# Patient Record
Sex: MALE | Race: WHITE
[De-identification: names, ages, dates, MRNs, and addresses within clinical notes are randomized per-mention and may not be internally consistent; named-entity substitution may affect disease eponyms.]

---

## 2019-03-27 ENCOUNTER — TRANSCRIPTION ENCOUNTER (OUTPATIENT)
Age: 59
End: 2019-03-27

## 2022-01-19 ENCOUNTER — APPOINTMENT (OUTPATIENT)
Dept: GASTROENTEROLOGY | Facility: CLINIC | Age: 62
End: 2022-01-19
Payer: COMMERCIAL

## 2022-01-19 VITALS
TEMPERATURE: 97 F | SYSTOLIC BLOOD PRESSURE: 170 MMHG | BODY MASS INDEX: 42.44 KG/M2 | HEART RATE: 67 BPM | HEIGHT: 68 IN | OXYGEN SATURATION: 95 % | WEIGHT: 280 LBS | DIASTOLIC BLOOD PRESSURE: 90 MMHG

## 2022-01-19 DIAGNOSIS — E03.9 HYPOTHYROIDISM, UNSPECIFIED: ICD-10-CM

## 2022-01-19 DIAGNOSIS — Z86.010 PERSONAL HISTORY OF COLONIC POLYPS: ICD-10-CM

## 2022-01-19 DIAGNOSIS — Z87.440 PERSONAL HISTORY OF URINARY (TRACT) INFECTIONS: ICD-10-CM

## 2022-01-19 DIAGNOSIS — K80.20 CALCULUS OF GALLBLADDER W/OUT CHOLECYSTITIS W/OUT OBSTRUCTION: ICD-10-CM

## 2022-01-19 DIAGNOSIS — Z87.19 PERSONAL HISTORY OF OTHER DISEASES OF THE DIGESTIVE SYSTEM: ICD-10-CM

## 2022-01-19 DIAGNOSIS — I10 ESSENTIAL (PRIMARY) HYPERTENSION: ICD-10-CM

## 2022-01-19 DIAGNOSIS — G47.33 OBSTRUCTIVE SLEEP APNEA (ADULT) (PEDIATRIC): ICD-10-CM

## 2022-01-19 DIAGNOSIS — K58.2 MIXED IRRITABLE BOWEL SYNDROME: ICD-10-CM

## 2022-01-19 DIAGNOSIS — Z78.9 OTHER SPECIFIED HEALTH STATUS: ICD-10-CM

## 2022-01-19 DIAGNOSIS — K76.0 FATTY (CHANGE OF) LIVER, NOT ELSEWHERE CLASSIFIED: ICD-10-CM

## 2022-01-19 DIAGNOSIS — M51.26 OTHER INTERVERTEBRAL DISC DISPLACEMENT, LUMBAR REGION: ICD-10-CM

## 2022-01-19 DIAGNOSIS — K35.32 ACUTE APPENDICITIS W/ PERFORATION AND LOCALIZED PERITONITIS, W/O ABSCESS: ICD-10-CM

## 2022-01-19 DIAGNOSIS — Z87.01 PERSONAL HISTORY OF PNEUMONIA (RECURRENT): ICD-10-CM

## 2022-01-19 PROCEDURE — 99205 OFFICE O/P NEW HI 60 MIN: CPT

## 2022-01-19 RX ORDER — DULOXETINE HYDROCHLORIDE 60 MG/1
60 CAPSULE, DELAYED RELEASE ORAL
Refills: 0 | Status: ACTIVE | COMMUNITY

## 2022-01-19 RX ORDER — PANTOPRAZOLE SODIUM 40 MG/1
40 TABLET, DELAYED RELEASE ORAL
Refills: 0 | Status: ACTIVE | COMMUNITY

## 2022-01-19 RX ORDER — ATENOLOL 100 MG/1
100 TABLET ORAL
Refills: 0 | Status: ACTIVE | COMMUNITY

## 2022-01-19 RX ORDER — AMLODIPINE BESYLATE 5 MG/1
5 TABLET ORAL
Refills: 0 | Status: ACTIVE | COMMUNITY

## 2022-01-19 RX ORDER — HYOSCYAMINE SULFATE 0.375 MG
0.38 CAPSULE, EXTENDED RELEASE 12 HR ORAL
Refills: 0 | Status: ACTIVE | COMMUNITY

## 2022-01-19 RX ORDER — GABAPENTIN 300 MG/1
300 CAPSULE ORAL
Refills: 0 | Status: ACTIVE | COMMUNITY

## 2022-01-19 RX ORDER — LORATADINE 10 MG
TABLET,DISINTEGRATING ORAL
Refills: 0 | Status: ACTIVE | COMMUNITY

## 2022-01-19 RX ORDER — LEVOTHYROXINE SODIUM 75 UG/1
75 TABLET ORAL
Refills: 0 | Status: ACTIVE | COMMUNITY

## 2022-01-19 NOTE — CONSULT LETTER
[Dear  ___] : Dear  [unfilled], [Consult Letter:] : I had the pleasure of evaluating your patient, [unfilled]. [( Thank you for referring [unfilled] for consultation for _____ )] : Thank you for referring [unfilled] for consultation for [unfilled] [FreeTextEntry2] : Dr. Shannan Victoria

## 2022-01-19 NOTE — ASSESSMENT
[FreeTextEntry1] : Impression:\par \par #1 GERD–decade long history of symptoms described as typical heartburn described as retrosternal burning in association with regurgitation and epigastric burning and discomfort. Symptoms almost exclusively nocturnal when recumbent. Per patient–esophageal physiology testing noted for hypotensive LES (await reports for review and confirmation).\par \par #2 dysphagia–describes solid food dysphagia of 10 years duration. Evaluate for stricture. May be esophageal dysmotility related to ineffective motility from chronic GERD.\par \par #3 IBS–typical symptoms of intermittent lower abdominal crampy discomfort in association with regular bowel movements (mostly constipation improved on MiraLAX although has occasional diarrhea).\par \par #4 nonalcoholic fatty liver disease–associated with obesity.\par \par #5 history of colonic polyps–per patient (if had adenoma or sessile serrated polyp would pose increased risk of metachronous lesions).\par \par #6 perforated appendicitis 1971–history of peritonitis. Status post appendectomy.\par \par #7 status post laparoscopic cholecystectomy 1997.\par \par #8 history recurrent small bowel obstruction–attributed to adhesions. Status post laparotomy for SBO 1995.\par \par #9 abdominal wall hernias–hernia repair with mesh 1996 and ventral hernia repair 1998.\par \par #10 obesity–BMI 42.57.\par \par #11 history duodenal ulcer–per patient. Reports testing negative for Helicobacter pylori.\par \par General medical problems:\par \par -Hypertension.\par \par -Hypothyroid.\par \par -History pneumonia.\par \par -Obstructive sleep apnea (on CPAP).\par \par -History lumbar discogenic disease status post lumbar laminectomy 2007. Apparently had complications requiring reoperation. Has chronic low back pain and impaired ambulation.

## 2022-01-19 NOTE — REVIEW OF SYSTEMS
[As Noted in HPI] : as noted in HPI [Limb Pain] : limb pain [Dizziness] : dizziness [Difficulty Walking] : difficulty walking [Anxiety] : anxiety [Depression] : depression [Negative] : Heme/Lymph [FreeTextEntry9] : Chronic back pain. Difficulty ambulating. Joint pain, neck pain, back pain. [de-identified] : Lower extremity numbness.

## 2022-01-19 NOTE — PHYSICAL EXAM
[General Appearance - Alert] : alert [General Appearance - In No Acute Distress] : in no acute distress [General Appearance - Well Developed] : well developed [General Appearance - Well-Appearing] : healthy appearing [Sclera] : the sclera and conjunctiva were normal [Neck Appearance] : the appearance of the neck was normal [Neck Cervical Mass (___cm)] : no neck mass was observed [Respiration, Rhythm And Depth] : normal respiratory rhythm and effort [Exaggerated Use Of Accessory Muscles For Inspiration] : no accessory muscle use [Auscultation Breath Sounds / Voice Sounds] : lungs were clear to auscultation bilaterally [Heart Rate And Rhythm] : heart rate was normal and rhythm regular [Heart Sounds] : normal S1 and S2 [Heart Sounds Gallop] : no gallops [Murmurs] : no murmurs [Heart Sounds Pericardial Friction Rub] : no pericardial rub [Bowel Sounds] : normal bowel sounds [Abdomen Soft] : soft [Abdomen Tenderness] : non-tender [] : no hepato-splenomegaly [Abdomen Mass (___ Cm)] : no abdominal mass palpated [Abdomen Hernia] : no hernia was discovered [Cervical Lymph Nodes Enlarged Posterior Bilaterally] : posterior cervical [Cervical Lymph Nodes Enlarged Anterior Bilaterally] : anterior cervical [Supraclavicular Lymph Nodes Enlarged Bilaterally] : supraclavicular [No CVA Tenderness] : no ~M costovertebral angle tenderness [Nail Clubbing] : no clubbing  or cyanosis of the fingernails [Skin Color & Pigmentation] : normal skin color and pigmentation [Oriented To Time, Place, And Person] : oriented to person, place, and time [Impaired Insight] : insight and judgment were intact [Affect] : the affect was normal [Mood] : the mood was normal [FreeTextEntry1] : Ambulates with a cane.

## 2022-01-19 NOTE — HISTORY OF PRESENT ILLNESS
[FreeTextEntry1] : Office consultation on 1/19/2022.\par \par The patient is a 61-year-old man evaluated for consultation regarding severe GERD symptoms. PMD: Dr. Cass Victoria.\par \par The patient seeks consultation regarding severe persistent GERD symptoms. In addition, he has a history of IBS, nonalcoholic fatty liver disease, obesity and warrants screening colonoscopy.\par \par Patient indicates issues of heartburn and GERD since teenage years. Longstanding heartburn described as typical retrosternal burning. In his 20s he was in the [employer] for 6 years and was diagnosed with GERD and at varying times treated with dietary modification, Zantac or Tagamet.\par \par The patient underwent lumbar laminectomy in 2007 and indicates having a poor outcome. Increased back pain associated with difficulty ambulating. Has gained significant weight. GERD symptoms have increased in severity since that time.\par \par Patient indicates last colonoscopy was in 2013 and last upper endoscopy in 2016. Patient believes colon polyps were removed at prior colonoscopy examinations.\par \par Indicates being evaluated by GI at [place] and underwent's esophageal function testing describing weak LES. Indicates was recommended to have Stretta procedure but insurance would not cover. Never evaluated by thoracic surgery for antireflux surgery. Patient unsure as to the type of diagnostic evaluation but may have had esophageal HRM and possibly ambulatory pH monitoring.\par \par Patient indicates daily complaints of heartburn and regurgitation. However, symptoms almost exclusively when recumbent. Does not indicate significant daytime symptoms when upright. When recumbent describes sensation of feeling regurgitation ("something coming up") in association with heartburn described as retrosternal burning. Experiences associated epigastric burning and discomfort in conjunction with heartburn symptoms.\par \par Currently on a regimen of pantoprazole 40 mg twice daily administered AC. Indicates he has tried other PPI therapies but finds pantoprazole works the best. As noted, despite this regimen he indicates breakthrough heartburn almost on a daily basis at night when recumbent. In the past has utilized H2 blockers but not recently. Tends to sleep with head of bed elevated.\par \par Appetite stable. Since back surgery has gained considerable weight. However, over the past 6 months he has lost 25 pounds. Swallows liquids without any difficulty and reports no choking, coughing or nasal regurgitation. For the past 10 years he has had intermittent episodes of solid food dysphagia described as transient sense of bolus hang up of solid food located at the sternal notch. Culprit foods might include bread. Episodes occur approximately 3 times per week. Typically drinks water to push bolus down. Does not describe having to retch bolus. Never had an episode of esophageal food bolus impaction.\par \par As noted, experiencing daily episodes of heartburn feelings of regurgitation but almost exclusively when recumbent. Has occasional nausea but no vomiting. Past 2 to 3 years describes feeling of early satiety. As noted, in association with GERD symptoms describes sensation of epigastric burning and feeling of tenderness at epigastrium.\par \par Patient also reports a history of IBS. Indicates experiencing intermittent lower abdominal cramps in association with irregular bowel movements. Mostly on the constipated side and on a regimen of MiraLAX he typically has Wright 5 or Wright 6 soft bowel movements daily to every other day. Mostly constipated but has occasional diarrhea. Denies any rectal bleeding. Stress exacerbates IBS symptoms.\par \par In addition to history of GERD patient reports prior diagnosis of duodenal ulcer but indicates he was Helicobacter pylori negative. Has history of gallstones and underwent laparoscopic cholecystectomy in 1997.\par \par Experienced perforated ruptured appendicitis complicated by peritonitis in 1971. Describes prolonged hospitalization of 1 month at that time. Experience pneumonia.\par \par Operated on for small bowel obstruction attributed to adhesions in 1995. Does not believe he had bowel resection.\par \par Underwent "acute ventral hernia repair" in 1998 and also describes abdominal hernia repair with mesh insertion in 1996.\par \par Has a history of obesity and elevated liver enzymes attributed to nonalcoholic fatty liver disease.\par \par Patient reports no other history of digestive illness except as noted. Family history of colon cancer is not reported.

## 2022-01-24 ENCOUNTER — NON-APPOINTMENT (OUTPATIENT)
Age: 62
End: 2022-01-24

## 2022-01-31 NOTE — ASU PATIENT PROFILE, ADULT - FALL HARM RISK - UNIVERSAL INTERVENTIONS
Bed in lowest position, wheels locked, appropriate side rails in place/Call bell, personal items and telephone in reach/Instruct patient to call for assistance before getting out of bed or chair/Non-slip footwear when patient is out of bed/[place] to call system/Physically safe environment - no spills, clutter or unnecessary equipment/Purposeful Proactive Rounding/Room/bathroom lighting operational, light cord in reach

## 2022-01-31 NOTE — ASU PATIENT PROFILE, ADULT - PAIN LOCATION
patient lives with chronic Pain. patient takes morphine at home for Patient had an acceptable level of pain while in Endoscopy. and repositions.

## 2022-01-31 NOTE — ASU PATIENT PROFILE, ADULT - NSICDXPASTMEDICALHX_GEN_ALL_CORE_FT
PAST MEDICAL HISTORY:  GERD (gastroesophageal reflux disease)     HTN (hypertension)     IBS (irritable bowel syndrome)     SBO (small bowel obstruction)

## 2022-01-31 NOTE — ASU PATIENT PROFILE, ADULT - NSICDXPASTSURGICALHX_GEN_ALL_CORE_FT
PAST SURGICAL HISTORY:  H/O exploratory laparotomy     H/O hernia repair     History of appendectomy     History of lumbar fusion

## 2022-02-01 ENCOUNTER — NON-APPOINTMENT (OUTPATIENT)
Age: 62
End: 2022-02-01

## 2022-02-01 ENCOUNTER — OUTPATIENT (OUTPATIENT)
Dept: OUTPATIENT SERVICES | Facility: HOSPITAL | Age: 62
LOS: 1 days | Discharge: HOME | End: 2022-02-01
Payer: MEDICARE

## 2022-02-01 ENCOUNTER — RESULT REVIEW (OUTPATIENT)
Age: 62
End: 2022-02-01

## 2022-02-01 ENCOUNTER — APPOINTMENT (OUTPATIENT)
Dept: GASTROENTEROLOGY | Facility: HOSPITAL | Age: 62
End: 2022-02-01

## 2022-02-01 VITALS
HEIGHT: 69 IN | RESPIRATION RATE: 17 BRPM | DIASTOLIC BLOOD PRESSURE: 82 MMHG | WEIGHT: 274.92 LBS | HEART RATE: 66 BPM | TEMPERATURE: 99 F | OXYGEN SATURATION: 98 % | SYSTOLIC BLOOD PRESSURE: 161 MMHG

## 2022-02-01 VITALS
OXYGEN SATURATION: 98 % | RESPIRATION RATE: 20 BRPM | SYSTOLIC BLOOD PRESSURE: 141 MMHG | DIASTOLIC BLOOD PRESSURE: 80 MMHG

## 2022-02-01 DIAGNOSIS — Z98.890 OTHER SPECIFIED POSTPROCEDURAL STATES: Chronic | ICD-10-CM

## 2022-02-01 DIAGNOSIS — Z98.1 ARTHRODESIS STATUS: Chronic | ICD-10-CM

## 2022-02-01 DIAGNOSIS — Z12.11 ENCOUNTER FOR SCREENING FOR MALIGNANT NEOPLASM OF COLON: ICD-10-CM

## 2022-02-01 PROCEDURE — 45385 COLONOSCOPY W/LESION REMOVAL: CPT

## 2022-02-01 PROCEDURE — 43239 EGD BIOPSY SINGLE/MULTIPLE: CPT

## 2022-02-01 PROCEDURE — 88305 TISSUE EXAM BY PATHOLOGIST: CPT | Mod: 26

## 2022-02-01 DEVICE — CLIP RESOLUTION 360 235CM: Type: IMPLANTABLE DEVICE | Status: FUNCTIONAL

## 2022-02-01 DEVICE — CATH ENDOFLIP MEASURE 16MM: Type: IMPLANTABLE DEVICE | Status: FUNCTIONAL

## 2022-02-04 LAB — SURGICAL PATHOLOGY STUDY: SIGNIFICANT CHANGE UP

## 2022-02-06 ENCOUNTER — NON-APPOINTMENT (OUTPATIENT)
Age: 62
End: 2022-02-06

## 2022-02-06 DIAGNOSIS — Z12.11 ENCOUNTER FOR SCREENING FOR MALIGNANT NEOPLASM OF COLON: ICD-10-CM

## 2022-02-09 PROBLEM — K58.9 IRRITABLE BOWEL SYNDROME WITHOUT DIARRHEA: Chronic | Status: ACTIVE | Noted: 2022-02-01

## 2022-02-09 PROBLEM — K58.9 IRRITABLE BOWEL SYNDROME, UNSPECIFIED: Chronic | Status: ACTIVE | Noted: 2022-02-01

## 2022-02-09 PROBLEM — K56.609 UNSPECIFIED INTESTINAL OBSTRUCTION, UNSPECIFIED AS TO PARTIAL VERSUS COMPLETE OBSTRUCTION: Chronic | Status: ACTIVE | Noted: 2022-02-01

## 2022-02-09 PROBLEM — I10 ESSENTIAL (PRIMARY) HYPERTENSION: Chronic | Status: ACTIVE | Noted: 2022-02-01

## 2022-02-14 ENCOUNTER — APPOINTMENT (OUTPATIENT)
Dept: GASTROENTEROLOGY | Facility: CLINIC | Age: 62
End: 2022-02-14
Payer: MEDICARE

## 2022-02-14 PROCEDURE — 99442: CPT

## 2022-02-14 RX ORDER — SODIUM SULFATE, POTASSIUM SULFATE, MAGNESIUM SULFATE 17.5; 3.13; 1.6 G/ML; G/ML; G/ML
17.5-3.13-1.6 SOLUTION, CONCENTRATE ORAL
Qty: 1 | Refills: 0 | Status: COMPLETED | COMMUNITY
Start: 2022-01-19 | End: 2022-02-14

## 2022-02-15 ENCOUNTER — NON-APPOINTMENT (OUTPATIENT)
Age: 62
End: 2022-02-15

## 2022-02-16 ENCOUNTER — NON-APPOINTMENT (OUTPATIENT)
Age: 62
End: 2022-02-16

## 2022-02-21 ENCOUNTER — TRANSCRIPTION ENCOUNTER (OUTPATIENT)
Age: 62
End: 2022-02-21

## 2022-02-28 ENCOUNTER — NON-APPOINTMENT (OUTPATIENT)
Age: 62
End: 2022-02-28

## 2022-03-11 ENCOUNTER — APPOINTMENT (OUTPATIENT)
Dept: GASTROENTEROLOGY | Facility: HOSPITAL | Age: 62
End: 2022-03-11

## 2022-04-06 ENCOUNTER — NON-APPOINTMENT (OUTPATIENT)
Age: 62
End: 2022-04-06

## 2022-04-11 NOTE — ASU PATIENT PROFILE, ADULT - FALL HARM RISK - UNIVERSAL INTERVENTIONS
Bed in lowest position, wheels locked, appropriate side rails in place/Call bell, personal items and telephone in reach/Instruct patient to call for assistance before getting out of bed or chair/Non-slip footwear when patient is out of bed/[place] to call system/Physically safe environment - no spills, clutter or unnecessary equipment/Purposeful Proactive Rounding/Room/bathroom lighting operational, light cord in reach

## 2022-04-12 ENCOUNTER — OUTPATIENT (OUTPATIENT)
Dept: OUTPATIENT SERVICES | Facility: HOSPITAL | Age: 62
LOS: 1 days | Discharge: HOME | End: 2022-04-12
Payer: MEDICARE

## 2022-04-12 ENCOUNTER — APPOINTMENT (OUTPATIENT)
Dept: GASTROENTEROLOGY | Facility: HOSPITAL | Age: 62
End: 2022-04-12
Payer: MEDICARE

## 2022-04-12 VITALS
HEIGHT: 68 IN | TEMPERATURE: 97 F | SYSTOLIC BLOOD PRESSURE: 147 MMHG | RESPIRATION RATE: 16 BRPM | OXYGEN SATURATION: 100 % | HEART RATE: 88 BPM | WEIGHT: 265 LBS | DIASTOLIC BLOOD PRESSURE: 88 MMHG

## 2022-04-12 DIAGNOSIS — K21.9 GASTRO-ESOPHAGEAL REFLUX DISEASE WITHOUT ESOPHAGITIS: ICD-10-CM

## 2022-04-12 DIAGNOSIS — R13.19 OTHER DYSPHAGIA: ICD-10-CM

## 2022-04-12 DIAGNOSIS — Z98.1 ARTHRODESIS STATUS: Chronic | ICD-10-CM

## 2022-04-12 DIAGNOSIS — Z98.890 OTHER SPECIFIED POSTPROCEDURAL STATES: Chronic | ICD-10-CM

## 2022-04-12 PROCEDURE — 91037 ESOPH IMPED FUNCTION TEST: CPT | Mod: 26

## 2022-04-12 PROCEDURE — 91010 ESOPHAGUS MOTILITY STUDY: CPT | Mod: 26

## 2022-04-12 DEVICE — CATH VERSAFLEX Z PH: Type: IMPLANTABLE DEVICE | Status: FUNCTIONAL

## 2022-04-13 PROCEDURE — 91038 ESOPH IMPED FUNCT TEST > 1HR: CPT | Mod: 26

## 2022-05-10 ENCOUNTER — RX RENEWAL (OUTPATIENT)
Age: 62
End: 2022-05-10

## 2022-05-10 ENCOUNTER — NON-APPOINTMENT (OUTPATIENT)
Age: 62
End: 2022-05-10

## 2022-05-19 ENCOUNTER — APPOINTMENT (OUTPATIENT)
Dept: GASTROENTEROLOGY | Facility: CLINIC | Age: 62
End: 2022-05-19

## 2022-05-25 PROBLEM — R13.19 ESOPHAGEAL DYSPHAGIA: Status: ACTIVE | Noted: 2022-01-19

## 2022-05-26 ENCOUNTER — NON-APPOINTMENT (OUTPATIENT)
Age: 62
End: 2022-05-26

## 2022-05-26 ENCOUNTER — APPOINTMENT (OUTPATIENT)
Dept: THORACIC SURGERY | Facility: CLINIC | Age: 62
End: 2022-05-26
Payer: MEDICARE

## 2022-05-26 VITALS
DIASTOLIC BLOOD PRESSURE: 84 MMHG | BODY MASS INDEX: 42.44 KG/M2 | HEIGHT: 68 IN | OXYGEN SATURATION: 96 % | WEIGHT: 280 LBS | HEART RATE: 73 BPM | RESPIRATION RATE: 17 BRPM | SYSTOLIC BLOOD PRESSURE: 142 MMHG

## 2022-05-26 DIAGNOSIS — Z82.49 FAMILY HISTORY OF ISCHEMIC HEART DISEASE AND OTHER DISEASES OF THE CIRCULATORY SYSTEM: ICD-10-CM

## 2022-05-26 DIAGNOSIS — Z82.3 FAMILY HISTORY OF STROKE: ICD-10-CM

## 2022-05-26 DIAGNOSIS — R13.19 OTHER DYSPHAGIA: ICD-10-CM

## 2022-05-26 DIAGNOSIS — Z83.3 FAMILY HISTORY OF DIABETES MELLITUS: ICD-10-CM

## 2022-05-26 DIAGNOSIS — Z86.59 PERSONAL HISTORY OF OTHER MENTAL AND BEHAVIORAL DISORDERS: ICD-10-CM

## 2022-05-26 DIAGNOSIS — Z80.0 FAMILY HISTORY OF MALIGNANT NEOPLASM OF DIGESTIVE ORGANS: ICD-10-CM

## 2022-05-26 PROCEDURE — 99205 OFFICE O/P NEW HI 60 MIN: CPT

## 2022-05-26 RX ORDER — GUAIFENESIN 1200 MG/1
500 TABLET, EXTENDED RELEASE ORAL
Refills: 0 | Status: ACTIVE | COMMUNITY

## 2022-05-26 RX ORDER — METFORMIN HYDROCHLORIDE 1000 MG/1
1000 TABLET, COATED ORAL
Refills: 0 | Status: ACTIVE | COMMUNITY

## 2022-05-26 RX ORDER — ATORVASTATIN CALCIUM 10 MG/1
10 TABLET, FILM COATED ORAL
Refills: 0 | Status: ACTIVE | COMMUNITY

## 2022-05-26 RX ORDER — LORATADINE 10 MG
17 TABLET,DISINTEGRATING ORAL
Refills: 0 | Status: ACTIVE | COMMUNITY

## 2022-05-26 RX ORDER — DOCUSATE SODIUM 100 MG/1
100 CAPSULE ORAL
Refills: 0 | Status: ACTIVE | COMMUNITY

## 2022-05-26 RX ORDER — TIZANIDINE 4 MG/1
4 TABLET ORAL
Refills: 0 | Status: ACTIVE | COMMUNITY

## 2022-05-26 RX ORDER — ZOLPIDEM TARTRATE 5 MG/1
5 TABLET, FILM COATED ORAL
Refills: 0 | Status: ACTIVE | COMMUNITY

## 2022-05-26 RX ORDER — LOSARTAN POTASSIUM 25 MG/1
25 TABLET, FILM COATED ORAL
Refills: 0 | Status: ACTIVE | COMMUNITY

## 2022-05-26 RX ORDER — MOMETASONE FUROATE 1 MG/G
0.1 CREAM TOPICAL
Refills: 0 | Status: ACTIVE | COMMUNITY

## 2022-05-26 NOTE — ASSESSMENT
[FreeTextEntry1] : Mr. STEPHANIE LAWTON, 62 year old male, never smoker, w/ hx of HTN, Depression/Anxiety, Sleep Apnea on CPAP, chronic GERD since age 17, been on PPIs, currently on Protonix 40mg BID and Pepcid QHS.\par \par EGD on 2/1/22 by Dr. Patel: GE Flap valve classified as Hill Grade I, multiple gastric body and fundus polyps.\par \par Patient also had 24-hour pH P. while on Dexilant 60 mg twice a day/Zantac 300 each bedtime\par Protocol was 6 channel impedance catheter with 2 Versed flex pH sensors -10 cm in the distal esophagus and 5 cm above in the proximal esophagus. The total number of acid events was 74, with a AET of 3.6, upright AET of 6.6%, supine AET of 0.3%, Gastric acid AET was one hour and one minute in 24-hour period\par \par BRAVO pH Study on 4/12/22: DeMeester = 78.3\par Manometry on 4/12/22 by Dr. Eden Matthews. LES = 34.2 (13-43), UES = 34.6 (). Normal LES pressures w/ complete deglutitive relaxation. On supine, 100% swallows w/ normal amplitude peristalsis. On supine, 100% swallows w/ incomplete bolus clearance by impedence analysis w/ fluid escape at the level of the upper esophagus (where skeletal and smooth muscle meet). 1.5cm hiatal hernia. Normal motility study.\par \par I have reviewed the patient's medical records and diagnostic images at time of this office consultation and have made the following recommendation:\par 1. GI studies reviewed, d/w Dr. Eden Matthews, I recommended an EGD, Lap, Robotic-assisted, repair of hiatal hernia, fundoplication on 6/28/22. Risks and benefits and alternatives explained to patient, all questions answered, patient agreed to proceed with surgery.\par 2. CT CHest w/o contrast \par 3. Barium Esophagram\par 4. Medical clearance, PST, COVID-testing \par \par \par I personally performed the services described in the documentation, reviewed the documentation recorded by the scribe in my presence and it accurately and completely records my words and actions.\par \par Sammie NEW NP, am scribing for and the presence of CARLOS Kaufman, the following sections HISTORY OF PRESENT ILLNESS, PAST MEDICAL/FAMILY/SOCIAL HISTORY; REVIEW OF SYSTEMS; VITAL SIGNS; PHYSICAL EXAM; DISPOSITION.\par \par

## 2022-05-26 NOTE — PHYSICAL EXAM
[Fully active, able to carry on all pre-disease performance without restriction] : Status 0 - Fully active, able to carry on all pre-disease performance without restriction [General Appearance - Alert] : alert [General Appearance - In No Acute Distress] : in no acute distress [Sclera] : the sclera and conjunctiva were normal [PERRL With Normal Accommodation] : pupils were equal in size, round, and reactive to light [Extraocular Movements] : extraocular movements were intact [Outer Ear] : the ears and nose were normal in appearance [Oropharynx] : the oropharynx was normal [Neck Appearance] : the appearance of the neck was normal [Neck Cervical Mass (___cm)] : no neck mass was observed [Jugular Venous Distention Increased] : there was no jugular-venous distention [Thyroid Diffuse Enlargement] : the thyroid was not enlarged [Thyroid Nodule] : there were no palpable thyroid nodules [Auscultation Breath Sounds / Voice Sounds] : lungs were clear to auscultation bilaterally [Heart Rate And Rhythm] : heart rate was normal and rhythm regular [Heart Sounds] : normal S1 and S2 [Heart Sounds Gallop] : no gallops [Murmurs] : no murmurs [Heart Sounds Pericardial Friction Rub] : no pericardial rub [Examination Of The Chest] : the chest was normal in appearance [Chest Visual Inspection Thoracic Asymmetry] : no chest asymmetry [Diminished Respiratory Excursion] : normal chest expansion [2+] : left 2+ [Breast Appearance] : normal in appearance [Breast Palpation Mass] : no palpable masses [Bowel Sounds] : normal bowel sounds [Abdomen Soft] : soft [Abdomen Tenderness] : non-tender [Abdomen Mass (___ Cm)] : no abdominal mass palpated [Cervical Lymph Nodes Enlarged Posterior Bilaterally] : posterior cervical [Cervical Lymph Nodes Enlarged Anterior Bilaterally] : anterior cervical [Supraclavicular Lymph Nodes Enlarged Bilaterally] : supraclavicular [No CVA Tenderness] : no ~M costovertebral angle tenderness [No Spinal Tenderness] : no spinal tenderness [Abnormal Walk] : normal gait [Nail Clubbing] : no clubbing  or cyanosis of the fingernails [Musculoskeletal - Swelling] : no joint swelling seen [Motor Tone] : muscle strength and tone were normal [Skin Color & Pigmentation] : normal skin color and pigmentation [Skin Turgor] : normal skin turgor [] : no rash [Deep Tendon Reflexes (DTR)] : deep tendon reflexes were 2+ and symmetric [Sensation] : the sensory exam was normal to light touch and pinprick [No Focal Deficits] : no focal deficits [Oriented To Time, Place, And Person] : oriented to person, place, and time [Impaired Insight] : insight and judgment were intact [Affect] : the affect was normal [FreeTextEntry1] : +cane

## 2022-05-26 NOTE — CONSULT LETTER
[Consult Letter:] : I had the pleasure of evaluating your patient, [unfilled]. [( Thank you for referring [unfilled] for consultation for _____ )] : Thank you for referring [unfilled] for consultation for [unfilled] [Please see my note below.] : Please see my note below. [Consult Closing:] : Thank you very much for allowing me to participate in the care of this patient.  If you have any questions, please do not hesitate to contact me. [Sincerely,] : Sincerely, [FreeTextEntry2] : Dr. Eden Matthews (GI Motility/Referring) [FreeTextEntry3] : Rex Dent MD, MPH \par System Director of Thoracic Surgery \par Director of Comprehensive Lung [facility] [place] \par Professor Cardiovascular & Thoracic Surgery  \par [facility]\par \par [facility]\par [address]\par Oncology [address]\[place] [address]\par Tel: [phone]\par Fax: [phone]\par

## 2022-05-26 NOTE — HISTORY OF PRESENT ILLNESS
[FreeTextEntry1] : Mr. STEPHANIE LAWTON, 62 year old male, never smoker, w/ hx of HTN, Depression/Anxiety, Sleep Apnea on CPAP, chronic GERD since age 17, been on PPIs, currently on Protonix 40mg BID and Pepcid QHS.\par \par EGD on 2/1/22 by Dr. Patel: GE Flap valve classified as Hill Grade I, multiple gastric body and fundus polyps.\par \par Patient also had 24-hour pH P. while on Dexilant 60 mg twice a day/Zantac 300 each bedtime\par Protocol was 6 channel impedance catheter with 2 Versed flex pH sensors -10 cm in the distal esophagus and 5 cm above in the proximal esophagus. The total number of acid events was 74, with a AET of 3.6, upright AET of 6.6%, supine AET of 0.3%, Gastric acid AET was one hour and one minute in 24-hour period\par \par BRAVO pH Study on 4/12/22: DeMeester = 78.3\par Manometry on 4/12/22 by Dr. Eden Matthews. LES = 34.2 (13-43), UES = 34.6 (). Normal LES pressures w/ complete deglutitive relaxation. On supine, 100% swallows w/ normal amplitude peristalsis. On supine, 100% swallows w/ incomplete bolus clearance by impedence analysis w/ fluid escape at the level of the upper esophagus (where skeletal and smooth muscle meet). 1.5cm hiatal hernia. Normal motility study.\par \par Patient is here today for CT Sx consultation, referred by Dr. Eden Matthews.\par

## 2022-06-09 ENCOUNTER — APPOINTMENT (OUTPATIENT)
Dept: CT IMAGING | Facility: CLINIC | Age: 62
End: 2022-06-09
Payer: MEDICARE

## 2022-06-09 ENCOUNTER — OUTPATIENT (OUTPATIENT)
Dept: OUTPATIENT SERVICES | Facility: HOSPITAL | Age: 62
LOS: 1 days | End: 2022-06-09
Payer: MEDICARE

## 2022-06-09 DIAGNOSIS — K44.9 DIAPHRAGMATIC HERNIA WITHOUT OBSTRUCTION OR GANGRENE: ICD-10-CM

## 2022-06-09 DIAGNOSIS — Z98.890 OTHER SPECIFIED POSTPROCEDURAL STATES: Chronic | ICD-10-CM

## 2022-06-09 DIAGNOSIS — Z98.1 ARTHRODESIS STATUS: Chronic | ICD-10-CM

## 2022-06-09 PROCEDURE — 71250 CT THORAX DX C-: CPT | Mod: 26

## 2022-06-09 PROCEDURE — 71250 CT THORAX DX C-: CPT

## 2022-06-13 ENCOUNTER — OUTPATIENT (OUTPATIENT)
Dept: OUTPATIENT SERVICES | Facility: HOSPITAL | Age: 62
LOS: 1 days | End: 2022-06-13
Payer: MEDICARE

## 2022-06-13 VITALS
WEIGHT: 285.06 LBS | RESPIRATION RATE: 14 BRPM | OXYGEN SATURATION: 99 % | HEART RATE: 60 BPM | DIASTOLIC BLOOD PRESSURE: 87 MMHG | TEMPERATURE: 98 F | SYSTOLIC BLOOD PRESSURE: 148 MMHG | HEIGHT: 67 IN

## 2022-06-13 DIAGNOSIS — Z98.1 ARTHRODESIS STATUS: Chronic | ICD-10-CM

## 2022-06-13 DIAGNOSIS — K21.00 GASTRO-ESOPHAGEAL REFLUX DISEASE WITH ESOPHAGITIS, WITHOUT BLEEDING: ICD-10-CM

## 2022-06-13 DIAGNOSIS — E11.9 TYPE 2 DIABETES MELLITUS WITHOUT COMPLICATIONS: ICD-10-CM

## 2022-06-13 DIAGNOSIS — Z98.890 OTHER SPECIFIED POSTPROCEDURAL STATES: Chronic | ICD-10-CM

## 2022-06-13 DIAGNOSIS — K21.9 GASTRO-ESOPHAGEAL REFLUX DISEASE WITHOUT ESOPHAGITIS: ICD-10-CM

## 2022-06-13 DIAGNOSIS — G47.33 OBSTRUCTIVE SLEEP APNEA (ADULT) (PEDIATRIC): ICD-10-CM

## 2022-06-13 DIAGNOSIS — I10 ESSENTIAL (PRIMARY) HYPERTENSION: ICD-10-CM

## 2022-06-13 DIAGNOSIS — E03.9 HYPOTHYROIDISM, UNSPECIFIED: ICD-10-CM

## 2022-06-13 DIAGNOSIS — Z90.49 ACQUIRED ABSENCE OF OTHER SPECIFIED PARTS OF DIGESTIVE TRACT: Chronic | ICD-10-CM

## 2022-06-13 DIAGNOSIS — Z87.898 PERSONAL HISTORY OF OTHER SPECIFIED CONDITIONS: ICD-10-CM

## 2022-06-13 LAB
A1C WITH ESTIMATED AVERAGE GLUCOSE RESULT: 5.9 % — HIGH (ref 4–5.6)
ALBUMIN SERPL ELPH-MCNC: 4.2 G/DL — SIGNIFICANT CHANGE UP (ref 3.3–5)
ALP SERPL-CCNC: 90 U/L — SIGNIFICANT CHANGE UP (ref 40–120)
ALT FLD-CCNC: 24 U/L — SIGNIFICANT CHANGE UP (ref 4–41)
ANION GAP SERPL CALC-SCNC: 14 MMOL/L — SIGNIFICANT CHANGE UP (ref 7–14)
AST SERPL-CCNC: 29 U/L — SIGNIFICANT CHANGE UP (ref 4–40)
BILIRUB SERPL-MCNC: 0.6 MG/DL — SIGNIFICANT CHANGE UP (ref 0.2–1.2)
BLD GP AB SCN SERPL QL: NEGATIVE — SIGNIFICANT CHANGE UP
BUN SERPL-MCNC: 13 MG/DL — SIGNIFICANT CHANGE UP (ref 7–23)
CALCIUM SERPL-MCNC: 9.8 MG/DL — SIGNIFICANT CHANGE UP (ref 8.4–10.5)
CHLORIDE SERPL-SCNC: 99 MMOL/L — SIGNIFICANT CHANGE UP (ref 98–107)
CO2 SERPL-SCNC: 22 MMOL/L — SIGNIFICANT CHANGE UP (ref 22–31)
CREAT SERPL-MCNC: 0.75 MG/DL — SIGNIFICANT CHANGE UP (ref 0.5–1.3)
EGFR: 103 ML/MIN/1.73M2 — SIGNIFICANT CHANGE UP
ESTIMATED AVERAGE GLUCOSE: 123 — SIGNIFICANT CHANGE UP
GLUCOSE SERPL-MCNC: 112 MG/DL — HIGH (ref 70–99)
HCT VFR BLD CALC: 41.5 % — SIGNIFICANT CHANGE UP (ref 39–50)
HGB BLD-MCNC: 14.4 G/DL — SIGNIFICANT CHANGE UP (ref 13–17)
MCHC RBC-ENTMCNC: 30.8 PG — SIGNIFICANT CHANGE UP (ref 27–34)
MCHC RBC-ENTMCNC: 34.7 GM/DL — SIGNIFICANT CHANGE UP (ref 32–36)
MCV RBC AUTO: 88.7 FL — SIGNIFICANT CHANGE UP (ref 80–100)
NRBC # BLD: 0 /100 WBCS — SIGNIFICANT CHANGE UP
NRBC # FLD: 0 K/UL — SIGNIFICANT CHANGE UP
PLATELET # BLD AUTO: 177 K/UL — SIGNIFICANT CHANGE UP (ref 150–400)
POTASSIUM SERPL-MCNC: 4 MMOL/L — SIGNIFICANT CHANGE UP (ref 3.5–5.3)
POTASSIUM SERPL-SCNC: 4 MMOL/L — SIGNIFICANT CHANGE UP (ref 3.5–5.3)
PROT SERPL-MCNC: 7.9 G/DL — SIGNIFICANT CHANGE UP (ref 6–8.3)
RBC # BLD: 4.68 M/UL — SIGNIFICANT CHANGE UP (ref 4.2–5.8)
RBC # FLD: 12.9 % — SIGNIFICANT CHANGE UP (ref 10.3–14.5)
RH IG SCN BLD-IMP: POSITIVE — SIGNIFICANT CHANGE UP
SODIUM SERPL-SCNC: 135 MMOL/L — SIGNIFICANT CHANGE UP (ref 135–145)
WBC # BLD: 8.12 K/UL — SIGNIFICANT CHANGE UP (ref 3.8–10.5)
WBC # FLD AUTO: 8.12 K/UL — SIGNIFICANT CHANGE UP (ref 3.8–10.5)

## 2022-06-13 PROCEDURE — 93010 ELECTROCARDIOGRAM REPORT: CPT

## 2022-06-13 NOTE — H&P PST ADULT - PROBLEM SELECTOR PLAN 5
Assessment and Plan: Patient instructed to take atenolol, norvasc, losartan on day of procedure, verbalized understanding.

## 2022-06-13 NOTE — H&P PST ADULT - TEMPERATURE IN FAHRENHEIT (DEGREES F)
Request for medical records. Received request for medical records from [address] for continuation of care. Last office note from Dr. Nam Woo, Chest X-ray from 4/28/22, CCT from 5/9/22 and PET scan from 5/19/22 was faxed to [phone] for continuation of care. 97.9

## 2022-06-13 NOTE — H&P PST ADULT - PROBLEM SELECTOR PLAN 3
Problem: Diabetes  Assessment and Plan: Patient instructed on medications adjustments: hold metformin on DOS

## 2022-06-13 NOTE — H&P PST ADULT - NSICDXPASTMEDICALHX_GEN_ALL_CORE_FT
PAST MEDICAL HISTORY:  Anxiety and depression     Diabetes     Gastro-esophageal reflux disease with esophagitis     H/O hiatal hernia     H/O neuropathy     H/O small bowel obstruction     H/O spinal stenosis     History of chronic pain     History of herniated intervertebral disc     HTN (hypertension)     Hypothyroidism     IBS (irritable bowel syndrome)     Obese     SBO (small bowel obstruction)     Unsteady gait

## 2022-06-13 NOTE — H&P PST ADULT - HISTORY OF PRESENT ILLNESS
62 yr old male presents with preop dx gastro-esophageal reflux diseases scheduled for robotic esophagogastroduodenoscopy, laparoscopic hiatal hernia repair, fundoplication, patient reports 60 lbs weight loss from hiatal hernia, chronic acid reflux, 'constant irritation", occasional nausea, denies vomiting, decreased appetite

## 2022-06-13 NOTE — H&P PST ADULT - NSICDXPASTSURGICALHX_GEN_ALL_CORE_FT
PAST SURGICAL HISTORY:  H/O exploratory laparotomy     H/O hernia repair abdominal    History of appendectomy     History of lumbar fusion     S/P cholecystectomy     S/P small bowel resection

## 2022-06-13 NOTE — H&P PST ADULT - NSICDXFAMILYHX_GEN_ALL_CORE_FT
FAMILY HISTORY:  Father  Still living? No  FH: heart disease, Age at diagnosis: Age Unknown    Mother  Still living? No  Family history of TIAs, Age at diagnosis: Age Unknown  FH: stomach cancer, Age at diagnosis: Age Unknown  FH: type 2 diabetes, Age at diagnosis: Age Unknown

## 2022-06-13 NOTE — H&P PST ADULT - PROBLEM SELECTOR PLAN 1
Assessment and Plan: Patient scheduled for surgery on 6/28/22  Patient provided with verbal and written presurgical instructions; verbalized understanding  with teach back.    Patient provided with famotidine for GI prophylaxis; verbalized understanding.    Patient provided with Chlorhexidine wash, verbal and written instructions reviewed. Patient demonstrated understanding with teach back.   Patient instructed to obtain preop covid pcr, lab directory provided

## 2022-06-13 NOTE — H&P PST ADULT - GASTROINTESTINAL COMMENTS
preop dx gastro-esophageal reflux diseases - hiatal hernia- adhesions IBS, preop dx gastro-esophageal reflux diseases - hiatal hernia

## 2022-06-17 PROBLEM — Z87.39 PERSONAL HISTORY OF OTHER DISEASES OF THE MUSCULOSKELETAL SYSTEM AND CONNECTIVE TISSUE: Chronic | Status: ACTIVE | Noted: 2022-06-13

## 2022-06-17 PROBLEM — Z87.19 PERSONAL HISTORY OF OTHER DISEASES OF THE DIGESTIVE SYSTEM: Chronic | Status: ACTIVE | Noted: 2022-06-13

## 2022-06-17 PROBLEM — F41.9 ANXIETY DISORDER, UNSPECIFIED: Chronic | Status: ACTIVE | Noted: 2022-06-13

## 2022-06-17 PROBLEM — G47.33 OBSTRUCTIVE SLEEP APNEA (ADULT) (PEDIATRIC): Chronic | Status: ACTIVE | Noted: 2022-06-13

## 2022-06-17 PROBLEM — R26.81 UNSTEADINESS ON FEET: Chronic | Status: ACTIVE | Noted: 2022-06-13

## 2022-06-17 PROBLEM — E66.9 OBESITY, UNSPECIFIED: Chronic | Status: ACTIVE | Noted: 2022-06-13

## 2022-06-17 PROBLEM — E03.9 HYPOTHYROIDISM, UNSPECIFIED: Chronic | Status: ACTIVE | Noted: 2022-06-13

## 2022-06-17 PROBLEM — Z87.898 PERSONAL HISTORY OF OTHER SPECIFIED CONDITIONS: Chronic | Status: ACTIVE | Noted: 2022-06-13

## 2022-06-17 PROBLEM — Z86.69 PERSONAL HISTORY OF OTHER DISEASES OF THE NERVOUS SYSTEM AND SENSE ORGANS: Chronic | Status: ACTIVE | Noted: 2022-06-13

## 2022-06-17 PROBLEM — E11.9 TYPE 2 DIABETES MELLITUS WITHOUT COMPLICATIONS: Chronic | Status: ACTIVE | Noted: 2022-06-13

## 2022-06-17 PROBLEM — K21.00 GASTRO-ESOPHAGEAL REFLUX DISEASE WITH ESOPHAGITIS, WITHOUT BLEEDING: Chronic | Status: ACTIVE | Noted: 2022-06-13

## 2022-06-28 ENCOUNTER — APPOINTMENT (OUTPATIENT)
Dept: THORACIC SURGERY | Facility: HOSPITAL | Age: 62
End: 2022-06-28

## 2022-08-02 ENCOUNTER — OUTPATIENT (OUTPATIENT)
Dept: OUTPATIENT SERVICES | Facility: HOSPITAL | Age: 62
LOS: 1 days | End: 2022-08-02

## 2022-08-02 VITALS
HEART RATE: 68 BPM | RESPIRATION RATE: 16 BRPM | WEIGHT: 177.91 LBS | OXYGEN SATURATION: 98 % | DIASTOLIC BLOOD PRESSURE: 86 MMHG | TEMPERATURE: 98 F | SYSTOLIC BLOOD PRESSURE: 148 MMHG | HEIGHT: 68 IN

## 2022-08-02 DIAGNOSIS — I10 ESSENTIAL (PRIMARY) HYPERTENSION: ICD-10-CM

## 2022-08-02 DIAGNOSIS — E11.9 TYPE 2 DIABETES MELLITUS WITHOUT COMPLICATIONS: ICD-10-CM

## 2022-08-02 DIAGNOSIS — E03.9 HYPOTHYROIDISM, UNSPECIFIED: ICD-10-CM

## 2022-08-02 DIAGNOSIS — K44.9 DIAPHRAGMATIC HERNIA WITHOUT OBSTRUCTION OR GANGRENE: ICD-10-CM

## 2022-08-02 DIAGNOSIS — Z98.1 ARTHRODESIS STATUS: Chronic | ICD-10-CM

## 2022-08-02 DIAGNOSIS — Z98.890 OTHER SPECIFIED POSTPROCEDURAL STATES: Chronic | ICD-10-CM

## 2022-08-02 DIAGNOSIS — Z90.49 ACQUIRED ABSENCE OF OTHER SPECIFIED PARTS OF DIGESTIVE TRACT: Chronic | ICD-10-CM

## 2022-08-02 DIAGNOSIS — Z87.19 PERSONAL HISTORY OF OTHER DISEASES OF THE DIGESTIVE SYSTEM: ICD-10-CM

## 2022-08-02 LAB
BLD GP AB SCN SERPL QL: NEGATIVE — SIGNIFICANT CHANGE UP
HCT VFR BLD CALC: 42.9 % — SIGNIFICANT CHANGE UP (ref 39–50)
HGB BLD-MCNC: 14.3 G/DL — SIGNIFICANT CHANGE UP (ref 13–17)
MCHC RBC-ENTMCNC: 31 PG — SIGNIFICANT CHANGE UP (ref 27–34)
MCHC RBC-ENTMCNC: 33.3 GM/DL — SIGNIFICANT CHANGE UP (ref 32–36)
MCV RBC AUTO: 93.1 FL — SIGNIFICANT CHANGE UP (ref 80–100)
NRBC # BLD: 0 /100 WBCS — SIGNIFICANT CHANGE UP
NRBC # FLD: 0 K/UL — SIGNIFICANT CHANGE UP
PLATELET # BLD AUTO: 171 K/UL — SIGNIFICANT CHANGE UP (ref 150–400)
RBC # BLD: 4.61 M/UL — SIGNIFICANT CHANGE UP (ref 4.2–5.8)
RBC # FLD: 12.7 % — SIGNIFICANT CHANGE UP (ref 10.3–14.5)
RH IG SCN BLD-IMP: POSITIVE — SIGNIFICANT CHANGE UP
WBC # BLD: 6.66 K/UL — SIGNIFICANT CHANGE UP (ref 3.8–10.5)
WBC # FLD AUTO: 6.66 K/UL — SIGNIFICANT CHANGE UP (ref 3.8–10.5)

## 2022-08-02 RX ORDER — ACETAMINOPHEN 500 MG
1 TABLET ORAL
Qty: 0 | Refills: 0 | DISCHARGE

## 2022-08-02 RX ORDER — METFORMIN HYDROCHLORIDE 850 MG/1
1 TABLET ORAL
Qty: 0 | Refills: 0 | DISCHARGE

## 2022-08-02 RX ORDER — HYOSCYAMINE SULFATE 0.13 MG
1 TABLET ORAL
Qty: 0 | Refills: 0 | DISCHARGE

## 2022-08-02 RX ORDER — SODIUM CHLORIDE 9 MG/ML
1000 INJECTION, SOLUTION INTRAVENOUS
Refills: 0 | Status: DISCONTINUED | OUTPATIENT
Start: 2022-08-10 | End: 2022-08-12

## 2022-08-02 RX ORDER — TIZANIDINE 4 MG/1
1 TABLET ORAL
Qty: 0 | Refills: 0 | DISCHARGE

## 2022-08-02 RX ORDER — MORPHINE SULFATE 50 MG/1
1 CAPSULE, EXTENDED RELEASE ORAL
Qty: 0 | Refills: 0 | DISCHARGE

## 2022-08-02 RX ORDER — ZOLPIDEM TARTRATE 10 MG/1
1 TABLET ORAL
Qty: 0 | Refills: 0 | DISCHARGE

## 2022-08-02 RX ORDER — AMLODIPINE BESYLATE 2.5 MG/1
1 TABLET ORAL
Qty: 0 | Refills: 0 | DISCHARGE

## 2022-08-02 RX ORDER — PANTOPRAZOLE SODIUM 20 MG/1
1 TABLET, DELAYED RELEASE ORAL
Qty: 0 | Refills: 0 | DISCHARGE

## 2022-08-02 RX ORDER — LOSARTAN POTASSIUM 100 MG/1
1 TABLET, FILM COATED ORAL
Qty: 0 | Refills: 0 | DISCHARGE

## 2022-08-02 RX ORDER — DULOXETINE HYDROCHLORIDE 30 MG/1
1 CAPSULE, DELAYED RELEASE ORAL
Qty: 0 | Refills: 0 | DISCHARGE

## 2022-08-02 RX ORDER — ATORVASTATIN CALCIUM 80 MG/1
1 TABLET, FILM COATED ORAL
Qty: 0 | Refills: 0 | DISCHARGE

## 2022-08-02 RX ORDER — ATENOLOL 25 MG/1
1 TABLET ORAL
Qty: 0 | Refills: 0 | DISCHARGE

## 2022-08-02 RX ORDER — GABAPENTIN 400 MG/1
1 CAPSULE ORAL
Qty: 0 | Refills: 0 | DISCHARGE

## 2022-08-02 RX ORDER — LEVOTHYROXINE SODIUM 125 MCG
1 TABLET ORAL
Qty: 0 | Refills: 0 | DISCHARGE

## 2022-08-02 RX ORDER — FAMOTIDINE 10 MG/ML
1 INJECTION INTRAVENOUS
Qty: 0 | Refills: 0 | DISCHARGE

## 2022-08-02 RX ORDER — MOMETASONE FUROATE 1 MG/G
1 CREAM TOPICAL
Qty: 0 | Refills: 0 | DISCHARGE

## 2022-08-02 NOTE — H&P PST ADULT - PROBLEM SELECTOR PLAN 1
Pt scheduled for surgery and preop instructions including instructions for taking Famotidine and for Chlorhexidine use in showering on the day of surgery, given verbally and with use of  written materials, and patient confirming understanding of such instructions using  teach back method.  Pt given information for COVID PCR testing sites and told to make appointment 3-5 days preop   MC requested for hx of sinus infection Pt scheduled for surgery and preop instructions including instructions for taking Famotidine and for Chlorhexidine use in showering on the day of surgery, given verbally and with use of  written materials, and patient confirming understanding of such instructions using  teach back method.  CBC repeated in PST with T&S   Email to [facility] Rubi - Pain Management - to inform of Morphine use prn   Pt given information for COVID PCR testing sites and told to make appointment 3-5 days preop   MC requested for hx of sinus infection

## 2022-08-02 NOTE — H&P PST ADULT - SUBSTANCE USE COMMENT, PROFILE
uses morphine 30 IR mg BID PRN uses morphine 30 IR mg BID PRN - last used 8/1/22 pm for sleep uses morphine 30 IR mg po PRN HS - last used 8/1/22 pm for sleep

## 2022-08-02 NOTE — H&P PST ADULT - NEGATIVE CARDIOVASCULAR SYMPTOMS
no chest pain/no palpitations/no dyspnea on exertion/no claudication no chest pain/no palpitations/no dyspnea on exertion

## 2022-08-02 NOTE — H&P PST ADULT - LYMPHATICS COMMENTS
Medical Necessity Clause: This procedure was medically necessary because the lesions that were treated were: itchy and irritated Add 52 Modifier (Optional): no Number Of Freeze-Thaw Cycles: 1 freeze-thaw cycle Medical Necessity Information: It is in your best interest to select a reason for this procedure from the list below. All of these items fulfill various CMS LCD requirements except the new and changing color options. Detail Level: Simple Post-Care Instructions: I reviewed with the patient in detail post-care instructions. Patient is to wear sunprotection, and avoid picking at any of the treated lesions. Pt may apply Vaseline to crusted or scabbing areas. Consent: The patient's verbal consent was obtained including but not limited to risks of crusting, scabbing, blistering, scarring, darker or lighter pigmentary change, recurrence, incomplete removal and infection. No supraclavicular or clavicular adenopathy noted on assessment

## 2022-08-02 NOTE — H&P PST ADULT - HISTORY OF PRESENT ILLNESS
62 yr old male presents with preop dx gastro-esophageal reflux diseases scheduled for robotic esophagogastroduodenoscopy, laparoscopic hiatal hernia repair, fundoplication, patient reports 60 lbs weight loss from hiatal hernia, chronic acid reflux, 'constant irritation", occasional nausea, denies vomiting, decreased appetite   Pt 62 yr old male presents with preop dx gastro-esophageal reflux diseases scheduled for robotic esophagogastroduodenoscopy, laparoscopic hiatal hernia repair, fundoplication, patient reports 60 lbs weight loss from hiatal hernia, chronic acid reflux, 'constant irritation", occasional nausea, denies vomiting, decreased appetite   Pt 62 yr old male scheduled for Esophagogastroduodenoscopy Robotic Assisted Laparoscopic Repair of Hiatal hernia Fundoplication with Dr Dent tentatively 8/10/22 - ( pt cancelled from surgery 6/10/22 due to sinus infection )  presents with preop dx gastro-esophageal reflux diseases -  patient reports 60 lbs weight loss from hiatal hernia, chronic acid reflux, 'constant irritation", occasional nausea, - -hx obesity, lumbar disc disorder, sleep apnea , preDM, HTN , HLD  Pt given information for COVID PCR testing sites and told to make appointment 3-5 days preop

## 2022-08-02 NOTE — H&P PST ADULT - ENMT COMMENTS
preop dx gastro-esophageal reflux diseases - hiatal hernia Pt cancelled from surgery 6/10/22 for sinus infection which was treated with antibiotics - pt now denies symptoms

## 2022-08-02 NOTE — H&P PST ADULT - NS MD HP INPLANTS MED DEV
Brain/Spinal implant mesh , spinal cage/Brain/Spinal implant mesh , lumbar spinal cage/Brain/Spinal implant

## 2022-08-04 ENCOUNTER — OUTPATIENT (OUTPATIENT)
Dept: OUTPATIENT SERVICES | Facility: HOSPITAL | Age: 62
LOS: 1 days | End: 2022-08-04

## 2022-08-04 ENCOUNTER — APPOINTMENT (OUTPATIENT)
Dept: RADIOLOGY | Facility: HOSPITAL | Age: 62
End: 2022-08-04

## 2022-08-04 DIAGNOSIS — Z98.1 ARTHRODESIS STATUS: Chronic | ICD-10-CM

## 2022-08-04 DIAGNOSIS — K21.9 GASTRO-ESOPHAGEAL REFLUX DISEASE WITHOUT ESOPHAGITIS: ICD-10-CM

## 2022-08-04 DIAGNOSIS — Z90.49 ACQUIRED ABSENCE OF OTHER SPECIFIED PARTS OF DIGESTIVE TRACT: Chronic | ICD-10-CM

## 2022-08-04 DIAGNOSIS — R13.19 OTHER DYSPHAGIA: ICD-10-CM

## 2022-08-04 DIAGNOSIS — Z98.890 OTHER SPECIFIED POSTPROCEDURAL STATES: Chronic | ICD-10-CM

## 2022-08-04 PROCEDURE — 74220 X-RAY XM ESOPHAGUS 1CNTRST: CPT | Mod: 26

## 2022-08-06 ENCOUNTER — LABORATORY RESULT (OUTPATIENT)
Age: 62
End: 2022-08-06

## 2022-08-09 ENCOUNTER — TRANSCRIPTION ENCOUNTER (OUTPATIENT)
Age: 62
End: 2022-08-09

## 2022-08-10 ENCOUNTER — APPOINTMENT (OUTPATIENT)
Dept: THORACIC SURGERY | Facility: HOSPITAL | Age: 62
End: 2022-08-10

## 2022-08-10 ENCOUNTER — TRANSCRIPTION ENCOUNTER (OUTPATIENT)
Age: 62
End: 2022-08-10

## 2022-08-10 ENCOUNTER — RESULT REVIEW (OUTPATIENT)
Age: 62
End: 2022-08-10

## 2022-08-10 ENCOUNTER — INPATIENT (INPATIENT)
Facility: HOSPITAL | Age: 62
LOS: 1 days | Discharge: HOME | End: 2022-08-12
Attending: THORACIC SURGERY (CARDIOTHORACIC VASCULAR SURGERY) | Admitting: THORACIC SURGERY (CARDIOTHORACIC VASCULAR SURGERY)

## 2022-08-10 VITALS
HEIGHT: 68 IN | HEART RATE: 61 BPM | TEMPERATURE: 98 F | SYSTOLIC BLOOD PRESSURE: 122 MMHG | DIASTOLIC BLOOD PRESSURE: 74 MMHG | RESPIRATION RATE: 18 BRPM | WEIGHT: 278 LBS | OXYGEN SATURATION: 99 %

## 2022-08-10 DIAGNOSIS — Z90.49 ACQUIRED ABSENCE OF OTHER SPECIFIED PARTS OF DIGESTIVE TRACT: Chronic | ICD-10-CM

## 2022-08-10 DIAGNOSIS — Z98.1 ARTHRODESIS STATUS: Chronic | ICD-10-CM

## 2022-08-10 DIAGNOSIS — Z98.890 OTHER SPECIFIED POSTPROCEDURAL STATES: Chronic | ICD-10-CM

## 2022-08-10 DIAGNOSIS — K44.9 DIAPHRAGMATIC HERNIA WITHOUT OBSTRUCTION OR GANGRENE: ICD-10-CM

## 2022-08-10 LAB — GLUCOSE BLDC GLUCOMTR-MCNC: 148 MG/DL — HIGH (ref 70–99)

## 2022-08-10 PROCEDURE — 43281 LAP PARAESOPHAG HERN REPAIR: CPT | Mod: 22

## 2022-08-10 PROCEDURE — 88304 TISSUE EXAM BY PATHOLOGIST: CPT | Mod: 26

## 2022-08-10 PROCEDURE — 43281 LAP PARAESOPHAG HERN REPAIR: CPT | Mod: 80,22

## 2022-08-10 PROCEDURE — S2900 ROBOTIC SURGICAL SYSTEM: CPT | Mod: NC

## 2022-08-10 PROCEDURE — 99233 SBSQ HOSP IP/OBS HIGH 50: CPT | Mod: 57

## 2022-08-10 PROCEDURE — 43235 EGD DIAGNOSTIC BRUSH WASH: CPT

## 2022-08-10 PROCEDURE — 71045 X-RAY EXAM CHEST 1 VIEW: CPT | Mod: 26

## 2022-08-10 PROCEDURE — 88302 TISSUE EXAM BY PATHOLOGIST: CPT | Mod: 26

## 2022-08-10 DEVICE — SURGICEL 2 X 14": Type: IMPLANTABLE DEVICE | Status: FUNCTIONAL

## 2022-08-10 DEVICE — LIGATING CLIPS WECK HEMOLOK POLYMER LARGE (PURPLE) 6: Type: IMPLANTABLE DEVICE | Status: FUNCTIONAL

## 2022-08-10 RX ORDER — HEPARIN SODIUM 5000 [USP'U]/ML
7500 INJECTION INTRAVENOUS; SUBCUTANEOUS EVERY 8 HOURS
Refills: 0 | Status: DISCONTINUED | OUTPATIENT
Start: 2022-08-10 | End: 2022-08-12

## 2022-08-10 RX ORDER — LEVOTHYROXINE SODIUM 125 MCG
45 TABLET ORAL AT BEDTIME
Refills: 0 | Status: DISCONTINUED | OUTPATIENT
Start: 2022-08-10 | End: 2022-08-10

## 2022-08-10 RX ORDER — NALOXONE HYDROCHLORIDE 4 MG/.1ML
0.1 SPRAY NASAL
Refills: 0 | Status: DISCONTINUED | OUTPATIENT
Start: 2022-08-10 | End: 2022-08-12

## 2022-08-10 RX ORDER — ONDANSETRON 8 MG/1
4 TABLET, FILM COATED ORAL EVERY 6 HOURS
Refills: 0 | Status: DISCONTINUED | OUTPATIENT
Start: 2022-08-10 | End: 2022-08-12

## 2022-08-10 RX ORDER — DEXTROSE 50 % IN WATER 50 %
25 SYRINGE (ML) INTRAVENOUS ONCE
Refills: 0 | Status: DISCONTINUED | OUTPATIENT
Start: 2022-08-10 | End: 2022-08-12

## 2022-08-10 RX ORDER — SODIUM CHLORIDE 9 MG/ML
1000 INJECTION, SOLUTION INTRAVENOUS
Refills: 0 | Status: DISCONTINUED | OUTPATIENT
Start: 2022-08-10 | End: 2022-08-12

## 2022-08-10 RX ORDER — LEVOTHYROXINE SODIUM 125 MCG
56 TABLET ORAL AT BEDTIME
Refills: 0 | Status: DISCONTINUED | OUTPATIENT
Start: 2022-08-10 | End: 2022-08-12

## 2022-08-10 RX ORDER — HYDROMORPHONE HYDROCHLORIDE 2 MG/ML
30 INJECTION INTRAMUSCULAR; INTRAVENOUS; SUBCUTANEOUS
Refills: 0 | Status: DISCONTINUED | OUTPATIENT
Start: 2022-08-10 | End: 2022-08-11

## 2022-08-10 RX ORDER — HYDROMORPHONE HYDROCHLORIDE 2 MG/ML
0.5 INJECTION INTRAMUSCULAR; INTRAVENOUS; SUBCUTANEOUS
Refills: 0 | Status: DISCONTINUED | OUTPATIENT
Start: 2022-08-10 | End: 2022-08-11

## 2022-08-10 RX ORDER — GLUCAGON INJECTION, SOLUTION 0.5 MG/.1ML
1 INJECTION, SOLUTION SUBCUTANEOUS ONCE
Refills: 0 | Status: DISCONTINUED | OUTPATIENT
Start: 2022-08-10 | End: 2022-08-12

## 2022-08-10 RX ORDER — DEXTROSE 50 % IN WATER 50 %
12.5 SYRINGE (ML) INTRAVENOUS ONCE
Refills: 0 | Status: DISCONTINUED | OUTPATIENT
Start: 2022-08-10 | End: 2022-08-12

## 2022-08-10 RX ORDER — DEXTROSE 50 % IN WATER 50 %
15 SYRINGE (ML) INTRAVENOUS ONCE
Refills: 0 | Status: DISCONTINUED | OUTPATIENT
Start: 2022-08-10 | End: 2022-08-12

## 2022-08-10 RX ORDER — METOPROLOL TARTRATE 50 MG
5 TABLET ORAL EVERY 6 HOURS
Refills: 0 | Status: DISCONTINUED | OUTPATIENT
Start: 2022-08-10 | End: 2022-08-11

## 2022-08-10 RX ORDER — INSULIN LISPRO 100/ML
VIAL (ML) SUBCUTANEOUS EVERY 6 HOURS
Refills: 0 | Status: DISCONTINUED | OUTPATIENT
Start: 2022-08-10 | End: 2022-08-12

## 2022-08-10 RX ADMIN — Medication 5 MILLIGRAM(S): at 19:23

## 2022-08-10 RX ADMIN — HYDROMORPHONE HYDROCHLORIDE 30 MILLILITER(S): 2 INJECTION INTRAMUSCULAR; INTRAVENOUS; SUBCUTANEOUS at 19:23

## 2022-08-10 RX ADMIN — SODIUM CHLORIDE 100 MILLILITER(S): 9 INJECTION, SOLUTION INTRAVENOUS at 22:08

## 2022-08-10 RX ADMIN — Medication 56 MICROGRAM(S): at 22:07

## 2022-08-10 RX ADMIN — HEPARIN SODIUM 7500 UNIT(S): 5000 INJECTION INTRAVENOUS; SUBCUTANEOUS at 22:07

## 2022-08-10 NOTE — BRIEF OPERATIVE NOTE - OPERATION/FINDINGS
Extensive intraabdominal adhesions, lysis of adhesions, reduction of hiatal hernia, removal of esophageal lipoma. Toupet fundoplication. Preop Z-line 41 cm, post op 45 cm.

## 2022-08-10 NOTE — BRIEF OPERATIVE NOTE - NSICDXBRIEFPROCEDURE_GEN_ALL_CORE_FT
PROCEDURES:  Robot-assisted laparoscopic lysis of adhesions of abdomen using da [place] Xi 10-Aug-2022 16:39:08  John CARCAMO  Robot-assisted repair of sliding hiatal hernia 10-Aug-2022 16:39:26  John CARCAMO

## 2022-08-10 NOTE — ASU PREOP CHECKLIST - COMMENTS
took amlodipine atenolol protonix losartin this am with sip of water took amlodipine atenolol protonix Losartan this am with sip of water took amlodipine atenolol Protonix Losartan this am with sip of water

## 2022-08-10 NOTE — PROGRESS NOTE ADULT - SUBJECTIVE AND OBJECTIVE BOX
STEPHANIE LAWTON            [id number]     Male    IV Contrast (Nausea)  No Known Allergies               HPI: Pt 62 yr old male scheduled for Esophagogastroduodenoscopy Robotic Assisted Laparoscopic Repair of Hiatal hernia Fundoplication with Dr Dent tentatively 8/10/22 - ( pt cancelled from surgery 6/10/22 due to sinus infection )  presents with preop dx gastro-esophageal reflux diseases -  patient reports 60 lbs weight loss from hiatal hernia, chronic acid reflux, 'constant irritation", occasional nausea, - -hx obesity, lumbar disc disorder, sleep apnea , preDM, HTN , HLD      Procedure: Extensive intraabdominal adhesions, lysis of adhesions, reduction of hiatal hernia, removal of esophageal lipoma. Toupet fundoplication. Preop Z-line 41 cm, post op 45 cm.  8/10/2022    Issues:               Hiatal Hernia               Postop pain   HTN (hypertension)  IBS (irritable bowel syndrome)  Hx of SBO (small bowel obstruction) s/p SBR  Gastro-esophageal reflux disease with esophagitis  Obesity (BMI 42.3)  Diabetes  H/O neuropathy  Hypothyroidism  History of chronic pain  Unsteady gait  History of herniated intervertebral disc  H/O spinal stenosis  Anxiety and depression  NANCY (obstructive sleep apnea)  History of lumbar fusion                   Postop course: Patient reports moderate pain at chest wall incision sites which is worse with coughing and deep breathing without associated fever or dyspnea. Pain is improved with PCA / IV meds    PAST MEDICAL & SURGICAL HISTORY:  HTN (hypertension)    IBS (irritable bowel syndrome)    SBO (small bowel obstruction)    Gastro-esophageal reflux disease with esophagitis    Obese    Diabetes    H/O neuropathy    Hypothyroidism    History of chronic pain    Unsteady gait    H/O hiatal hernia    H/O small bowel obstruction    History of herniated intervertebral disc    H/O spinal stenosis    Anxiety and depression    NANCY (obstructive sleep apnea)    History of lumbar fusion    H/O exploratory laparotomy    H/O hernia repair  abdominal    History of appendectomy    S/P cholecystectomy    S/P small bowel resection      ICU Vital Signs Last 24 Hrs  T(C): 36.8 (10 Aug 2022 17:25), Max: 36.9 (10 Aug 2022 08:23)  T(F): 98.2 (10 Aug 2022 17:25), Max: 98.4 (10 Aug 2022 08:23)  HR: 80 (10 Aug 2022 17:55) (61 - 80)  BP: 114/79 (10 Aug 2022 17:55) (114/79 - 138/86)  BP(mean): 91 (10 Aug 2022 17:55) (78 - 101)  ABP: --  ABP(mean): --  RR: 16 (10 Aug 2022 17:55) (13 - 20)  SpO2: 98% (10 Aug 2022 17:55) (97% - 99%)    O2 Parameters below as of 10 Aug 2022 17:55  Patient On (Oxygen Delivery Method): nasal cannula w/ humidification  O2 Flow (L/min): 4        I&O's Detail    10 Aug 2022 07:01  -  10 Aug 2022 18:22  --------------------------------------------------------  IN:    Lactated Ringers: 200 mL  Total IN: 200 mL    OUT:  Total OUT: 0 mL    Total NET: 200 mL        CAPILLARY BLOOD GLUCOSE        Home Medications:  acetaminophen 500 mg oral capsule: 2 tab(s) orally 3 times a day, As Needed (10 Aug 2022 08:40)  Ambien 5 mg oral tablet: 1 tab(s) orally once a day (at bedtime), As Needed (10 Aug 2022 08:40)  amLODIPine 10 mg oral tablet: 1 tab(s) orally once a day, am (10 Aug 2022 08:40)  atenolol 100 mg oral tablet: 1 tab(s) orally once a day, am (10 Aug 2022 08:40)  atorvastatin 10 mg oral tablet: 1 tab(s) orally once a day, pm (10 Aug 2022 08:40)  DULoxetine 60 mg oral delayed release capsule: 1 cap(s) orally 2 times a day (10 Aug 2022 08:40)  gabapentin 300 mg oral tablet: 1 cap(s) orally 4 times a day (10 Aug 2022 08:40)  hyoscyamine 0.375 mg oral tablet, extended release: 1 tab(s) orally 3 times a day, As Needed (10 Aug 2022 08:40)  levothyroxine 75 mcg (0.075 mg) oral tablet: 1 tab(s) orally once a day, am (10 Aug 2022 08:40)  losartan 25 mg oral tablet: 1 tab(s) orally once a day, am (10 Aug 2022 08:40)  metFORMIN 500 mg oral tablet: 2 tab(s) orally 2 times a day (10 Aug 2022 08:40)  morphine 30 mg oral tablet: 1 tab(s) orally prn HS for sleep  (10 Aug 2022 08:40)  pantoprazole 40 mg oral delayed release tablet: 1 tab(s) orally 2 times a day (10 Aug 2022 08:40)  Pepcid 20 mg oral tablet: 1 tab(s) orally once a day, am (10 Aug 2022 08:40)  tiZANidine 4 mg oral capsule: 1 cap(s) orally 2 times a day, As Needed (10 Aug 2022 08:40)    MEDICATIONS  (STANDING):  dextrose 5%. 1000 milliLiter(s) (100 mL/Hr) IV Continuous <Continuous>  dextrose 5%. 1000 milliLiter(s) (50 mL/Hr) IV Continuous <Continuous>  dextrose 50% Injectable 25 Gram(s) IV Push once  dextrose 50% Injectable 12.5 Gram(s) IV Push once  dextrose 50% Injectable 25 Gram(s) IV Push once  glucagon  Injectable 1 milliGRAM(s) IntraMuscular once  heparin   Injectable 7500 Unit(s) SubCutaneous every 8 hours  HYDROmorphone PCA (1 mG/mL) 30 milliLiter(s) PCA Continuous PCA Continuous  insulin lispro (ADMELOG) corrective regimen sliding scale   SubCutaneous every 6 hours  lactated ringers. 1000 milliLiter(s) (100 mL/Hr) IV Continuous <Continuous>  levothyroxine Injectable 56 MICROGram(s) IV Push at bedtime  metoprolol tartrate Injectable 5 milliGRAM(s) IV Push every 6 hours    MEDICATIONS  (PRN):  dextrose Oral Gel 15 Gram(s) Oral once PRN Blood Glucose LESS THAN 70 milliGRAM(s)/deciliter  HYDROmorphone PCA (1 mG/mL) Rescue Clinician Bolus 0.5 milliGRAM(s) IV Push every 15 minutes PRN for Pain Scale GREATER THAN 6  naloxone Injectable 0.1 milliGRAM(s) IV Push every 3 minutes PRN For ANY of the following changes in patient status:  A. RR LESS THAN 10 breaths per minute, B. Oxygen saturation LESS THAN 90%, C. Sedation score of 6  ondansetron Injectable 4 milliGRAM(s) IV Push every 6 hours PRN Nausea        Physical exam:                              General:               Pt is awake, alert, not in any distress                                 Psych:                   A & O x 3                                                                Neuro:                  Nonfocal                              Cardiovascular:   S1 & S2, regular                            Respiratory:         Air entry is fair and equal on both sides, has bilateral conducted sounds                            GI:                          Soft, nondistended and nontender, Bowel sounds hypoactive                             Ext:                        No cyanosis or edema     Labs:                                                                   CXR:      Plan:    General: 62yMale s/p Extensive intraabdominal adhesions, lysis of adhesions, reduction of hiatal hernia, removal of esophageal lipoma. Toupet fundoplication. Preop Z-line 41 cm, post op 45 cm.  8/10/2022 ,   progressing well, experiencing  pain with deep breathing.                             Neuro:                                          Pain control with PCA / IV Tylenol     Chronic back pain - May restart Tizanidine, Gabapentin and Morphine PO after swallow study in AM                            Cardiovascular:                                           Telemetry (medical test) - Reviewed by me today independently. Patient is in normal sinus rhythm.                                           Continue hemodynamic monitoring.                                         Continue IVF LR 100cc/hr     HTN: Continue Lopressor 5mg IVP Q6HRS                             Respiratory:                                          Postop hypoxemia requiring O2 via nasal cannula probably due to postop pain - Wean nasal cannula for goal O2sat above 92%.                                                        CXR showed subcutaneous emphysema. Encourage incentive spirometry.                                                                     Chest PT and frequent suctioning. Continue bronchodilators.                                                                                 OOB to chair & ambulate w/ assistance.                                                                                              Continuous pulse oximetry for support & to prevent decompensation.                                          Subcutaneous emphysema without any clinical significance. Monitor respiratory status closely.                            GI                                          NPO                                          Hx of GERD: Hold  Protonix for now                                          Continue Zofran / Reglan for nausea - PRN                                          Barium swallow in AM 	                                                                 Renal:                                          Continue LR 100cc/hr                                          Monitor I/Os and electrolytes, maintain K>4.0 and mG >2.0                                                                                         Hem/ Onc:                                                                                   Monitor for signs of bleeding.                                           Follow CBC in AM                           Infectious disease:                                             No signs of infection. Monitor for fever / leukocytosis.                                           All surgical incision / chest tube  sites look clean.                            Endocrine                                             DM-2: Continue Accu-Checks with coverage.                                            Hypothyroidism: On Synthroid 56mcg IVP QHS    Pt is on SQ Heparin and Venodyne boots for DVT prophylaxis.     Pertinent clinical, laboratory, radiographic, hemodynamic, echocardiographic, respiratory data, microbiologic data and chart were reviewed and analyzed frequently throughout the course of the day and night.   Patient seen, examined and plan discussed with CT Surgeon Dr. Dent / CTICU team during rounds.    Status discussed with patient at bedside, updated plan of care.    I have spent 40 minutes with this patient including 20 minutes of time coordinating care in the ICU and  updating patient's family.            Leo Herrmann MD                                                                     STEPHANIE LAWTON            MRN-[id number]     Male    IV Contrast (Nausea)  No Known Allergies               HPI: Pt 62 yr old male scheduled for Esophagogastroduodenoscopy Robotic Assisted Laparoscopic Repair of Hiatal hernia Fundoplication with Dr Dent tentatively 8/10/22 - ( pt cancelled from surgery 6/10/22 due to sinus infection )  presents with preop dx gastro-esophageal reflux diseases -  patient reports 60 lbs weight loss from hiatal hernia, chronic acid reflux, 'constant irritation", occasional nausea, - -hx obesity, lumbar disc disorder, sleep apnea , preDM, HTN , HLD      Procedure: Extensive intraabdominal adhesions, lysis of adhesions, reduction of hiatal hernia, removal of esophageal lipoma. Toupet fundoplication. Preop Z-line 41 cm, post op 45 cm.  8/10/2022    Issues:               Hiatal Hernia               Postop pain   HTN (hypertension)  IBS (irritable bowel syndrome)  Hx of SBO (small bowel obstruction) s/p SBR  Gastro-esophageal reflux disease with esophagitis  Obesity (BMI 42.3)  Diabetes  H/O neuropathy  Hypothyroidism  History of chronic pain  Unsteady gait  History of herniated intervertebral disc  H/O spinal stenosis  Anxiety and depression  NANCY (obstructive sleep apnea)  History of lumbar fusion                   Postop course: Patient reports moderate pain at chest wall incision sites which is worse with coughing and deep breathing without associated fever or dyspnea. Pain is improved with PCA / IV meds    PAST MEDICAL & SURGICAL HISTORY:  HTN (hypertension)    IBS (irritable bowel syndrome)    SBO (small bowel obstruction)    Gastro-esophageal reflux disease with esophagitis    Obese    Diabetes    H/O neuropathy    Hypothyroidism    History of chronic pain    Unsteady gait    H/O hiatal hernia    H/O small bowel obstruction    History of herniated intervertebral disc    H/O spinal stenosis    Anxiety and depression    NANCY (obstructive sleep apnea)    History of lumbar fusion    H/O exploratory laparotomy    H/O hernia repair  abdominal    History of appendectomy    S/P cholecystectomy    S/P small bowel resection      ICU Vital Signs Last 24 Hrs  T(C): 36.8 (10 Aug 2022 17:25), Max: 36.9 (10 Aug 2022 08:23)  T(F): 98.2 (10 Aug 2022 17:25), Max: 98.4 (10 Aug 2022 08:23)  HR: 80 (10 Aug 2022 17:55) (61 - 80)  BP: 114/79 (10 Aug 2022 17:55) (114/79 - 138/86)  BP(mean): 91 (10 Aug 2022 17:55) (78 - 101)  ABP: --  ABP(mean): --  RR: 16 (10 Aug 2022 17:55) (13 - 20)  SpO2: 98% (10 Aug 2022 17:55) (97% - 99%)    O2 Parameters below as of 10 Aug 2022 17:55  Patient On (Oxygen Delivery Method): nasal cannula w/ humidification  O2 Flow (L/min): 4        I&O's Detail    10 Aug 2022 07:01  -  10 Aug 2022 18:22  --------------------------------------------------------  IN:    Lactated Ringers: 200 mL  Total IN: 200 mL    OUT:  Total OUT: 0 mL    Total NET: 200 mL        CAPILLARY BLOOD GLUCOSE        Home Medications:  acetaminophen 500 mg oral capsule: 2 tab(s) orally 3 times a day, As Needed (10 Aug 2022 08:40)  Ambien 5 mg oral tablet: 1 tab(s) orally once a day (at bedtime), As Needed (10 Aug 2022 08:40)  amLODIPine 10 mg oral tablet: 1 tab(s) orally once a day, am (10 Aug 2022 08:40)  atenolol 100 mg oral tablet: 1 tab(s) orally once a day, am (10 Aug 2022 08:40)  atorvastatin 10 mg oral tablet: 1 tab(s) orally once a day, pm (10 Aug 2022 08:40)  DULoxetine 60 mg oral delayed release capsule: 1 cap(s) orally 2 times a day (10 Aug 2022 08:40)  gabapentin 300 mg oral tablet: 1 cap(s) orally 4 times a day (10 Aug 2022 08:40)  hyoscyamine 0.375 mg oral tablet, extended release: 1 tab(s) orally 3 times a day, As Needed (10 Aug 2022 08:40)  levothyroxine 75 mcg (0.075 mg) oral tablet: 1 tab(s) orally once a day, am (10 Aug 2022 08:40)  losartan 25 mg oral tablet: 1 tab(s) orally once a day, am (10 Aug 2022 08:40)  metFORMIN 500 mg oral tablet: 2 tab(s) orally 2 times a day (10 Aug 2022 08:40)  morphine 30 mg oral tablet: 1 tab(s) orally prn HS for sleep  (10 Aug 2022 08:40)  pantoprazole 40 mg oral delayed release tablet: 1 tab(s) orally 2 times a day (10 Aug 2022 08:40)  Pepcid 20 mg oral tablet: 1 tab(s) orally once a day, am (10 Aug 2022 08:40)  tiZANidine 4 mg oral capsule: 1 cap(s) orally 2 times a day, As Needed (10 Aug 2022 08:40)    MEDICATIONS  (STANDING):  dextrose 5%. 1000 milliLiter(s) (100 mL/Hr) IV Continuous <Continuous>  dextrose 5%. 1000 milliLiter(s) (50 mL/Hr) IV Continuous <Continuous>  dextrose 50% Injectable 25 Gram(s) IV Push once  dextrose 50% Injectable 12.5 Gram(s) IV Push once  dextrose 50% Injectable 25 Gram(s) IV Push once  glucagon  Injectable 1 milliGRAM(s) IntraMuscular once  heparin   Injectable 7500 Unit(s) SubCutaneous every 8 hours  HYDROmorphone PCA (1 mG/mL) 30 milliLiter(s) PCA Continuous PCA Continuous  insulin lispro (ADMELOG) corrective regimen sliding scale   SubCutaneous every 6 hours  lactated ringers. 1000 milliLiter(s) (100 mL/Hr) IV Continuous <Continuous>  levothyroxine Injectable 56 MICROGram(s) IV Push at bedtime  metoprolol tartrate Injectable 5 milliGRAM(s) IV Push every 6 hours    MEDICATIONS  (PRN):  dextrose Oral Gel 15 Gram(s) Oral once PRN Blood Glucose LESS THAN 70 milliGRAM(s)/deciliter  HYDROmorphone PCA (1 mG/mL) Rescue Clinician Bolus 0.5 milliGRAM(s) IV Push every 15 minutes PRN for Pain Scale GREATER THAN 6  naloxone Injectable 0.1 milliGRAM(s) IV Push every 3 minutes PRN For ANY of the following changes in patient status:  A. RR LESS THAN 10 breaths per minute, B. Oxygen saturation LESS THAN 90%, C. Sedation score of 6  ondansetron Injectable 4 milliGRAM(s) IV Push every 6 hours PRN Nausea        Physical exam:                              General:               Pt is awake, alert, not in any distress                                 Psych:                   A & O x 3                                                                Neuro:                  Nonfocal                              Cardiovascular:   S1 & S2, regular                            Respiratory:         Air entry is fair and equal on both sides, has bilateral conducted sounds                            GI:                          Soft, nondistended and nontender, Bowel sounds hypoactive                             Ext:                        No cyanosis or edema     Labs:                                                                   CXR:  Postop changes, lungs are clear.    Plan:    General: 62yMale s/p Extensive intraabdominal adhesions, lysis of adhesions, reduction of hiatal hernia, removal of esophageal lipoma. Toupet fundoplication. Preop Z-line 41 cm, post op 45 cm.  8/10/2022 ,   progressing well, experiencing  pain with deep breathing.                             Neuro:                                          Pain control with PCA / IV Tylenol     Chronic back pain - May restart Tizanidine, Gabapentin and Morphine PO after swallow study in AM                            Cardiovascular:                                           Telemetry (medical test) - Reviewed by me today independently. Patient is in normal sinus rhythm.                                           Continue hemodynamic monitoring.                                         Continue IVF LR 100cc/hr     HTN: Continue Lopressor 5mg IVP Q6HRS                             Respiratory:                                          Postop hypoxemia requiring O2 via nasal cannula probably due to postop pain - Wean nasal cannula for goal O2sat above 92%.                                                        CXR - lungs are clear. Encourage incentive spirometry.                                                                     Chest PT and frequent suctioning. Continue bronchodilators.                                                                                 OOB to chair & ambulate w/ assistance.                                                                                              Continuous pulse oximetry for support & to prevent decompensation.                                                                     GI                                          NPO                                          Hx of GERD: Hold  Protonix for now                                          Continue Zofran / Reglan for nausea - PRN                                          Barium swallow in AM 	                                                                 Renal:                                          Continue LR 100cc/hr                                          Monitor I/Os and electrolytes, maintain K>4.0 and mG >2.0                                                                                         Hem/ Onc:                                                                                   Monitor for signs of bleeding.                                           Follow CBC in AM                           Infectious disease:                                             No signs of infection. Monitor for fever / leukocytosis.                                           All surgical incision / chest tube  sites look clean.                            Endocrine                                             DM-2: Continue Accu-Checks with coverage.                                            Hypothyroidism: On Synthroid 56mcg IVP QHS    Pt is on SQ Heparin and Venodyne boots for DVT prophylaxis.     Pertinent clinical, laboratory, radiographic, hemodynamic, echocardiographic, respiratory data, microbiologic data and chart were reviewed and analyzed frequently throughout the course of the day and night.   Patient seen, examined and plan discussed with CT Surgeon Dr. Dent / CTICU team during rounds.    Status discussed with patient at bedside, updated plan of care.    I have spent 40 minutes with this patient including 20 minutes of time coordinating care in the ICU.            Leo Herrmann MD

## 2022-08-10 NOTE — PATIENT PROFILE ADULT - FALL HARM RISK - UNIVERSAL INTERVENTIONS
Bed in lowest position, wheels locked, appropriate side rails in place/Call bell, personal items and telephone in reach/Instruct patient to call for assistance before getting out of bed or chair/Non-slip footwear when patient is out of bed/[place] to call system/Physically safe environment - no spills, clutter or unnecessary equipment/Purposeful Proactive Rounding/Room/bathroom lighting operational, light cord in reach

## 2022-08-10 NOTE — BRIEF OPERATIVE NOTE - COMMENTS
___Felipe______  first-assisted  __Filipe__________ with, but not limited to, port placement, extensive robotic dissection within the abdomen, gaining exposure and Lysis of extensive adhesions, and gastric mobilization. Dr. Wang was the first-assistant in this operation, helping to enter the peritoneum and dock the da [place] robot, and helping with extensive robotic dissection within the abdomen, lysis of extensive adhesions, and gastric mobilization.

## 2022-08-11 ENCOUNTER — TRANSCRIPTION ENCOUNTER (OUTPATIENT)
Age: 62
End: 2022-08-11

## 2022-08-11 LAB
ANION GAP SERPL CALC-SCNC: 15 MMOL/L — HIGH (ref 7–14)
BUN SERPL-MCNC: 15 MG/DL — SIGNIFICANT CHANGE UP (ref 7–23)
CALCIUM SERPL-MCNC: 9.4 MG/DL — SIGNIFICANT CHANGE UP (ref 8.4–10.5)
CHLORIDE SERPL-SCNC: 98 MMOL/L — SIGNIFICANT CHANGE UP (ref 98–107)
CO2 SERPL-SCNC: 23 MMOL/L — SIGNIFICANT CHANGE UP (ref 22–31)
CREAT SERPL-MCNC: 0.67 MG/DL — SIGNIFICANT CHANGE UP (ref 0.5–1.3)
EGFR: 106 ML/MIN/1.73M2 — SIGNIFICANT CHANGE UP
GLUCOSE BLDC GLUCOMTR-MCNC: 100 MG/DL — HIGH (ref 70–99)
GLUCOSE BLDC GLUCOMTR-MCNC: 107 MG/DL — HIGH (ref 70–99)
GLUCOSE BLDC GLUCOMTR-MCNC: 109 MG/DL — HIGH (ref 70–99)
GLUCOSE BLDC GLUCOMTR-MCNC: 114 MG/DL — HIGH (ref 70–99)
GLUCOSE BLDC GLUCOMTR-MCNC: 118 MG/DL — HIGH (ref 70–99)
GLUCOSE BLDC GLUCOMTR-MCNC: 133 MG/DL — HIGH (ref 70–99)
GLUCOSE SERPL-MCNC: 107 MG/DL — HIGH (ref 70–99)
HCT VFR BLD CALC: 42.8 % — SIGNIFICANT CHANGE UP (ref 39–50)
HGB BLD-MCNC: 14.5 G/DL — SIGNIFICANT CHANGE UP (ref 13–17)
MAGNESIUM SERPL-MCNC: 1.5 MG/DL — LOW (ref 1.6–2.6)
MCHC RBC-ENTMCNC: 31.3 PG — SIGNIFICANT CHANGE UP (ref 27–34)
MCHC RBC-ENTMCNC: 33.9 GM/DL — SIGNIFICANT CHANGE UP (ref 32–36)
MCV RBC AUTO: 92.2 FL — SIGNIFICANT CHANGE UP (ref 80–100)
NRBC # BLD: 0 /100 WBCS — SIGNIFICANT CHANGE UP
NRBC # FLD: 0 K/UL — SIGNIFICANT CHANGE UP
PHOSPHATE SERPL-MCNC: 3.5 MG/DL — SIGNIFICANT CHANGE UP (ref 2.5–4.5)
PLATELET # BLD AUTO: 201 K/UL — SIGNIFICANT CHANGE UP (ref 150–400)
POTASSIUM SERPL-MCNC: 3.9 MMOL/L — SIGNIFICANT CHANGE UP (ref 3.5–5.3)
POTASSIUM SERPL-SCNC: 3.9 MMOL/L — SIGNIFICANT CHANGE UP (ref 3.5–5.3)
RBC # BLD: 4.64 M/UL — SIGNIFICANT CHANGE UP (ref 4.2–5.8)
RBC # FLD: 12.7 % — SIGNIFICANT CHANGE UP (ref 10.3–14.5)
SODIUM SERPL-SCNC: 136 MMOL/L — SIGNIFICANT CHANGE UP (ref 135–145)
WBC # BLD: 12.47 K/UL — HIGH (ref 3.8–10.5)
WBC # FLD AUTO: 12.47 K/UL — HIGH (ref 3.8–10.5)

## 2022-08-11 PROCEDURE — 99233 SBSQ HOSP IP/OBS HIGH 50: CPT

## 2022-08-11 PROCEDURE — 74220 X-RAY XM ESOPHAGUS 1CNTRST: CPT | Mod: 26

## 2022-08-11 PROCEDURE — 71045 X-RAY EXAM CHEST 1 VIEW: CPT | Mod: 26

## 2022-08-11 RX ORDER — BENZOCAINE AND MENTHOL 5; 1 G/100ML; G/100ML
1 LIQUID ORAL
Refills: 0 | Status: DISCONTINUED | OUTPATIENT
Start: 2022-08-11 | End: 2022-08-12

## 2022-08-11 RX ORDER — ZOLPIDEM TARTRATE 10 MG/1
5 TABLET ORAL AT BEDTIME
Refills: 0 | Status: DISCONTINUED | OUTPATIENT
Start: 2022-08-11 | End: 2022-08-12

## 2022-08-11 RX ORDER — ACETAMINOPHEN 500 MG
650 TABLET ORAL EVERY 6 HOURS
Refills: 0 | Status: DISCONTINUED | OUTPATIENT
Start: 2022-08-11 | End: 2022-08-12

## 2022-08-11 RX ORDER — DULOXETINE HYDROCHLORIDE 30 MG/1
60 CAPSULE, DELAYED RELEASE ORAL DAILY
Refills: 0 | Status: DISCONTINUED | OUTPATIENT
Start: 2022-08-11 | End: 2022-08-12

## 2022-08-11 RX ORDER — ATENOLOL 25 MG/1
50 TABLET ORAL DAILY
Refills: 0 | Status: DISCONTINUED | OUTPATIENT
Start: 2022-08-11 | End: 2022-08-12

## 2022-08-11 RX ORDER — HYDROMORPHONE HYDROCHLORIDE 2 MG/ML
4 INJECTION INTRAMUSCULAR; INTRAVENOUS; SUBCUTANEOUS
Refills: 0 | Status: DISCONTINUED | OUTPATIENT
Start: 2022-08-11 | End: 2022-08-12

## 2022-08-11 RX ORDER — HYDROMORPHONE HYDROCHLORIDE 2 MG/ML
6 INJECTION INTRAMUSCULAR; INTRAVENOUS; SUBCUTANEOUS
Refills: 0 | Status: DISCONTINUED | OUTPATIENT
Start: 2022-08-11 | End: 2022-08-12

## 2022-08-11 RX ORDER — ATORVASTATIN CALCIUM 80 MG/1
10 TABLET, FILM COATED ORAL AT BEDTIME
Refills: 0 | Status: DISCONTINUED | OUTPATIENT
Start: 2022-08-11 | End: 2022-08-12

## 2022-08-11 RX ORDER — MAGNESIUM SULFATE 500 MG/ML
2 VIAL (ML) INJECTION ONCE
Refills: 0 | Status: COMPLETED | OUTPATIENT
Start: 2022-08-11 | End: 2022-08-11

## 2022-08-11 RX ORDER — HYDROMORPHONE HYDROCHLORIDE 2 MG/ML
0.5 INJECTION INTRAMUSCULAR; INTRAVENOUS; SUBCUTANEOUS
Refills: 0 | Status: DISCONTINUED | OUTPATIENT
Start: 2022-08-11 | End: 2022-08-12

## 2022-08-11 RX ORDER — PANTOPRAZOLE SODIUM 20 MG/1
40 TABLET, DELAYED RELEASE ORAL
Refills: 0 | Status: DISCONTINUED | OUTPATIENT
Start: 2022-08-11 | End: 2022-08-12

## 2022-08-11 RX ORDER — LIDOCAINE 4 G/100G
1 CREAM TOPICAL EVERY 24 HOURS
Refills: 0 | Status: DISCONTINUED | OUTPATIENT
Start: 2022-08-11 | End: 2022-08-12

## 2022-08-11 RX ADMIN — Medication 56 MICROGRAM(S): at 21:12

## 2022-08-11 RX ADMIN — Medication 650 MILLIGRAM(S): at 17:09

## 2022-08-11 RX ADMIN — HEPARIN SODIUM 7500 UNIT(S): 5000 INJECTION INTRAVENOUS; SUBCUTANEOUS at 14:41

## 2022-08-11 RX ADMIN — Medication 650 MILLIGRAM(S): at 17:11

## 2022-08-11 RX ADMIN — ATENOLOL 50 MILLIGRAM(S): 25 TABLET ORAL at 18:06

## 2022-08-11 RX ADMIN — Medication 25 GRAM(S): at 10:48

## 2022-08-11 RX ADMIN — Medication 650 MILLIGRAM(S): at 13:15

## 2022-08-11 RX ADMIN — Medication 650 MILLIGRAM(S): at 23:10

## 2022-08-11 RX ADMIN — HEPARIN SODIUM 7500 UNIT(S): 5000 INJECTION INTRAVENOUS; SUBCUTANEOUS at 06:04

## 2022-08-11 RX ADMIN — Medication 5 MILLIGRAM(S): at 06:04

## 2022-08-11 RX ADMIN — LIDOCAINE 1 PATCH: 4 CREAM TOPICAL at 19:30

## 2022-08-11 RX ADMIN — DULOXETINE HYDROCHLORIDE 60 MILLIGRAM(S): 30 CAPSULE, DELAYED RELEASE ORAL at 12:28

## 2022-08-11 RX ADMIN — HYDROMORPHONE HYDROCHLORIDE 6 MILLIGRAM(S): 2 INJECTION INTRAMUSCULAR; INTRAVENOUS; SUBCUTANEOUS at 22:00

## 2022-08-11 RX ADMIN — HYDROMORPHONE HYDROCHLORIDE 6 MILLIGRAM(S): 2 INJECTION INTRAMUSCULAR; INTRAVENOUS; SUBCUTANEOUS at 14:41

## 2022-08-11 RX ADMIN — Medication 5 MILLIGRAM(S): at 00:12

## 2022-08-11 RX ADMIN — HYDROMORPHONE HYDROCHLORIDE 6 MILLIGRAM(S): 2 INJECTION INTRAMUSCULAR; INTRAVENOUS; SUBCUTANEOUS at 21:11

## 2022-08-11 RX ADMIN — HYDROMORPHONE HYDROCHLORIDE 6 MILLIGRAM(S): 2 INJECTION INTRAMUSCULAR; INTRAVENOUS; SUBCUTANEOUS at 14:37

## 2022-08-11 RX ADMIN — SODIUM CHLORIDE 100 MILLILITER(S): 9 INJECTION, SOLUTION INTRAVENOUS at 20:02

## 2022-08-11 RX ADMIN — HEPARIN SODIUM 7500 UNIT(S): 5000 INJECTION INTRAVENOUS; SUBCUTANEOUS at 21:15

## 2022-08-11 RX ADMIN — Medication 650 MILLIGRAM(S): at 12:33

## 2022-08-11 RX ADMIN — LIDOCAINE 1 PATCH: 4 CREAM TOPICAL at 12:33

## 2022-08-11 RX ADMIN — HYDROMORPHONE HYDROCHLORIDE 30 MILLILITER(S): 2 INJECTION INTRAMUSCULAR; INTRAVENOUS; SUBCUTANEOUS at 07:08

## 2022-08-11 RX ADMIN — PANTOPRAZOLE SODIUM 40 MILLIGRAM(S): 20 TABLET, DELAYED RELEASE ORAL at 12:28

## 2022-08-11 RX ADMIN — ATORVASTATIN CALCIUM 10 MILLIGRAM(S): 80 TABLET, FILM COATED ORAL at 21:11

## 2022-08-11 NOTE — DIETITIAN INITIAL EVALUATION ADULT - PERTINENT MEDS FT
MEDICATIONS  (STANDING):  acetaminophen     Tablet .. 650 milliGRAM(s) Oral every 6 hours  atorvastatin 10 milliGRAM(s) Oral at bedtime  dextrose 5%. 1000 milliLiter(s) (100 mL/Hr) IV Continuous <Continuous>  dextrose 5%. 1000 milliLiter(s) (50 mL/Hr) IV Continuous <Continuous>  dextrose 50% Injectable 25 Gram(s) IV Push once  dextrose 50% Injectable 12.5 Gram(s) IV Push once  dextrose 50% Injectable 25 Gram(s) IV Push once  DULoxetine 60 milliGRAM(s) Oral daily  glucagon  Injectable 1 milliGRAM(s) IntraMuscular once  heparin   Injectable 7500 Unit(s) SubCutaneous every 8 hours  insulin lispro (ADMELOG) corrective regimen sliding scale   SubCutaneous every 6 hours  lactated ringers. 1000 milliLiter(s) (100 mL/Hr) IV Continuous <Continuous>  levothyroxine Injectable 56 MICROGram(s) IV Push at bedtime  lidocaine   4% Patch 1 Patch Transdermal every 24 hours  metoprolol tartrate Injectable 5 milliGRAM(s) IV Push every 6 hours  pantoprazole    Tablet 40 milliGRAM(s) Oral before breakfast    MEDICATIONS  (PRN):  dextrose Oral Gel 15 Gram(s) Oral once PRN Blood Glucose LESS THAN 70 milliGRAM(s)/deciliter  HYDROmorphone   Tablet 4 milliGRAM(s) Oral every 3 hours PRN Moderate Pain (4 - 6)  HYDROmorphone   Tablet 6 milliGRAM(s) Oral every 3 hours PRN Severe Pain (7 - 10)  HYDROmorphone  Injectable 0.5 milliGRAM(s) IV Push every 3 hours PRN Severe breakthrough  Pain (7 - 10)  naloxone Injectable 0.1 milliGRAM(s) IV Push every 3 minutes PRN For ANY of the following changes in patient status:  A. RR LESS THAN 10 breaths per minute, B. Oxygen saturation LESS THAN 90%, C. Sedation score of 6  ondansetron Injectable 4 milliGRAM(s) IV Push every 6 hours PRN Nausea  zolpidem 5 milliGRAM(s) Oral at bedtime PRN Insomnia

## 2022-08-11 NOTE — DIETITIAN INITIAL EVALUATION ADULT - OTHER INFO
Pt 62 yr old male scheduled for Esophagogastroduodenoscopy Robotic Assisted Laparoscopic Repair of Hiatal hernia Fundoplication with Dr Dent tentatively 8/10/22 - ( pt cancelled from surgery 6/10/22 due to sinus infection )  presents with preop dx gastro-esophageal reflux diseases -  patient reports 60 lbs weight loss from hiatal hernia, chronic acid reflux, 'constant irritation", occasional nausea, - -hx obesity, lumbar disc disorder, sleep apnea , preDM, HTN , HLD      Procedure: Extensive intraabdominal adhesions, lysis of adhesions, reduction of hiatal hernia, removal of esophageal lipoma. Toupet fundoplication. Preop Z-line 41 cm, post op 45 cm.  8/10/2022

## 2022-08-11 NOTE — PROGRESS NOTE ADULT - SUBJECTIVE AND OBJECTIVE BOX
LEIGHANN STEPHANIE                     MRN-[id number]    HPI:Pt 62 yr old male scheduled for Esophagogastroduodenoscopy Robotic Assisted Laparoscopic Repair of Hiatal hernia Fundoplication with Dr Dent tentatively 8/10/22 - ( pt cancelled from surgery 6/10/22 due to sinus infection )  presents with preop dx gastro-esophageal reflux diseases -  patient reports 60 lbs weight loss from hiatal hernia, chronic acid reflux, 'constant irritation", occasional nausea, - -hx obesity, lumbar disc disorder, sleep apnea , preDM, HTN , HLD      Procedure: Extensive intraabdominal adhesions, lysis of adhesions, reduction of hiatal hernia, removal of esophageal lipoma. Toupet fundoplication. Preop Z-line 41 cm, post op 45 cm.  8/10/2022    Issues:               Hiatal Hernia               Postop pain   HTN (hypertension)  IBS (irritable bowel syndrome)  Hx of SBO (small bowel obstruction) s/p SBR  Gastro-esophageal reflux disease with esophagitis  Obesity (BMI 42.3)  Diabetes  H/O neuropathy  Hypothyroidism  History of chronic pain  Unsteady gait  History of herniated intervertebral disc  H/O spinal stenosis  Anxiety and depression  NANCY (obstructive sleep apnea)  History of lumbar fusion    PAST MEDICAL & SURGICAL HISTORY:  HTN (hypertension)      IBS (irritable bowel syndrome)      SBO (small bowel obstruction)      Gastro-esophageal reflux disease with esophagitis      Obese      Diabetes      H/O neuropathy      Hypothyroidism      History of chronic pain      Unsteady gait      H/O hiatal hernia      H/O small bowel obstruction      History of herniated intervertebral disc      H/O spinal stenosis      Anxiety and depression      NANCY (obstructive sleep apnea)      History of lumbar fusion      H/O exploratory laparotomy      H/O hernia repair  abdominal      History of appendectomy      S/P cholecystectomy      S/P small bowel resection                VITAL SIGNS:  Vital Signs Last 24 Hrs  T(C): 36.8 (11 Aug 2022 08:00), Max: 36.8 (10 Aug 2022 17:25)  T(F): 98.2 (11 Aug 2022 08:00), Max: 98.3 (11 Aug 2022 04:00)  HR: 82 (11 Aug 2022 12:00) (68 - 88)  BP: 109/74 (11 Aug 2022 11:00) (109/74 - 146/90)  BP(mean): 82 (11 Aug 2022 11:00) (78 - 108)  RR: 15 (11 Aug 2022 12:00) (10 - 23)  SpO2: 95% (11 Aug 2022 12:00) (94% - 99%)    Parameters below as of 11 Aug 2022 12:00  Patient On (Oxygen Delivery Method): room air        I/Os:   I&O's Detail    10 Aug 2022 07:01  -  11 Aug 2022 07:00  --------------------------------------------------------  IN:    Lactated Ringers: 1500 mL  Total IN: 1500 mL    OUT:    Voided (mL): 1100 mL  Total OUT: 1100 mL    Total NET: 400 mL      11 Aug 2022 07:01  -  11 Aug 2022 12:24  --------------------------------------------------------  IN:    Lactated Ringers: 500 mL  Total IN: 500 mL    OUT:    Voided (mL): 300 mL  Total OUT: 300 mL    Total NET: 200 mL          CAPILLARY BLOOD GLUCOSE      POCT Blood Glucose.: 114 mg/dL (11 Aug 2022 12:22)  POCT Blood Glucose.: 107 mg/dL (11 Aug 2022 06:03)  POCT Blood Glucose.: 133 mg/dL (11 Aug 2022 00:18)  POCT Blood Glucose.: 148 mg/dL (10 Aug 2022 19:12)      =======================MEDICATIONS===================  MEDICATIONS  (STANDING):  acetaminophen     Tablet .. 650 milliGRAM(s) Oral every 6 hours  atorvastatin 10 milliGRAM(s) Oral at bedtime  dextrose 5%. 1000 milliLiter(s) (100 mL/Hr) IV Continuous <Continuous>  dextrose 5%. 1000 milliLiter(s) (50 mL/Hr) IV Continuous <Continuous>  dextrose 50% Injectable 25 Gram(s) IV Push once  dextrose 50% Injectable 12.5 Gram(s) IV Push once  dextrose 50% Injectable 25 Gram(s) IV Push once  DULoxetine 60 milliGRAM(s) Oral daily  glucagon  Injectable 1 milliGRAM(s) IntraMuscular once  heparin   Injectable 7500 Unit(s) SubCutaneous every 8 hours  insulin lispro (ADMELOG) corrective regimen sliding scale   SubCutaneous every 6 hours  lactated ringers. 1000 milliLiter(s) (100 mL/Hr) IV Continuous <Continuous>  levothyroxine Injectable 56 MICROGram(s) IV Push at bedtime  lidocaine   4% Patch 1 Patch Transdermal every 24 hours  metoprolol tartrate Injectable 5 milliGRAM(s) IV Push every 6 hours  pantoprazole    Tablet 40 milliGRAM(s) Oral before breakfast    MEDICATIONS  (PRN):  dextrose Oral Gel 15 Gram(s) Oral once PRN Blood Glucose LESS THAN 70 milliGRAM(s)/deciliter  HYDROmorphone   Tablet 4 milliGRAM(s) Oral every 3 hours PRN Moderate Pain (4 - 6)  HYDROmorphone   Tablet 6 milliGRAM(s) Oral every 3 hours PRN Severe Pain (7 - 10)  HYDROmorphone  Injectable 0.5 milliGRAM(s) IV Push every 3 hours PRN Severe breakthrough  Pain (7 - 10)  naloxone Injectable 0.1 milliGRAM(s) IV Push every 3 minutes PRN For ANY of the following changes in patient status:  A. RR LESS THAN 10 breaths per minute, B. Oxygen saturation LESS THAN 90%, C. Sedation score of 6  ondansetron Injectable 4 milliGRAM(s) IV Push every 6 hours PRN Nausea  zolpidem 5 milliGRAM(s) Oral at bedtime PRN Insomnia        PHYSICAL EXAM============================  General:                         Awake, alert, not in any distress  Neuro:                            Moving all extremities to commands.   Respiratory:	Air entry fair and  bilateral conducted sounds                                           Effort even and unlabored.  CV:		Regular rate and rhythm. Normal S1/S2                                          Distal pulses present.  Abdomen:	                     Soft, non-distended. Bowel sounds present   Skin:		No rash.  Extremities:	Warm, no cyanosis or edema.  Palpable pulses    ============================LABS=========================                        14.5   12.47 )-----------( 201      ( 11 Aug 2022 07:12 )             42.8     08-11    136  |  98  |  15  ----------------------------<  107<H>  3.9   |  23  |  0.67    Ca    9.4      11 Aug 2022 07:12  Phos  3.5     08-11  Mg     1.50     08-11      A/P:   62yMale s/p Extensive intraabdominal adhesions, lysis of adhesions, reduction of hiatal hernia, removal of esophageal lipoma. Toupet fundoplication. Preop Z-line 41 cm, post op 45 cm.  8/10/2022 ,   progressing well, experiencing  pain with deep breathing.                             Neuro:    Nonfocal                                         Pain control with PCA / IV Tylenol     Chronic back pain - May restart Tizanidine, Gabapentin and Morphine PO after swallow study in AM                            Cardiovascular:                                           Telemetry (medical test) - Reviewed by me today independently. Patient is in normal sinus rhythm.                                           Continue hemodynamic monitoring.                                         Continue IVF LR 100cc/hr     HTN: Continue Lopressor 5mg IVP Q6HRS                             Respiratory:                                          Postop hypoxemia requiring O2 via nasal cannula probably due to postop pain - Wean nasal cannula for goal O2sat above 92%.                                                        CXR - lungs are clear. Encourage incentive spirometry.                                                                     Chest PT and frequent suctioning. Continue bronchodilators.                                                                                 OOB to chair & ambulate w/ assistance.                                                                                              Continuous pulse oximetry for support & to prevent decompensation.                                                                     GI                                          NPO                                          Hx of GERD: Hold  Protonix for now                                          Continue Zofran / Reglan for nausea - PRN                                          Barium swallow 	                                                                 Renal:                                          Continue LR                                           Monitor I/Os and electrolytes, maintain K>4.0 and mG >2.0                                                                                         Hem/ Onc:                                                                                   Monitor for signs of bleeding.                                           Follow CBC in AM                           Infectious disease:                                             No signs of infection. Monitor for fever / leukocytosis.                                           All surgical incision / chest tube  sites look clean.                            Endocrine                                             DM-2: Continue Accu-Checks with coverage.                                            Hypothyroidism: On Synthroid 56mcg IVP QHS    Pt is on SQ Heparin and Venodyne boots for DVT prophylaxis.     Pertinent clinical, laboratory, radiographic, hemodynamic, echocardiographic, respiratory data, microbiologic data and chart were reviewed and analyzed frequently throughout the course of the day and night.   Patient seen, examined and plan discussed with CT Surgeon Dr. Dent / CTICU team during rounds.    Status discussed with patient at bedside, updated plan of care.    I have spent 35 minutes with this patient including 20 minutes of time coordinating care in the ICU.      Bruce Starkey DO, FACEP

## 2022-08-11 NOTE — DIETITIAN INITIAL EVALUATION ADULT - ORAL INTAKE PTA/DIET HISTORY
Met w/pt who provided nutrition hx.  Pt denies food allergies.  Pt w/poor dentition-eats soft consistency foods.  Pt avoids fried foods and steak.  Pt denies modified diet PTA.  Pt endorses 60lb wt loss x 2 1/2-3 years, by just cutting down on intake.  Pt w/limited physical intake 2/2 spinal stenosis.   Met w/pt who provided nutrition hx.  Pt denies food allergies.  Pt w/poor dentition-eats soft consistency foods.  Pt avoids fried foods and steak.  Pt denies modified diet PTA.  Pt endorses 60lb wt loss x 2 1/2-3 years, by just cutting down on intake.  Pt w/limited physical intake 2/2 spinal stenosis.  Pt states he does have an appetite at this time Pt was started on clears, but cannot tolerate anything cold at this time.   He reports that he had some tea earlier.  Advised pt to attempt small, frequent feedings to aid in increasing po intake.  Pt agreeable.

## 2022-08-11 NOTE — PROGRESS NOTE ADULT - SUBJECTIVE AND OBJECTIVE BOX
Anesthesia Pain Management Service    SUBJECTIVE: Pt now off IV PCA without problems reported.    Therapy:	  [ X] IV PCA	   [ ] Epidural           [ ] s/p Spinal Opoid              [ ] Postpartum infusion	  [ ] Patient controlled regional anesthesia (PCRA)    [ ] prn Analgesics    Allergies    No Known Allergies    Intolerances    IV Contrast (Nausea)    MEDICATIONS  (STANDING):  acetaminophen     Tablet .. 650 milliGRAM(s) Oral every 6 hours  atorvastatin 10 milliGRAM(s) Oral at bedtime  dextrose 5%. 1000 milliLiter(s) (100 mL/Hr) IV Continuous <Continuous>  dextrose 5%. 1000 milliLiter(s) (50 mL/Hr) IV Continuous <Continuous>  dextrose 50% Injectable 25 Gram(s) IV Push once  dextrose 50% Injectable 12.5 Gram(s) IV Push once  dextrose 50% Injectable 25 Gram(s) IV Push once  DULoxetine 60 milliGRAM(s) Oral daily  glucagon  Injectable 1 milliGRAM(s) IntraMuscular once  heparin   Injectable 7500 Unit(s) SubCutaneous every 8 hours  insulin lispro (ADMELOG) corrective regimen sliding scale   SubCutaneous every 6 hours  lactated ringers. 1000 milliLiter(s) (100 mL/Hr) IV Continuous <Continuous>  levothyroxine Injectable 56 MICROGram(s) IV Push at bedtime  lidocaine   4% Patch 1 Patch Transdermal every 24 hours  metoprolol tartrate Injectable 5 milliGRAM(s) IV Push every 6 hours  pantoprazole    Tablet 40 milliGRAM(s) Oral before breakfast    MEDICATIONS  (PRN):  dextrose Oral Gel 15 Gram(s) Oral once PRN Blood Glucose LESS THAN 70 milliGRAM(s)/deciliter  HYDROmorphone   Tablet 4 milliGRAM(s) Oral every 3 hours PRN Moderate Pain (4 - 6)  HYDROmorphone   Tablet 6 milliGRAM(s) Oral every 3 hours PRN Severe Pain (7 - 10)  HYDROmorphone  Injectable 0.5 milliGRAM(s) IV Push every 3 hours PRN Severe breakthrough  Pain (7 - 10)  naloxone Injectable 0.1 milliGRAM(s) IV Push every 3 minutes PRN For ANY of the following changes in patient status:  A. RR LESS THAN 10 breaths per minute, B. Oxygen saturation LESS THAN 90%, C. Sedation score of 6  ondansetron Injectable 4 milliGRAM(s) IV Push every 6 hours PRN Nausea  zolpidem 5 milliGRAM(s) Oral at bedtime PRN Insomnia      OBJECTIVE:   [X] No new signs     [ ] Other:    Side Effects:  [X ] None			[ ] Other:    Assessment of Catheter Site:		[ ] Intact		[ ] Other:    ASSESSMENT/PLAN  [ ] Continue current therapy    [X ] Therapy changed to:    [ ] IV PCA       [ ] Epidural     [ X] prn Analgesics     Comments: PRN Oral/IV opioids and/or non-opioid adjuvant analgesics to be used at this point.    Progress Note written now but Patient was seen earlier.

## 2022-08-11 NOTE — DIETITIAN INITIAL EVALUATION ADULT - PERTINENT LABORATORY DATA
08-11    136  |  98  |  15  ----------------------------<  107<H>  3.9   |  23  |  0.67    Ca    9.4      11 Aug 2022 07:12  Phos  3.5     08-11  Mg     1.50     08-11    POCT Blood Glucose.: 114 mg/dL (08-11-22 @ 12:22)  A1C with Estimated Average Glucose Result: 5.9 % (06-13-22 @ 15:05)

## 2022-08-11 NOTE — DIETITIAN INITIAL EVALUATION ADULT - NS FNS DIET ORDER
Diet, Clear Liquid:   Consistent Carbohydrate {No Snacks} (CSTCHO)  No Carbonated Beverages (08-11-22 @ 10:21)

## 2022-08-11 NOTE — DIETITIAN INITIAL EVALUATION ADULT - ADD RECOMMEND
1) Monitor weights, labs, BM's, skin integrity, p.o. intake;  2) Advance diet as tolerated;  3) Pt to be provided with daily menu once diet is advanced for him to make selections according to easy to chew type foods. no radiation

## 2022-08-11 NOTE — DIETITIAN NUTRITION RISK NOTIFICATION - TREATMENT: THE FOLLOWING DIET HAS BEEN RECOMMENDED
Diet, Clear Liquid:   Consistent Carbohydrate {No Snacks} (CSTCHO)  No Carbonated Beverages (08-11-22 @ 10:21) [Active]

## 2022-08-11 NOTE — PROGRESS NOTE ADULT - SUBJECTIVE AND OBJECTIVE BOX
Anesthesia Pain Management Service    SUBJECTIVE: Patient is doing well with IV PCA and no significant problems reported. Patient states his pain is controlled. Reports he takes Morphine 30mg BID for chronic back pain but it does not help him.    Pain Scale Score	At rest: _6/10__ 	With Activity: ___ 	[X ] Refer to charted pain scores    THERAPY:    [ ] IV PCA Morphine		[ ] 5 mg/mL	[ ] 1 mg/mL  [X ] IV PCA Hydromorphone	[ ] 5 mg/mL	[X ] 1 mg/mL  [ ] IV PCA Fentanyl		[ ] 50 micrograms/mL    Demand dose __0.2_ lockout __6_ (minutes) Continuous Rate _0__ Total: __11.1_   mg used (in past 24 hrs)      MEDICATIONS  (STANDING):  acetaminophen     Tablet .. 650 milliGRAM(s) Oral every 6 hours  atorvastatin 10 milliGRAM(s) Oral at bedtime  dextrose 5%. 1000 milliLiter(s) (100 mL/Hr) IV Continuous <Continuous>  dextrose 5%. 1000 milliLiter(s) (50 mL/Hr) IV Continuous <Continuous>  dextrose 50% Injectable 25 Gram(s) IV Push once  dextrose 50% Injectable 12.5 Gram(s) IV Push once  dextrose 50% Injectable 25 Gram(s) IV Push once  DULoxetine 60 milliGRAM(s) Oral daily  glucagon  Injectable 1 milliGRAM(s) IntraMuscular once  heparin   Injectable 7500 Unit(s) SubCutaneous every 8 hours  insulin lispro (ADMELOG) corrective regimen sliding scale   SubCutaneous every 6 hours  lactated ringers. 1000 milliLiter(s) (100 mL/Hr) IV Continuous <Continuous>  levothyroxine Injectable 56 MICROGram(s) IV Push at bedtime  lidocaine   4% Patch 1 Patch Transdermal every 24 hours  metoprolol tartrate Injectable 5 milliGRAM(s) IV Push every 6 hours  pantoprazole    Tablet 40 milliGRAM(s) Oral before breakfast    MEDICATIONS  (PRN):  dextrose Oral Gel 15 Gram(s) Oral once PRN Blood Glucose LESS THAN 70 milliGRAM(s)/deciliter  HYDROmorphone   Tablet 4 milliGRAM(s) Oral every 3 hours PRN Moderate Pain (4 - 6)  HYDROmorphone   Tablet 6 milliGRAM(s) Oral every 3 hours PRN Severe Pain (7 - 10)  HYDROmorphone  Injectable 0.5 milliGRAM(s) IV Push every 3 hours PRN Severe breakthrough  Pain (7 - 10)  naloxone Injectable 0.1 milliGRAM(s) IV Push every 3 minutes PRN For ANY of the following changes in patient status:  A. RR LESS THAN 10 breaths per minute, B. Oxygen saturation LESS THAN 90%, C. Sedation score of 6  ondansetron Injectable 4 milliGRAM(s) IV Push every 6 hours PRN Nausea  zolpidem 5 milliGRAM(s) Oral at bedtime PRN Insomnia      OBJECTIVE: Patient sitting up in chair.    Sedation Score:	[ X] Alert	[ ] Drowsy 	[ ] Arousable	[ ] Asleep	[ ] Unresponsive    Side Effects:	[X ] None	[ ] Nausea	[ ] Vomiting	[ ] Pruritus  		[ ] Other:    Vital Signs Last 24 Hrs  T(C): 36.8 (11 Aug 2022 08:00), Max: 36.8 (10 Aug 2022 17:25)  T(F): 98.2 (11 Aug 2022 08:00), Max: 98.3 (11 Aug 2022 04:00)  HR: 82 (11 Aug 2022 12:00) (68 - 88)  BP: 109/74 (11 Aug 2022 11:00) (109/74 - 146/90)  BP(mean): 82 (11 Aug 2022 11:00) (78 - 108)  RR: 15 (11 Aug 2022 12:00) (10 - 23)  SpO2: 95% (11 Aug 2022 12:00) (94% - 99%)    Parameters below as of 11 Aug 2022 12:00  Patient On (Oxygen Delivery Method): room air        ASSESSMENT/ PLAN    Therapy to  be:	[ ] Continue   [ X] Discontinued   [X ] Change to prn Analgesics    Documentation and Verification of current medications:   [X] Done	[ ] Not done, not elligible    Comments: Discussed patient with CTICU team, IV PCA discontinued. Discussed patient with Anesthesia pain attending, PO Dilaudid 4mg and 6mg Q3H ordered. PRN Oral/IV opioids and/or Adjuvant non-opioid medication to be ordered at this point.    Progress Note written now but Patient was seen earlier.

## 2022-08-11 NOTE — DIETITIAN INITIAL EVALUATION ADULT - NUTRITIONGOAL OUTCOME1
Pt to tolerate diet and have diet advanced accordingly.  Suggest wt reduction efforts to continue after d/c and healing.

## 2022-08-12 ENCOUNTER — TRANSCRIPTION ENCOUNTER (OUTPATIENT)
Age: 62
End: 2022-08-12

## 2022-08-12 VITALS
HEART RATE: 75 BPM | RESPIRATION RATE: 14 BRPM | TEMPERATURE: 98 F | OXYGEN SATURATION: 96 % | SYSTOLIC BLOOD PRESSURE: 141 MMHG | DIASTOLIC BLOOD PRESSURE: 75 MMHG

## 2022-08-12 LAB
ANION GAP SERPL CALC-SCNC: 12 MMOL/L — SIGNIFICANT CHANGE UP (ref 7–14)
BUN SERPL-MCNC: 12 MG/DL — SIGNIFICANT CHANGE UP (ref 7–23)
CALCIUM SERPL-MCNC: 8.8 MG/DL — SIGNIFICANT CHANGE UP (ref 8.4–10.5)
CHLORIDE SERPL-SCNC: 97 MMOL/L — LOW (ref 98–107)
CO2 SERPL-SCNC: 24 MMOL/L — SIGNIFICANT CHANGE UP (ref 22–31)
CREAT SERPL-MCNC: 0.62 MG/DL — SIGNIFICANT CHANGE UP (ref 0.5–1.3)
EGFR: 109 ML/MIN/1.73M2 — SIGNIFICANT CHANGE UP
GLUCOSE BLDC GLUCOMTR-MCNC: 101 MG/DL — HIGH (ref 70–99)
GLUCOSE BLDC GLUCOMTR-MCNC: 124 MG/DL — HIGH (ref 70–99)
GLUCOSE SERPL-MCNC: 109 MG/DL — HIGH (ref 70–99)
HCT VFR BLD CALC: 40.9 % — SIGNIFICANT CHANGE UP (ref 39–50)
HGB BLD-MCNC: 13.9 G/DL — SIGNIFICANT CHANGE UP (ref 13–17)
MAGNESIUM SERPL-MCNC: 1.6 MG/DL — SIGNIFICANT CHANGE UP (ref 1.6–2.6)
MCHC RBC-ENTMCNC: 31.1 PG — SIGNIFICANT CHANGE UP (ref 27–34)
MCHC RBC-ENTMCNC: 34 GM/DL — SIGNIFICANT CHANGE UP (ref 32–36)
MCV RBC AUTO: 91.5 FL — SIGNIFICANT CHANGE UP (ref 80–100)
NRBC # BLD: 0 /100 WBCS — SIGNIFICANT CHANGE UP
NRBC # FLD: 0 K/UL — SIGNIFICANT CHANGE UP
PHOSPHATE SERPL-MCNC: 2.5 MG/DL — SIGNIFICANT CHANGE UP (ref 2.5–4.5)
PLATELET # BLD AUTO: 179 K/UL — SIGNIFICANT CHANGE UP (ref 150–400)
POTASSIUM SERPL-MCNC: 3.9 MMOL/L — SIGNIFICANT CHANGE UP (ref 3.5–5.3)
POTASSIUM SERPL-SCNC: 3.9 MMOL/L — SIGNIFICANT CHANGE UP (ref 3.5–5.3)
RBC # BLD: 4.47 M/UL — SIGNIFICANT CHANGE UP (ref 4.2–5.8)
RBC # FLD: 13 % — SIGNIFICANT CHANGE UP (ref 10.3–14.5)
SODIUM SERPL-SCNC: 133 MMOL/L — LOW (ref 135–145)
TROPONIN T, HIGH SENSITIVITY RESULT: <6 NG/L — SIGNIFICANT CHANGE UP
WBC # BLD: 9.81 K/UL — SIGNIFICANT CHANGE UP (ref 3.8–10.5)
WBC # FLD AUTO: 9.81 K/UL — SIGNIFICANT CHANGE UP (ref 3.8–10.5)

## 2022-08-12 PROCEDURE — 99233 SBSQ HOSP IP/OBS HIGH 50: CPT

## 2022-08-12 PROCEDURE — 71045 X-RAY EXAM CHEST 1 VIEW: CPT | Mod: 26

## 2022-08-12 RX ORDER — HYDROMORPHONE HYDROCHLORIDE 2 MG/ML
1 INJECTION INTRAMUSCULAR; INTRAVENOUS; SUBCUTANEOUS
Qty: 40 | Refills: 0
Start: 2022-08-12 | End: 2022-08-16

## 2022-08-12 RX ORDER — LEVOTHYROXINE SODIUM 125 MCG
75 TABLET ORAL DAILY
Refills: 0 | Status: DISCONTINUED | OUTPATIENT
Start: 2022-08-12 | End: 2022-08-12

## 2022-08-12 RX ORDER — MAGNESIUM SULFATE 500 MG/ML
2 VIAL (ML) INJECTION ONCE
Refills: 0 | Status: COMPLETED | OUTPATIENT
Start: 2022-08-12 | End: 2022-08-12

## 2022-08-12 RX ORDER — LIDOCAINE 4 G/100G
1 CREAM TOPICAL
Qty: 0 | Refills: 0 | DISCHARGE
Start: 2022-08-12

## 2022-08-12 RX ORDER — GABAPENTIN 400 MG/1
300 CAPSULE ORAL
Refills: 0 | Status: DISCONTINUED | OUTPATIENT
Start: 2022-08-12 | End: 2022-08-12

## 2022-08-12 RX ORDER — MORPHINE SULFATE 50 MG/1
1 CAPSULE, EXTENDED RELEASE ORAL
Qty: 0 | Refills: 0 | DISCHARGE

## 2022-08-12 RX ADMIN — HEPARIN SODIUM 7500 UNIT(S): 5000 INJECTION INTRAVENOUS; SUBCUTANEOUS at 05:52

## 2022-08-12 RX ADMIN — LIDOCAINE 1 PATCH: 4 CREAM TOPICAL at 00:18

## 2022-08-12 RX ADMIN — PANTOPRAZOLE SODIUM 40 MILLIGRAM(S): 20 TABLET, DELAYED RELEASE ORAL at 06:01

## 2022-08-12 RX ADMIN — ZOLPIDEM TARTRATE 5 MILLIGRAM(S): 10 TABLET ORAL at 00:48

## 2022-08-12 RX ADMIN — HYDROMORPHONE HYDROCHLORIDE 6 MILLIGRAM(S): 2 INJECTION INTRAMUSCULAR; INTRAVENOUS; SUBCUTANEOUS at 00:48

## 2022-08-12 RX ADMIN — HYDROMORPHONE HYDROCHLORIDE 6 MILLIGRAM(S): 2 INJECTION INTRAMUSCULAR; INTRAVENOUS; SUBCUTANEOUS at 06:37

## 2022-08-12 RX ADMIN — HYDROMORPHONE HYDROCHLORIDE 6 MILLIGRAM(S): 2 INJECTION INTRAMUSCULAR; INTRAVENOUS; SUBCUTANEOUS at 01:30

## 2022-08-12 RX ADMIN — Medication 650 MILLIGRAM(S): at 12:47

## 2022-08-12 RX ADMIN — ATENOLOL 50 MILLIGRAM(S): 25 TABLET ORAL at 05:52

## 2022-08-12 RX ADMIN — Medication 650 MILLIGRAM(S): at 05:51

## 2022-08-12 RX ADMIN — HYDROMORPHONE HYDROCHLORIDE 6 MILLIGRAM(S): 2 INJECTION INTRAMUSCULAR; INTRAVENOUS; SUBCUTANEOUS at 09:39

## 2022-08-12 RX ADMIN — HYDROMORPHONE HYDROCHLORIDE 6 MILLIGRAM(S): 2 INJECTION INTRAMUSCULAR; INTRAVENOUS; SUBCUTANEOUS at 09:35

## 2022-08-12 RX ADMIN — HYDROMORPHONE HYDROCHLORIDE 6 MILLIGRAM(S): 2 INJECTION INTRAMUSCULAR; INTRAVENOUS; SUBCUTANEOUS at 06:01

## 2022-08-12 RX ADMIN — Medication 25 GRAM(S): at 06:18

## 2022-08-12 RX ADMIN — Medication 650 MILLIGRAM(S): at 06:37

## 2022-08-12 RX ADMIN — DULOXETINE HYDROCHLORIDE 60 MILLIGRAM(S): 30 CAPSULE, DELAYED RELEASE ORAL at 12:47

## 2022-08-12 RX ADMIN — GABAPENTIN 300 MILLIGRAM(S): 400 CAPSULE ORAL at 12:47

## 2022-08-12 RX ADMIN — Medication 650 MILLIGRAM(S): at 12:50

## 2022-08-12 NOTE — DISCHARGE NOTE PROVIDER - INSTRUCTIONS
Clears liquid diet until Sunday. Sunday through Tuesday full liquids, than on Wednesday soft diet until you see Dr mott in the office. No carbonated beverages.

## 2022-08-12 NOTE — DISCHARGE NOTE NURSING/CASE MANAGEMENT/SOCIAL WORK - PATIENT PORTAL LINK FT
You can access the FollowMyHealth Patient Portal offered by [facility] by registering at the following website: [email or web address]. By joining [employer]’s FollowMyHealth portal, you will also be able to view your health information using other applications (apps) compatible with our system.

## 2022-08-12 NOTE — DISCHARGE NOTE PROVIDER - DETAILS OF MALNUTRITION DIAGNOSIS/DIAGNOSES
This patient has been assessed with a concern for Malnutrition and was treated during this hospitalization for the following Nutrition diagnosis/diagnoses:     -  08/11/2022: Morbid obesity (BMI > 40)

## 2022-08-12 NOTE — PROGRESS NOTE ADULT - SUBJECTIVE AND OBJECTIVE BOX
STEPHANIE LAWTON            MRN-[id number]     Male    IV Contrast (Nausea)  No Known Allergies               Pt 62 yr old male scheduled for Esophagogastroduodenoscopy Robotic Assisted Laparoscopic Repair of Hiatal hernia Fundoplication with Dr Dent tentatively 8/10/22 - ( pt cancelled from surgery 6/10/22 due to sinus infection )  presents with preop dx gastro-esophageal reflux diseases -  patient reports 60 lbs weight loss from hiatal hernia, chronic acid reflux, 'constant irritation", occasional nausea, - -hx obesity, lumbar disc disorder, sleep apnea , preDM, HTN , HLD      Procedure: Extensive intraabdominal adhesions, lysis of adhesions, reduction of hiatal hernia, removal of esophageal lipoma. Toupet fundoplication. Preop Z-line 41 cm, post op 45 cm.  8/10/2022    Issues:               Hiatal Hernia               Postop pain   HTN (hypertension)  IBS (irritable bowel syndrome)  Hx of SBO (small bowel obstruction) s/p SBR  Gastro-esophageal reflux disease with esophagitis  Obesity (BMI 42.3)  Diabetes  H/O neuropathy  Hypothyroidism  History of chronic pain  Unsteady gait  History of herniated intervertebral disc  H/O spinal stenosis  Anxiety and depression  NANCY (obstructive sleep apnea)  History of lumbar fusion               Postop course: Patient reports moderate pain at chest wall incision sites which is worse with coughing and deep breathing without associated fever or dyspnea. Pain is improved with PCA / IV meds    PAST MEDICAL & SURGICAL HISTORY:  HTN (hypertension)      IBS (irritable bowel syndrome)      SBO (small bowel obstruction)      Gastro-esophageal reflux disease with esophagitis      Obese      Diabetes      H/O neuropathy      Hypothyroidism      History of chronic pain      Unsteady gait      H/O hiatal hernia      H/O small bowel obstruction      History of herniated intervertebral disc      H/O spinal stenosis      Anxiety and depression      NANCY (obstructive sleep apnea)      History of lumbar fusion      H/O exploratory laparotomy      H/O hernia repair  abdominal      History of appendectomy      S/P cholecystectomy      S/P small bowel resection        ICU Vital Signs Last 24 Hrs  T(C): 36.8 (12 Aug 2022 08:00), Max: 37 (12 Aug 2022 04:00)  T(F): 98.3 (12 Aug 2022 08:00), Max: 98.6 (12 Aug 2022 04:00)  HR: 67 (12 Aug 2022 09:00) (61 - 95)  BP: 135/74 (12 Aug 2022 09:00) (100/55 - 159/81)  BP(mean): 94 (12 Aug 2022 09:00) (68 - 108)  ABP: --  ABP(mean): --  RR: 20 (12 Aug 2022 09:00) (12 - 28)  SpO2: 97% (12 Aug 2022 09:00) (90% - 98%)    O2 Parameters below as of 12 Aug 2022 09:00  Patient On (Oxygen Delivery Method): room air          I&O's Detail    11 Aug 2022 07:01  -  12 Aug 2022 07:00  --------------------------------------------------------  IN:    IV PiggyBack: 50 mL    Lactated Ringers: 2400 mL    Oral Fluid: 360 mL  Total IN: 2810 mL    OUT:    Voided (mL): 1375 mL  Total OUT: 1375 mL    Total NET: 1435 mL      12 Aug 2022 07:01  -  12 Aug 2022 09:35  --------------------------------------------------------  IN:    Oral Fluid: 200 mL  Total IN: 200 mL    OUT:  Total OUT: 0 mL    Total NET: 200 mL        CAPILLARY BLOOD GLUCOSE      POCT Blood Glucose.: 101 mg/dL (12 Aug 2022 05:40)    Home Medications:  acetaminophen 500 mg oral capsule: 2 tab(s) orally 3 times a day, As Needed (10 Aug 2022 08:40)  Ambien 5 mg oral tablet: 1 tab(s) orally once a day (at bedtime), As Needed (10 Aug 2022 08:40)  amLODIPine 10 mg oral tablet: 1 tab(s) orally once a day, am (10 Aug 2022 08:40)  atenolol 100 mg oral tablet: 1 tab(s) orally once a day, am (10 Aug 2022 08:40)  atorvastatin 10 mg oral tablet: 1 tab(s) orally once a day, pm (10 Aug 2022 08:40)  DULoxetine 60 mg oral delayed release capsule: 1 cap(s) orally 2 times a day (10 Aug 2022 08:40)  gabapentin 300 mg oral tablet: 1 cap(s) orally 4 times a day (10 Aug 2022 08:40)  hyoscyamine 0.375 mg oral tablet, extended release: 1 tab(s) orally 3 times a day, As Needed (10 Aug 2022 08:40)  levothyroxine 75 mcg (0.075 mg) oral tablet: 1 tab(s) orally once a day, am (10 Aug 2022 08:40)  losartan 25 mg oral tablet: 1 tab(s) orally once a day, am (10 Aug 2022 08:40)  metFORMIN 500 mg oral tablet: 2 tab(s) orally 2 times a day (10 Aug 2022 08:40)  morphine 30 mg oral tablet: 1 tab(s) orally prn HS for sleep  (10 Aug 2022 08:40)  pantoprazole 40 mg oral delayed release tablet: 1 tab(s) orally 2 times a day (10 Aug 2022 08:40)  Pepcid 20 mg oral tablet: 1 tab(s) orally once a day, am (10 Aug 2022 08:40)  tiZANidine 4 mg oral capsule: 1 cap(s) orally 2 times a day, As Needed (10 Aug 2022 08:40)    MEDICATIONS  (STANDING):  acetaminophen     Tablet .. 650 milliGRAM(s) Oral every 6 hours  ATENolol  Tablet 50 milliGRAM(s) Oral daily  atorvastatin 10 milliGRAM(s) Oral at bedtime  dextrose 5%. 1000 milliLiter(s) (100 mL/Hr) IV Continuous <Continuous>  dextrose 5%. 1000 milliLiter(s) (50 mL/Hr) IV Continuous <Continuous>  dextrose 50% Injectable 25 Gram(s) IV Push once  dextrose 50% Injectable 12.5 Gram(s) IV Push once  dextrose 50% Injectable 25 Gram(s) IV Push once  DULoxetine 60 milliGRAM(s) Oral daily  gabapentin 300 milliGRAM(s) Oral four times a day  glucagon  Injectable 1 milliGRAM(s) IntraMuscular once  heparin   Injectable 7500 Unit(s) SubCutaneous every 8 hours  insulin lispro (ADMELOG) corrective regimen sliding scale   SubCutaneous every 6 hours  levothyroxine 75 MICROGram(s) Oral daily  lidocaine   4% Patch 1 Patch Transdermal every 24 hours  pantoprazole    Tablet 40 milliGRAM(s) Oral before breakfast    MEDICATIONS  (PRN):  benzocaine 15 mG/menthol 3.6 mG Lozenge 1 Lozenge Oral every 3 hours PRN Sore Throat  dextrose Oral Gel 15 Gram(s) Oral once PRN Blood Glucose LESS THAN 70 milliGRAM(s)/deciliter  HYDROmorphone   Tablet 4 milliGRAM(s) Oral every 3 hours PRN Moderate Pain (4 - 6)  HYDROmorphone   Tablet 6 milliGRAM(s) Oral every 3 hours PRN Severe Pain (7 - 10)  HYDROmorphone  Injectable 0.5 milliGRAM(s) IV Push every 3 hours PRN Severe breakthrough  Pain (7 - 10)  naloxone Injectable 0.1 milliGRAM(s) IV Push every 3 minutes PRN For ANY of the following changes in patient status:  A. RR LESS THAN 10 breaths per minute, B. Oxygen saturation LESS THAN 90%, C. Sedation score of 6  ondansetron Injectable 4 milliGRAM(s) IV Push every 6 hours PRN Nausea  zolpidem 5 milliGRAM(s) Oral at bedtime PRN Insomnia        Physical exam:   General:               Pt is awake, alert, not in any distress                                 Psych:                   A & O x 3                                                                Neuro:                  Nonfocal                              Cardiovascular:   S1 & S2, regular                            Respiratory:         Air entry is fair and equal on both sides, has bilateral conducted sounds                            GI:                          Soft, nondistended and nontender, Bowel sounds hypoactive                             Ext:                        No cyanosis or edema                            Labs:                                                                           13.9   9.81  )-----------( 179      ( 12 Aug 2022 04:30 )             40.9             08-12    133<L>  |  97<L>  |  12  ----------------------------<  109<H>  3.9   |  24  |  0.62    Ca    8.8      12 Aug 2022 04:30  Phos  2.5     08-12  Mg     1.60     08-12                            CXR:  < from: Xray Chest 1 View- PORTABLE-Routine (Xray Chest 1 View- PORTABLE-Routine in AM.) (08.11.22 @ 05:42) >  CATHETERS AND TUBES: None    PULMONARY: The visualized lungs are clear of airspace consolidations or   effusions.   No pneumothorax.    HEART/VASCULAR: The heart and mediastinum size and configuration are   within normal limits.    BONES: Visualized osseous structures are intact.    IMPRESSION:   No radiographic evidence of active chest disease.    < end of copied text >    < from: Xray Esophagram Single Contrast (08.04.22 @ 10:50) >  Preliminary scoutradiograph of the abdomen demonstrates a nonobstructive   bowel gas pattern. Surgical clips are seen in the bilateral abdomen.    The esophagus demonstrates normal distensibility, contour and course,   without evidence of abnormal extrinsic mass effect.    Contrast passed unimpeded through the gastroesophageal junction into a   normal-appearing stomach. The stomach demonstrates normal distensibility,   and no mass or ulceration is identified. No hiatal hernia is   demonstrated. Mild gastroesophageal reflux is seen.    No gastric outlet obstruction was appreciated. The duodenal bulb and   sweep are unremarkable. There is no duodenal ulcer.      IMPRESSION:    Mild gastroesophageal reflux.          Plan:    General: 62yMale s/p Extensive intraabdominal adhesions, lysis of adhesions, reduction of hiatal hernia, removal of esophageal lipoma. Toupet fundoplication. Preop Z-line 41 cm, post op 45 cm.  8/10/2022 ,   progressing well, experiencing  pain with deep breathing.                             Neuro:                                          Pain control with Dilaudid  / Tylenol      Chronic back pain -  restart Tizanidine, Gabapentin and Morphine PO                             Cardiovascular:                                           Telemetry (medical test) - Reviewed by me today independently. Patient is in normal sinus rhythm.                                           Continue hemodynamic monitoring.                                         HTN: Continue Atenolol                            Respiratory:                                          On RA, looks comfortable                                                       CXR - lungs are clear. Encourage incentive spirometry.                                                                     Chest PT and frequent suctioning. Continue bronchodilators.                                                                                 OOB to chair & ambulate w/ assistance.                                                                                              Continuous pulse oximetry for support & to prevent decompensation.                                                                     GI                                          Tolerating clears  Advance to full liquids on POD # 4 and soft diet on day#7                                          Hx of GERD:   Protonix                                           Continue Zofran / Reglan for nausea - PRN                                          Barium swallow - Barium transited well without any leak.                                                                  Renal:                                                                                    Monitor I/Os and electrolytes, maintain K>4.0 and mG >2.0                                                                                         Hem/ Onc:                                                                                   Monitor for signs of bleeding.                                           Follow CBC in AM                           Infectious disease:                                             No signs of infection. Monitor for fever / leukocytosis.                                           All surgical incision / chest tube  sites look clean.                            Endocrine                                             DM-2: Continue Accu-Checks with coverage.                                            Hypothyroidism: On Synthroid 75mcg po    Pt is on SQ Heparin and Venodyne boots for DVT prophylaxis.     Pertinent clinical, laboratory, radiographic, hemodynamic, echocardiographic, respiratory data, microbiologic data and chart were reviewed and analyzed frequently throughout the course of the day and night.   Patient seen, examined and plan discussed with CT Surgeon / CTICU team during rounds.    Status discussed with patient at bedside, updated plan of care.    I have spent 40 minutes with this patient including 20 minutes of time coordinating care in the ICU.        Leo Herrmann MD

## 2022-08-12 NOTE — DISCHARGE NOTE NURSING/CASE MANAGEMENT/SOCIAL WORK - NSDCFUADDAPPT_GEN_ALL_CORE_FT
Please call Dr mott's office to schedule a follow up visit in 2 weeks.  Sleep with head of Bed elevated. Please have a chest xray done 1-2 days prior to your appointment and bring a copy with you on the day of your appointment    Follow up with chronic pain doctor, do not take morphine while taking Dilaudid

## 2022-08-12 NOTE — DISCHARGE NOTE PROVIDER - NSDCCPTREATMENT_GEN_ALL_CORE_FT
PRINCIPAL PROCEDURE  Procedure: Fundoplication, laparoscopic, with hiatal hernia repair  Findings and Treatment: Reop Robotic Assisted laparoscopic Repair of hiatal hernia and toupet Fundoplication

## 2022-08-12 NOTE — PROGRESS NOTE ADULT - NUTRITIONAL ASSESSMENT
This patient has been assessed with a concern for Malnutrition and has been determined to have a diagnosis/diagnoses of Morbid obesity (BMI > 40).    This patient is being managed with:   Diet Clear Liquid-  Consistent Carbohydrate {No Snacks} (CSTCHO)  No Carbonated Beverages  Entered: Aug 11 2022 10:21AM

## 2022-08-12 NOTE — DISCHARGE NOTE PROVIDER - NSDCMRMEDTOKEN_GEN_ALL_CORE_FT
acetaminophen 500 mg oral capsule: 2 tab(s) orally 3 times a day, As Needed  Ambien 5 mg oral tablet: 1 tab(s) orally once a day (at bedtime), As Needed  amLODIPine 10 mg oral tablet: 1 tab(s) orally once a day, am  atenolol 100 mg oral tablet: 1 tab(s) orally once a day, am  atorvastatin 10 mg oral tablet: 1 tab(s) orally once a day, pm  CXR Pa and Lateral s/p Hiatal hernia repair/fundoplication: 1 film(s)  once . Please give patient a copy of the film and fax results to Dr Rex Dent [phone]  Dilaudid 4 mg oral tablet: 1 tab(s) orally every 3-4 hours, As Needed for moderate to severe pain MDD:8  DULoxetine 60 mg oral delayed release capsule: 1 cap(s) orally 2 times a day  gabapentin 300 mg oral tablet: 1 cap(s) orally 4 times a day  hyoscyamine 0.375 mg oral tablet, extended release: 1 tab(s) orally 3 times a day, As Needed  levothyroxine 75 mcg (0.075 mg) oral tablet: 1 tab(s) orally once a day, am  lidocaine 4% topical film: Apply topically to affected area once a day for 12 hours daily, discontinue after 3 days  losartan 25 mg oral tablet: 1 tab(s) orally once a day, am  metFORMIN 500 mg oral tablet: 2 tab(s) orally 2 times a day  pantoprazole 40 mg oral delayed release tablet: 1 tab(s) orally 2 times a day  Pepcid 20 mg oral tablet: 1 tab(s) orally once a day, am  tiZANidine 4 mg oral capsule: 1 cap(s) orally 2 times a day, As Needed

## 2022-08-12 NOTE — DISCHARGE NOTE PROVIDER - HOSPITAL COURSE
HPI:  Pt 62 yr old male scheduled for Esophagogastroduodenoscopy Robotic Assisted Laparoscopic Repair of Hiatal hernia Fundoplication with Dr Dent tentatively 8/10/22 - ( pt cancelled from surgery 6/10/22 due to sinus infection )  presents with preop dx gastro-esophageal reflux diseases -  patient reports 60 lbs weight loss from hiatal hernia, chronic acid reflux, 'constant irritation", occasional nausea, - -hx obesity, lumbar disc disorder, sleep apnea , preDM, HTN , HLD  Pt given information for COVID PCR testing sites and told to make appointment 3-5 days preop       On 8/10/2022 pt underwent Robotic Reop Laparoscopic Assisted Hiatal Hernia Repair w/ Toupet Fundoplication. Pt tolerated the procedure well. Barium Esophagram on 8/11 revealed that contrast passed freely into stomach w/ no evidence of leak. Pt has been tolerating clear liquids. D/C home on POD#2. Pt has chronic back pain and the pain service has been involved in his pain ,management and recommend he stop his morphine and use dilaudin 4mg q3h prn pain and f/u w/ chronic pain doctor. He will continue with clears liquids until Sunday, than on Sunday through Tuesday full liquida, on Wednesday he may start soft diet. No carbonated beverage and sleep with head of bed elevated.

## 2022-08-12 NOTE — DISCHARGE NOTE PROVIDER - CARE PROVIDER_API CALL
Rex Dent)  Surgery; Thoracic Surgery  [phone] [place] [address] [place] [address]  Phone: [phone]  Fax: [phone]  Follow Up Time:     Chahca Lyle (NP; RN)  NP in [address], [address]  [place] [address]  Phone: [phone]  Fax: [phone]  Follow Up Time:

## 2022-08-12 NOTE — DISCHARGE NOTE PROVIDER - NSDCFUADDAPPT_GEN_ALL_CORE_FT
Please call Dr mott's office to schedule a follow up visit in 2 weeks.  Sleep with head of Bed elevated. Please have a chest xray done 1-2 days prior to your appointment and bring a copy with you on the day of your appointment Please call Dr mott's office to schedule a follow up visit in 2 weeks.  Sleep with head of Bed elevated. Please have a chest xray done 1-2 days prior to your appointment and bring a copy with you on the day of your appointment    Follow up with chronic pain doctor, do not take morphine while taking Dilaudid

## 2022-08-16 ENCOUNTER — NON-APPOINTMENT (OUTPATIENT)
Age: 62
End: 2022-08-16

## 2022-08-18 LAB — SURGICAL PATHOLOGY STUDY: SIGNIFICANT CHANGE UP

## 2022-08-19 DIAGNOSIS — L29.9 PRURITUS, UNSPECIFIED: ICD-10-CM

## 2022-08-19 RX ORDER — HYDROCORTISONE 25 MG/G
2.5 CREAM TOPICAL 3 TIMES DAILY
Qty: 1 | Refills: 0 | Status: ACTIVE | COMMUNITY
Start: 2022-08-19 | End: 1900-01-01

## 2022-09-01 ENCOUNTER — APPOINTMENT (OUTPATIENT)
Dept: THORACIC SURGERY | Facility: CLINIC | Age: 62
End: 2022-09-01

## 2022-09-01 ENCOUNTER — OUTPATIENT (OUTPATIENT)
Dept: OUTPATIENT SERVICES | Facility: HOSPITAL | Age: 62
LOS: 1 days | End: 2022-09-01

## 2022-09-01 ENCOUNTER — RESULT REVIEW (OUTPATIENT)
Age: 62
End: 2022-09-01

## 2022-09-01 ENCOUNTER — APPOINTMENT (OUTPATIENT)
Dept: RADIOLOGY | Facility: HOSPITAL | Age: 62
End: 2022-09-01

## 2022-09-01 VITALS
OXYGEN SATURATION: 92 % | WEIGHT: 256 LBS | SYSTOLIC BLOOD PRESSURE: 101 MMHG | HEIGHT: 68 IN | HEART RATE: 55 BPM | DIASTOLIC BLOOD PRESSURE: 66 MMHG | BODY MASS INDEX: 38.8 KG/M2

## 2022-09-01 VITALS — TEMPERATURE: 98.6 F

## 2022-09-01 DIAGNOSIS — Z90.49 ACQUIRED ABSENCE OF OTHER SPECIFIED PARTS OF DIGESTIVE TRACT: Chronic | ICD-10-CM

## 2022-09-01 DIAGNOSIS — Z98.1 ARTHRODESIS STATUS: Chronic | ICD-10-CM

## 2022-09-01 DIAGNOSIS — Z98.890 OTHER SPECIFIED POSTPROCEDURAL STATES: Chronic | ICD-10-CM

## 2022-09-01 DIAGNOSIS — K44.9 DIAPHRAGMATIC HERNIA WITHOUT OBSTRUCTION OR GANGRENE: ICD-10-CM

## 2022-09-01 PROCEDURE — 71046 X-RAY EXAM CHEST 2 VIEWS: CPT | Mod: 26

## 2022-09-01 PROCEDURE — 99024 POSTOP FOLLOW-UP VISIT: CPT

## 2022-09-22 ENCOUNTER — APPOINTMENT (OUTPATIENT)
Dept: THORACIC SURGERY | Facility: CLINIC | Age: 62
End: 2022-09-22

## 2022-10-06 ENCOUNTER — APPOINTMENT (OUTPATIENT)
Dept: THORACIC SURGERY | Facility: CLINIC | Age: 62
End: 2022-10-06

## 2022-10-13 ENCOUNTER — APPOINTMENT (OUTPATIENT)
Dept: THORACIC SURGERY | Facility: CLINIC | Age: 62
End: 2022-10-13

## 2022-10-13 VITALS
BODY MASS INDEX: 38.04 KG/M2 | OXYGEN SATURATION: 95 % | HEIGHT: 68 IN | WEIGHT: 251 LBS | SYSTOLIC BLOOD PRESSURE: 142 MMHG | DIASTOLIC BLOOD PRESSURE: 87 MMHG | HEART RATE: 58 BPM

## 2022-10-13 DIAGNOSIS — R11.2 NAUSEA WITH VOMITING, UNSPECIFIED: ICD-10-CM

## 2022-10-13 PROCEDURE — 99024 POSTOP FOLLOW-UP VISIT: CPT

## 2022-10-24 ENCOUNTER — OUTPATIENT (OUTPATIENT)
Dept: OUTPATIENT SERVICES | Facility: HOSPITAL | Age: 62
LOS: 1 days | End: 2022-10-24
Payer: MEDICARE

## 2022-10-24 ENCOUNTER — APPOINTMENT (OUTPATIENT)
Dept: CT IMAGING | Facility: CLINIC | Age: 62
End: 2022-10-24

## 2022-10-24 ENCOUNTER — RESULT REVIEW (OUTPATIENT)
Age: 62
End: 2022-10-24

## 2022-10-24 DIAGNOSIS — Z98.890 OTHER SPECIFIED POSTPROCEDURAL STATES: Chronic | ICD-10-CM

## 2022-10-24 DIAGNOSIS — Z90.49 ACQUIRED ABSENCE OF OTHER SPECIFIED PARTS OF DIGESTIVE TRACT: Chronic | ICD-10-CM

## 2022-10-24 DIAGNOSIS — Z98.1 ARTHRODESIS STATUS: Chronic | ICD-10-CM

## 2022-10-24 DIAGNOSIS — K44.9 DIAPHRAGMATIC HERNIA WITHOUT OBSTRUCTION OR GANGRENE: ICD-10-CM

## 2022-10-24 PROCEDURE — 71250 CT THORAX DX C-: CPT

## 2022-10-24 PROCEDURE — 71250 CT THORAX DX C-: CPT | Mod: 26

## 2022-10-28 ENCOUNTER — OUTPATIENT (OUTPATIENT)
Dept: OUTPATIENT SERVICES | Facility: HOSPITAL | Age: 62
LOS: 1 days | End: 2022-10-28
Payer: MEDICARE

## 2022-10-28 ENCOUNTER — RESULT REVIEW (OUTPATIENT)
Age: 62
End: 2022-10-28

## 2022-10-28 DIAGNOSIS — Z98.1 ARTHRODESIS STATUS: Chronic | ICD-10-CM

## 2022-10-28 DIAGNOSIS — Z90.49 ACQUIRED ABSENCE OF OTHER SPECIFIED PARTS OF DIGESTIVE TRACT: Chronic | ICD-10-CM

## 2022-10-28 DIAGNOSIS — Z98.890 OTHER SPECIFIED POSTPROCEDURAL STATES: Chronic | ICD-10-CM

## 2022-10-28 DIAGNOSIS — K44.9 DIAPHRAGMATIC HERNIA WITHOUT OBSTRUCTION OR GANGRENE: ICD-10-CM

## 2022-10-28 PROCEDURE — 74220 X-RAY XM ESOPHAGUS 1CNTRST: CPT | Mod: 26

## 2022-10-28 PROCEDURE — 74220 X-RAY XM ESOPHAGUS 1CNTRST: CPT

## 2022-10-29 DIAGNOSIS — K44.9 DIAPHRAGMATIC HERNIA WITHOUT OBSTRUCTION OR GANGRENE: ICD-10-CM

## 2022-11-03 ENCOUNTER — APPOINTMENT (OUTPATIENT)
Dept: THORACIC SURGERY | Facility: CLINIC | Age: 62
End: 2022-11-03

## 2022-11-03 VITALS
WEIGHT: 260 LBS | HEIGHT: 68 IN | DIASTOLIC BLOOD PRESSURE: 76 MMHG | BODY MASS INDEX: 39.4 KG/M2 | HEART RATE: 54 BPM | OXYGEN SATURATION: 95 % | SYSTOLIC BLOOD PRESSURE: 147 MMHG | RESPIRATION RATE: 15 BRPM

## 2022-11-03 PROCEDURE — 99214 OFFICE O/P EST MOD 30 MIN: CPT | Mod: 24

## 2022-11-03 RX ORDER — HYDROMORPHONE HYDROCHLORIDE 4 MG/1
4 TABLET ORAL
Qty: 120 | Refills: 0 | Status: ACTIVE | COMMUNITY
Start: 2022-10-12

## 2022-11-03 NOTE — PHYSICAL EXAM
[] : no respiratory distress [Auscultation Breath Sounds / Voice Sounds] : lungs were clear to auscultation bilaterally [Heart Rate And Rhythm] : heart rate was normal and rhythm regular [Heart Sounds] : normal S1 and S2 [Heart Sounds Gallop] : no gallops [Murmurs] : no murmurs [Heart Sounds Pericardial Friction Rub] : no pericardial rub [Examination Of The Chest] : the chest was normal in appearance [Diminished Respiratory Excursion] : normal chest expansion [Chest Visual Inspection Thoracic Asymmetry] : no chest asymmetry

## 2022-11-04 NOTE — CONSULT LETTER
[Dear  ___] : Dear  [unfilled], [Consult Letter:] : I had the pleasure of evaluating your patient, [unfilled]. [( Thank you for referring [unfilled] for consultation for _____ )] : Thank you for referring [unfilled] for consultation for [unfilled] [Please see my note below.] : Please see my note below. [Consult Closing:] : Thank you very much for allowing me to participate in the care of this patient.  If you have any questions, please do not hesitate to contact me. [Sincerely,] : Sincerely, [FreeTextEntry2] : Dr. Eden Matthews (GI Motility/Referring) [FreeTextEntry3] : Rex Dent MD, MPH \par System Director of Thoracic Surgery \par Director of Comprehensive Lung and Foregut [facility] \par Professor Cardiovascular & Thoracic Surgery  \par [facility] [employer] [facility]\par \par [facility]\par [address]\par Oncology [address]\[place] [address]\par Tel: [phone]\par Fax: [phone]\par

## 2022-11-04 NOTE — DATA REVIEWED
[FreeTextEntry1] : I have independently reviewed the following:\par CT Chest w/PO contrast on 10/24/22:\par Barium esophagram on 10/28/22

## 2022-11-04 NOTE — ASSESSMENT
[FreeTextEntry1] : Mr. STEPHAINE LAWTON, 62 year old male, never smoker, w/ hx of HTN, Depression/Anxiety, Sleep Apnea on CPAP, chronic GERD since age 17, been on PPIs, currently on Protonix 40mg BID and Pepcid QHS.\par \par Now 3 mons s/p EGD, Lap, R.A., extensive lysis of intraabdominal adhesion, more then 2 hours, repair of hiatal hernia, Toupet fundoplication, extra 2.5 hours of intraoperative dissection 2/2 to difficulty of procedure on 8/10/22. Path revealed esophageal lipoma and mature adipose tissue, scanty fibrovascular tissue and small reactive LN\par \par CT Chest w/PO contrast on 10/24/22:\par - atelectasis of RML stable \par - post-op changes\par \par Barium esophagram on 10/28/22:\par - no evidence of H.H.\par - narrowing of the distal esophagus may represent post-op changes\par \par I have reviewed the patient's medical records and diagnostic images at time of this office consultation and have made the following recommendation:\par 1. CT chest and Barium esophagram reviewed and explained to patient, no evidence for recurrence,  I recommended patient to RTC in 3 months for clinical check up. \par \par I personally performed the services described in the documentation, reviewed the documentation recorded by the scribe in my presence and it accurately and completely records my words and actions.\par \par I, Dayana Mojica, YUE, am scribing for and the presence of CARLOS Kaufman, the following sections HISTORY OF PRESENT ILLNESS, PAST MEDICAL/FAMILY/SOCIAL HISTORY; REVIEW OF SYSTEMS; VITAL SIGNS; PHYSICAL EXAM; DISPOSITION.

## 2022-11-04 NOTE — HISTORY OF PRESENT ILLNESS
[FreeTextEntry1] : Mr. STEPHANIE LAWTON, 62 year old male, never smoker, w/ hx of HTN, Depression/Anxiety, Sleep Apnea on CPAP, chronic GERD since age 17, been on PPIs, currently on Protonix 40mg BID and Pepcid QHS.\par \par EGD on 2/1/22 by Dr. Patel: GE Flap valve classified as Hill Grade I, multiple gastric body and fundus polyps.\par \par Patient also had 24-hour pH P. while on Dexilant 60 mg twice a day/Zantac 300 each bedtime\par Protocol was 6 channel impedance catheter with 2 Versed flex pH sensors -10 cm in the distal esophagus and 5 cm above in the proximal esophagus. The total number of acid events was 74, with a AET of 3.6, upright AET of 6.6%, supine AET of 0.3%, Gastric acid AET was one hour and one minute in 24-hour period\par \par BRAVO pH Study on 4/12/22: DeMeester = 78.3\par Manometry on 4/12/22 by Dr. Eden Matthews. LES = 34.2 (13-43), UES = 34.6 (). Normal LES pressures w/ complete deglutitive relaxation. On supine, 100% swallows w/ normal amplitude peristalsis. On supine, 100% swallows w/ incomplete bolus clearance by impedence analysis w/ fluid escape at the level of the upper esophagus (where skeletal and smooth muscle meet). 1.5cm hiatal hernia. Normal motility study.\par \par CT Chest on 6/9/22:\par - small LLL calcified granuloma\par - nondistended esophagus and no H.H\par \par Barium esophagram on 8/4/22: mild GE reflux\par \par Now 3 mons s/p EGD, Lap, R.A., extensive lysis of intraabdominal adhesion, more then 2 hours, repair of hiatal hernia, Toupet fundoplication, extra 2.5 hours of intraoperative dissection 2/2 to difficulty of procedure on 8/10/22. Path revealed esophageal lipoma and mature adipose tissue, scanty fibrovascular tissue and small reactive LN\par \par CT Chest w/PO contrast on 10/24/22:\par - atelectasis of RML stable \par - post-op changes\par \par Barium esophagram on 10/28/22:\par - no evidence of H.H.\par - narrowing of the distal esophagus may represent post-op changes\par \par Pt presents today for follow up. Patient c/o reflux, dysphagia, food regurgitation.

## 2022-11-29 NOTE — H&P PST ADULT - NSCAFFEAMTFREQ_GEN_ALL_CORE_SD
3-4 cups/cans per day V-Y Plasty Text: The defect edges were debeveled with a #15 scalpel blade.  Given the location of the defect, shape of the defect and the proximity to free margins an V-Y advancement flap was deemed most appropriate.  Using a sterile surgical marker, an appropriate advancement flap was drawn incorporating the defect and placing the expected incisions within the relaxed skin tension lines where possible.    The area thus outlined was incised deep to adipose tissue with a #15 scalpel blade.  The skin margins were undermined to an appropriate distance in all directions utilizing iris scissors.

## 2023-02-02 ENCOUNTER — APPOINTMENT (OUTPATIENT)
Dept: THORACIC SURGERY | Facility: CLINIC | Age: 63
End: 2023-02-02

## 2023-02-18 ENCOUNTER — INPATIENT (INPATIENT)
Facility: HOSPITAL | Age: 63
LOS: 2 days | Discharge: HOME HEALTH | DRG: 728 | End: 2023-02-21
Attending: FAMILY MEDICINE | Admitting: STUDENT IN AN ORGANIZED HEALTH CARE EDUCATION/TRAINING PROGRAM
Payer: MEDICARE

## 2023-02-18 VITALS — WEIGHT: 259.93 LBS | HEIGHT: 68 IN

## 2023-02-18 DIAGNOSIS — Z98.890 OTHER SPECIFIED POSTPROCEDURAL STATES: Chronic | ICD-10-CM

## 2023-02-18 DIAGNOSIS — Z98.1 ARTHRODESIS STATUS: Chronic | ICD-10-CM

## 2023-02-18 DIAGNOSIS — M54.9 DORSALGIA, UNSPECIFIED: ICD-10-CM

## 2023-02-18 DIAGNOSIS — N39.0 URINARY TRACT INFECTION, SITE NOT SPECIFIED: ICD-10-CM

## 2023-02-18 DIAGNOSIS — Z90.49 ACQUIRED ABSENCE OF OTHER SPECIFIED PARTS OF DIGESTIVE TRACT: Chronic | ICD-10-CM

## 2023-02-18 DIAGNOSIS — G47.33 OBSTRUCTIVE SLEEP APNEA (ADULT) (PEDIATRIC): ICD-10-CM

## 2023-02-18 DIAGNOSIS — E66.01 MORBID (SEVERE) OBESITY DUE TO EXCESS CALORIES: ICD-10-CM

## 2023-02-18 LAB
ALBUMIN SERPL ELPH-MCNC: 3.9 G/DL — SIGNIFICANT CHANGE UP (ref 3.3–5)
ALP SERPL-CCNC: 115 U/L — SIGNIFICANT CHANGE UP (ref 40–120)
ALT FLD-CCNC: 36 U/L — SIGNIFICANT CHANGE UP (ref 12–78)
ANION GAP SERPL CALC-SCNC: 6 MMOL/L — SIGNIFICANT CHANGE UP (ref 5–17)
APPEARANCE UR: CLEAR — SIGNIFICANT CHANGE UP
APTT BLD: 35.6 SEC — HIGH (ref 27.5–35.5)
AST SERPL-CCNC: 36 U/L — SIGNIFICANT CHANGE UP (ref 15–37)
BACTERIA # UR AUTO: ABNORMAL
BASOPHILS # BLD AUTO: 0.05 K/UL — SIGNIFICANT CHANGE UP (ref 0–0.2)
BASOPHILS NFR BLD AUTO: 0.7 % — SIGNIFICANT CHANGE UP (ref 0–2)
BILIRUB SERPL-MCNC: 0.8 MG/DL — SIGNIFICANT CHANGE UP (ref 0.2–1.2)
BILIRUB UR-MCNC: NEGATIVE — SIGNIFICANT CHANGE UP
BUN SERPL-MCNC: 10 MG/DL — SIGNIFICANT CHANGE UP (ref 7–23)
CALCIUM SERPL-MCNC: 9.7 MG/DL — SIGNIFICANT CHANGE UP (ref 8.5–10.1)
CHLORIDE SERPL-SCNC: 103 MMOL/L — SIGNIFICANT CHANGE UP (ref 96–108)
CO2 SERPL-SCNC: 25 MMOL/L — SIGNIFICANT CHANGE UP (ref 22–31)
COLOR SPEC: YELLOW — SIGNIFICANT CHANGE UP
CREAT SERPL-MCNC: 0.7 MG/DL — SIGNIFICANT CHANGE UP (ref 0.5–1.3)
DIFF PNL FLD: ABNORMAL
EGFR: 104 ML/MIN/1.73M2 — SIGNIFICANT CHANGE UP
EOSINOPHIL # BLD AUTO: 0.11 K/UL — SIGNIFICANT CHANGE UP (ref 0–0.5)
EOSINOPHIL NFR BLD AUTO: 1.6 % — SIGNIFICANT CHANGE UP (ref 0–6)
EPI CELLS # UR: SIGNIFICANT CHANGE UP
GLUCOSE SERPL-MCNC: 126 MG/DL — HIGH (ref 70–99)
GLUCOSE UR QL: NEGATIVE — SIGNIFICANT CHANGE UP
HCT VFR BLD CALC: 42.3 % — SIGNIFICANT CHANGE UP (ref 39–50)
HGB BLD-MCNC: 14.9 G/DL — SIGNIFICANT CHANGE UP (ref 13–17)
IMM GRANULOCYTES NFR BLD AUTO: 0.1 % — SIGNIFICANT CHANGE UP (ref 0–0.9)
INR BLD: 1.26 RATIO — HIGH (ref 0.88–1.16)
KETONES UR-MCNC: NEGATIVE — SIGNIFICANT CHANGE UP
LACTATE SERPL-SCNC: 1.1 MMOL/L — SIGNIFICANT CHANGE UP (ref 0.7–2)
LEUKOCYTE ESTERASE UR-ACNC: ABNORMAL
LYMPHOCYTES # BLD AUTO: 2.17 K/UL — SIGNIFICANT CHANGE UP (ref 1–3.3)
LYMPHOCYTES # BLD AUTO: 30.7 % — SIGNIFICANT CHANGE UP (ref 13–44)
MCHC RBC-ENTMCNC: 31.3 PG — SIGNIFICANT CHANGE UP (ref 27–34)
MCHC RBC-ENTMCNC: 35.2 GM/DL — SIGNIFICANT CHANGE UP (ref 32–36)
MCV RBC AUTO: 88.9 FL — SIGNIFICANT CHANGE UP (ref 80–100)
MONOCYTES # BLD AUTO: 0.64 K/UL — SIGNIFICANT CHANGE UP (ref 0–0.9)
MONOCYTES NFR BLD AUTO: 9.1 % — SIGNIFICANT CHANGE UP (ref 2–14)
NEUTROPHILS # BLD AUTO: 4.09 K/UL — SIGNIFICANT CHANGE UP (ref 1.8–7.4)
NEUTROPHILS NFR BLD AUTO: 57.8 % — SIGNIFICANT CHANGE UP (ref 43–77)
NITRITE UR-MCNC: NEGATIVE — SIGNIFICANT CHANGE UP
PH UR: 7 — SIGNIFICANT CHANGE UP (ref 5–8)
PLATELET # BLD AUTO: 211 K/UL — SIGNIFICANT CHANGE UP (ref 150–400)
POTASSIUM SERPL-MCNC: 4 MMOL/L — SIGNIFICANT CHANGE UP (ref 3.5–5.3)
POTASSIUM SERPL-SCNC: 4 MMOL/L — SIGNIFICANT CHANGE UP (ref 3.5–5.3)
PROT SERPL-MCNC: 8.4 GM/DL — HIGH (ref 6–8.3)
PROT UR-MCNC: SIGNIFICANT CHANGE UP MG/DL
PROTHROM AB SERPL-ACNC: 14.6 SEC — HIGH (ref 10.5–13.4)
RAPID RVP RESULT: SIGNIFICANT CHANGE UP
RBC # BLD: 4.76 M/UL — SIGNIFICANT CHANGE UP (ref 4.2–5.8)
RBC # FLD: 12.6 % — SIGNIFICANT CHANGE UP (ref 10.3–14.5)
RBC CASTS # UR COMP ASSIST: SIGNIFICANT CHANGE UP /HPF (ref 0–4)
S PYO AG SPEC QL IA: NEGATIVE — SIGNIFICANT CHANGE UP
SARS-COV-2 RNA SPEC QL NAA+PROBE: SIGNIFICANT CHANGE UP
SODIUM SERPL-SCNC: 134 MMOL/L — LOW (ref 135–145)
SP GR SPEC: 1 — LOW (ref 1.01–1.02)
UROBILINOGEN FLD QL: NEGATIVE — SIGNIFICANT CHANGE UP
WBC # BLD: 7.07 K/UL — SIGNIFICANT CHANGE UP (ref 3.8–10.5)
WBC # FLD AUTO: 7.07 K/UL — SIGNIFICANT CHANGE UP (ref 3.8–10.5)
WBC UR QL: SIGNIFICANT CHANGE UP /HPF (ref 0–5)

## 2023-02-18 PROCEDURE — 82607 VITAMIN B-12: CPT

## 2023-02-18 PROCEDURE — 85025 COMPLETE CBC W/AUTO DIFF WBC: CPT

## 2023-02-18 PROCEDURE — 76770 US EXAM ABDO BACK WALL COMP: CPT

## 2023-02-18 PROCEDURE — 99222 1ST HOSP IP/OBS MODERATE 55: CPT

## 2023-02-18 PROCEDURE — 36415 COLL VENOUS BLD VENIPUNCTURE: CPT

## 2023-02-18 PROCEDURE — 84100 ASSAY OF PHOSPHORUS: CPT

## 2023-02-18 PROCEDURE — 93010 ELECTROCARDIOGRAM REPORT: CPT

## 2023-02-18 PROCEDURE — 84443 ASSAY THYROID STIM HORMONE: CPT

## 2023-02-18 PROCEDURE — 82746 ASSAY OF FOLIC ACID SERUM: CPT

## 2023-02-18 PROCEDURE — 99285 EMERGENCY DEPT VISIT HI MDM: CPT

## 2023-02-18 PROCEDURE — 86803 HEPATITIS C AB TEST: CPT

## 2023-02-18 PROCEDURE — 82306 VITAMIN D 25 HYDROXY: CPT

## 2023-02-18 PROCEDURE — 80048 BASIC METABOLIC PNL TOTAL CA: CPT

## 2023-02-18 PROCEDURE — 83735 ASSAY OF MAGNESIUM: CPT

## 2023-02-18 PROCEDURE — 80053 COMPREHEN METABOLIC PANEL: CPT

## 2023-02-18 PROCEDURE — 86140 C-REACTIVE PROTEIN: CPT

## 2023-02-18 PROCEDURE — 85027 COMPLETE CBC AUTOMATED: CPT

## 2023-02-18 PROCEDURE — C1751: CPT

## 2023-02-18 RX ORDER — AMLODIPINE BESYLATE 2.5 MG/1
10 TABLET ORAL DAILY
Refills: 0 | Status: DISCONTINUED | OUTPATIENT
Start: 2023-02-18 | End: 2023-02-21

## 2023-02-18 RX ORDER — MEROPENEM 1 G/30ML
1000 INJECTION INTRAVENOUS ONCE
Refills: 0 | Status: COMPLETED | OUTPATIENT
Start: 2023-02-18 | End: 2023-02-18

## 2023-02-18 RX ORDER — GABAPENTIN 400 MG/1
300 CAPSULE ORAL DAILY
Refills: 0 | Status: DISCONTINUED | OUTPATIENT
Start: 2023-02-18 | End: 2023-02-18

## 2023-02-18 RX ORDER — ATENOLOL 25 MG/1
100 TABLET ORAL DAILY
Refills: 0 | Status: DISCONTINUED | OUTPATIENT
Start: 2023-02-18 | End: 2023-02-21

## 2023-02-18 RX ORDER — MEROPENEM 1 G/30ML
1000 INJECTION INTRAVENOUS EVERY 8 HOURS
Refills: 0 | Status: COMPLETED | OUTPATIENT
Start: 2023-02-18 | End: 2023-02-19

## 2023-02-18 RX ORDER — PANTOPRAZOLE SODIUM 20 MG/1
40 TABLET, DELAYED RELEASE ORAL
Refills: 0 | Status: DISCONTINUED | OUTPATIENT
Start: 2023-02-18 | End: 2023-02-21

## 2023-02-18 RX ORDER — HYOSCYAMINE SULFATE 0.13 MG
1 TABLET ORAL
Qty: 0 | Refills: 0 | DISCHARGE

## 2023-02-18 RX ORDER — DULOXETINE HYDROCHLORIDE 30 MG/1
60 CAPSULE, DELAYED RELEASE ORAL DAILY
Refills: 0 | Status: DISCONTINUED | OUTPATIENT
Start: 2023-02-18 | End: 2023-02-18

## 2023-02-18 RX ORDER — KETOROLAC TROMETHAMINE 30 MG/ML
15 SYRINGE (ML) INJECTION ONCE
Refills: 0 | Status: DISCONTINUED | OUTPATIENT
Start: 2023-02-18 | End: 2023-02-18

## 2023-02-18 RX ORDER — LANOLIN ALCOHOL/MO/W.PET/CERES
3 CREAM (GRAM) TOPICAL AT BEDTIME
Refills: 0 | Status: DISCONTINUED | OUTPATIENT
Start: 2023-02-18 | End: 2023-02-21

## 2023-02-18 RX ORDER — GABAPENTIN 400 MG/1
300 CAPSULE ORAL EVERY 8 HOURS
Refills: 0 | Status: DISCONTINUED | OUTPATIENT
Start: 2023-02-18 | End: 2023-02-21

## 2023-02-18 RX ORDER — HYDROMORPHONE HYDROCHLORIDE 2 MG/ML
4 INJECTION INTRAMUSCULAR; INTRAVENOUS; SUBCUTANEOUS EVERY 4 HOURS
Refills: 0 | Status: DISCONTINUED | OUTPATIENT
Start: 2023-02-18 | End: 2023-02-21

## 2023-02-18 RX ORDER — SODIUM CHLORIDE 9 MG/ML
2100 INJECTION INTRAMUSCULAR; INTRAVENOUS; SUBCUTANEOUS ONCE
Refills: 0 | Status: COMPLETED | OUTPATIENT
Start: 2023-02-18 | End: 2023-02-18

## 2023-02-18 RX ORDER — ZOLPIDEM TARTRATE 10 MG/1
5 TABLET ORAL AT BEDTIME
Refills: 0 | Status: DISCONTINUED | OUTPATIENT
Start: 2023-02-18 | End: 2023-02-21

## 2023-02-18 RX ORDER — FAMOTIDINE 10 MG/ML
1 INJECTION INTRAVENOUS
Qty: 0 | Refills: 0 | DISCHARGE

## 2023-02-18 RX ORDER — ACETAMINOPHEN 500 MG
2 TABLET ORAL
Qty: 0 | Refills: 0 | DISCHARGE

## 2023-02-18 RX ORDER — ATORVASTATIN CALCIUM 80 MG/1
10 TABLET, FILM COATED ORAL AT BEDTIME
Refills: 0 | Status: DISCONTINUED | OUTPATIENT
Start: 2023-02-18 | End: 2023-02-21

## 2023-02-18 RX ORDER — DULOXETINE HYDROCHLORIDE 30 MG/1
60 CAPSULE, DELAYED RELEASE ORAL EVERY 12 HOURS
Refills: 0 | Status: DISCONTINUED | OUTPATIENT
Start: 2023-02-18 | End: 2023-02-21

## 2023-02-18 RX ORDER — ONDANSETRON 8 MG/1
4 TABLET, FILM COATED ORAL EVERY 8 HOURS
Refills: 0 | Status: DISCONTINUED | OUTPATIENT
Start: 2023-02-18 | End: 2023-02-21

## 2023-02-18 RX ORDER — MEROPENEM 1 G/30ML
INJECTION INTRAVENOUS
Refills: 0 | Status: COMPLETED | OUTPATIENT
Start: 2023-02-18 | End: 2023-02-19

## 2023-02-18 RX ORDER — ACETAMINOPHEN 500 MG
650 TABLET ORAL EVERY 6 HOURS
Refills: 0 | Status: DISCONTINUED | OUTPATIENT
Start: 2023-02-18 | End: 2023-02-21

## 2023-02-18 RX ORDER — METFORMIN HYDROCHLORIDE 850 MG/1
2 TABLET ORAL
Qty: 0 | Refills: 0 | DISCHARGE

## 2023-02-18 RX ORDER — LOSARTAN POTASSIUM 100 MG/1
25 TABLET, FILM COATED ORAL DAILY
Refills: 0 | Status: DISCONTINUED | OUTPATIENT
Start: 2023-02-18 | End: 2023-02-21

## 2023-02-18 RX ORDER — LEVOTHYROXINE SODIUM 125 MCG
75 TABLET ORAL DAILY
Refills: 0 | Status: DISCONTINUED | OUTPATIENT
Start: 2023-02-18 | End: 2023-02-21

## 2023-02-18 RX ORDER — LIDOCAINE 4 G/100G
10 CREAM TOPICAL ONCE
Refills: 0 | Status: COMPLETED | OUTPATIENT
Start: 2023-02-18 | End: 2023-02-18

## 2023-02-18 RX ORDER — HEPARIN SODIUM 5000 [USP'U]/ML
5000 INJECTION INTRAVENOUS; SUBCUTANEOUS EVERY 12 HOURS
Refills: 0 | Status: DISCONTINUED | OUTPATIENT
Start: 2023-02-18 | End: 2023-02-21

## 2023-02-18 RX ORDER — ASCORBIC ACID 60 MG
500 TABLET,CHEWABLE ORAL DAILY
Refills: 0 | Status: DISCONTINUED | OUTPATIENT
Start: 2023-02-18 | End: 2023-02-21

## 2023-02-18 RX ORDER — TAMSULOSIN HYDROCHLORIDE 0.4 MG/1
0.4 CAPSULE ORAL AT BEDTIME
Refills: 0 | Status: DISCONTINUED | OUTPATIENT
Start: 2023-02-18 | End: 2023-02-21

## 2023-02-18 RX ORDER — ACETAMINOPHEN 500 MG
1000 TABLET ORAL ONCE
Refills: 0 | Status: COMPLETED | OUTPATIENT
Start: 2023-02-18 | End: 2023-02-18

## 2023-02-18 RX ORDER — MEROPENEM 1 G/30ML
1000 INJECTION INTRAVENOUS EVERY 8 HOURS
Refills: 0 | Status: COMPLETED | OUTPATIENT
Start: 2023-02-18 | End: 2023-02-18

## 2023-02-18 RX ADMIN — HYDROMORPHONE HYDROCHLORIDE 4 MILLIGRAM(S): 2 INJECTION INTRAMUSCULAR; INTRAVENOUS; SUBCUTANEOUS at 20:11

## 2023-02-18 RX ADMIN — LIDOCAINE 10 MILLILITER(S): 4 CREAM TOPICAL at 18:56

## 2023-02-18 RX ADMIN — SODIUM CHLORIDE 2100 MILLILITER(S): 9 INJECTION INTRAMUSCULAR; INTRAVENOUS; SUBCUTANEOUS at 16:01

## 2023-02-18 RX ADMIN — ZOLPIDEM TARTRATE 5 MILLIGRAM(S): 10 TABLET ORAL at 23:48

## 2023-02-18 RX ADMIN — Medication 15 MILLIGRAM(S): at 18:56

## 2023-02-18 RX ADMIN — DULOXETINE HYDROCHLORIDE 60 MILLIGRAM(S): 30 CAPSULE, DELAYED RELEASE ORAL at 22:02

## 2023-02-18 RX ADMIN — GABAPENTIN 300 MILLIGRAM(S): 400 CAPSULE ORAL at 21:22

## 2023-02-18 RX ADMIN — Medication 400 MILLIGRAM(S): at 18:56

## 2023-02-18 RX ADMIN — HYDROMORPHONE HYDROCHLORIDE 4 MILLIGRAM(S): 2 INJECTION INTRAMUSCULAR; INTRAVENOUS; SUBCUTANEOUS at 23:49

## 2023-02-18 RX ADMIN — TAMSULOSIN HYDROCHLORIDE 0.4 MILLIGRAM(S): 0.4 CAPSULE ORAL at 21:22

## 2023-02-18 RX ADMIN — MEROPENEM 200 MILLIGRAM(S): 1 INJECTION INTRAVENOUS at 23:50

## 2023-02-18 RX ADMIN — MEROPENEM 200 MILLIGRAM(S): 1 INJECTION INTRAVENOUS at 16:01

## 2023-02-18 RX ADMIN — ATORVASTATIN CALCIUM 10 MILLIGRAM(S): 80 TABLET, FILM COATED ORAL at 21:22

## 2023-02-18 RX ADMIN — HEPARIN SODIUM 5000 UNIT(S): 5000 INJECTION INTRAVENOUS; SUBCUTANEOUS at 22:01

## 2023-02-18 NOTE — ED STATDOCS - NSICDXPASTMEDICALHX_GEN_ALL_CORE_FT
PAST MEDICAL HISTORY:  Anxiety and depression     Diabetes     Gastro-esophageal reflux disease with esophagitis     H/O hiatal hernia     H/O neuropathy     H/O small bowel obstruction     H/O spinal stenosis     History of chronic pain     History of herniated intervertebral disc     HTN (hypertension)     Hypothyroidism     IBS (irritable bowel syndrome)     Obese     NANCY (obstructive sleep apnea)     SBO (small bowel obstruction)     Unsteady gait

## 2023-02-18 NOTE — PATIENT PROFILE ADULT - FALL HARM RISK - UNIVERSAL INTERVENTIONS
Bed in lowest position, wheels locked, appropriate side rails in place/Call bell, personal items and telephone in reach/Instruct patient to call for assistance before getting out of bed or chair/Non-slip footwear when patient is out of bed/[place] to call system/Physically safe environment - no spills, clutter or unnecessary equipment/Purposeful Proactive Rounding/Room/bathroom lighting operational, light cord in reach

## 2023-02-18 NOTE — ED STATDOCS - CLINICAL SUMMARY MEDICAL DECISION MAKING FREE TEXT BOX
Pt w/ ESBL UTI, will check labs, give Meropenum and get flu/COVID. Also c/o sore throat, mild cobblestoning on his oropharynx, will check strep. Pt w/ ESBL UTI, will check labs, give Meropenum and get flu/COVID. Also c/o sore throat, mild cobblestoning on his oropharynx, will check strep.    see progress note

## 2023-02-18 NOTE — H&P ADULT - NSHPLABSRESULTS_GEN_ALL_CORE
CBC Full  -  ( 2023 15:04 )  WBC Count : 7.07 K/uL  RBC Count : 4.76 M/uL  Hemoglobin : 14.9 g/dL  Hematocrit : 42.3 %  Platelet Count - Automated : 211 K/uL  Mean Cell Volume : 88.9 fl  Mean Cell Hemoglobin : 31.3 pg  Mean Cell Hemoglobin Concentration : 35.2 gm/dL  Auto Neutrophil # : 4.09 K/uL  Auto Lymphocyte # : 2.17 K/uL  Auto Monocyte # : 0.64 K/uL  Auto Eosinophil # : 0.11 K/uL  Auto Basophil # : 0.05 K/uL  Auto Neutrophil % : 57.8 %  Auto Lymphocyte % : 30.7 %  Auto Monocyte % : 9.1 %  Auto Eosinophil % : 1.6 %  Auto Basophil % : 0.7 %    PT/INR - ( 2023 15:04 )   PT: 14.6 sec;   INR: 1.26 ratio         PTT - ( 2023 15:04 )  PTT:35.6 sec  Urinalysis Basic - ( 2023 15:04 )    Color: Yellow / Appearance: Clear / S.005 / pH: x  Gluc: x / Ketone: Negative  / Bili: Negative / Urobili: Negative   Blood: x / Protein: trace mg/dL / Nitrite: Negative   Leuk Esterase: Trace / RBC: 0-2 /HPF / WBC 0-2 /HPF   Sq Epi: x / Non Sq Epi: Occasional / Bacteria: Moderate          134<L>  |  103  |  10  ----------------------------<  126<H>  4.0   |  25  |  0.70    Ca    9.7      2023 15:04    TPro  8.4<H>  /  Alb  3.9  /  TBili  0.8  /  DBili  x   /  AST  36  /  ALT  36  /  AlkPhos  115

## 2023-02-18 NOTE — ED STATDOCS - OBJECTIVE STATEMENT
64 y/o male w/ a PMHx of obesity, lumbar disc disorder, sleep apnea, preDM, HTN, hiatal hernia s/p repair, SBO, appendicitis, and HLD presents to the ED c/o sore throat x5 days. Pt also c/o urinary frequency, states he recent had out urine cultures done that came back + for ESBL urine after failing 3 courses of Macrobid. Denies fevers. No other complaints at this time. Pt has a uro appt in 3 days.

## 2023-02-18 NOTE — H&P ADULT - PROBLEM SELECTOR PLAN 1
- urinalysis not suggestive of infection  - patient brought outside medical records that indicated 1/25 ESBL e.coli that patient claims was treated with macrobid  - patient continues to have urinary urgency /  frequency  - IV meropenem has been initiated  - ID consulted

## 2023-02-18 NOTE — ED STATDOCS - NS ED ROS FT
Gen: No fever, normal appetite  Eyes: No eye irritation or discharge  ENT: No ear pain, congestion, +sore throat  Resp: No cough or trouble breathing  Cardiovascular: No chest pain or palpitation  Gastroenteric: No nausea/vomiting, diarrhea, constipation  :  No dysuria +urinary frequency  MS: No joint or muscle pain  Skin: No rashes  Neuro: No headache; no abnormal movements  Remainder negative, except as per the HPI

## 2023-02-18 NOTE — H&P ADULT - NSHPREVIEWOFSYSTEMS_GEN_ALL_CORE
REVIEW OF SYSTEMS:  General: NAD, hemodynamically stable, weakness  HEENT: chronic sore throat  SKIN:  No rash or itching.  CARDIOVASCULAR:  No chest pain, chest pressure or chest discomfort. No palpitations or edema.  RESPIRATORY:  No shortness of breath, cough or sputum.  GASTROINTESTINAL:  No anorexia, nausea, vomiting or diarrhea. No abdominal pain or blood.  NEUROLOGICAL:  No headache, dizziness, syncope, paralysis, ataxia, numbness or tingling in the extremities. No change in bowel or bladder control.  MUSCULOSKELETAL:  chronic back pain  HEMATOLOGIC:  No anemia, bleeding or bruising.  LYMPHATICS:  No enlarged nodes. No history of splenectomy.  ENDOCRINOLOGIC:  No reports of sweating, cold or heat intolerance. No polyuria or polydipsia.  ALLERGIES:  No history of asthma, hives, eczema or rhinitis.  : urinary urgency and frequency

## 2023-02-18 NOTE — H&P ADULT - HISTORY OF PRESENT ILLNESS
Patient is a 62 y/o male w/ a PMHx of obesity, lumbar disc disorder (high dose pain meds), sleep apnea, preDM, HTN, hiatal hernia (s/p repair), SBO, hx appendicitis, chronic sore throat - admitted with cc of increased frequency /  dysurea and pain with urination. Patient notes that she was dx with ESBL e. coli (on a paperwork 1/25 -  treated with po antibiotics Macrobid). Continues to have urinary symptoms. Patient also notes of sore throat - which has been an ongoing issue on and off last couple of months since the Hiatal Hernia repair Patient states that she was told to follow up with ENT doctor. Patient denies any HA, CP, SOB. Chronic back pain. No fevers, chills or shakes. Patient received IV meropenem in the ED.  Patient is a 64 y/o male w/ a PMHx of obesity, lumbar disc disorder (high dose pain meds), sleep apnea, preDM, HTN, hiatal hernia (s/p repair), SBO, hx appendicitis, chronic sore throat - admitted with cc of increased urinary frequency /  dysuria . Patient notes that he was dx with ESBL e. coli (on a paperwork 1/25 -  treated with po antibiotics Macrobid). Continues to have urinary symptoms. Patient also notes of sore throat - which has been an ongoing issue on and off last couple of months since the Hiatal Hernia repair. Patient states that he was told to follow up with ENT doctor. Patient denies any HA, CP, SOB. Chronic back pain. No fevers, chills or shakes. Patient received IV meropenem in the ED.

## 2023-02-18 NOTE — ED STATDOCS - PROGRESS NOTE DETAILS
64 yo male with a PMH of DM, HTN, HLD, presents with multiple complaints. Pt states he was having sore throat x 5 days. Pt had surgery for hiatal hernia repair because he was having acid reflux symptoms but the sore throat felt more like a cold sore throat as opposed to acid reflux sore throat. Pt also states he has been having dysuria and frequency for weeks. Went to see his PMD and was tested 3 separate times and came back with ESBL in the urine. PMD placed him 3x on macrobid (since that was the only thing he was able to put him on, last prescription given on 1/27/23) without relief. +fever at home, T max of 100F which was 3 days ago). Will check labs, UA, start IV abx for ESBL and likely admit. -Lex Haynes PA-C Labs unremarkable; however, pt was treated as outpt three times without relief. Discussed admission with hospitalist. Pt to be admitted. -Lex Haynes PA-C

## 2023-02-18 NOTE — PATIENT PROFILE ADULT - STATED REASON FOR ADMISSION
Throat pain for 1 year, hiatal hernia surgery last year, burning coming back,  3 times with ecoli esbl urine infection, was taking macrobid  keeps coming back

## 2023-02-18 NOTE — ED STATDOCS - NS ED ATTENDING STATEMENT MOD
This was a shared visit with the DEANNE. I reviewed and verified the documentation and independently performed the documented:

## 2023-02-18 NOTE — ED STATDOCS - CARE PLAN
Principal Discharge DX:	UTI due to extended-spectrum beta lactamase (ESBL) producing Escherichia coli   1

## 2023-02-18 NOTE — PATIENT PROFILE ADULT - NSPROSPHOSPCHAPLAINYN_GEN_A_NUR
Area L Indication Text: Tumors in this location are included in Area L (trunk and extremities).  Mohs surgery is indicated for larger tumors, or tumors with aggressive histologic features, in these anatomic locations. no

## 2023-02-18 NOTE — ED ADULT TRIAGE NOTE - CHIEF COMPLAINT QUOTE
Pt c/o sore throat x 4-5 days and recurring UTI symptoms. Able to tolerate own secretions. Airway patent. Endorses increased urinary frequency. Denies fevers.

## 2023-02-18 NOTE — H&P ADULT - NSHPPHYSICALEXAM_GEN_ALL_CORE
Physical Exam:   GENERAL APPEARANCE:  NAD, hemodynamically stable  T(C): 36.6 (02-18-23 @ 17:52), Max: 37.2 (02-18-23 @ 13:59)  HR: 74 (02-18-23 @ 17:52) (72 - 74)  BP: 154/94 (02-18-23 @ 17:52) (154/94 - 156/96)  RR: 16 (02-18-23 @ 17:52) (16 - 18)  SpO2: 100% (02-18-23 @ 17:52) (96% - 100%)  HEENT:  Head is normocephalic    Skin:  Warm and dry without any rash   NECK:  Supple without lymphadenopathy.   HEART:  Regular rate and rhythm. normal S1 and S2, No M/R/G  LUNGS:  Good ins/exp effort, no W/R/R/C  ABDOMEN:  Soft, nontender, nondistended with good bowel sounds heard  EXTREMITIES:  Without cyanosis, clubbing or edema.   NEUROLOGICAL:  Gross nonfocal

## 2023-02-19 LAB
ANION GAP SERPL CALC-SCNC: 4 MMOL/L — LOW (ref 5–17)
BUN SERPL-MCNC: 10 MG/DL — SIGNIFICANT CHANGE UP (ref 7–23)
CALCIUM SERPL-MCNC: 9.1 MG/DL — SIGNIFICANT CHANGE UP (ref 8.5–10.1)
CHLORIDE SERPL-SCNC: 104 MMOL/L — SIGNIFICANT CHANGE UP (ref 96–108)
CO2 SERPL-SCNC: 25 MMOL/L — SIGNIFICANT CHANGE UP (ref 22–31)
CREAT SERPL-MCNC: 0.59 MG/DL — SIGNIFICANT CHANGE UP (ref 0.5–1.3)
EGFR: 110 ML/MIN/1.73M2 — SIGNIFICANT CHANGE UP
GLUCOSE SERPL-MCNC: 109 MG/DL — HIGH (ref 70–99)
HCT VFR BLD CALC: 38.9 % — LOW (ref 39–50)
HCV AB S/CO SERPL IA: 0.18 S/CO — SIGNIFICANT CHANGE UP (ref 0–0.99)
HCV AB SERPL-IMP: SIGNIFICANT CHANGE UP
HGB BLD-MCNC: 13.5 G/DL — SIGNIFICANT CHANGE UP (ref 13–17)
MCHC RBC-ENTMCNC: 31.2 PG — SIGNIFICANT CHANGE UP (ref 27–34)
MCHC RBC-ENTMCNC: 34.7 GM/DL — SIGNIFICANT CHANGE UP (ref 32–36)
MCV RBC AUTO: 89.8 FL — SIGNIFICANT CHANGE UP (ref 80–100)
PLATELET # BLD AUTO: 151 K/UL — SIGNIFICANT CHANGE UP (ref 150–400)
POTASSIUM SERPL-MCNC: 3.6 MMOL/L — SIGNIFICANT CHANGE UP (ref 3.5–5.3)
POTASSIUM SERPL-SCNC: 3.6 MMOL/L — SIGNIFICANT CHANGE UP (ref 3.5–5.3)
RBC # BLD: 4.33 M/UL — SIGNIFICANT CHANGE UP (ref 4.2–5.8)
RBC # FLD: 12.6 % — SIGNIFICANT CHANGE UP (ref 10.3–14.5)
SODIUM SERPL-SCNC: 133 MMOL/L — LOW (ref 135–145)
WBC # BLD: 6.55 K/UL — SIGNIFICANT CHANGE UP (ref 3.8–10.5)
WBC # FLD AUTO: 6.55 K/UL — SIGNIFICANT CHANGE UP (ref 3.8–10.5)

## 2023-02-19 PROCEDURE — 99232 SBSQ HOSP IP/OBS MODERATE 35: CPT

## 2023-02-19 RX ORDER — SODIUM CHLORIDE 0.65 %
1 AEROSOL, SPRAY (ML) NASAL
Refills: 0 | Status: DISCONTINUED | OUTPATIENT
Start: 2023-02-19 | End: 2023-02-21

## 2023-02-19 RX ORDER — MEROPENEM 1 G/30ML
1000 INJECTION INTRAVENOUS EVERY 8 HOURS
Refills: 0 | Status: COMPLETED | OUTPATIENT
Start: 2023-02-19 | End: 2023-02-20

## 2023-02-19 RX ADMIN — MEROPENEM 100 MILLIGRAM(S): 1 INJECTION INTRAVENOUS at 08:02

## 2023-02-19 RX ADMIN — GABAPENTIN 300 MILLIGRAM(S): 400 CAPSULE ORAL at 13:44

## 2023-02-19 RX ADMIN — HEPARIN SODIUM 5000 UNIT(S): 5000 INJECTION INTRAVENOUS; SUBCUTANEOUS at 08:04

## 2023-02-19 RX ADMIN — AMLODIPINE BESYLATE 10 MILLIGRAM(S): 2.5 TABLET ORAL at 08:02

## 2023-02-19 RX ADMIN — MEROPENEM 100 MILLIGRAM(S): 1 INJECTION INTRAVENOUS at 15:59

## 2023-02-19 RX ADMIN — MEROPENEM 100 MILLIGRAM(S): 1 INJECTION INTRAVENOUS at 23:24

## 2023-02-19 RX ADMIN — HEPARIN SODIUM 5000 UNIT(S): 5000 INJECTION INTRAVENOUS; SUBCUTANEOUS at 22:04

## 2023-02-19 RX ADMIN — HYDROMORPHONE HYDROCHLORIDE 4 MILLIGRAM(S): 2 INJECTION INTRAMUSCULAR; INTRAVENOUS; SUBCUTANEOUS at 11:55

## 2023-02-19 RX ADMIN — HYDROMORPHONE HYDROCHLORIDE 4 MILLIGRAM(S): 2 INJECTION INTRAMUSCULAR; INTRAVENOUS; SUBCUTANEOUS at 20:45

## 2023-02-19 RX ADMIN — LOSARTAN POTASSIUM 25 MILLIGRAM(S): 100 TABLET, FILM COATED ORAL at 08:02

## 2023-02-19 RX ADMIN — ZOLPIDEM TARTRATE 5 MILLIGRAM(S): 10 TABLET ORAL at 23:24

## 2023-02-19 RX ADMIN — DULOXETINE HYDROCHLORIDE 60 MILLIGRAM(S): 30 CAPSULE, DELAYED RELEASE ORAL at 08:01

## 2023-02-19 RX ADMIN — HYDROMORPHONE HYDROCHLORIDE 4 MILLIGRAM(S): 2 INJECTION INTRAMUSCULAR; INTRAVENOUS; SUBCUTANEOUS at 15:59

## 2023-02-19 RX ADMIN — HYDROMORPHONE HYDROCHLORIDE 4 MILLIGRAM(S): 2 INJECTION INTRAMUSCULAR; INTRAVENOUS; SUBCUTANEOUS at 12:52

## 2023-02-19 RX ADMIN — HYDROMORPHONE HYDROCHLORIDE 4 MILLIGRAM(S): 2 INJECTION INTRAMUSCULAR; INTRAVENOUS; SUBCUTANEOUS at 04:25

## 2023-02-19 RX ADMIN — PANTOPRAZOLE SODIUM 40 MILLIGRAM(S): 20 TABLET, DELAYED RELEASE ORAL at 06:39

## 2023-02-19 RX ADMIN — Medication 30 MILLILITER(S): at 04:33

## 2023-02-19 RX ADMIN — TAMSULOSIN HYDROCHLORIDE 0.4 MILLIGRAM(S): 0.4 CAPSULE ORAL at 22:03

## 2023-02-19 RX ADMIN — ATENOLOL 100 MILLIGRAM(S): 25 TABLET ORAL at 08:01

## 2023-02-19 RX ADMIN — DULOXETINE HYDROCHLORIDE 60 MILLIGRAM(S): 30 CAPSULE, DELAYED RELEASE ORAL at 22:03

## 2023-02-19 RX ADMIN — HYDROMORPHONE HYDROCHLORIDE 4 MILLIGRAM(S): 2 INJECTION INTRAMUSCULAR; INTRAVENOUS; SUBCUTANEOUS at 07:30

## 2023-02-19 RX ADMIN — HYDROMORPHONE HYDROCHLORIDE 4 MILLIGRAM(S): 2 INJECTION INTRAMUSCULAR; INTRAVENOUS; SUBCUTANEOUS at 19:50

## 2023-02-19 RX ADMIN — Medication 650 MILLIGRAM(S): at 05:20

## 2023-02-19 RX ADMIN — ATORVASTATIN CALCIUM 10 MILLIGRAM(S): 80 TABLET, FILM COATED ORAL at 22:03

## 2023-02-19 RX ADMIN — Medication 650 MILLIGRAM(S): at 04:31

## 2023-02-19 RX ADMIN — Medication 500 MILLIGRAM(S): at 08:02

## 2023-02-19 RX ADMIN — HYDROMORPHONE HYDROCHLORIDE 4 MILLIGRAM(S): 2 INJECTION INTRAMUSCULAR; INTRAVENOUS; SUBCUTANEOUS at 03:29

## 2023-02-19 RX ADMIN — HYDROMORPHONE HYDROCHLORIDE 4 MILLIGRAM(S): 2 INJECTION INTRAMUSCULAR; INTRAVENOUS; SUBCUTANEOUS at 08:22

## 2023-02-19 RX ADMIN — Medication 75 MICROGRAM(S): at 06:40

## 2023-02-19 RX ADMIN — Medication 1 TABLET(S): at 08:01

## 2023-02-19 RX ADMIN — HYDROMORPHONE HYDROCHLORIDE 4 MILLIGRAM(S): 2 INJECTION INTRAMUSCULAR; INTRAVENOUS; SUBCUTANEOUS at 00:45

## 2023-02-19 RX ADMIN — GABAPENTIN 300 MILLIGRAM(S): 400 CAPSULE ORAL at 22:03

## 2023-02-19 RX ADMIN — GABAPENTIN 300 MILLIGRAM(S): 400 CAPSULE ORAL at 06:40

## 2023-02-19 NOTE — CONSULT NOTE ADULT - SUBJECTIVE AND OBJECTIVE BOX
Patient is a 63y old  Male who presents with a chief complaint of sore throat /  urinary symptoms     HPI:  62 y/o male with h/o obesity, lumbar disc disorder (high dose pain meds), sleep apnea, preDM, HTN, hiatal hernia (s/p repair), SBO, hx appendicitis, chronic sore throat was admitted on  for increased urinary frequency /  dysuria. Patient reported a urine culture form  with ESBL E.coli (on a paperwork  -  treated with po antibiotics Macrobid), but continues to have urinary symptoms. Patient also notes of sore throat - which has been an ongoing issue on and off last couple of months since the hiatal hernia repair. Patient states that he was told to follow up with ENT doctor.  No fevers, chills or shakes at home. Patient received IV meropenem in the ED.      PMH: as above  PSH: as above  Meds: per reconciliation sheet, noted below  MEDICATIONS  (STANDING):  amLODIPine   Tablet 10 milliGRAM(s) Oral daily  ascorbic acid 500 milliGRAM(s) Oral daily  atenolol  Tablet 100 milliGRAM(s) Oral daily  atorvastatin 10 milliGRAM(s) Oral at bedtime  DULoxetine 60 milliGRAM(s) Oral every 12 hours  gabapentin 300 milliGRAM(s) Oral every 8 hours  heparin   Injectable 5000 Unit(s) SubCutaneous every 12 hours  levothyroxine 75 MICROGram(s) Oral daily  losartan 25 milliGRAM(s) Oral daily  multivitamin 1 Tablet(s) Oral daily  pantoprazole    Tablet 40 milliGRAM(s) Oral before breakfast  tamsulosin 0.4 milliGRAM(s) Oral at bedtime    MEDICATIONS  (PRN):  acetaminophen     Tablet .. 650 milliGRAM(s) Oral every 6 hours PRN Temp greater or equal to 38C (100.4F), Mild Pain (1 - 3)  aluminum hydroxide/magnesium hydroxide/simethicone Suspension 30 milliLiter(s) Oral every 4 hours PRN Dyspepsia  HYDROmorphone   Tablet 4 milliGRAM(s) Oral every 4 hours PRN Severe Pain (7 - 10)  melatonin 3 milliGRAM(s) Oral at bedtime PRN Insomnia  ondansetron Injectable 4 milliGRAM(s) IV Push every 8 hours PRN Nausea and/or Vomiting  zolpidem 5 milliGRAM(s) Oral at bedtime PRN Insomnia    Allergies    No Known Allergies    Intolerances    IV Contrast (Nausea)    Social: no smoking, no alcohol, no illegal drugs; no recent travel, no exposure to TB  FAMILY HISTORY:  FH: heart disease (Father)  FH: stomach cancer (Mother)  FH: type 2 diabetes (Mother)  Family history of TIAs (Mother)    ROS: the patient denies fever, no chills, no HA, no seizures, no dizziness, no sore throat, no nasal congestion, no blurry vision, no CP, no palpitations, no SOB, no cough, no abdominal pain, no diarrhea, no N/V, has dysuria, no leg pain, no claudication, no rash, no joint aches, no rectal pain or bleeding, no night sweats  All other systems reviewed and are negative    Vital Signs Last 24 Hrs  T(C): 37.1 (2023 07:39), Max: 37.2 (2023 13:59)  T(F): 98.7 (2023 07:39), Max: 98.9 (2023 13:59)  HR: 65 (2023 07:39) (65 - 75)  BP: 148/79 (2023 07:39) (145/78 - 162/99)  BP(mean): 94 (2023 05:23) (94 - 112)  RR: 18 (2023 07:39) (16 - 18)  SpO2: 96% (2023 07:39) (94% - 100%)    Parameters below as of 2023 07:39  Patient On (Oxygen Delivery Method): room air      Daily Height in cm: 172.72 (2023 13:14)    Daily     PE:    Constitutional:  No acute distress  HEENT: NC/AT, EOMI, PERRLA, conjunctivae clear; ears and nose atraumatic; pharynx benign  Neck: supple; thyroid not palpable  Back: no tenderness  Respiratory: respiratory effort normal; clear to auscultation  Cardiovascular: S1S2 regular, no murmurs  Abdomen: soft, not tender, not distended, positive BS; no liver or spleen organomegaly  Genitourinary: no suprapubic tenderness  Lymphatic: no LN palpable  Musculoskeletal: no muscle tenderness, no joint swelling or tenderness  Extremities: no pedal edema  Neurological/ Psychiatric: AxOx3, judgement and insight normal; moving all extremities  Skin: no rashes; no palpable lesions    Labs: all available labs reviewed                        13.5   6.55  )-----------( 151      ( 2023 07:19 )             38.9         133<L>  |  104  |  10  ----------------------------<  109<H>  3.6   |  25  |  0.59    Ca    9.1      2023 07:19    TPro  8.4<H>  /  Alb  3.9  /  TBili  0.8  /  DBili  x   /  AST  36  /  ALT  36  /  AlkPhos  115       LIVER FUNCTIONS - ( 2023 15:04 )  Alb: 3.9 g/dL / Pro: 8.4 gm/dL / ALK PHOS: 115 U/L / ALT: 36 U/L / AST: 36 U/L / GGT: x           Urinalysis Basic - ( 2023 15:04 )    Color: Yellow / Appearance: Clear / S.005 / pH: x  Gluc: x / Ketone: Negative  / Bili: Negative / Urobili: Negative   Blood: x / Protein: trace mg/dL / Nitrite: Negative   Leuk Esterase: Trace / RBC: 0-2 /HPF / WBC 0-2 /HPF   Sq Epi: x / Non Sq Epi: Occasional / Bacteria: Moderate    ( @ 15:04)  NotDete    Radiology: all available radiological tests reviewed        Advanced directives addressed: full resuscitation

## 2023-02-19 NOTE — CONSULT NOTE ADULT - ASSESSMENT
64 y/o male with h/o obesity, lumbar disc disorder (high dose pain meds), sleep apnea, preDM, HTN, hiatal hernia (s/p repair), SBO, hx appendicitis, chronic sore throat was admitted on 2/18 for increased urinary frequency /  dysuria. Patient reported a urine culture form 1/25 with ESBL E.coli (on a paperwork 1/25 -  treated with po antibiotics Macrobid), but continues to have urinary symptoms. Patient also notes of sore throat - which has been an ongoing issue on and off last couple of months since the hiatal hernia repair. Patient states that he was told to follow up with ENT doctor.  No fevers, chills or shakes at home. Patient received IV meropenem in the ED.    1. UTI with ESBL E.coli, partially treated. Dysuria. Hiatal hernia.  -UA noted with minimal pyuria  -obtain urine c/s  -agree with meropenem 1 gm IV q8h  -reason for abx use and side effects reviewed with patient; monitor BMP  -old chart reviewed to assess prior cultures  -consider GI evalaution  -monitor temps  -f/u CBC  -supportive care  2. Other issues:   -care per medicine   62 y/o male with h/o obesity, lumbar disc disorder (high dose pain meds), sleep apnea, preDM, HTN, hiatal hernia (s/p repair), SBO, hx appendicitis, chronic sore throat was admitted on 2/18 for increased urinary frequency /  dysuria. Patient reported a urine culture form 1/25 with ESBL E.coli (on a paperwork 1/25 -  treated with po antibiotics Macrobid), but continues to have urinary symptoms. Patient also notes of sore throat - which has been an ongoing issue on and off last couple of months since the hiatal hernia repair. Patient states that he was told to follow up with ENT doctor.  No fevers, chills or shakes at home. Patient received IV meropenem in the ED.    1. UTI with ESBL E.coli, partially treated. Dysuria. Hiatal hernia.  -UA noted with minimal pyuria  -has nocturia - may have undiagnosed BPH, claims to have seen urologist, but was note told he has BPH, he was given flomax  -obtain urine c/s  -agree with meropenem 1 gm IV q8h  -reason for abx use and side effects reviewed with patient; monitor BMP  -old chart reviewed to assess prior cultures  -consider GI evaluation  -monitor temps  -f/u CBC  -supportive care  2. Other issues:   -care per medicine

## 2023-02-20 LAB
24R-OH-CALCIDIOL SERPL-MCNC: 67.3 NG/ML — SIGNIFICANT CHANGE UP (ref 30–80)
ALBUMIN SERPL ELPH-MCNC: 3.4 G/DL — SIGNIFICANT CHANGE UP (ref 3.3–5)
ALP SERPL-CCNC: 93 U/L — SIGNIFICANT CHANGE UP (ref 40–120)
ALT FLD-CCNC: 29 U/L — SIGNIFICANT CHANGE UP (ref 12–78)
ANION GAP SERPL CALC-SCNC: 3 MMOL/L — LOW (ref 5–17)
AST SERPL-CCNC: 33 U/L — SIGNIFICANT CHANGE UP (ref 15–37)
BASOPHILS # BLD AUTO: 0.03 K/UL — SIGNIFICANT CHANGE UP (ref 0–0.2)
BASOPHILS NFR BLD AUTO: 0.5 % — SIGNIFICANT CHANGE UP (ref 0–2)
BILIRUB SERPL-MCNC: 0.7 MG/DL — SIGNIFICANT CHANGE UP (ref 0.2–1.2)
BUN SERPL-MCNC: 13 MG/DL — SIGNIFICANT CHANGE UP (ref 7–23)
CALCIUM SERPL-MCNC: 9.4 MG/DL — SIGNIFICANT CHANGE UP (ref 8.5–10.1)
CHLORIDE SERPL-SCNC: 103 MMOL/L — SIGNIFICANT CHANGE UP (ref 96–108)
CO2 SERPL-SCNC: 30 MMOL/L — SIGNIFICANT CHANGE UP (ref 22–31)
CREAT SERPL-MCNC: 0.7 MG/DL — SIGNIFICANT CHANGE UP (ref 0.5–1.3)
CRP SERPL-MCNC: <3 MG/L — SIGNIFICANT CHANGE UP
EGFR: 104 ML/MIN/1.73M2 — SIGNIFICANT CHANGE UP
EOSINOPHIL # BLD AUTO: 0.24 K/UL — SIGNIFICANT CHANGE UP (ref 0–0.5)
EOSINOPHIL NFR BLD AUTO: 3.7 % — SIGNIFICANT CHANGE UP (ref 0–6)
FOLATE SERPL-MCNC: >20 NG/ML — SIGNIFICANT CHANGE UP
GLUCOSE SERPL-MCNC: 122 MG/DL — HIGH (ref 70–99)
HCT VFR BLD CALC: 39.2 % — SIGNIFICANT CHANGE UP (ref 39–50)
HGB BLD-MCNC: 13.5 G/DL — SIGNIFICANT CHANGE UP (ref 13–17)
IMM GRANULOCYTES NFR BLD AUTO: 0.2 % — SIGNIFICANT CHANGE UP (ref 0–0.9)
LYMPHOCYTES # BLD AUTO: 2.28 K/UL — SIGNIFICANT CHANGE UP (ref 1–3.3)
LYMPHOCYTES # BLD AUTO: 35.5 % — SIGNIFICANT CHANGE UP (ref 13–44)
MAGNESIUM SERPL-MCNC: 2.1 MG/DL — SIGNIFICANT CHANGE UP (ref 1.6–2.6)
MCHC RBC-ENTMCNC: 31.3 PG — SIGNIFICANT CHANGE UP (ref 27–34)
MCHC RBC-ENTMCNC: 34.4 GM/DL — SIGNIFICANT CHANGE UP (ref 32–36)
MCV RBC AUTO: 90.7 FL — SIGNIFICANT CHANGE UP (ref 80–100)
MONOCYTES # BLD AUTO: 0.92 K/UL — HIGH (ref 0–0.9)
MONOCYTES NFR BLD AUTO: 14.3 % — HIGH (ref 2–14)
NEUTROPHILS # BLD AUTO: 2.95 K/UL — SIGNIFICANT CHANGE UP (ref 1.8–7.4)
NEUTROPHILS NFR BLD AUTO: 45.8 % — SIGNIFICANT CHANGE UP (ref 43–77)
PHOSPHATE SERPL-MCNC: 3.5 MG/DL — SIGNIFICANT CHANGE UP (ref 2.5–4.5)
PLATELET # BLD AUTO: 157 K/UL — SIGNIFICANT CHANGE UP (ref 150–400)
POTASSIUM SERPL-MCNC: 4.5 MMOL/L — SIGNIFICANT CHANGE UP (ref 3.5–5.3)
POTASSIUM SERPL-SCNC: 4.5 MMOL/L — SIGNIFICANT CHANGE UP (ref 3.5–5.3)
PROT SERPL-MCNC: 7.3 GM/DL — SIGNIFICANT CHANGE UP (ref 6–8.3)
RBC # BLD: 4.32 M/UL — SIGNIFICANT CHANGE UP (ref 4.2–5.8)
RBC # FLD: 12.8 % — SIGNIFICANT CHANGE UP (ref 10.3–14.5)
SODIUM SERPL-SCNC: 136 MMOL/L — SIGNIFICANT CHANGE UP (ref 135–145)
TSH SERPL-MCNC: 0.92 UU/ML — SIGNIFICANT CHANGE UP (ref 0.34–4.82)
VIT B12 SERPL-MCNC: 347 PG/ML — SIGNIFICANT CHANGE UP (ref 232–1245)
WBC # BLD: 6.43 K/UL — SIGNIFICANT CHANGE UP (ref 3.8–10.5)
WBC # FLD AUTO: 6.43 K/UL — SIGNIFICANT CHANGE UP (ref 3.8–10.5)

## 2023-02-20 PROCEDURE — 76770 US EXAM ABDO BACK WALL COMP: CPT | Mod: 26

## 2023-02-20 PROCEDURE — 99232 SBSQ HOSP IP/OBS MODERATE 35: CPT

## 2023-02-20 RX ORDER — ERTAPENEM SODIUM 1 G/1
1000 INJECTION, POWDER, LYOPHILIZED, FOR SOLUTION INTRAMUSCULAR; INTRAVENOUS EVERY 24 HOURS
Refills: 0 | Status: DISCONTINUED | OUTPATIENT
Start: 2023-02-21 | End: 2023-02-21

## 2023-02-20 RX ADMIN — HYDROMORPHONE HYDROCHLORIDE 4 MILLIGRAM(S): 2 INJECTION INTRAMUSCULAR; INTRAVENOUS; SUBCUTANEOUS at 07:09

## 2023-02-20 RX ADMIN — Medication 30 MILLILITER(S): at 00:26

## 2023-02-20 RX ADMIN — MEROPENEM 100 MILLIGRAM(S): 1 INJECTION INTRAVENOUS at 16:02

## 2023-02-20 RX ADMIN — HEPARIN SODIUM 5000 UNIT(S): 5000 INJECTION INTRAVENOUS; SUBCUTANEOUS at 09:26

## 2023-02-20 RX ADMIN — DULOXETINE HYDROCHLORIDE 60 MILLIGRAM(S): 30 CAPSULE, DELAYED RELEASE ORAL at 09:25

## 2023-02-20 RX ADMIN — HEPARIN SODIUM 5000 UNIT(S): 5000 INJECTION INTRAVENOUS; SUBCUTANEOUS at 21:34

## 2023-02-20 RX ADMIN — HYDROMORPHONE HYDROCHLORIDE 4 MILLIGRAM(S): 2 INJECTION INTRAMUSCULAR; INTRAVENOUS; SUBCUTANEOUS at 19:42

## 2023-02-20 RX ADMIN — ATORVASTATIN CALCIUM 10 MILLIGRAM(S): 80 TABLET, FILM COATED ORAL at 21:35

## 2023-02-20 RX ADMIN — LOSARTAN POTASSIUM 25 MILLIGRAM(S): 100 TABLET, FILM COATED ORAL at 09:26

## 2023-02-20 RX ADMIN — ATENOLOL 100 MILLIGRAM(S): 25 TABLET ORAL at 09:26

## 2023-02-20 RX ADMIN — HYDROMORPHONE HYDROCHLORIDE 4 MILLIGRAM(S): 2 INJECTION INTRAMUSCULAR; INTRAVENOUS; SUBCUTANEOUS at 00:20

## 2023-02-20 RX ADMIN — GABAPENTIN 300 MILLIGRAM(S): 400 CAPSULE ORAL at 06:36

## 2023-02-20 RX ADMIN — HYDROMORPHONE HYDROCHLORIDE 4 MILLIGRAM(S): 2 INJECTION INTRAMUSCULAR; INTRAVENOUS; SUBCUTANEOUS at 06:39

## 2023-02-20 RX ADMIN — Medication 1 TABLET(S): at 09:25

## 2023-02-20 RX ADMIN — ZOLPIDEM TARTRATE 5 MILLIGRAM(S): 10 TABLET ORAL at 21:46

## 2023-02-20 RX ADMIN — HYDROMORPHONE HYDROCHLORIDE 4 MILLIGRAM(S): 2 INJECTION INTRAMUSCULAR; INTRAVENOUS; SUBCUTANEOUS at 13:05

## 2023-02-20 RX ADMIN — Medication 500 MILLIGRAM(S): at 09:25

## 2023-02-20 RX ADMIN — TAMSULOSIN HYDROCHLORIDE 0.4 MILLIGRAM(S): 0.4 CAPSULE ORAL at 21:35

## 2023-02-20 RX ADMIN — MEROPENEM 100 MILLIGRAM(S): 1 INJECTION INTRAVENOUS at 08:07

## 2023-02-20 RX ADMIN — HYDROMORPHONE HYDROCHLORIDE 4 MILLIGRAM(S): 2 INJECTION INTRAMUSCULAR; INTRAVENOUS; SUBCUTANEOUS at 13:35

## 2023-02-20 RX ADMIN — HYDROMORPHONE HYDROCHLORIDE 4 MILLIGRAM(S): 2 INJECTION INTRAMUSCULAR; INTRAVENOUS; SUBCUTANEOUS at 20:30

## 2023-02-20 RX ADMIN — HYDROMORPHONE HYDROCHLORIDE 4 MILLIGRAM(S): 2 INJECTION INTRAMUSCULAR; INTRAVENOUS; SUBCUTANEOUS at 01:13

## 2023-02-20 RX ADMIN — Medication 75 MICROGRAM(S): at 06:36

## 2023-02-20 RX ADMIN — PANTOPRAZOLE SODIUM 40 MILLIGRAM(S): 20 TABLET, DELAYED RELEASE ORAL at 06:36

## 2023-02-20 RX ADMIN — GABAPENTIN 300 MILLIGRAM(S): 400 CAPSULE ORAL at 13:05

## 2023-02-20 RX ADMIN — DULOXETINE HYDROCHLORIDE 60 MILLIGRAM(S): 30 CAPSULE, DELAYED RELEASE ORAL at 21:35

## 2023-02-20 RX ADMIN — AMLODIPINE BESYLATE 10 MILLIGRAM(S): 2.5 TABLET ORAL at 09:26

## 2023-02-20 RX ADMIN — GABAPENTIN 300 MILLIGRAM(S): 400 CAPSULE ORAL at 21:35

## 2023-02-21 ENCOUNTER — TRANSCRIPTION ENCOUNTER (OUTPATIENT)
Age: 63
End: 2023-02-21

## 2023-02-21 VITALS
DIASTOLIC BLOOD PRESSURE: 83 MMHG | RESPIRATION RATE: 18 BRPM | SYSTOLIC BLOOD PRESSURE: 155 MMHG | OXYGEN SATURATION: 94 % | TEMPERATURE: 99 F | HEART RATE: 64 BPM

## 2023-02-21 PROCEDURE — 99239 HOSP IP/OBS DSCHRG MGMT >30: CPT

## 2023-02-21 RX ORDER — BENZOCAINE AND MENTHOL 5; 1 G/100ML; G/100ML
1 LIQUID ORAL EVERY 4 HOURS
Refills: 0 | Status: DISCONTINUED | OUTPATIENT
Start: 2023-02-21 | End: 2023-02-21

## 2023-02-21 RX ORDER — ERTAPENEM SODIUM 1 G/1
1 INJECTION, POWDER, LYOPHILIZED, FOR SOLUTION INTRAMUSCULAR; INTRAVENOUS
Qty: 0 | Refills: 0 | DISCHARGE
Start: 2023-02-21

## 2023-02-21 RX ORDER — SODIUM CHLORIDE 0.65 %
1 AEROSOL, SPRAY (ML) NASAL
Qty: 1 | Refills: 0
Start: 2023-02-21 | End: 2023-03-22

## 2023-02-21 RX ADMIN — HEPARIN SODIUM 5000 UNIT(S): 5000 INJECTION INTRAVENOUS; SUBCUTANEOUS at 09:16

## 2023-02-21 RX ADMIN — HYDROMORPHONE HYDROCHLORIDE 4 MILLIGRAM(S): 2 INJECTION INTRAMUSCULAR; INTRAVENOUS; SUBCUTANEOUS at 02:40

## 2023-02-21 RX ADMIN — GABAPENTIN 300 MILLIGRAM(S): 400 CAPSULE ORAL at 06:52

## 2023-02-21 RX ADMIN — PANTOPRAZOLE SODIUM 40 MILLIGRAM(S): 20 TABLET, DELAYED RELEASE ORAL at 06:51

## 2023-02-21 RX ADMIN — HYDROMORPHONE HYDROCHLORIDE 4 MILLIGRAM(S): 2 INJECTION INTRAMUSCULAR; INTRAVENOUS; SUBCUTANEOUS at 08:08

## 2023-02-21 RX ADMIN — Medication 500 MILLIGRAM(S): at 09:15

## 2023-02-21 RX ADMIN — LOSARTAN POTASSIUM 25 MILLIGRAM(S): 100 TABLET, FILM COATED ORAL at 09:15

## 2023-02-21 RX ADMIN — GABAPENTIN 300 MILLIGRAM(S): 400 CAPSULE ORAL at 13:35

## 2023-02-21 RX ADMIN — AMLODIPINE BESYLATE 10 MILLIGRAM(S): 2.5 TABLET ORAL at 09:16

## 2023-02-21 RX ADMIN — ERTAPENEM SODIUM 120 MILLIGRAM(S): 1 INJECTION, POWDER, LYOPHILIZED, FOR SOLUTION INTRAMUSCULAR; INTRAVENOUS at 09:16

## 2023-02-21 RX ADMIN — Medication 1 TABLET(S): at 09:15

## 2023-02-21 RX ADMIN — HYDROMORPHONE HYDROCHLORIDE 4 MILLIGRAM(S): 2 INJECTION INTRAMUSCULAR; INTRAVENOUS; SUBCUTANEOUS at 07:38

## 2023-02-21 RX ADMIN — HYDROMORPHONE HYDROCHLORIDE 4 MILLIGRAM(S): 2 INJECTION INTRAMUSCULAR; INTRAVENOUS; SUBCUTANEOUS at 14:13

## 2023-02-21 RX ADMIN — DULOXETINE HYDROCHLORIDE 60 MILLIGRAM(S): 30 CAPSULE, DELAYED RELEASE ORAL at 09:15

## 2023-02-21 RX ADMIN — ATENOLOL 100 MILLIGRAM(S): 25 TABLET ORAL at 09:15

## 2023-02-21 RX ADMIN — HYDROMORPHONE HYDROCHLORIDE 4 MILLIGRAM(S): 2 INJECTION INTRAMUSCULAR; INTRAVENOUS; SUBCUTANEOUS at 13:43

## 2023-02-21 RX ADMIN — Medication 75 MICROGRAM(S): at 06:51

## 2023-02-21 RX ADMIN — HYDROMORPHONE HYDROCHLORIDE 4 MILLIGRAM(S): 2 INJECTION INTRAMUSCULAR; INTRAVENOUS; SUBCUTANEOUS at 02:05

## 2023-02-21 NOTE — DISCHARGE NOTE PROVIDER - ATTENDING ATTESTATION STATEMENT
CONSTITUTIONAL: Well appearing, well nourished, awake, alert and in no apparent distress.  HEENT: Head is atraumatic. Eyes clear bilaterally, normal EOMI. Airway patent.  CARDIAC: Normal rate, regular rhythm.  Heart sounds S1, S2.   RESPIRATORY: Breath sounds clear and equal bilaterally.  GASTROINTESTINAL: Abdomen soft, non-tender, no guarding.  MUSCULOSKELETAL: Spine appears normal, range of motion is not limited, no muscle or joint tenderness.   NEUROLOGICAL: Alert and oriented, no focal deficits, no motor or sensory deficits.  SKIN: Skin normal color for race, warm, dry and intact. No evidence of rash.  PSYCHIATRIC: Alert and oriented to person, place, time/situation. normal mood and affect. no apparent risk to self or others. I have personally seen and examined the patient. I have collaborated with and supervised the

## 2023-02-21 NOTE — DISCHARGE NOTE NURSING/CASE MANAGEMENT/SOCIAL WORK - NSDCPEFALRISK_GEN_ALL_CORE
For information on Fall & Injury Prevention, visit: [email or web address] OR  [email or web address] OR  https://www.cdc.gov/steadi/patient.html

## 2023-02-21 NOTE — DISCHARGE NOTE PROVIDER - NSDCMRMEDTOKEN_GEN_ALL_CORE_FT
Ambien 5 mg oral tablet: 1 tab(s) orally once a day (at bedtime), As Needed  amLODIPine 10 mg oral tablet: 1 tab(s) orally once a day, am  ascorbic acid 500 mg oral tablet: 1 tab(s) orally once a day  atenolol 100 mg oral tablet: 1 tab(s) orally once a day, am  atorvastatin 10 mg oral tablet: 1 tab(s) orally once a day, pm  B Complex 50 oral tablet, extended release: 1 tab(s) orally once a day  cholecalciferol 25 mcg (1000 intl units) oral tablet: 1 tab(s) orally once a day  Dilaudid 4 mg oral tablet: 1 tab(s) orally every 3-4 hours, As Needed for moderate to severe pain MDD:8  DULoxetine 60 mg oral delayed release capsule: 1 cap(s) orally 2 times a day  gabapentin 300 mg oral tablet: 1 cap(s) orally 4 times a day  hyoscyamine 0.375 mg oral tablet, extended release: 1 tab(s) orally once a day, As Needed  levothyroxine 75 mcg (0.075 mg) oral tablet: 1 tab(s) orally once a day, am  losartan 25 mg oral tablet: 1 tab(s) orally once a day, am  metFORMIN 1000 mg oral tablet: 1 tab(s) orally 2 times a day  Multiple Vitamins oral tablet: 1 tab(s) orally once a day  pantoprazole 40 mg oral delayed release tablet: 1 tab(s) orally 2 times a day  tamsulosin 0.4 mg oral capsule: 1 cap(s) orally once a day  tiZANidine 4 mg oral capsule: 1 cap(s) orally 2 times a day, As Needed   Ambien 5 mg oral tablet: 1 tab(s) orally once a day (at bedtime), As Needed  amLODIPine 10 mg oral tablet: 1 tab(s) orally once a day, am  ascorbic acid 500 mg oral tablet: 1 tab(s) orally once a day  atenolol 100 mg oral tablet: 1 tab(s) orally once a day, am  atorvastatin 10 mg oral tablet: 1 tab(s) orally once a day, pm  B Complex 50 oral tablet, extended release: 1 tab(s) orally once a day  cholecalciferol 25 mcg (1000 intl units) oral tablet: 1 tab(s) orally once a day  Dilaudid 4 mg oral tablet: 1 tab(s) orally every 3-4 hours, As Needed for moderate to severe pain MDD:8  DULoxetine 60 mg oral delayed release capsule: 1 cap(s) orally 2 times a day  ertapenem 1 g injection: 1 gram(s) injectable once a day for 4 weeks  gabapentin 300 mg oral tablet: 1 cap(s) orally 4 times a day  hyoscyamine 0.375 mg oral tablet, extended release: 1 tab(s) orally once a day, As Needed  levothyroxine 75 mcg (0.075 mg) oral tablet: 1 tab(s) orally once a day, am  losartan 25 mg oral tablet: 1 tab(s) orally once a day, am  metFORMIN 1000 mg oral tablet: 1 tab(s) orally 2 times a day  Multiple Vitamins oral tablet: 1 tab(s) orally once a day  pantoprazole 40 mg oral delayed release tablet: 1 tab(s) orally 2 times a day  sodium chloride 0.65% nasal spray: 1 spray(s) nasal every 2 minutes, As Needed for nasal congestion  tamsulosin 0.4 mg oral capsule: 1 cap(s) orally once a day  tiZANidine 4 mg oral capsule: 1 cap(s) orally 2 times a day, As Needed

## 2023-02-21 NOTE — DISCHARGE NOTE PROVIDER - NSDCHHNEEDSERVICE_GEN_ALL_CORE
Central venous access care/Medication teaching and assessment/Observation and assessment/Teaching and training

## 2023-02-21 NOTE — ADVANCED PRACTICE NURSE CONSULT - REASON FOR CONSULT
Patient received resting in bed, awake, alert, oriented x 4, pleasant and cooperative. Risks and benefits of midline catheter placement explained to patient, verbal consent obtained at bedside . Patient verbalized understanding via teach back method.  Time out and hand hygiene performed. Arrow Endurance Extended Dwell Peripheral Catheter System (LOT #[id number]) 20g, 8cm inserted to left basillic vein via ultrasound guidance via sterile technique. Flushes well with 20cc of NS. Brisk blood return present. Dressing and end cap placed. Minimal blood loss. No chest x ray needed to verify placement. sharps disposed in proper container. Report given to district RN.   Midline information/education given to patient.

## 2023-02-21 NOTE — PROGRESS NOTE ADULT - SUBJECTIVE AND OBJECTIVE BOX
Date of service: 23 @ 08:25    Lying in bed in NAD  Has urinary frequency  No fever    ROS: no fever or chills; denies dizziness, no HA, no SOB or cough, no abdominal pain, no diarrhea or constipation; no legs pain, no rashes    MEDICATIONS  (STANDING):  amLODIPine   Tablet 10 milliGRAM(s) Oral daily  ascorbic acid 500 milliGRAM(s) Oral daily  atenolol  Tablet 100 milliGRAM(s) Oral daily  atorvastatin 10 milliGRAM(s) Oral at bedtime  DULoxetine 60 milliGRAM(s) Oral every 12 hours  ertapenem  IVPB 1000 milliGRAM(s) IV Intermittent every 24 hours  gabapentin 300 milliGRAM(s) Oral every 8 hours  heparin   Injectable 5000 Unit(s) SubCutaneous every 12 hours  levothyroxine 75 MICROGram(s) Oral daily  losartan 25 milliGRAM(s) Oral daily  multivitamin 1 Tablet(s) Oral daily  pantoprazole    Tablet 40 milliGRAM(s) Oral before breakfast  tamsulosin 0.4 milliGRAM(s) Oral at bedtime    Vital Signs Last 24 Hrs  T(C): 36.8 (2023 07:42), Max: 37 (2023 15:18)  T(F): 98.2 (2023 07:42), Max: 98.6 (2023 15:18)  HR: 73 (2023 07:42) (58 - 74)  BP: 133/77 (2023 07:42) (133/77 - 139/78)  BP(mean): 86 (2023 23:22) (86 - 86)  RR: 20 (2023 07:42) (18 - 20)  SpO2: 96% (2023 07:42) (94% - 96%)    Parameters below as of 2023 07:42  Patient On (Oxygen Delivery Method): room air     Physical exam:    Constitutional:  No acute distress  HEENT: NC/AT, EOMI, PERRLA, conjunctivae clear; ears and nose atraumatic  Neck: supple; thyroid not palpable  Back: no tenderness  Respiratory: respiratory effort normal; clear to auscultation  Cardiovascular: S1S2 regular, no murmurs  Abdomen: soft, not tender, not distended, positive BS  Genitourinary: no suprapubic tenderness  Lymphatic: no LN palpable  Musculoskeletal: no muscle tenderness, no joint swelling or tenderness  Extremities: no pedal edema  Neurological/ Psychiatric: AxOx3, judgement and insight normal; moving all extremities  Skin: no rashes; no palpable lesions    Labs: reviewed                        13.5   6.43  )-----------( 157      ( 2023 07:03 )             39.2     02-20    136  |  103  |  13  ----------------------------<  122<H>  4.5   |  30  |  0.70    Ca    9.4      2023 07:03  Phos  3.5     02-20  Mg     2.1     -20    TPro  7.3  /  Alb  3.4  /  TBili  0.7  /  DBili  x   /  AST  33  /  ALT  29  /  AlkPhos  93  02-20    C-Reactive Protein, Serum: <3 mg/L (23 @ 07:03)                        13.5   6.55  )-----------( 151      ( 2023 07:19 )             38.9     02-19    133<L>  |  104  |  10  ----------------------------<  109<H>  3.6   |  25  |  0.59    Ca    9.1      2023 07:19    TPro  8.4<H>  /  Alb  3.9  /  TBili  0.8  /  DBili  x   /  AST  36  /  ALT  36  /  AlkPhos  115  02-18     LIVER FUNCTIONS - ( 2023 15:04 )  Alb: 3.9 g/dL / Pro: 8.4 gm/dL / ALK PHOS: 115 U/L / ALT: 36 U/L / AST: 36 U/L / GGT: x           Urinalysis Basic - ( 2023 15:04 )    Color: Yellow / Appearance: Clear / S.005 / pH: x  Gluc: x / Ketone: Negative  / Bili: Negative / Urobili: Negative   Blood: x / Protein: trace mg/dL / Nitrite: Negative   Leuk Esterase: Trace / RBC: 0-2 /HPF / WBC 0-2 /HPF   Sq Epi: x / Non Sq Epi: Occasional / Bacteria: Moderate    ( @ 15:04)  NotDetec      Culture - Group A Streptococcus (collected 2023 15:32)  Source: .Throat  Final Report (2023 22:24):    No Streptococcus pyogenes (Group A) isolated    Culture - Urine (collected 2023 15:04)  Source: Clean Catch None  Preliminary Report (2023 08:41):    50,000 - 99,000 CFU/mL Escherichia coli    Culture - Blood (collected 2023 15:04)  Source: .Blood None  Preliminary Report (2023 22:02):    No growth to date.    Culture - Blood (collected 2023 15:04)  Source: .Blood None  Preliminary Report (2023 22:02):    No growth to date.    Radiology: all available radiological tests reviewed    Advanced directives addressed: full resuscitation
Subjective:  Chief complain : urinary frequencies  , dysuria    HPI:        64 y/o male w/ a PMHx of obesity, lumbar disc disorder (high dose pain meds), sleep apnea, preDM, HTN, hiatal hernia (s/p repair), SBO, hx appendicitis, chronic sore throat - admitted on 23  cc of increased urinary frequency /  dysuria . Patient notes that he was dx with ESBL e. coli (on a paperwork  -  treated with po antibiotics Macrobid). Continues to have urinary symptoms. Patient also notes of sore throat - which has been an ongoing issue on and off last couple of months since the Hiatal Hernia repair. Patient states that he was told to follow up with ENT doctor. Patient denies any HA, CP, SOB. Chronic back pain. No fevers, chills or shakes. Patient received IV meropenem in the ED.       - Patient seen and examined at bedside earlier today, + urinary frequencies, dysuria improving , + chronic back pain, + sore throat, denies cp, dyspnea, afebrile    Review of system- Rest of the review of system are negative except mentioned in HPI     Vital sings reviewed for last 24 h  T(C): 36.8 (23 @ 16:25), Max: 37.1 (23 @ 23:26)  HR: 59 (23 @ 16:25) (59 - 69)  BP: 137/75 (23 @ 16:25) (137/75 - 152/85)  RR: 18 (23 @ 16:25) (17 - 18)  SpO2: 95% (23 @ 16:25) (94% - 96%)  Wt(kg): --  Daily     Daily   CAPILLARY BLOOD GLUCOSE      Physical exam:   General : NAD, appear to be of stated age , well groomed   NERVOUS SYSTEM:  Alert & Oriented X3, non- focal exam, Motor Strength 5/5 B/L upper and lower extremities; DTRs 2+ intact and symmetric  HEAD:  Atraumatic, Normocephalic  EYES: EOMI, PERRLA, conjunctiva and sclera clear  HEENT: Moist mucous membranes, Supple neck , No JVD  CHEST: Clear to auscultation bilaterally; No rales, no rhonchi, no wheezing  HEART: Regular rate and rhythm; No murmurs, no rubs or gallops  ABDOMEN: Soft, Non-tender, Non-distended; Bowel sounds present, no guarding , no peritoneal irritation   GENITOURINARY- Voiding, no suprapubic tenderness  EXTREMITIES:  2+ Peripheral Pulses, No clubbing, cyanosis,   edema  MUSCULOSKELETAL:- No muscle tenderness, Muscle tone normal, No joint tenderness, no Joint swelling,  Joint ROM –normal   SKIN-no rash, no lesion    Labs radiologic and other test : all reviewed and interpret                         13.5   6.55  )-----------( 151      ( 2023 07:19 )             38.9     02-19    133<L>  |  104  |  10  ----------------------------<  109<H>  3.6   |  25  |  0.59    Ca    9.1      2023 07:19    TPro  8.4<H>  /  Alb  3.9  /  TBili  0.8  /  DBili  x   /  AST  36  /  ALT  36  /  AlkPhos  115  02-18    PT/INR - ( 2023 15:04 )   PT: 14.6 sec;   INR: 1.26 ratio         PTT - ( 2023 15:04 )  PTT:35.6 sec        Urinalysis Basic - ( 2023 15:04 )    Color: Yellow / Appearance: Clear / S.005 / pH: x  Gluc: x / Ketone: Negative  / Bili: Negative / Urobili: Negative   Blood: x / Protein: trace mg/dL / Nitrite: Negative   Leuk Esterase: Trace / RBC: 0-2 /HPF / WBC 0-2 /HPF   Sq Epi: x / Non Sq Epi: Occasional / Bacteria: Moderate      RECENT CULTURES:      Cardiac testing : reviewed   EKG < from: 12 Lead ECG (23 @ 14:34) >  Ventricular Rate 69 BPM    Atrial Rate 69 BPM    P-R Interval 216 ms    QRS Duration 90 ms    Q-T Interval 416 ms    QTC Calculation(Bazett) 445 ms    P Axis 33 degrees    R Axis -7 degrees    T Axis 23 degrees    Diagnosis Line Sinus rhythm with 1st degree A-V block  Abnormal ECG  When compared with ECG of 09-MAR-2012 10:59,  [place] interval has increased    < end of copied text >      Procedures :     Devices:     Current medications:  acetaminophen     Tablet .. 650 milliGRAM(s) Oral every 6 hours PRN  aluminum hydroxide/magnesium hydroxide/simethicone Suspension 30 milliLiter(s) Oral every 4 hours PRN  amLODIPine   Tablet 10 milliGRAM(s) Oral daily  ascorbic acid 500 milliGRAM(s) Oral daily  atenolol  Tablet 100 milliGRAM(s) Oral daily  atorvastatin 10 milliGRAM(s) Oral at bedtime  DULoxetine 60 milliGRAM(s) Oral every 12 hours  gabapentin 300 milliGRAM(s) Oral every 8 hours  heparin   Injectable 5000 Unit(s) SubCutaneous every 12 hours  HYDROmorphone   Tablet 4 milliGRAM(s) Oral every 4 hours PRN  levothyroxine 75 MICROGram(s) Oral daily  losartan 25 milliGRAM(s) Oral daily  melatonin 3 milliGRAM(s) Oral at bedtime PRN  meropenem  IVPB 1000 milliGRAM(s) IV Intermittent every 8 hours  multivitamin 1 Tablet(s) Oral daily  ondansetron Injectable 4 milliGRAM(s) IV Push every 8 hours PRN  pantoprazole    Tablet 40 milliGRAM(s) Oral before breakfast  sodium chloride 0.65% Nasal 1 Spray(s) Both Nostrils every 2 hours PRN  tamsulosin 0.4 milliGRAM(s) Oral at bedtime  zolpidem 5 milliGRAM(s) Oral at bedtime PRN            
Date of service: 23 @ 10:49    Lying in bed in NAD  Has urinary frequency  Has low grade fever    ROS: denies dizziness, no HA, no SOB or cough, no abdominal pain, no diarrhea or constipation; no legs pain, no rashes    MEDICATIONS  (STANDING):  amLODIPine   Tablet 10 milliGRAM(s) Oral daily  ascorbic acid 500 milliGRAM(s) Oral daily  atenolol  Tablet 100 milliGRAM(s) Oral daily  atorvastatin 10 milliGRAM(s) Oral at bedtime  DULoxetine 60 milliGRAM(s) Oral every 12 hours  gabapentin 300 milliGRAM(s) Oral every 8 hours  heparin   Injectable 5000 Unit(s) SubCutaneous every 12 hours  levothyroxine 75 MICROGram(s) Oral daily  losartan 25 milliGRAM(s) Oral daily  meropenem  IVPB 1000 milliGRAM(s) IV Intermittent every 8 hours  multivitamin 1 Tablet(s) Oral daily  pantoprazole    Tablet 40 milliGRAM(s) Oral before breakfast  tamsulosin 0.4 milliGRAM(s) Oral at bedtime    Vital Signs Last 24 Hrs  T(C): 36.3 (2023 09:09), Max: 37.3 (2023 23:27)  T(F): 97.4 (2023 09:09), Max: 99.2 (2023 23:27)  HR: 74 (2023 09:09) (59 - 74)  BP: 136/77 (2023 09:09) (136/77 - 152/75)  BP(mean): --  RR: 18 (2023 09:09) (17 - 18)  SpO2: 96% (2023 09:09) (95% - 96%)    Parameters below as of 2023 09:09  Patient On (Oxygen Delivery Method): room air     Physical exam:    Constitutional:  No acute distress  HEENT: NC/AT, EOMI, PERRLA, conjunctivae clear; ears and nose atraumatic; pharynx benign  Neck: supple; thyroid not palpable  Back: no tenderness  Respiratory: respiratory effort normal; clear to auscultation  Cardiovascular: S1S2 regular, no murmurs  Abdomen: soft, not tender, not distended, positive BS; no liver or spleen organomegaly  Genitourinary: no suprapubic tenderness  Lymphatic: no LN palpable  Musculoskeletal: no muscle tenderness, no joint swelling or tenderness  Extremities: no pedal edema  Neurological/ Psychiatric: AxOx3, judgement and insight normal; moving all extremities  Skin: no rashes; no palpable lesions    Labs: all available labs reviewed                        13.5   6.55  )-----------( 151      ( 2023 07:19 )             38.9     02-    133<L>  |  104  |  10  ----------------------------<  109<H>  3.6   |  25  |  0.59    Ca    9.1      2023 07:19    TPro  8.4<H>  /  Alb  3.9  /  TBili  0.8  /  DBili  x   /  AST  36  /  ALT  36  /  AlkPhos  115  -18     LIVER FUNCTIONS - ( 2023 15:04 )  Alb: 3.9 g/dL / Pro: 8.4 gm/dL / ALK PHOS: 115 U/L / ALT: 36 U/L / AST: 36 U/L / GGT: x           Urinalysis Basic - ( 2023 15:04 )    Color: Yellow / Appearance: Clear / S.005 / pH: x  Gluc: x / Ketone: Negative  / Bili: Negative / Urobili: Negative   Blood: x / Protein: trace mg/dL / Nitrite: Negative   Leuk Esterase: Trace / RBC: 0-2 /HPF / WBC 0-2 /HPF   Sq Epi: x / Non Sq Epi: Occasional / Bacteria: Moderate    ( @ 15:04)  [facility]      Culture - Group A Streptococcus (collected 2023 15:32)  Source: .Throat  Final Report (2023 22:24):    No Streptococcus pyogenes (Group A) isolated    Culture - Urine (collected 2023 15:04)  Source: Clean Catch None  Preliminary Report (2023 08:41):    50,000 - 99,000 CFU/mL Escherichia coli    Culture - Blood (collected 2023 15:04)  Source: .Blood None  Preliminary Report (2023 22:02):    No growth to date.    Culture - Blood (collected 2023 15:04)  Source: .Blood None  Preliminary Report (2023 22:02):    No growth to date.    Radiology: all available radiological tests reviewed    Advanced directives addressed: full resuscitation
Subjective:  Chief complain : urinary frequencies  , dysuria    HPI:        64 y/o male w/ a PMHx of obesity, lumbar disc disorder (high dose pain meds), sleep apnea, preDM, HTN, hiatal hernia (s/p repair), SBO, hx appendicitis, chronic sore throat - admitted on 23  cc of increased urinary frequency /  dysuria . Patient notes that he was dx with ESBL e. coli (on a paperwork  -  treated with po antibiotics Macrobid). Continues to have urinary symptoms. Patient also notes of sore throat - which has been an ongoing issue on and off last couple of months since the Hiatal Hernia repair. Patient states that he was told to follow up with ENT doctor. Patient denies any HA, CP, SOB. Chronic back pain. No fevers, chills or shakes. Patient received IV meropenem in the ED.       - Patient seen and examined at bedside earlier today, + urinary frequencies, dysuria improving , + chronic back pain, + sore throat, denies cp, dyspnea, afebrile   - pt seen and examined, feels better, + urinary frequencies, denies cp, dyspnea, chronic back pain well controlled     Review of system- Rest of the review of system are negative except mentioned in HPI    Vital sings reviewed for last 24 h  T(C): 36.3 (23 @ 09:09), Max: 37.3 (23 @ 23:27)  T(F): 97.4 (23 @ 09:09), Max: 99.2 (23 @ 23:27)  HR: 74 (23 @ 09:09) (59 - 74)  BP: 136/77 (23 @ 09:09) (136/77 - 152/75)  RR: 18 (23 @ 09:09) (18 - 18)  SpO2: 96% (23 @ 09:09) (95% - 96%)  Wt(kg): --  Daily     Daily   CAPILLARY BLOOD GLUCOSE          Physical exam:   General : NAD, appear to be of stated age , well groomed   NERVOUS SYSTEM:  Alert & Oriented X3, non- focal exam, Motor Strength 5/5 B/L upper and lower extremities; DTRs 2+ intact and symmetric  HEAD:  Atraumatic, Normocephalic  EYES: EOMI, PERRLA, conjunctiva and sclera clear  HEENT: Moist mucous membranes, Supple neck , No JVD  CHEST: Clear to auscultation bilaterally; No rales, no rhonchi, no wheezing  HEART: Regular rate and rhythm; No murmurs, no rubs or gallops  ABDOMEN: Soft, Non-tender, Non-distended; Bowel sounds present, no guarding , no peritoneal irritation   GENITOURINARY- Voiding, no suprapubic tenderness  EXTREMITIES:  2+ Peripheral Pulses, No clubbing, cyanosis,   edema  MUSCULOSKELETAL:- No muscle tenderness, Muscle tone normal, No joint tenderness, no Joint swelling,  Joint ROM –normal   SKIN-no rash, no lesion    Labs radiologic and other test : all reviewed and interpret       136  |  103  |  13  ----------------------------<  122<H>  4.5   |  30  |  0.70    Ca    9.4      2023 07:03  Phos  3.5       Mg     2.1         TPro  7.3  /  Alb  3.4  /  TBili  0.7  /  DBili  x   /  AST  33  /  ALT  29  /  AlkPhos  93                              13.5   6.43  )-----------( 157      ( 2023 07:03 )             39.2       C-Reactive Protein, Serum (23 @ 07:03)    C-Reactive Protein, Serum: <3: Test Repeated mg/L    Vitamin D, 25-Hydroxy (23 @ 07:03)    Vitamin D, 25-Hydroxy: 67.3:     Vitamin B12, Serum (23 @ 07:03)    Vitamin B12, Serum: 347 pg/mL    Folate, Serum (23 @ 07:03)    Folate, Serum: >20.0 ng/mL            LIVER FUNCTIONS - ( 2023 07:03 )  Alb: 3.4 g/dL / Pro: 7.3 gm/dL / ALK PHOS: 93 U/L / ALT: 29 U/L / AST: 33 U/L / GGT: x             PT/INR - ( 2023 15:04 )   PT: 14.6 sec;   INR: 1.26 ratio         PTT - ( 2023 15:04 )  PTT:35.6 sec  Thyroid Stimulating Hormone, Serum (23 @ 07:03)    Thyroid Stimulating Hormone, Serum: 0.92 uU/mL      Urinalysis Basic - ( 2023 15:04 )    Color: Yellow / Appearance: Clear / S.005 / pH: x  Gluc: x / Ketone: Negative  / Bili: Negative / Urobili: Negative   Blood: x / Protein: trace mg/dL / Nitrite: Negative   Leuk Esterase: Trace / RBC: 0-2 /HPF / WBC 0-2 /HPF   Sq Epi: x / Non Sq Epi: Occasional / Bacteria: Moderate                   RECENT CULTURES:      Cardiac testing : reviewed   EKG < from: 12 Lead ECG (23 @ 14:34) >  Ventricular Rate 69 BPM    Atrial Rate 69 BPM    P-R Interval 216 ms    QRS Duration 90 ms    Q-T Interval 416 ms    QTC Calculation(Bazett) 445 ms    P Axis 33 degrees    R Axis -7 degrees    T Axis 23 degrees    Diagnosis Line Sinus rhythm with 1st degree A-V block  Abnormal ECG  When compared with ECG of 09-MAR-2012 10:59,  [place] interval has increased    < end of copied text >    Culture - Urine (23 @ 15:04)    Specimen Source: Clean Catch None    Culture Results:   50,000 - 99,000 CFU/mL Escherichia coli      Procedures :     Devices:     Current medications:  acetaminophen     Tablet .. 650 milliGRAM(s) Oral every 6 hours PRN  aluminum hydroxide/magnesium hydroxide/simethicone Suspension 30 milliLiter(s) Oral every 4 hours PRN  amLODIPine   Tablet 10 milliGRAM(s) Oral daily  ascorbic acid 500 milliGRAM(s) Oral daily  atenolol  Tablet 100 milliGRAM(s) Oral daily  atorvastatin 10 milliGRAM(s) Oral at bedtime  DULoxetine 60 milliGRAM(s) Oral every 12 hours  gabapentin 300 milliGRAM(s) Oral every 8 hours  heparin   Injectable 5000 Unit(s) SubCutaneous every 12 hours  HYDROmorphone   Tablet 4 milliGRAM(s) Oral every 4 hours PRN  levothyroxine 75 MICROGram(s) Oral daily  losartan 25 milliGRAM(s) Oral daily  melatonin 3 milliGRAM(s) Oral at bedtime PRN  meropenem  IVPB 1000 milliGRAM(s) IV Intermittent every 8 hours  multivitamin 1 Tablet(s) Oral daily  ondansetron Injectable 4 milliGRAM(s) IV Push every 8 hours PRN  pantoprazole    Tablet 40 milliGRAM(s) Oral before breakfast  sodium chloride 0.65% Nasal 1 Spray(s) Both Nostrils every 2 hours PRN  tamsulosin 0.4 milliGRAM(s) Oral at bedtime  zolpidem 5 milliGRAM(s) Oral at bedtime PRN

## 2023-02-21 NOTE — PROGRESS NOTE ADULT - REASON FOR ADMISSION
[address]               [address]                               PULMONARY FUNCTION    PATIENT NAME: Claudene Ginsberg                 :        1963  MED REC NO:   [id number]                          ROOM:  ACCOUNT NO:   [de-identified]                           ADMIT DATE: 2020  PROVIDER:     Avtar Aaron MD    DATE OF PROCEDURE:  2020    Forced vital capacity, expiratory flow rates, and FEV1 to FVC ratio are  normal.  Lung volume determinations by plethysmography are normal.   Single-breath diffusion capacity reduced, but normal after comparison to  alveolar volume. IMPRESSION:  Reduction of diffusion capacity may be secondary to  presence of emphysema, pulmonary vascular disease, early interstitial  lung disease.   Remainder of pulmonary function study is normal.        Shabnam Ji MD    D: 2020 11:19:32       T: 2020 11:25:50     [email or web address]  Job#: [id number]     Doc#: [id number]    CC:
sore throat /  urinary symptoms

## 2023-02-21 NOTE — DISCHARGE NOTE PROVIDER - HOSPITAL COURSE
Chief complain : urinary frequencies  , dysuria    HPI:        64 y/o male w/ a PMHx of obesity, lumbar disc disorder (high dose pain meds), sleep apnea, preDM, HTN, hiatal hernia (s/p repair), SBO, hx appendicitis, chronic sore throat - admitted on 2/18/23  cc of increased urinary frequency /  dysuria . Patient notes that he was dx with ESBL e. coli (on a paperwork 1/25 -  treated with po antibiotics Macrobid). Continues to have urinary symptoms. Patient also notes of sore throat - which has been an ongoing issue on and off last couple of months since the Hiatal Hernia repair. Patient states that he was told to follow up with ENT doctor. Patient denies any HA, CP, SOB. Chronic back pain. No fevers, chills or shakes. Patient received IV meropenem in the ED.      2/19 - Patient seen and examined at bedside earlier today, + urinary frequencies, dysuria improving , + chronic back pain, + sore throat, denies cp, dyspnea, afebrile  2/20 - pt seen and examined, feels better, + urinary frequencies, denies cp, dyspnea, chronic back pain well controlled   2/21 - pt seen and examined , denies cp, dyspnea, + lower abdominal discomfort persist,     Review of system- Rest of the review of system are negative except mentioned in HPI  Vital sings reviewed for last 24 h  T(C): 36.8 (02-21-23 @ 07:42), Max: 37 (02-20-23 @ 15:18)  T(F): 98.2 (02-21-23 @ 07:42), Max: 98.6 (02-20-23 @ 15:18)  HR: 87 (02-21-23 @ 09:04) (58 - 87)  BP: 132/73 (02-21-23 @ 09:04) (132/73 - 139/78)  RR: 20 (02-21-23 @ 07:42) (18 - 20)  SpO2: 96% (02-21-23 @ 07:42) (94% - 96%)  Wt(kg): --  Daily     Daily   CAPILLARY BLOOD GLUCOSE  Physical exam:   General : NAD, appear to be of stated age , well groomed   NERVOUS SYSTEM:  Alert & Oriented X3, non- focal exam, Motor Strength 5/5 B/L upper and lower extremities; DTRs 2+ intact and symmetric  HEAD:  Atraumatic, Normocephalic  EYES: EOMI, PERRLA, conjunctiva and sclera clear  HEENT: Moist mucous membranes, Supple neck , No JVD  CHEST: Clear to auscultation bilaterally; No rales, no rhonchi, no wheezing  HEART: Regular rate and rhythm; No murmurs, no rubs or gallops  ABDOMEN: Soft, Non-tender, Non-distended; Bowel sounds present, no guarding , no peritoneal irritation   GENITOURINARY- Voiding, no suprapubic tenderness  EXTREMITIES:  2+ Peripheral Pulses, No clubbing, cyanosis,   edema  MUSCULOSKELETAL:- No muscle tenderness, Muscle tone normal, No joint tenderness, no Joint swelling,  Joint ROM –normal   SKIN-no rash, no lesion    Labs radiologic and other test : all reviewed and interpret     · Assessment       64 y/o male w/ a PMHx of obesity, lumbar disc disorder (high dose pain meds), sleep apnea, preDM, HTN, hiatal hernia (s/p repair), SBO, hx appendicitis, chronic sore throat - admitted on 2/18/23  cc of increased urinary frequency /  dysuria . Patient notes that he was dx with ESBL e. coli (on a paperwork 1/25 -  treated with po antibiotics Macrobid).     UTI with ESBL E coli partially treated , possible acute on chronic prostitis  - UA - pyuria  - culture E coli, sensitivities pending   - s/p  meropenem as per ID   - 2/20 - plan for midline and 4 weeks of IV abx      h/o BPH , 2 cm left parapelvic renal cyst   - check post-void residual  <110  - c/w Flomax finasteride   - us kidney and bladder - neg, PVR 68 cc    GERD, h/o hiatal hernia repair   - c/w PPI     Chronic back pain   - c/w duloxetine gabapentin  - hydromorphone prn     HTN  - c/w BB, CCB, ARBs    HLD   - c/w statin    Hypothyroidism   - c/w levothyroxine replacement     Sore throat ? postnasal drip   - trial of saline spray          Chief complain : urinary frequencies  , dysuria    HPI:        64 y/o male w/ a PMHx of obesity, lumbar disc disorder (high dose pain meds), sleep apnea, preDM, HTN, hiatal hernia (s/p repair), SBO, hx appendicitis, chronic sore throat - admitted on 2/18/23  cc of increased urinary frequency /  dysuria . Patient notes that he was dx with ESBL e. coli (on a paperwork 1/25 -  treated with po antibiotics Macrobid). Continues to have urinary symptoms. Patient also notes of sore throat - which has been an ongoing issue on and off last couple of months since the Hiatal Hernia repair. Patient states that he was told to follow up with ENT doctor. Patient denies any HA, CP, SOB. Chronic back pain. No fevers, chills or shakes. Patient received IV meropenem in the ED.      2/19 - Patient seen and examined at bedside earlier today, + urinary frequencies, dysuria improving , + chronic back pain, + sore throat, denies cp, dyspnea, afebrile  2/20 - pt seen and examined, feels better, + urinary frequencies, denies cp, dyspnea, chronic back pain well controlled   2/21- pt seen and examined, feels the same, +urinary frequencies, lower abdominal discomfort, + sore throat   Review of system- Rest of the review of system are negative except mentioned in HPI  Vital sings reviewed for last 24 h  T(C): 36.8 (02-21-23 @ 07:42), Max: 37 (02-20-23 @ 15:18)  T(F): 98.2 (02-21-23 @ 07:42), Max: 98.6 (02-20-23 @ 15:18)  HR: 87 (02-21-23 @ 09:04) (58 - 87)  BP: 132/73 (02-21-23 @ 09:04) (132/73 - 139/78)  RR: 20 (02-21-23 @ 07:42) (18 - 20)  SpO2: 96% (02-21-23 @ 07:42) (94% - 96%)  Physical exam:   General : NAD, appear to be of stated age , well groomed   NERVOUS SYSTEM:  Alert & Oriented X3, non- focal exam, Motor Strength 5/5 B/L upper and lower extremities; DTRs 2+ intact and symmetric  HEAD:  Atraumatic, Normocephalic  EYES: EOMI, PERRLA, conjunctiva and sclera clear  HEENT: Moist mucous membranes, Supple neck , No JVD  CHEST: Clear to auscultation bilaterally; No rales, no rhonchi, no wheezing  HEART: Regular rate and rhythm; No murmurs, no rubs or gallops  ABDOMEN: Soft, Non-tender, Non-distended; Bowel sounds present, no guarding , no peritoneal irritation   GENITOURINARY- Voiding, no suprapubic tenderness  EXTREMITIES:  2+ Peripheral Pulses, No clubbing, cyanosis,   edema  MUSCULOSKELETAL:- No muscle tenderness, Muscle tone normal, No joint tenderness, no Joint swelling,  Joint ROM –normal   SKIN-no rash, no lesion    Labs radiologic and other test : all reviewed and interpret          64 y/o male w/ a PMHx of obesity, lumbar disc disorder (high dose pain meds), sleep apnea, preDM, HTN, hiatal hernia (s/p repair), SBO, hx appendicitis, chronic sore throat - admitted on 2/18/23  cc of increased urinary frequency /  dysuria . Patient notes that he was dx with ESBL e. coli (on a paperwork 1/25 -  treated with po antibiotics Macrobid).     UTI with ESBL E coli partially treated , possible acute on chronic prostatitis  - UA - pyuria  - culture E coli, sensitivities pending   - s/p  meropenem as per ID   - 2/20 - plan for midline and 4 weeks of IV abx of ertapenem 1 gm qd    h/o BPH , 2 cm left parapelvic renal cyst   - check post-void residual  <110  - c/w Flomax finasteride   - us kidney and bladder - neg, PVR 68 cc    Sore throat ? postnasal drip  - trial of saline spray , Cepacol lozenges    GERD, h/o hiatal hernia repair  - c/w PPI ,  f/u with surgeon who did surgery on hiatal hernia  Chronic back pain - c/w duloxetine gabapentin,  hydromorphone prn   HTN - c/w BB, CCB, ARBs  HLD  - c/w statin  Hypothyroidism  - c/w levothyroxine replacement     Final diagnosis, treatment plan, and follow-up recommendations were discussed and explained to the patient.   The patient was given an opportunity to ask questions concerning the diagnosis and treatment plan.   The patient acknowledged understanding of the diagnosis, treatment, and follow-up recommendations.   The patient was advised to seek urgent care upon discharge if worsening symptoms develop prior to scheduled follow-up.   Time spent on discharge included time with the patient, and also coordinating discharge care as outlined below.  Discharge note faxed to PCP with my contact information to call me back   PCP Dr. Chi   Total time spent: 50 min

## 2023-02-21 NOTE — DISCHARGE NOTE PROVIDER - CARE PROVIDERS DIRECT ADDRESSES
[email or web address]

## 2023-02-21 NOTE — DISCHARGE NOTE NURSING/CASE MANAGEMENT/SOCIAL WORK - PATIENT PORTAL LINK FT
You can access the FollowMyHealth Patient Portal offered by [facility] by registering at the following website: [email or web address]. By joining [employer]’s FollowMyHealth portal, you will also be able to view your health information using other applications (apps) compatible with our system.

## 2023-02-21 NOTE — PROGRESS NOTE ADULT - ASSESSMENT
64 y/o male w/ a PMHx of obesity, lumbar disc disorder (high dose pain meds), sleep apnea, preDM, HTN, hiatal hernia (s/p repair), SBO, hx appendicitis, chronic sore throat - admitted on 2/18/23  cc of increased urinary frequency /  dysuria . Patient notes that he was dx with ESBL e. coli (on a paperwork 1/25 -  treated with po antibiotics Macrobid).     UTI with ESBL E coli partially treated , possible acute on chronic prostitis  - UA - pyuria  - culture E coli, sensitivities pending   - s/p  meropenem as per ID   - 2/20 - plan for midline and 4 weeks of IV abx      h/o BPH , 2 cm left parapelvic renal cyst   - check post-void residual  <110  - c/w Flomax finasteride   - us kidney and bladder - neg, PVR 68 cc    GERD, h/o hiatal hernia repair   - c/w PPI     Chronic back pain   - c/w duloxetine gabapentin  - hydromorphone prn     HTN  - c/w BB, CCB, ARBs    HLD   - c/w statin    Hypothyroidism   - c/w levothyroxine replacement     Sore throat ? postnasal drip   - trial of saline spray       Dispo - IV abx, f/u cultures, d/c planning in am    
64 y/o male with h/o obesity, lumbar disc disorder (high dose pain meds), sleep apnea, preDM, HTN, hiatal hernia (s/p repair), SBO, hx appendicitis, chronic sore throat was admitted on 2/18 for increased urinary frequency /  dysuria. Patient reported a urine culture form 1/25 with ESBL E.coli (on a paperwork 1/25 -  treated with po antibiotics Macrobid), but continues to have urinary symptoms. Patient also notes of sore throat - which has been an ongoing issue on and off last couple of months since the hiatal hernia repair. Patient states that he was told to follow up with ENT doctor.  No fevers, chills or shakes at home. Patient received IV meropenem in the ED.    1. UTI with ESBL E.coli, partially treated. Probable acute on chronic prostatitis. Dysuria. Hiatal hernia.  -UA noted with minimal pyuria  -has nocturia - may have undiagnosed BPH, claims to have seen urologist, but was note told he has BPH, he was given flomax  -urine c/s noted  -on meropenem 1 gm IV q8h # 2  -tolerating abx well so far; no side effects noted  -continue abx coverage  -he will need 4 weeks of therapy  -plan for midline and home IV abx therapy  -monitor temps  -f/u CBC  -supportive care  2. Other issues:   -care per medicine  
   64 y/o male w/ a PMHx of obesity, lumbar disc disorder (high dose pain meds), sleep apnea, preDM, HTN, hiatal hernia (s/p repair), SBO, hx appendicitis, chronic sore throat - admitted on 2/18/23  cc of increased urinary frequency /  dysuria . Patient notes that he was dx with ESBL e. coli (on a paperwork 1/25 -  treated with po antibiotics Macrobid).     UTI with ESBL E coli partially treated   - UA - pyuria  - culture pending  - c/w meropenem as per ID     h/o BPH   - check post-void residual   - c/w Flomax finasteride   - us kidney and bladder     GERD, h/o hiatal hernia repair   - c/w PPI     Chronic back pain   - c/w duloxetine gabapentin  - hydromorphone prn     HTN  - c/w BB, CCB, ARBs    HLD   - c/w statin    Hypothyroidism   - c/w levothyroxine replacement     Sore throat ? postnasal drip   - trial of saline spray       Dispo - IV abx, f/u cultures, US kidney in am    
64 y/o male with h/o obesity, lumbar disc disorder (high dose pain meds), sleep apnea, preDM, HTN, hiatal hernia (s/p repair), SBO, hx appendicitis, chronic sore throat was admitted on 2/18 for increased urinary frequency /  dysuria. Patient reported a urine culture form 1/25 with ESBL E.coli (on a paperwork 1/25 -  treated with po antibiotics Macrobid), but continues to have urinary symptoms. Patient also notes of sore throat - which has been an ongoing issue on and off last couple of months since the hiatal hernia repair. Patient states that he was told to follow up with ENT doctor.  No fevers, chills or shakes at home. Patient received IV meropenem in the ED.    1. UTI with ESBL E.coli, partially treated. Probable acute on chronic prostatitis. Dysuria. Hiatal hernia.  -UA noted with minimal pyuria  -has nocturia - may have undiagnosed BPH, claims to have seen urologist, but was note told he has BPH, he was given flomax  -urine c/s noted  -on meropenem 1 gm IV q8h # 3  -tolerating abx well so far; no side effects noted  -change abx to ertapenem 1 gm IV qd  -reason for abx use and side effects reviewed with patient; monitor BMP  -continue abx coverage  -he will need 4 weeks of therapy  -plan for midline and home IV abx therapy  -weekly labs  -f/u with urology as outpatient  -monitor temps  -f/u CBC  -supportive care  2. Other issues:   -care per medicine

## 2023-02-21 NOTE — DISCHARGE NOTE PROVIDER - PROVIDER TOKENS
PROVIDER:[TOKEN:[[id number]],FOLLOWUP:[1 week]],PROVIDER:[TOKEN:[[id number]],FOLLOWUP:[1 week]]

## 2023-02-21 NOTE — DISCHARGE NOTE PROVIDER - NSDCCPTREATMENT_GEN_ALL_CORE_FT
PRINCIPAL PROCEDURE  Procedure: US kidney complete  Findings and Treatment: ACC: [id number] EXAM:  US KIDNEYS AND BLADDER   ORDERED BY: CYRIL HORN   PROCEDURE DATE:  02/20/2023    INTERPRETATION:  CLINICAL INFORMATION: Recurrent urinary tract infection  COMPARISON: None available.  TECHNIQUE: Sonography of the kidneys and bladder.  FINDINGS:  Right kidney: 10.7 cm. No renal mass, hydronephrosis or calculi.  Left kidney: 13.2 cm. No renal mass, hydronephrosis or calculi. 2 cm LEFT   parapelvic renal cyst  Urinary bladder: Normal distensibility. 68 cc PVR. Nonvisualized   prostate. LEFT ureteral jet observed. RIGHT ureteral jet not observed  IMPRESSION:  1.  Negative renal ultrasound.  2.  68 cc PVR   RORY ROBERTSON; Attending Radiologist  This document has been electronically signed. Feb 20 2023  1:17PM        SECONDARY PROCEDURE  Procedure: Culture urine  Findings and Treatment: Culture - Urine (02.18.23 @ 15:04)    -  Amikacin: S <=16    -  Amoxicillin/Clavulanic Acid: R >16/8    -  Ampicillin: R >16 These ampicillin results predict results for amoxicillin    -  Ampicillin/Sulbactam: I 16/8 Enterobacter, Klebsiella aerogenes, Citrobacter, and Serratia may develop resistance during prolonged therapy (3-4 days)    -  Aztreonam: S <=4    -  Cefazolin: R >16 For uncomplicated UTI with K. pneumoniae, E. coli, or P. mirablis: ANN MARIE <=16 is sensitive and ANN MARIE >=32 is resistant. This also predicts results for oral agents cefaclor, cefdinir, cefpodoxime, cefprozil, cefuroxime axetil, cephalexin and locarbef for uncomplicated UTI. Note that some isolates may be susceptible to these agents while testing resistant to cefazolin.    -  Cefepime: S <=2    -  Cefoxitin: R >16    -  Ceftriaxone: R 8 Enterobacter, Klebsiella aerogenes, Citrobacter, and Serratia may develop resistance during prolonged therapy    -  Cefuroxime: R >16    -  Ciprofloxacin: R >2    -  Ertapenem: S <=0.5    -  Gentamicin: S <=2    -  Imipenem: S <=1    -  Levofloxacin: R >4    -  Meropenem: S <=1    -  Nitrofurantoin: S <=32 Should not be used to treat pyelonephritis    -  Piperacillin/Tazobactam: S <=8    -  Tobramycin: S <=2    -  Trimethoprim/Sulfamethoxazole: R >2/38    Specimen Source: Clean Catch None    Culture Results:   50,000 - 99,000 CFU/mL Escherichia coli    Organism Identification: Escherichia coli    Organism: Escherichia coli    Method Type: ANN MARIE

## 2023-02-21 NOTE — DISCHARGE NOTE PROVIDER - NSDCCPCAREPLAN_GEN_ALL_CORE_FT
PRINCIPAL DISCHARGE DIAGNOSIS  Diagnosis: UTI due to extended-spectrum beta lactamase (ESBL) producing Escherichia coli  Assessment and Plan of Treatment: complete 4 weeks of IV antibiotics  return to ED if fever, chills, worsening of abdominal pain, chest pain, dyspnea, or other complains      SECONDARY DISCHARGE DIAGNOSES  Diagnosis: Chronic back pain  Assessment and Plan of Treatment: follow up with your PCP and pain management specialist    Diagnosis: Renal cyst  Assessment and Plan of Treatment: 2 cm left parapelvic renal cyst seen on your US of the kidney  follow up with PCP within 1 week for further work-up and monitoring   see attached report below    Diagnosis: Postnasal drip  Assessment and Plan of Treatment: use nasal saline spray    Diagnosis: GERD (gastroesophageal reflux disease)  Assessment and Plan of Treatment: continue with PPI    Diagnosis: H/O hiatal hernia  Assessment and Plan of Treatment: follow up with your surgeon as o/p

## 2023-02-21 NOTE — DISCHARGE NOTE PROVIDER - CARE PROVIDER_API CALL
Bryce Chi)  Medicine  [address] [place] [address]  Phone: [phone]  Fax: [phone]  Follow Up Time: 1 week    Jovany Godwin  INFECTIOUS DISEASE  [address] [place] [address], [place] [address]  Phone: [phone]  Fax: [phone]  Follow Up Time: 1 week

## 2023-02-23 LAB
CULTURE RESULTS: SIGNIFICANT CHANGE UP
CULTURE RESULTS: SIGNIFICANT CHANGE UP
SPECIMEN SOURCE: SIGNIFICANT CHANGE UP
SPECIMEN SOURCE: SIGNIFICANT CHANGE UP

## 2023-03-01 DIAGNOSIS — E78.5 HYPERLIPIDEMIA, UNSPECIFIED: ICD-10-CM

## 2023-03-01 DIAGNOSIS — R35.1 NOCTURIA: ICD-10-CM

## 2023-03-01 DIAGNOSIS — K44.9 DIAPHRAGMATIC HERNIA WITHOUT OBSTRUCTION OR GANGRENE: ICD-10-CM

## 2023-03-01 DIAGNOSIS — G47.33 OBSTRUCTIVE SLEEP APNEA (ADULT) (PEDIATRIC): ICD-10-CM

## 2023-03-01 DIAGNOSIS — Z79.890 HORMONE REPLACEMENT THERAPY: ICD-10-CM

## 2023-03-01 DIAGNOSIS — N41.0 ACUTE PROSTATITIS: ICD-10-CM

## 2023-03-01 DIAGNOSIS — Z79.891 LONG TERM (CURRENT) USE OF OPIATE ANALGESIC: ICD-10-CM

## 2023-03-01 DIAGNOSIS — K21.9 GASTRO-ESOPHAGEAL REFLUX DISEASE WITHOUT ESOPHAGITIS: ICD-10-CM

## 2023-03-01 DIAGNOSIS — I10 ESSENTIAL (PRIMARY) HYPERTENSION: ICD-10-CM

## 2023-03-01 DIAGNOSIS — G89.29 OTHER CHRONIC PAIN: ICD-10-CM

## 2023-03-01 DIAGNOSIS — Z16.12 EXTENDED SPECTRUM BETA LACTAMASE (ESBL) RESISTANCE: ICD-10-CM

## 2023-03-01 DIAGNOSIS — N28.1 CYST OF KIDNEY, ACQUIRED: ICD-10-CM

## 2023-03-01 DIAGNOSIS — Z90.49 ACQUIRED ABSENCE OF OTHER SPECIFIED PARTS OF DIGESTIVE TRACT: ICD-10-CM

## 2023-03-01 DIAGNOSIS — F41.9 ANXIETY DISORDER, UNSPECIFIED: ICD-10-CM

## 2023-03-01 DIAGNOSIS — N39.0 URINARY TRACT INFECTION, SITE NOT SPECIFIED: ICD-10-CM

## 2023-03-01 DIAGNOSIS — N41.1 CHRONIC PROSTATITIS: ICD-10-CM

## 2023-03-01 DIAGNOSIS — Z20.822 CONTACT WITH AND (SUSPECTED) EXPOSURE TO COVID-19: ICD-10-CM

## 2023-03-01 DIAGNOSIS — Z98.1 ARTHRODESIS STATUS: ICD-10-CM

## 2023-03-01 DIAGNOSIS — R39.15 URGENCY OF URINATION: ICD-10-CM

## 2023-03-01 DIAGNOSIS — Z79.84 LONG TERM (CURRENT) USE OF ORAL HYPOGLYCEMIC DRUGS: ICD-10-CM

## 2023-03-01 DIAGNOSIS — R09.82 POSTNASAL DRIP: ICD-10-CM

## 2023-03-01 DIAGNOSIS — R35.0 FREQUENCY OF MICTURITION: ICD-10-CM

## 2023-03-01 DIAGNOSIS — E11.9 TYPE 2 DIABETES MELLITUS WITHOUT COMPLICATIONS: ICD-10-CM

## 2023-03-01 DIAGNOSIS — E66.01 MORBID (SEVERE) OBESITY DUE TO EXCESS CALORIES: ICD-10-CM

## 2023-03-01 DIAGNOSIS — N40.1 BENIGN PROSTATIC HYPERPLASIA WITH LOWER URINARY TRACT SYMPTOMS: ICD-10-CM

## 2023-03-01 DIAGNOSIS — F32.A DEPRESSION, UNSPECIFIED: ICD-10-CM

## 2023-03-01 DIAGNOSIS — M51.36 OTHER INTERVERTEBRAL DISC DEGENERATION, LUMBAR REGION: ICD-10-CM

## 2023-03-01 DIAGNOSIS — E03.9 HYPOTHYROIDISM, UNSPECIFIED: ICD-10-CM

## 2023-03-01 DIAGNOSIS — B96.20 UNSPECIFIED ESCHERICHIA COLI [E. COLI] AS THE CAUSE OF DISEASES CLASSIFIED ELSEWHERE: ICD-10-CM

## 2023-03-01 DIAGNOSIS — K58.9 IRRITABLE BOWEL SYNDROME WITHOUT DIARRHEA: ICD-10-CM

## 2023-03-01 DIAGNOSIS — R26.0 ATAXIC GAIT: ICD-10-CM

## 2023-03-04 ENCOUNTER — EMERGENCY (EMERGENCY)
Facility: HOSPITAL | Age: 63
LOS: 0 days | Discharge: HOME | End: 2023-03-04
Attending: EMERGENCY MEDICINE
Payer: MEDICARE

## 2023-03-04 VITALS — HEIGHT: 68 IN | WEIGHT: 255.07 LBS

## 2023-03-04 VITALS
TEMPERATURE: 98 F | DIASTOLIC BLOOD PRESSURE: 76 MMHG | RESPIRATION RATE: 18 BRPM | SYSTOLIC BLOOD PRESSURE: 135 MMHG | OXYGEN SATURATION: 98 % | HEART RATE: 72 BPM

## 2023-03-04 DIAGNOSIS — Z98.890 OTHER SPECIFIED POSTPROCEDURAL STATES: Chronic | ICD-10-CM

## 2023-03-04 DIAGNOSIS — E11.42 TYPE 2 DIABETES MELLITUS WITH DIABETIC POLYNEUROPATHY: ICD-10-CM

## 2023-03-04 DIAGNOSIS — Z79.84 LONG TERM (CURRENT) USE OF ORAL HYPOGLYCEMIC DRUGS: ICD-10-CM

## 2023-03-04 DIAGNOSIS — E03.9 HYPOTHYROIDISM, UNSPECIFIED: ICD-10-CM

## 2023-03-04 DIAGNOSIS — Z98.1 ARTHRODESIS STATUS: Chronic | ICD-10-CM

## 2023-03-04 DIAGNOSIS — Z90.49 ACQUIRED ABSENCE OF OTHER SPECIFIED PARTS OF DIGESTIVE TRACT: Chronic | ICD-10-CM

## 2023-03-04 DIAGNOSIS — G47.33 OBSTRUCTIVE SLEEP APNEA (ADULT) (PEDIATRIC): ICD-10-CM

## 2023-03-04 DIAGNOSIS — Z45.2 ENCOUNTER FOR ADJUSTMENT AND MANAGEMENT OF VASCULAR ACCESS DEVICE: ICD-10-CM

## 2023-03-04 DIAGNOSIS — K21.9 GASTRO-ESOPHAGEAL REFLUX DISEASE WITHOUT ESOPHAGITIS: ICD-10-CM

## 2023-03-04 DIAGNOSIS — F41.8 OTHER SPECIFIED ANXIETY DISORDERS: ICD-10-CM

## 2023-03-04 DIAGNOSIS — Z90.49 ACQUIRED ABSENCE OF OTHER SPECIFIED PARTS OF DIGESTIVE TRACT: ICD-10-CM

## 2023-03-04 DIAGNOSIS — I10 ESSENTIAL (PRIMARY) HYPERTENSION: ICD-10-CM

## 2023-03-04 PROCEDURE — C1751: CPT

## 2023-03-04 PROCEDURE — 99285 EMERGENCY DEPT VISIT HI MDM: CPT

## 2023-03-04 PROCEDURE — 99284 EMERGENCY DEPT VISIT MOD MDM: CPT

## 2023-03-04 NOTE — ED STATDOCS - PATIENT PORTAL LINK FT
You can access the FollowMyHealth Patient Portal offered by [facility] by registering at the following website: [email or web address]. By joining [employer]’s FollowMyHealth portal, you will also be able to view your health information using other applications (apps) compatible with our system.

## 2023-03-04 NOTE — ED STATDOCS - OBJECTIVE STATEMENT
64 y/o male with PMHx of DM, GERD, anxiety/depression, hiatal hernia, neuropathy, IBS, spinal stenosis, HTN, hypothyroid, NANCY, SBO presents to the ED with left midline catheter that won't flush. Patient uses midline for long-term antibiotic use.

## 2023-03-04 NOTE — ED STATDOCS - CLINICAL SUMMARY MEDICAL DECISION MAKING FREE TEXT BOX
64 y/o male with left midline for IV antibiotics, chronic. Patient's midline won't flush. Will call IV team to troubleshoot. 62 y/o male with left midline for IV antibiotics, chronic. Patient's midline won't flush. Will call IV team to troubleshoot.          MID LINE replaced and patient to return home for his meds.  Mena Prather PA-C

## 2023-03-04 NOTE — ED ADULT NURSE NOTE - OBJECTIVE STATEMENT
patient axox3, c/o blood leaking around insertion site of left midline IV catheter. patient states he has midline for long term antibiotics for e. coli in urine. patient denies headache, vision changes, n/v/d, fever, chills, cough, chest pain, SOB.

## 2023-03-04 NOTE — ED STATDOCS - PHYSICAL EXAMINATION
Constitutional: NAD AAOx3. Left midline present, unable to flush.  Eyes: PERRLA EOMI  Head: Normocephalic atraumatic  Mouth: MMM  Cardiac: regular rate   Resp: Lungs CTAB  GI: Abd s/nt/nd, no rebound or guarding.  Neuro: awake, alert, moving all extremities, cranial nerves 2-12 intact, sensation intact, no dysmetria.  Skin: No rashes

## 2023-03-04 NOTE — ED STATDOCS - ATTENDING APP SHARED VISIT CONTRIBUTION OF CARE
I, Colleen Story MD, performed the initial face to face bedside interview with this patient regarding history of present illness, review of symptoms and relevant past medical, social and family history.  I completed an independent physical examination.  I was the initial provider who evaluated this patient. I have signed out the follow up of any pending tests (i.e. labs, radiological studies) to the ACP.  I have communicated the patient’s plan of care and disposition with the ACP.  The history, relevant review of systems, past medical and surgical history, medical decision making, and physical examination was documented by the scribe in my presence and I attest to the accuracy of the documentation.

## 2023-03-04 NOTE — ED STATDOCS - NSFOLLOWUPINSTRUCTIONS_ED_ALL_ED_FT
Tunneled Catheter Insertion, Care After      The following information offers guidance on how to care for yourself after your procedure. Your health care provider may also give you more specific instructions. If you have problems or questions, contact your health care provider.      What can I expect after the procedure?    After the procedure, it is common to have:  •Some mild redness, bruising, swelling, and pain around your catheter site.      •A small amount of blood or clear fluid coming from your incisions.        Follow these instructions at home:    Medicines     •Take over-the-counter and prescription medicines only as told by your health care provider.      •If you were prescribed an antibiotic medicine, take it as told by your health care provider. Do not stop taking the antibiotic even if you start to feel better.        Incision care   Two stitched incisions. One is normal. The other is red with pus and infected.  •Follow instructions from your health care provider about how to take care of your incisions. Make sure you:  •Wash your hands with soap and water for at least 20 seconds before and after you change your bandages (dressings). If soap and water are not available, use hand .      •Change your dressings as told by your health care provider. Wash the area around your incisions with a germ-killing (antiseptic) solution when you change your dressings.      •Leave stitches (sutures), skin glue, or adhesive strips in place. These skin closures may need to stay in place for 2 weeks or longer. If adhesive strip edges start to loosen and curl up, you may trim the loose edges. Do not remove adhesive strips completely unless your health care provider tells you to do that.        •Keep your dressings clean and dry.    •Check your incision areas every day for signs of infection. Check for:  •More redness, swelling, or pain.      •More fluid or blood.      •Warmth.      •Pus or a bad smell.          Catheter care   Washing hands with soap and water.   •Wash your hands with soap and water for at least 20 seconds before and after caring for your catheter. If soap and water are not available, use hand .      •Keep your catheter site clean and dry.      •Apply an antibiotic ointment to your catheter site as told by your health care provider.      •Flush your catheter as told by your health care provider. This helps prevent it from becoming clogged.      •Leave thecaps on the ends of the catheter when not in use.      • Do not pull on your catheter.      Activity     •Return to your normal activities as told by your health care provider. Ask your health care provider what activities are safe for you.      •Follow any other activity restrictions as instructed by your health care provider.      • Do not lift anything that is heavier than 10 lb (4.5 kg), or the limit that you are told, until your health care provider says that it is safe.      Driving     • Do not drive until your health care provider approves.      •Ask your health care provider if the medicine prescribed to you requires you to avoid driving or using machinery.      General instructions     •Follow your health care provider's specific instructions for the type of catheter that you have.      • Do not take baths, swim, or use a hot tub until your health care provider approves. Ask your health care provider if you may take showers.      •Keep all follow-up visits. This is important.        Contact a health care provider if:    •You feel unusually weak or nauseous.      •You have a fever or chills.      •You have more redness, swelling, or pain at your incisions or around the area where your catheter has been inserted or where it exits.      •You have pus or a bad smell coming from your catheter site.      •Your catheter site feels warm to the touch.      •Your catheter is not working properly.      •Fluid is leaking from the catheter, under the dressing, or around the dressing.      •You are unable to flush your catheter.        Get help right away if:    •Your catheter develops a hole or it breaks.      •Your catheter comes loose or gets pulled completely out. If this happens, press on your catheter site firmly with a clean cloth until you can get medical help.      •You develop bleeding from your catheter or your insertion site, and your bleeding does not stop.      •You have swelling in your shoulder, neck, chest, or face.      •You have pain or swelling when fluids or medicines are being given through the catheter.      •You have chest pain or difficulty breathing.      These symptoms may represent a serious problem that is an emergency. Do not wait to see if the symptoms will go away. Get medical help right away. Call your local emergency services (911 in the [place] Do not drive yourself to the hospital.       Summary    •After the procedure, it is common to have mild redness, swelling, and pain around your catheter site.      •Return to your normal activities as told by your health care provider. Ask your health care provider what activities are safe for you.      •Follow your health care provider's specific instructions for the type of catheter that you have.      •Keep your catheter site and your dressings clean and dry.      •Contact a health care provider if your catheter is not working properly. Get help right away if you have chest pain, difficulty breathing, or your catheter comes loose or gets pulled completely out.      This information is not intended to replace advice given to you by your health care provider. Make sure you discuss any questions you have with your health care provider.

## 2023-03-04 NOTE — ED STATDOCS - PROGRESS NOTE DETAILS
MID LINE replaced and patient to return home for his meds.  Mena Prather PA-C Pt. is a 63 year old male presenting with need to flush MID LINE.  Placed for outpatient antibiotics.  Mena Prather PA-C

## 2023-03-14 ENCOUNTER — INPATIENT (INPATIENT)
Facility: HOSPITAL | Age: 63
LOS: 6 days | Discharge: HOME | DRG: 690 | End: 2023-03-21
Attending: INTERNAL MEDICINE | Admitting: INTERNAL MEDICINE
Payer: MEDICARE

## 2023-03-14 VITALS — HEIGHT: 68 IN

## 2023-03-14 DIAGNOSIS — Z90.49 ACQUIRED ABSENCE OF OTHER SPECIFIED PARTS OF DIGESTIVE TRACT: Chronic | ICD-10-CM

## 2023-03-14 DIAGNOSIS — Z98.1 ARTHRODESIS STATUS: Chronic | ICD-10-CM

## 2023-03-14 DIAGNOSIS — N39.0 URINARY TRACT INFECTION, SITE NOT SPECIFIED: ICD-10-CM

## 2023-03-14 DIAGNOSIS — Z98.890 OTHER SPECIFIED POSTPROCEDURAL STATES: Chronic | ICD-10-CM

## 2023-03-14 LAB
ALBUMIN SERPL ELPH-MCNC: 3.7 G/DL — SIGNIFICANT CHANGE UP (ref 3.3–5)
ALP SERPL-CCNC: 121 U/L — HIGH (ref 40–120)
ALT FLD-CCNC: 31 U/L — SIGNIFICANT CHANGE UP (ref 12–78)
ANION GAP SERPL CALC-SCNC: 3 MMOL/L — LOW (ref 5–17)
APPEARANCE UR: CLEAR — SIGNIFICANT CHANGE UP
APTT BLD: 36.7 SEC — HIGH (ref 27.5–35.5)
AST SERPL-CCNC: 29 U/L — SIGNIFICANT CHANGE UP (ref 15–37)
BASOPHILS # BLD AUTO: 0.07 K/UL — SIGNIFICANT CHANGE UP (ref 0–0.2)
BASOPHILS NFR BLD AUTO: 0.9 % — SIGNIFICANT CHANGE UP (ref 0–2)
BILIRUB SERPL-MCNC: 0.7 MG/DL — SIGNIFICANT CHANGE UP (ref 0.2–1.2)
BILIRUB UR-MCNC: NEGATIVE — SIGNIFICANT CHANGE UP
BUN SERPL-MCNC: 14 MG/DL — SIGNIFICANT CHANGE UP (ref 7–23)
CALCIUM SERPL-MCNC: 9.6 MG/DL — SIGNIFICANT CHANGE UP (ref 8.5–10.1)
CHLORIDE SERPL-SCNC: 104 MMOL/L — SIGNIFICANT CHANGE UP (ref 96–108)
CO2 SERPL-SCNC: 29 MMOL/L — SIGNIFICANT CHANGE UP (ref 22–31)
COLOR SPEC: YELLOW — SIGNIFICANT CHANGE UP
CREAT SERPL-MCNC: 0.72 MG/DL — SIGNIFICANT CHANGE UP (ref 0.5–1.3)
DIFF PNL FLD: NEGATIVE — SIGNIFICANT CHANGE UP
EGFR: 103 ML/MIN/1.73M2 — SIGNIFICANT CHANGE UP
EOSINOPHIL # BLD AUTO: 0.33 K/UL — SIGNIFICANT CHANGE UP (ref 0–0.5)
EOSINOPHIL NFR BLD AUTO: 4.4 % — SIGNIFICANT CHANGE UP (ref 0–6)
FLUAV AG NPH QL: SIGNIFICANT CHANGE UP
FLUBV AG NPH QL: SIGNIFICANT CHANGE UP
GLUCOSE SERPL-MCNC: 101 MG/DL — HIGH (ref 70–99)
GLUCOSE UR QL: NEGATIVE — SIGNIFICANT CHANGE UP
HCT VFR BLD CALC: 41.4 % — SIGNIFICANT CHANGE UP (ref 39–50)
HGB BLD-MCNC: 14.2 G/DL — SIGNIFICANT CHANGE UP (ref 13–17)
IMM GRANULOCYTES NFR BLD AUTO: 0.3 % — SIGNIFICANT CHANGE UP (ref 0–0.9)
INR BLD: 1.26 RATIO — HIGH (ref 0.88–1.16)
KETONES UR-MCNC: NEGATIVE — SIGNIFICANT CHANGE UP
LACTATE SERPL-SCNC: 1.3 MMOL/L — SIGNIFICANT CHANGE UP (ref 0.7–2)
LEUKOCYTE ESTERASE UR-ACNC: NEGATIVE — SIGNIFICANT CHANGE UP
LYMPHOCYTES # BLD AUTO: 2.27 K/UL — SIGNIFICANT CHANGE UP (ref 1–3.3)
LYMPHOCYTES # BLD AUTO: 30.3 % — SIGNIFICANT CHANGE UP (ref 13–44)
MCHC RBC-ENTMCNC: 31.3 PG — SIGNIFICANT CHANGE UP (ref 27–34)
MCHC RBC-ENTMCNC: 34.3 GM/DL — SIGNIFICANT CHANGE UP (ref 32–36)
MCV RBC AUTO: 91.4 FL — SIGNIFICANT CHANGE UP (ref 80–100)
MONOCYTES # BLD AUTO: 0.89 K/UL — SIGNIFICANT CHANGE UP (ref 0–0.9)
MONOCYTES NFR BLD AUTO: 11.9 % — SIGNIFICANT CHANGE UP (ref 2–14)
NEUTROPHILS # BLD AUTO: 3.91 K/UL — SIGNIFICANT CHANGE UP (ref 1.8–7.4)
NEUTROPHILS NFR BLD AUTO: 52.2 % — SIGNIFICANT CHANGE UP (ref 43–77)
NITRITE UR-MCNC: NEGATIVE — SIGNIFICANT CHANGE UP
PH UR: 6.5 — SIGNIFICANT CHANGE UP (ref 5–8)
PLATELET # BLD AUTO: 152 K/UL — SIGNIFICANT CHANGE UP (ref 150–400)
POTASSIUM SERPL-MCNC: 4.3 MMOL/L — SIGNIFICANT CHANGE UP (ref 3.5–5.3)
POTASSIUM SERPL-SCNC: 4.3 MMOL/L — SIGNIFICANT CHANGE UP (ref 3.5–5.3)
PROT SERPL-MCNC: 8 GM/DL — SIGNIFICANT CHANGE UP (ref 6–8.3)
PROT UR-MCNC: NEGATIVE — SIGNIFICANT CHANGE UP
PROTHROM AB SERPL-ACNC: 14.6 SEC — HIGH (ref 10.5–13.4)
RBC # BLD: 4.53 M/UL — SIGNIFICANT CHANGE UP (ref 4.2–5.8)
RBC # FLD: 12.4 % — SIGNIFICANT CHANGE UP (ref 10.3–14.5)
RSV RNA NPH QL NAA+NON-PROBE: SIGNIFICANT CHANGE UP
SARS-COV-2 RNA SPEC QL NAA+PROBE: SIGNIFICANT CHANGE UP
SODIUM SERPL-SCNC: 136 MMOL/L — SIGNIFICANT CHANGE UP (ref 135–145)
SP GR SPEC: 1 — LOW (ref 1.01–1.02)
UROBILINOGEN FLD QL: NEGATIVE — SIGNIFICANT CHANGE UP
WBC # BLD: 7.49 K/UL — SIGNIFICANT CHANGE UP (ref 3.8–10.5)
WBC # FLD AUTO: 7.49 K/UL — SIGNIFICANT CHANGE UP (ref 3.8–10.5)

## 2023-03-14 PROCEDURE — 93010 ELECTROCARDIOGRAM REPORT: CPT

## 2023-03-14 PROCEDURE — 71046 X-RAY EXAM CHEST 2 VIEWS: CPT | Mod: 26

## 2023-03-14 PROCEDURE — 80053 COMPREHEN METABOLIC PANEL: CPT

## 2023-03-14 PROCEDURE — 36415 COLL VENOUS BLD VENIPUNCTURE: CPT

## 2023-03-14 PROCEDURE — 82962 GLUCOSE BLOOD TEST: CPT

## 2023-03-14 PROCEDURE — 85730 THROMBOPLASTIN TIME PARTIAL: CPT

## 2023-03-14 PROCEDURE — 85025 COMPLETE CBC W/AUTO DIFF WBC: CPT

## 2023-03-14 PROCEDURE — 85610 PROTHROMBIN TIME: CPT

## 2023-03-14 PROCEDURE — 83036 HEMOGLOBIN GLYCOSYLATED A1C: CPT

## 2023-03-14 PROCEDURE — 99285 EMERGENCY DEPT VISIT HI MDM: CPT

## 2023-03-14 RX ORDER — MORPHINE SULFATE 50 MG/1
2 CAPSULE, EXTENDED RELEASE ORAL EVERY 6 HOURS
Refills: 0 | Status: DISCONTINUED | OUTPATIENT
Start: 2023-03-14 | End: 2023-03-21

## 2023-03-14 RX ORDER — ATORVASTATIN CALCIUM 80 MG/1
1 TABLET, FILM COATED ORAL
Qty: 0 | Refills: 0 | DISCHARGE

## 2023-03-14 RX ORDER — MEROPENEM 1 G/30ML
1000 INJECTION INTRAVENOUS ONCE
Refills: 0 | Status: COMPLETED | OUTPATIENT
Start: 2023-03-14 | End: 2023-03-14

## 2023-03-14 RX ORDER — VANCOMYCIN HCL 1 G
1750 VIAL (EA) INTRAVENOUS ONCE
Refills: 0 | Status: COMPLETED | OUTPATIENT
Start: 2023-03-14 | End: 2023-03-14

## 2023-03-14 RX ORDER — MEROPENEM 1 G/30ML
INJECTION INTRAVENOUS
Refills: 0 | Status: COMPLETED | OUTPATIENT
Start: 2023-03-14 | End: 2023-03-14

## 2023-03-14 RX ORDER — TAMSULOSIN HYDROCHLORIDE 0.4 MG/1
1 CAPSULE ORAL
Qty: 0 | Refills: 0 | DISCHARGE

## 2023-03-14 RX ORDER — MEROPENEM 1 G/30ML
1000 INJECTION INTRAVENOUS EVERY 8 HOURS
Refills: 0 | Status: COMPLETED | OUTPATIENT
Start: 2023-03-14 | End: 2023-03-14

## 2023-03-14 RX ADMIN — MORPHINE SULFATE 2 MILLIGRAM(S): 50 CAPSULE, EXTENDED RELEASE ORAL at 21:07

## 2023-03-14 RX ADMIN — MEROPENEM 200 MILLIGRAM(S): 1 INJECTION INTRAVENOUS at 17:39

## 2023-03-14 NOTE — ED STATDOCS - OBJECTIVE STATEMENT
64 y/o male presents to the ED currently being treated for ESBL in the urine on imipenem daily at home with midline infection, intermittent last 3 months. Pt is  having chills again so returned to ED as per MD. Pt still has urinary frequency.

## 2023-03-14 NOTE — ED STATDOCS - NSICDXFAMILYHX_GEN_ALL_CORE_FT
FAMILY HISTORY:  Father  Still living? No  FH: heart disease, Age at diagnosis: Age Unknown    Mother  Still living? No  Family history of TIAs, Age at diagnosis: Age Unknown  FH: stomach cancer, Age at diagnosis: Age Unknown  FH: type 2 diabetes, Age at diagnosis: Age Unknown     No

## 2023-03-14 NOTE — ED ADULT NURSE NOTE - OBJECTIVE STATEMENT
Pt. is a 63YOM sent by MD for worsening chills/fever. Pt currently being treated for ESBL with IV antibx. Pt. has ALFRED midline PTA

## 2023-03-14 NOTE — ED ADULT TRIAGE NOTE - CHIEF COMPLAINT QUOTE
Pt arrives to ED sent by MD for worsening chills/fever. Pt currently being treated for ESBL with IV antibx.

## 2023-03-14 NOTE — ED STATDOCS - PHYSICAL EXAMINATION

## 2023-03-14 NOTE — PHARMACOTHERAPY INTERVENTION NOTE - COMMENTS
Medication reconciliation completed.  Reviewed Medication list and confirmed med allergies with patient; confirmed with Dr. First Medvon.

## 2023-03-15 LAB
A1C WITH ESTIMATED AVERAGE GLUCOSE RESULT: 5.6 % — SIGNIFICANT CHANGE UP (ref 4–5.6)
ALBUMIN SERPL ELPH-MCNC: 3.7 G/DL — SIGNIFICANT CHANGE UP (ref 3.3–5)
ALP SERPL-CCNC: 102 U/L — SIGNIFICANT CHANGE UP (ref 40–120)
ALT FLD-CCNC: 29 U/L — SIGNIFICANT CHANGE UP (ref 12–78)
ANION GAP SERPL CALC-SCNC: 4 MMOL/L — LOW (ref 5–17)
APTT BLD: 34.5 SEC — SIGNIFICANT CHANGE UP (ref 27.5–35.5)
AST SERPL-CCNC: 33 U/L — SIGNIFICANT CHANGE UP (ref 15–37)
BASOPHILS # BLD AUTO: 0.06 K/UL — SIGNIFICANT CHANGE UP (ref 0–0.2)
BASOPHILS NFR BLD AUTO: 0.8 % — SIGNIFICANT CHANGE UP (ref 0–2)
BILIRUB SERPL-MCNC: 1 MG/DL — SIGNIFICANT CHANGE UP (ref 0.2–1.2)
BUN SERPL-MCNC: 11 MG/DL — SIGNIFICANT CHANGE UP (ref 7–23)
CALCIUM SERPL-MCNC: 9.4 MG/DL — SIGNIFICANT CHANGE UP (ref 8.5–10.1)
CHLORIDE SERPL-SCNC: 104 MMOL/L — SIGNIFICANT CHANGE UP (ref 96–108)
CO2 SERPL-SCNC: 27 MMOL/L — SIGNIFICANT CHANGE UP (ref 22–31)
CREAT SERPL-MCNC: 0.69 MG/DL — SIGNIFICANT CHANGE UP (ref 0.5–1.3)
CULTURE RESULTS: NO GROWTH — SIGNIFICANT CHANGE UP
EGFR: 105 ML/MIN/1.73M2 — SIGNIFICANT CHANGE UP
EOSINOPHIL # BLD AUTO: 0.39 K/UL — SIGNIFICANT CHANGE UP (ref 0–0.5)
EOSINOPHIL NFR BLD AUTO: 4.9 % — SIGNIFICANT CHANGE UP (ref 0–6)
ESTIMATED AVERAGE GLUCOSE: 114 MG/DL — SIGNIFICANT CHANGE UP (ref 68–114)
GLUCOSE BLDC GLUCOMTR-MCNC: 116 MG/DL — HIGH (ref 70–99)
GLUCOSE BLDC GLUCOMTR-MCNC: 81 MG/DL — SIGNIFICANT CHANGE UP (ref 70–99)
GLUCOSE BLDC GLUCOMTR-MCNC: 85 MG/DL — SIGNIFICANT CHANGE UP (ref 70–99)
GLUCOSE SERPL-MCNC: 114 MG/DL — HIGH (ref 70–99)
HCT VFR BLD CALC: 43.3 % — SIGNIFICANT CHANGE UP (ref 39–50)
HGB BLD-MCNC: 15 G/DL — SIGNIFICANT CHANGE UP (ref 13–17)
IMM GRANULOCYTES NFR BLD AUTO: 0.3 % — SIGNIFICANT CHANGE UP (ref 0–0.9)
INR BLD: 1.26 RATIO — HIGH (ref 0.88–1.16)
LYMPHOCYTES # BLD AUTO: 2.56 K/UL — SIGNIFICANT CHANGE UP (ref 1–3.3)
LYMPHOCYTES # BLD AUTO: 32 % — SIGNIFICANT CHANGE UP (ref 13–44)
MCHC RBC-ENTMCNC: 31.4 PG — SIGNIFICANT CHANGE UP (ref 27–34)
MCHC RBC-ENTMCNC: 34.6 GM/DL — SIGNIFICANT CHANGE UP (ref 32–36)
MCV RBC AUTO: 90.6 FL — SIGNIFICANT CHANGE UP (ref 80–100)
MONOCYTES # BLD AUTO: 0.86 K/UL — SIGNIFICANT CHANGE UP (ref 0–0.9)
MONOCYTES NFR BLD AUTO: 10.8 % — SIGNIFICANT CHANGE UP (ref 2–14)
NEUTROPHILS # BLD AUTO: 4.11 K/UL — SIGNIFICANT CHANGE UP (ref 1.8–7.4)
NEUTROPHILS NFR BLD AUTO: 51.2 % — SIGNIFICANT CHANGE UP (ref 43–77)
PLATELET # BLD AUTO: 145 K/UL — LOW (ref 150–400)
POTASSIUM SERPL-MCNC: 3.7 MMOL/L — SIGNIFICANT CHANGE UP (ref 3.5–5.3)
POTASSIUM SERPL-SCNC: 3.7 MMOL/L — SIGNIFICANT CHANGE UP (ref 3.5–5.3)
PROT SERPL-MCNC: 8 GM/DL — SIGNIFICANT CHANGE UP (ref 6–8.3)
PROTHROM AB SERPL-ACNC: 14.6 SEC — HIGH (ref 10.5–13.4)
RBC # BLD: 4.78 M/UL — SIGNIFICANT CHANGE UP (ref 4.2–5.8)
RBC # FLD: 12.3 % — SIGNIFICANT CHANGE UP (ref 10.3–14.5)
SODIUM SERPL-SCNC: 135 MMOL/L — SIGNIFICANT CHANGE UP (ref 135–145)
SPECIMEN SOURCE: SIGNIFICANT CHANGE UP
WBC # BLD: 8 K/UL — SIGNIFICANT CHANGE UP (ref 3.8–10.5)
WBC # FLD AUTO: 8 K/UL — SIGNIFICANT CHANGE UP (ref 3.8–10.5)

## 2023-03-15 PROCEDURE — 99222 1ST HOSP IP/OBS MODERATE 55: CPT

## 2023-03-15 PROCEDURE — 12345: CPT | Mod: NC

## 2023-03-15 RX ORDER — VANCOMYCIN HCL 1 G
1250 VIAL (EA) INTRAVENOUS EVERY 12 HOURS
Refills: 0 | Status: DISCONTINUED | OUTPATIENT
Start: 2023-03-15 | End: 2023-03-15

## 2023-03-15 RX ORDER — HYDROMORPHONE HYDROCHLORIDE 2 MG/ML
4 INJECTION INTRAMUSCULAR; INTRAVENOUS; SUBCUTANEOUS EVERY 4 HOURS
Refills: 0 | Status: DISCONTINUED | OUTPATIENT
Start: 2023-03-15 | End: 2023-03-21

## 2023-03-15 RX ORDER — DULOXETINE HYDROCHLORIDE 30 MG/1
60 CAPSULE, DELAYED RELEASE ORAL
Refills: 0 | Status: DISCONTINUED | OUTPATIENT
Start: 2023-03-15 | End: 2023-03-21

## 2023-03-15 RX ORDER — LEVOTHYROXINE SODIUM 125 MCG
75 TABLET ORAL DAILY
Refills: 0 | Status: DISCONTINUED | OUTPATIENT
Start: 2023-03-15 | End: 2023-03-21

## 2023-03-15 RX ORDER — PANTOPRAZOLE SODIUM 20 MG/1
40 TABLET, DELAYED RELEASE ORAL EVERY 12 HOURS
Refills: 0 | Status: DISCONTINUED | OUTPATIENT
Start: 2023-03-15 | End: 2023-03-21

## 2023-03-15 RX ORDER — DEXTROSE 50 % IN WATER 50 %
12.5 SYRINGE (ML) INTRAVENOUS ONCE
Refills: 0 | Status: DISCONTINUED | OUTPATIENT
Start: 2023-03-15 | End: 2023-03-16

## 2023-03-15 RX ORDER — PANTOPRAZOLE SODIUM 20 MG/1
1 TABLET, DELAYED RELEASE ORAL
Qty: 0 | Refills: 0 | DISCHARGE

## 2023-03-15 RX ORDER — ATENOLOL 25 MG/1
1 TABLET ORAL
Qty: 0 | Refills: 0 | DISCHARGE

## 2023-03-15 RX ORDER — GABAPENTIN 400 MG/1
300 CAPSULE ORAL
Refills: 0 | Status: DISCONTINUED | OUTPATIENT
Start: 2023-03-15 | End: 2023-03-21

## 2023-03-15 RX ORDER — TIZANIDINE 4 MG/1
1 TABLET ORAL
Qty: 0 | Refills: 0 | DISCHARGE

## 2023-03-15 RX ORDER — SODIUM CHLORIDE 9 MG/ML
1000 INJECTION, SOLUTION INTRAVENOUS
Refills: 0 | Status: DISCONTINUED | OUTPATIENT
Start: 2023-03-15 | End: 2023-03-16

## 2023-03-15 RX ORDER — GABAPENTIN 400 MG/1
1 CAPSULE ORAL
Qty: 0 | Refills: 0 | DISCHARGE

## 2023-03-15 RX ORDER — ZOLPIDEM TARTRATE 10 MG/1
1 TABLET ORAL
Qty: 0 | Refills: 0 | DISCHARGE

## 2023-03-15 RX ORDER — INSULIN LISPRO 100/ML
VIAL (ML) SUBCUTANEOUS AT BEDTIME
Refills: 0 | Status: DISCONTINUED | OUTPATIENT
Start: 2023-03-15 | End: 2023-03-16

## 2023-03-15 RX ORDER — SODIUM CHLORIDE 0.65 %
1 AEROSOL, SPRAY (ML) NASAL EVERY 8 HOURS
Refills: 0 | Status: DISCONTINUED | OUTPATIENT
Start: 2023-03-15 | End: 2023-03-21

## 2023-03-15 RX ORDER — HEPARIN SODIUM 5000 [USP'U]/ML
5000 INJECTION INTRAVENOUS; SUBCUTANEOUS EVERY 12 HOURS
Refills: 0 | Status: DISCONTINUED | OUTPATIENT
Start: 2023-03-15 | End: 2023-03-21

## 2023-03-15 RX ORDER — LEVOTHYROXINE SODIUM 125 MCG
1 TABLET ORAL
Qty: 0 | Refills: 0 | DISCHARGE

## 2023-03-15 RX ORDER — AMLODIPINE BESYLATE 2.5 MG/1
10 TABLET ORAL DAILY
Refills: 0 | Status: DISCONTINUED | OUTPATIENT
Start: 2023-03-15 | End: 2023-03-21

## 2023-03-15 RX ORDER — METFORMIN HYDROCHLORIDE 850 MG/1
1 TABLET ORAL
Qty: 0 | Refills: 0 | DISCHARGE

## 2023-03-15 RX ORDER — LOSARTAN POTASSIUM 100 MG/1
1 TABLET, FILM COATED ORAL
Qty: 0 | Refills: 0 | DISCHARGE

## 2023-03-15 RX ORDER — GLUCAGON INJECTION, SOLUTION 0.5 MG/.1ML
1 INJECTION, SOLUTION SUBCUTANEOUS ONCE
Refills: 0 | Status: DISCONTINUED | OUTPATIENT
Start: 2023-03-15 | End: 2023-03-16

## 2023-03-15 RX ORDER — ZOLPIDEM TARTRATE 10 MG/1
5 TABLET ORAL AT BEDTIME
Refills: 0 | Status: DISCONTINUED | OUTPATIENT
Start: 2023-03-15 | End: 2023-03-21

## 2023-03-15 RX ORDER — ASCORBIC ACID 60 MG
1 TABLET,CHEWABLE ORAL
Qty: 0 | Refills: 0 | DISCHARGE

## 2023-03-15 RX ORDER — ATORVASTATIN CALCIUM 80 MG/1
1 TABLET, FILM COATED ORAL
Qty: 0 | Refills: 0 | DISCHARGE

## 2023-03-15 RX ORDER — ASCORBIC ACID 60 MG
500 TABLET,CHEWABLE ORAL DAILY
Refills: 0 | Status: DISCONTINUED | OUTPATIENT
Start: 2023-03-15 | End: 2023-03-21

## 2023-03-15 RX ORDER — CHOLECALCIFEROL (VITAMIN D3) 125 MCG
1 CAPSULE ORAL
Qty: 0 | Refills: 0 | DISCHARGE

## 2023-03-15 RX ORDER — INSULIN LISPRO 100/ML
VIAL (ML) SUBCUTANEOUS
Refills: 0 | Status: DISCONTINUED | OUTPATIENT
Start: 2023-03-15 | End: 2023-03-16

## 2023-03-15 RX ORDER — CHOLECALCIFEROL (VITAMIN D3) 125 MCG
1000 CAPSULE ORAL DAILY
Refills: 0 | Status: DISCONTINUED | OUTPATIENT
Start: 2023-03-15 | End: 2023-03-21

## 2023-03-15 RX ORDER — AMLODIPINE BESYLATE 2.5 MG/1
1 TABLET ORAL
Qty: 0 | Refills: 0 | DISCHARGE

## 2023-03-15 RX ORDER — ATENOLOL 25 MG/1
100 TABLET ORAL DAILY
Refills: 0 | Status: DISCONTINUED | OUTPATIENT
Start: 2023-03-15 | End: 2023-03-21

## 2023-03-15 RX ORDER — ATORVASTATIN CALCIUM 80 MG/1
10 TABLET, FILM COATED ORAL AT BEDTIME
Refills: 0 | Status: DISCONTINUED | OUTPATIENT
Start: 2023-03-15 | End: 2023-03-21

## 2023-03-15 RX ORDER — DULOXETINE HYDROCHLORIDE 30 MG/1
1 CAPSULE, DELAYED RELEASE ORAL
Qty: 0 | Refills: 0 | DISCHARGE

## 2023-03-15 RX ORDER — ERTAPENEM SODIUM 1 G/1
1000 INJECTION, POWDER, LYOPHILIZED, FOR SOLUTION INTRAMUSCULAR; INTRAVENOUS EVERY 24 HOURS
Refills: 0 | Status: COMPLETED | OUTPATIENT
Start: 2023-03-15 | End: 2023-03-17

## 2023-03-15 RX ORDER — TAMSULOSIN HYDROCHLORIDE 0.4 MG/1
0.4 CAPSULE ORAL AT BEDTIME
Refills: 0 | Status: DISCONTINUED | OUTPATIENT
Start: 2023-03-15 | End: 2023-03-21

## 2023-03-15 RX ORDER — LOSARTAN POTASSIUM 100 MG/1
25 TABLET, FILM COATED ORAL DAILY
Refills: 0 | Status: DISCONTINUED | OUTPATIENT
Start: 2023-03-15 | End: 2023-03-21

## 2023-03-15 RX ORDER — TAMSULOSIN HYDROCHLORIDE 0.4 MG/1
1 CAPSULE ORAL
Qty: 0 | Refills: 0 | DISCHARGE

## 2023-03-15 RX ORDER — DEXTROSE 50 % IN WATER 50 %
25 SYRINGE (ML) INTRAVENOUS ONCE
Refills: 0 | Status: DISCONTINUED | OUTPATIENT
Start: 2023-03-15 | End: 2023-03-16

## 2023-03-15 RX ORDER — DEXTROSE 50 % IN WATER 50 %
15 SYRINGE (ML) INTRAVENOUS ONCE
Refills: 0 | Status: DISCONTINUED | OUTPATIENT
Start: 2023-03-15 | End: 2023-03-16

## 2023-03-15 RX ADMIN — GABAPENTIN 300 MILLIGRAM(S): 400 CAPSULE ORAL at 14:59

## 2023-03-15 RX ADMIN — HYDROMORPHONE HYDROCHLORIDE 4 MILLIGRAM(S): 2 INJECTION INTRAMUSCULAR; INTRAVENOUS; SUBCUTANEOUS at 18:24

## 2023-03-15 RX ADMIN — ZOLPIDEM TARTRATE 5 MILLIGRAM(S): 10 TABLET ORAL at 22:36

## 2023-03-15 RX ADMIN — MORPHINE SULFATE 2 MILLIGRAM(S): 50 CAPSULE, EXTENDED RELEASE ORAL at 04:00

## 2023-03-15 RX ADMIN — ATENOLOL 100 MILLIGRAM(S): 25 TABLET ORAL at 17:27

## 2023-03-15 RX ADMIN — PANTOPRAZOLE SODIUM 40 MILLIGRAM(S): 20 TABLET, DELAYED RELEASE ORAL at 09:20

## 2023-03-15 RX ADMIN — HYDROMORPHONE HYDROCHLORIDE 4 MILLIGRAM(S): 2 INJECTION INTRAMUSCULAR; INTRAVENOUS; SUBCUTANEOUS at 07:47

## 2023-03-15 RX ADMIN — AMLODIPINE BESYLATE 10 MILLIGRAM(S): 2.5 TABLET ORAL at 09:21

## 2023-03-15 RX ADMIN — Medication 1 TABLET(S): at 09:20

## 2023-03-15 RX ADMIN — ERTAPENEM SODIUM 120 MILLIGRAM(S): 1 INJECTION, POWDER, LYOPHILIZED, FOR SOLUTION INTRAMUSCULAR; INTRAVENOUS at 05:08

## 2023-03-15 RX ADMIN — Medication 166.67 MILLIGRAM(S): at 10:49

## 2023-03-15 RX ADMIN — MEROPENEM 200 MILLIGRAM(S): 1 INJECTION INTRAVENOUS at 00:14

## 2023-03-15 RX ADMIN — ATORVASTATIN CALCIUM 10 MILLIGRAM(S): 80 TABLET, FILM COATED ORAL at 22:36

## 2023-03-15 RX ADMIN — Medication 75 MICROGRAM(S): at 05:08

## 2023-03-15 RX ADMIN — Medication 1000 UNIT(S): at 09:21

## 2023-03-15 RX ADMIN — HYDROMORPHONE HYDROCHLORIDE 4 MILLIGRAM(S): 2 INJECTION INTRAMUSCULAR; INTRAVENOUS; SUBCUTANEOUS at 18:18

## 2023-03-15 RX ADMIN — HYDROMORPHONE HYDROCHLORIDE 4 MILLIGRAM(S): 2 INJECTION INTRAMUSCULAR; INTRAVENOUS; SUBCUTANEOUS at 14:10

## 2023-03-15 RX ADMIN — HYDROMORPHONE HYDROCHLORIDE 4 MILLIGRAM(S): 2 INJECTION INTRAMUSCULAR; INTRAVENOUS; SUBCUTANEOUS at 13:34

## 2023-03-15 RX ADMIN — LOSARTAN POTASSIUM 25 MILLIGRAM(S): 100 TABLET, FILM COATED ORAL at 09:20

## 2023-03-15 RX ADMIN — HEPARIN SODIUM 5000 UNIT(S): 5000 INJECTION INTRAVENOUS; SUBCUTANEOUS at 22:37

## 2023-03-15 RX ADMIN — Medication 250 MILLIGRAM(S): at 02:11

## 2023-03-15 RX ADMIN — GABAPENTIN 300 MILLIGRAM(S): 400 CAPSULE ORAL at 19:56

## 2023-03-15 RX ADMIN — GABAPENTIN 300 MILLIGRAM(S): 400 CAPSULE ORAL at 05:07

## 2023-03-15 RX ADMIN — DULOXETINE HYDROCHLORIDE 60 MILLIGRAM(S): 30 CAPSULE, DELAYED RELEASE ORAL at 09:20

## 2023-03-15 RX ADMIN — TAMSULOSIN HYDROCHLORIDE 0.4 MILLIGRAM(S): 0.4 CAPSULE ORAL at 22:37

## 2023-03-15 RX ADMIN — PANTOPRAZOLE SODIUM 40 MILLIGRAM(S): 20 TABLET, DELAYED RELEASE ORAL at 22:37

## 2023-03-15 RX ADMIN — DULOXETINE HYDROCHLORIDE 60 MILLIGRAM(S): 30 CAPSULE, DELAYED RELEASE ORAL at 22:37

## 2023-03-15 RX ADMIN — MORPHINE SULFATE 2 MILLIGRAM(S): 50 CAPSULE, EXTENDED RELEASE ORAL at 03:01

## 2023-03-15 RX ADMIN — HYDROMORPHONE HYDROCHLORIDE 4 MILLIGRAM(S): 2 INJECTION INTRAMUSCULAR; INTRAVENOUS; SUBCUTANEOUS at 17:40

## 2023-03-15 RX ADMIN — Medication 500 MILLIGRAM(S): at 12:59

## 2023-03-15 RX ADMIN — HEPARIN SODIUM 5000 UNIT(S): 5000 INJECTION INTRAVENOUS; SUBCUTANEOUS at 09:21

## 2023-03-15 NOTE — PATIENT PROFILE ADULT - HOME ACCESSIBILITY CONCERNS
Operative Report





- General


Procedure Date: 01/24/19


Planned Procedure: Right ulnar nerve subcutaneous transposition


Pre-Op Diagnosis: right cubital tunnel syndrome with unstable ulnar nerve


Procedure Performed: 





Right ulnar nerve subcutaneous transposition


Post Op Diagnosis: same





- Procedure Note


Primary Surgeon: javed rae


Secondary Surgeon: minerva Sahu


Estimated Blood Loss (mL): 25


Complications: 





None





- Other


Other Information/Narrative: 





Indication For Surgery: This is a 30-year-old male who has had many years of 

ulnar nerve symptoms and pain when it subluxes over the medial epicondyle.  The 

symptoms are bilaterally.  A nerve conduction study showed evidence of cubital 

tunnel syndrome.  He has failed nonoperative treatment.  The risks, benefits, 

and alternatives were discussed.  Risks include pain, bleeding, infection, 

damage to nearby structures to include nerves and blood vessels, numbness, lack 

of symptom relief, need for further surgery, DVT, PE, stroke, and death.  

Written consent was obtained.





Procedure in Detail: The patient was met in the pre-operative hold area on the 

day of the procedure.  The operative extremity was signed and questions were 

answered.  The patient was brought to the operating room and a general 

anesthetic was administered.  Supine position was used and bony prominences were

padded.  Standard prepping and draping was performed.  A sterile tourniquet was 

applied.  A time out confirmed patient identification, laterality, procedure, 

allergies, antibiotics, and images.  An Esmarch was used to exsanguinate the 

limb and the tourniquet was elevated to 250 mmHg.  Total tourniquet time was 63 

minutes.





A 10 cm incision was made along the course of the ulnar nerve just posterior to 

the medial epicondyle.  Scissor dissection was carried out and branches of the 

medial antebrachial cutaneous nerve were identified and protected.  Scissor 

dissection was carried down to the cubital tunnel and it was opened just 

posterior to the medial epicondyle.  The ulnar nerve was identified and the 

cubital tunnel was dissected distally very carefully.  I encountered the initial

sensory branch to the capsule and the motor branches to the FCU and protected 

them.  I sharply transected the fascia over the FCU to gain further access to 

the ulnar nerve as it passed deep into the muscle.  blunt dissection was used to

dissect the muscle and exposed the nerve further.  I mobilized the branches as 

possible and perform slight intraneural immobilization as well.  





I then moved proximal and followed the nerve up the medial arm taking care to 

protect any cutaneous branches.  I freed the nerve proximally 8 cm from the med

ial epicondyle.  I took care to leave as many veins as possible attached to the 

nerve.  I then moved anteriorly and cleared tissue off of the intermuscular 

septum and I resected a portion of the intermuscular septum down to the medial 

epicondyle.  Vessel loops were placed around the nerve to ensure a 

circumferential dissection and that it was completely freed.





I then took a flap of the fascia overlying the flexor pronator mass 2 cm from 

the medial epicondyle and left it attached to the medial epicondyle to use as a 

sling.  I then transected any distal fascia that would serve as a pinch point.  

I then transposed the nerve anterior to the medial epicondyle and assessed range

of motion ensuring that there are no pinch points or areas of compression.  I 

then reduced the skin edges together and assessed to wear to attach the fascial 

sling.  I then used 0 Vicryl to attach the fascial sling and I performed range 

of motion again of the elbow ensuring that the nerve glided freely.  





I then dropped the tourniquet and obtained hemostasis with bipolar 

electrocautery.  Satisfied with this I irrigated the wound copiously and closed 

in a layered fashion with 2-0 Vicryl in the dermis and running Monocryl in the 

skin.  A sterile dressing was then applied and a bulky dressing was placed 

without plaster.  He was awakened and transferred to the recovery room





Postoperative plan: Gentle range of motion from 0-2 weeks.  Follow-up at 2 weeks

to cut Monocryl suture ends and assess the wound.  I will remove the bulky 

dressing at that time and advance range of motion.  No strengthening until 6 webing helms. none

## 2023-03-15 NOTE — CONSULT NOTE ADULT - SUBJECTIVE AND OBJECTIVE BOX
Patient is a 63y old  Male who presents with a chief complaint of chills (15 Mar 2023 01:11)    HPI:  62 y/o M w/ PMH of obesity, spinal stenosis, hypothyroidism,  lumbar disc disorder, sleep apnea, preDM, HTN, GERD, hiatal hernia s/p repair, SBO, ESBL UTI on ertapenem via midline since previous admission, p/w chills. Patient states that for several days he has been having increasing urinary frequency again, and is associated with feeling chills w/ cold and hot flashes. States that he took his temperature day before 3/14 and it was 100, and prior to arrival temperature was 99.9. Patient states he has been adherent to IV ertapenem at home. States he had 4 episodes of diarrhea 3/14 but no diarrhea at all currently. However, he feels like he's not getting any better. Denies cough, runny nose, abdominal pain. Denies noticing any abnormal skin changes around midline site.     PSH: Exploratory lap, small bowel resection, hernia repair, appendectomy, lumbar fusion, cholecystectomy                                               PMH: as above    Meds: per reconciliation sheet, noted below  MEDICATIONS  (STANDING):  amLODIPine   Tablet 10 milliGRAM(s) Oral daily  ascorbic acid 500 milliGRAM(s) Oral daily  atenolol  Tablet 100 milliGRAM(s) Oral daily  atorvastatin 10 milliGRAM(s) Oral at bedtime  cholecalciferol 1000 Unit(s) Oral daily  dextrose 5%. 1000 milliLiter(s) (50 mL/Hr) IV Continuous <Continuous>  dextrose 5%. 1000 milliLiter(s) (100 mL/Hr) IV Continuous <Continuous>  dextrose 50% Injectable 25 Gram(s) IV Push once  dextrose 50% Injectable 12.5 Gram(s) IV Push once  dextrose 50% Injectable 25 Gram(s) IV Push once  DULoxetine 60 milliGRAM(s) Oral two times a day  ertapenem  IVPB 1000 milliGRAM(s) IV Intermittent every 24 hours  gabapentin 300 milliGRAM(s) Oral four times a day  glucagon  Injectable 1 milliGRAM(s) IntraMuscular once  heparin   Injectable 5000 Unit(s) SubCutaneous every 12 hours  insulin lispro (ADMELOG) corrective regimen sliding scale   SubCutaneous three times a day before meals  insulin lispro (ADMELOG) corrective regimen sliding scale   SubCutaneous at bedtime  levothyroxine 75 MICROGram(s) Oral daily  losartan 25 milliGRAM(s) Oral daily  multivitamin 1 Tablet(s) Oral daily  pantoprazole    Tablet 40 milliGRAM(s) Oral every 12 hours  tamsulosin 0.4 milliGRAM(s) Oral at bedtime      Allergies    No Known Allergies    Intolerances    IV Contrast (Nausea)    Social: no smoking, no alcohol, no illegal drugs; no recent travel, no exposure to TB  FAMILY HISTORY:  FH: heart disease (Father)    FH: stomach cancer (Mother)    FH: type 2 diabetes (Mother)    Family history of TIAs (Mother)       no history of premature cardiovascular disease in first degree relatives    ROS: +fever, chills, no HA, no dizziness, no sore throat, no blurry vision, no CP, no palpitations, no SOB, no cough, no abdominal pain, + diarrhea, no N/V, no dysuria, no leg pain, no claudication, no rash, no joint aches, no rectal pain or bleeding, no night sweats    All other systems reviewed and are negative    Vital Signs Last 24 Hrs  T(C): 36.6 (15 Mar 2023 08:27), Max: 37.2 (14 Mar 2023 16:06)  T(F): 97.9 (15 Mar 2023 08:27), Max: 99 (14 Mar 2023 16:06)  HR: 62 (15 Mar 2023 08:27) (59 - 64)  BP: 150/87 (15 Mar 2023 08:27) (150/87 - 162/84)  BP(mean): 104 (14 Mar 2023 23:54) (102 - 110)  RR: 18 (15 Mar 2023 08:27) (18 - 18)  SpO2: 99% (15 Mar 2023 08:27) (95% - 99%)    Parameters below as of 15 Mar 2023 08:27  Patient On (Oxygen Delivery Method): room air      Daily Height in cm: 172.72 (14 Mar 2023 15:38)    Daily     PE:  Constitutional: NAD  HEENT: NC/AT, EOMI, PERRLA, conjunctivae clear; ears and nose atraumatic; pharynx benign  Neck: supple; thyroid not palpable  Back: no tenderness  Respiratory: respiratory effort normal; clear to auscultation  Cardiovascular: S1S2 regular, no murmurs  Abdomen: soft, not tender, not distended, positive BS; liver and spleen WNL  Genitourinary: no suprapubic tenderness  Lymphatic: no LN palpable  Musculoskeletal: no muscle tenderness, no joint swelling or tenderness  Extremities: no pedal edema  Neurological/ Psychiatric: AxOx3, Judgement and insight normal;  moving all extremities  Skin: no rashes; no palpable lesions    Labs: all available labs reviewed                        15.0   8.00  )-----------( 145      ( 15 Mar 2023 07:58 )             43.3     -15    135  |  104  |  11  ----------------------------<  114<H>  3.7   |  27  |  0.69    Ca    9.4      15 Mar 2023 07:58    TPro  8.0  /  Alb  3.7  /  TBili  1.0  /  DBili  x   /  AST  33  /  ALT  29  /  AlkPhos  102  03-15     LIVER FUNCTIONS - ( 15 Mar 2023 07:58 )  Alb: 3.7 g/dL / Pro: 8.0 gm/dL / ALK PHOS: 102 U/L / ALT: 29 U/L / AST: 33 U/L / GGT: x           Urinalysis Basic - ( 14 Mar 2023 17:03 )    Color: Yellow / Appearance: Clear / S.005 / pH: x  Gluc: x / Ketone: Negative  / Bili: Negative / Urobili: Negative   Blood: x / Protein: Negative / Nitrite: Negative   Leuk Esterase: Negative / RBC: x / WBC x   Sq Epi: x / Non Sq Epi: x / Bacteria: x          Radiology: all available radiological tests reviewed      ACC: [id number] EXAM:  XR CHEST PA LAT 2V   ORDERED BY: EDIN PAULINO     PROCEDURE DATE:  2023          INTERPRETATION:  INDICATION: Sepsis    PA and lateral chest    COMPARISON: 2022    FINDINGS:  Heart/Vascular: The heart size, mediastinum, hilum and aorta are within   normal limits for projection.  Pulmonary: Midline trachea. There is no focal infiltrate, congestion or   effusion.  Elevated right diaphragm.  Bones: There is no fracture.  Lines and catheter: None    Impression:    No acute pulmonary disease.        Advanced directives addressed: full resuscitation

## 2023-03-15 NOTE — PATIENT PROFILE ADULT - FALL HARM RISK - UNIVERSAL INTERVENTIONS
Bed in lowest position, wheels locked, appropriate side rails in place/Call bell, personal items and telephone in reach/Instruct patient to call for assistance before getting out of bed or chair/Non-slip footwear when patient is out of bed/[place] to call system/Physically safe environment - no spills, clutter or unnecessary equipment/Purposeful Proactive Rounding/Room/bathroom lighting operational, light cord in reach

## 2023-03-15 NOTE — H&P ADULT - ASSESSMENT
62 y/o M w/ PMH of obesity, spinal stenosis, hypothyroidism,  lumbar disc disorder, sleep apnea, preDM, HTN, GERD, hiatal hernia s/p repair, SBO, ESBL UTI on ertapenem via midline since previous admission, p/w chills    *UTI w/ ESBL - has been receiving ertapenem via midline at home  -F/u urine cx / blood cx  -Vanco / Ertapenem   -ID consult     *Pre-DM  -Humalog ISS   -Diabetic diet     *Elevated alk phos  -F/u outpatient     *H/o spinal stenosis / hypothyroidism / sleep apnea / HTN  / GERD   -C/w home meds and f/u outpatient for further management     *Advance Directives  -Denies having any advance care directives in place     *DVT ppx   -Heparin SubQ   64 y/o M w/ PMH of obesity, spinal stenosis, hypothyroidism,  lumbar disc disorder, sleep apnea, preDM, HTN, GERD, hiatal hernia s/p repair, SBO, ESBL UTI on ertapenem via midline since previous admission, p/w chills    *UTI w/ ESBL - has been receiving ertapenem via midline at home  -F/u urine cx / blood cx  -Vanco / Ertapenem   -ID consult     *Pre-DM  -Humalog ISS   -Diabetic diet     *Elevated alk phos  -F/u outpatient     *H/o spinal stenosis / hypothyroidism / sleep apnea / HTN  / GERD   -C/w home meds and f/u outpatient for further management     *Advance Directives  -Denies having any advance care directives in place     *DVT ppx   -Heparin SubQ

## 2023-03-15 NOTE — H&P ADULT - HISTORY OF PRESENT ILLNESS
62 y/o M w/ PMH of obesity, spinal stenosis, hypothyroidism,  lumbar disc disorder, sleep apnea, preDM, HTN, GERD, hiatal hernia s/p repair, SBO, ESBL UTI on ertapenem via midline since previous admission, p/w chills. Patient states that for several days he has been having increasing urinary frequency again, and is associated with feeling chills w/ cold and hot flashes. States that he took his temperature day before yesterday and it was 100, and yesterday prior to arrival temperature was 99.9. Patient states he has been adherent to IV ertapenem at home. States he had 4 episodes of diarrhea day before yesterday, but no diarrhea yesterday at all. However, he feels like he's not getting any better. Denies cough, runny nose, abdominal pain. Denies noticing any abnormal skin changes around midline site    PSH: Exploratory lap, small bowel resection, hernia repair, appendectomy, lumbar fusion, cholecystectomy                                               Social Hx: Denies tobacco / etoh / drugs     Family Hx: Father - heart disease, mother - stomach cancer, DM2

## 2023-03-15 NOTE — CONSULT NOTE ADULT - ASSESSMENT
64 y/o M w/ PMH of obesity, spinal stenosis, hypothyroidism,  lumbar disc disorder, sleep apnea, preDM, HTN, GERD, hiatal hernia s/p repair, SBO, ESBL UTI on ertapenem via midline since previous admission, p/w chills. Patient states that for several days he has been having increasing urinary frequency again, and is associated with feeling chills w/ cold and hot flashes. States that he took his temperature day before 3/14 and it was 100, and prior to arrival temperature was 99.9. Patient states he has been adherent to IV ertapenem at home. States he had 4 episodes of diarrhea 3/14 but no diarrhea at all currently. However, he feels like he's not getting any better. Denies cough, runny nose, abdominal pain. Denies noticing any abnormal skin changes around midline site.     1. Acute on chronic prostatitis. ESBL Ecoli Complicated UTI. Diarrhea  - on long term IV abx therapy via midline - 4 weeks to be completed on 3/21  - afebrile here, wbc ct nl, UA (-)  - no current diarrhea if he does have diarrhea will check stool for c diff  - dc vancomycin  - continue with IV ertapenem 1gm daily  - f/u urine cx, blood cx  - monitor temps  - tolerating abx well so far; no side effects noted  - reason for abx use and side effects reviewed with patient  - supportive care  - fu cbc    2. other issues - care per medicine

## 2023-03-16 PROCEDURE — 99232 SBSQ HOSP IP/OBS MODERATE 35: CPT

## 2023-03-16 RX ADMIN — PANTOPRAZOLE SODIUM 40 MILLIGRAM(S): 20 TABLET, DELAYED RELEASE ORAL at 08:59

## 2023-03-16 RX ADMIN — ZOLPIDEM TARTRATE 5 MILLIGRAM(S): 10 TABLET ORAL at 23:25

## 2023-03-16 RX ADMIN — HYDROMORPHONE HYDROCHLORIDE 4 MILLIGRAM(S): 2 INJECTION INTRAMUSCULAR; INTRAVENOUS; SUBCUTANEOUS at 14:38

## 2023-03-16 RX ADMIN — GABAPENTIN 300 MILLIGRAM(S): 400 CAPSULE ORAL at 14:36

## 2023-03-16 RX ADMIN — GABAPENTIN 300 MILLIGRAM(S): 400 CAPSULE ORAL at 00:16

## 2023-03-16 RX ADMIN — HYDROMORPHONE HYDROCHLORIDE 4 MILLIGRAM(S): 2 INJECTION INTRAMUSCULAR; INTRAVENOUS; SUBCUTANEOUS at 03:41

## 2023-03-16 RX ADMIN — ERTAPENEM SODIUM 120 MILLIGRAM(S): 1 INJECTION, POWDER, LYOPHILIZED, FOR SOLUTION INTRAMUSCULAR; INTRAVENOUS at 05:21

## 2023-03-16 RX ADMIN — HYDROMORPHONE HYDROCHLORIDE 4 MILLIGRAM(S): 2 INJECTION INTRAMUSCULAR; INTRAVENOUS; SUBCUTANEOUS at 22:47

## 2023-03-16 RX ADMIN — GABAPENTIN 300 MILLIGRAM(S): 400 CAPSULE ORAL at 21:23

## 2023-03-16 RX ADMIN — Medication 75 MICROGRAM(S): at 05:56

## 2023-03-16 RX ADMIN — GABAPENTIN 300 MILLIGRAM(S): 400 CAPSULE ORAL at 18:47

## 2023-03-16 RX ADMIN — Medication 1 TABLET(S): at 09:00

## 2023-03-16 RX ADMIN — PANTOPRAZOLE SODIUM 40 MILLIGRAM(S): 20 TABLET, DELAYED RELEASE ORAL at 21:24

## 2023-03-16 RX ADMIN — HYDROMORPHONE HYDROCHLORIDE 4 MILLIGRAM(S): 2 INJECTION INTRAMUSCULAR; INTRAVENOUS; SUBCUTANEOUS at 18:49

## 2023-03-16 RX ADMIN — ATENOLOL 100 MILLIGRAM(S): 25 TABLET ORAL at 08:59

## 2023-03-16 RX ADMIN — GABAPENTIN 300 MILLIGRAM(S): 400 CAPSULE ORAL at 05:21

## 2023-03-16 RX ADMIN — MORPHINE SULFATE 2 MILLIGRAM(S): 50 CAPSULE, EXTENDED RELEASE ORAL at 01:16

## 2023-03-16 RX ADMIN — HEPARIN SODIUM 5000 UNIT(S): 5000 INJECTION INTRAVENOUS; SUBCUTANEOUS at 09:00

## 2023-03-16 RX ADMIN — DULOXETINE HYDROCHLORIDE 60 MILLIGRAM(S): 30 CAPSULE, DELAYED RELEASE ORAL at 08:59

## 2023-03-16 RX ADMIN — AMLODIPINE BESYLATE 10 MILLIGRAM(S): 2.5 TABLET ORAL at 09:00

## 2023-03-16 RX ADMIN — DULOXETINE HYDROCHLORIDE 60 MILLIGRAM(S): 30 CAPSULE, DELAYED RELEASE ORAL at 21:25

## 2023-03-16 RX ADMIN — HYDROMORPHONE HYDROCHLORIDE 4 MILLIGRAM(S): 2 INJECTION INTRAMUSCULAR; INTRAVENOUS; SUBCUTANEOUS at 08:59

## 2023-03-16 RX ADMIN — HEPARIN SODIUM 5000 UNIT(S): 5000 INJECTION INTRAVENOUS; SUBCUTANEOUS at 21:24

## 2023-03-16 RX ADMIN — Medication 500 MILLIGRAM(S): at 14:36

## 2023-03-16 RX ADMIN — ATORVASTATIN CALCIUM 10 MILLIGRAM(S): 80 TABLET, FILM COATED ORAL at 21:23

## 2023-03-16 RX ADMIN — HYDROMORPHONE HYDROCHLORIDE 4 MILLIGRAM(S): 2 INJECTION INTRAMUSCULAR; INTRAVENOUS; SUBCUTANEOUS at 02:41

## 2023-03-16 RX ADMIN — TAMSULOSIN HYDROCHLORIDE 0.4 MILLIGRAM(S): 0.4 CAPSULE ORAL at 21:24

## 2023-03-16 RX ADMIN — MORPHINE SULFATE 2 MILLIGRAM(S): 50 CAPSULE, EXTENDED RELEASE ORAL at 00:16

## 2023-03-16 RX ADMIN — Medication 1000 UNIT(S): at 08:59

## 2023-03-16 RX ADMIN — LOSARTAN POTASSIUM 25 MILLIGRAM(S): 100 TABLET, FILM COATED ORAL at 08:59

## 2023-03-16 NOTE — PROGRESS NOTE ADULT - ASSESSMENT
64 y/o M w/ PMH of obesity, spinal stenosis, hypothyroidism,  lumbar disc disorder, sleep apnea, preDM, HTN, GERD, hiatal hernia s/p repair, SBO, ESBL UTI on ertapenem via midline since previous admission, p/w chills. Patient states that for several days he has been having increasing urinary frequency again, and is associated with feeling chills w/ cold and hot flashes. States that he took his temperature day before 3/14 and it was 100, and prior to arrival temperature was 99.9. Patient states he has been adherent to IV ertapenem at home. States he had 4 episodes of diarrhea 3/14 but no diarrhea at all currently. However, he feels like he's not getting any better. Denies cough, runny nose, abdominal pain. Denies noticing any abnormal skin changes around midline site.     1. Acute on chronic prostatitis. ESBL Ecoli Complicated UTI. Diarrhea  - on long term IV abx therapy via midline - 4 weeks to be completed on 3/21  - afebrile here, wbc ct nl, UA (-)  - no current diarrhea if he does have diarrhea will check stool for c diff  - dc vancomycin  - continue with IV ertapenem 1gm daily via midline   - urine cx, blood cx no growth   - tolerating abx well so far; no side effects noted  - reason for abx use and side effects reviewed with patient  - supportive care  - fu cbc    2. other issues - care per medicine

## 2023-03-17 PROCEDURE — 99232 SBSQ HOSP IP/OBS MODERATE 35: CPT

## 2023-03-17 RX ORDER — ERTAPENEM SODIUM 1 G/1
1000 INJECTION, POWDER, LYOPHILIZED, FOR SOLUTION INTRAMUSCULAR; INTRAVENOUS EVERY 24 HOURS
Refills: 0 | Status: COMPLETED | OUTPATIENT
Start: 2023-03-17 | End: 2023-03-19

## 2023-03-17 RX ADMIN — HYDROMORPHONE HYDROCHLORIDE 4 MILLIGRAM(S): 2 INJECTION INTRAMUSCULAR; INTRAVENOUS; SUBCUTANEOUS at 04:25

## 2023-03-17 RX ADMIN — PANTOPRAZOLE SODIUM 40 MILLIGRAM(S): 20 TABLET, DELAYED RELEASE ORAL at 09:23

## 2023-03-17 RX ADMIN — TAMSULOSIN HYDROCHLORIDE 0.4 MILLIGRAM(S): 0.4 CAPSULE ORAL at 20:47

## 2023-03-17 RX ADMIN — HYDROMORPHONE HYDROCHLORIDE 4 MILLIGRAM(S): 2 INJECTION INTRAMUSCULAR; INTRAVENOUS; SUBCUTANEOUS at 01:29

## 2023-03-17 RX ADMIN — HYDROMORPHONE HYDROCHLORIDE 4 MILLIGRAM(S): 2 INJECTION INTRAMUSCULAR; INTRAVENOUS; SUBCUTANEOUS at 15:26

## 2023-03-17 RX ADMIN — HYDROMORPHONE HYDROCHLORIDE 4 MILLIGRAM(S): 2 INJECTION INTRAMUSCULAR; INTRAVENOUS; SUBCUTANEOUS at 09:24

## 2023-03-17 RX ADMIN — GABAPENTIN 300 MILLIGRAM(S): 400 CAPSULE ORAL at 22:55

## 2023-03-17 RX ADMIN — PANTOPRAZOLE SODIUM 40 MILLIGRAM(S): 20 TABLET, DELAYED RELEASE ORAL at 20:48

## 2023-03-17 RX ADMIN — Medication 1 TABLET(S): at 09:25

## 2023-03-17 RX ADMIN — ZOLPIDEM TARTRATE 5 MILLIGRAM(S): 10 TABLET ORAL at 20:48

## 2023-03-17 RX ADMIN — ATORVASTATIN CALCIUM 10 MILLIGRAM(S): 80 TABLET, FILM COATED ORAL at 20:47

## 2023-03-17 RX ADMIN — ERTAPENEM SODIUM 120 MILLIGRAM(S): 1 INJECTION, POWDER, LYOPHILIZED, FOR SOLUTION INTRAMUSCULAR; INTRAVENOUS at 04:44

## 2023-03-17 RX ADMIN — HYDROMORPHONE HYDROCHLORIDE 4 MILLIGRAM(S): 2 INJECTION INTRAMUSCULAR; INTRAVENOUS; SUBCUTANEOUS at 05:33

## 2023-03-17 RX ADMIN — AMLODIPINE BESYLATE 10 MILLIGRAM(S): 2.5 TABLET ORAL at 09:24

## 2023-03-17 RX ADMIN — ATENOLOL 100 MILLIGRAM(S): 25 TABLET ORAL at 09:26

## 2023-03-17 RX ADMIN — HYDROMORPHONE HYDROCHLORIDE 4 MILLIGRAM(S): 2 INJECTION INTRAMUSCULAR; INTRAVENOUS; SUBCUTANEOUS at 10:24

## 2023-03-17 RX ADMIN — DULOXETINE HYDROCHLORIDE 60 MILLIGRAM(S): 30 CAPSULE, DELAYED RELEASE ORAL at 20:48

## 2023-03-17 RX ADMIN — Medication 75 MICROGRAM(S): at 04:44

## 2023-03-17 RX ADMIN — Medication 500 MILLIGRAM(S): at 09:32

## 2023-03-17 RX ADMIN — HEPARIN SODIUM 5000 UNIT(S): 5000 INJECTION INTRAVENOUS; SUBCUTANEOUS at 20:48

## 2023-03-17 RX ADMIN — HYDROMORPHONE HYDROCHLORIDE 4 MILLIGRAM(S): 2 INJECTION INTRAMUSCULAR; INTRAVENOUS; SUBCUTANEOUS at 14:26

## 2023-03-17 RX ADMIN — GABAPENTIN 300 MILLIGRAM(S): 400 CAPSULE ORAL at 13:14

## 2023-03-17 RX ADMIN — GABAPENTIN 300 MILLIGRAM(S): 400 CAPSULE ORAL at 17:23

## 2023-03-17 RX ADMIN — ERTAPENEM SODIUM 120 MILLIGRAM(S): 1 INJECTION, POWDER, LYOPHILIZED, FOR SOLUTION INTRAMUSCULAR; INTRAVENOUS at 13:13

## 2023-03-17 RX ADMIN — HYDROMORPHONE HYDROCHLORIDE 4 MILLIGRAM(S): 2 INJECTION INTRAMUSCULAR; INTRAVENOUS; SUBCUTANEOUS at 18:57

## 2023-03-17 RX ADMIN — HYDROMORPHONE HYDROCHLORIDE 4 MILLIGRAM(S): 2 INJECTION INTRAMUSCULAR; INTRAVENOUS; SUBCUTANEOUS at 22:55

## 2023-03-17 RX ADMIN — DULOXETINE HYDROCHLORIDE 60 MILLIGRAM(S): 30 CAPSULE, DELAYED RELEASE ORAL at 09:26

## 2023-03-17 RX ADMIN — LOSARTAN POTASSIUM 25 MILLIGRAM(S): 100 TABLET, FILM COATED ORAL at 09:26

## 2023-03-17 RX ADMIN — Medication 1000 UNIT(S): at 09:24

## 2023-03-17 RX ADMIN — GABAPENTIN 300 MILLIGRAM(S): 400 CAPSULE ORAL at 04:45

## 2023-03-17 RX ADMIN — HEPARIN SODIUM 5000 UNIT(S): 5000 INJECTION INTRAVENOUS; SUBCUTANEOUS at 09:27

## 2023-03-18 PROCEDURE — 99232 SBSQ HOSP IP/OBS MODERATE 35: CPT

## 2023-03-18 RX ADMIN — HYDROMORPHONE HYDROCHLORIDE 4 MILLIGRAM(S): 2 INJECTION INTRAMUSCULAR; INTRAVENOUS; SUBCUTANEOUS at 08:50

## 2023-03-18 RX ADMIN — ATORVASTATIN CALCIUM 10 MILLIGRAM(S): 80 TABLET, FILM COATED ORAL at 21:55

## 2023-03-18 RX ADMIN — ERTAPENEM SODIUM 120 MILLIGRAM(S): 1 INJECTION, POWDER, LYOPHILIZED, FOR SOLUTION INTRAMUSCULAR; INTRAVENOUS at 13:16

## 2023-03-18 RX ADMIN — HEPARIN SODIUM 5000 UNIT(S): 5000 INJECTION INTRAVENOUS; SUBCUTANEOUS at 08:51

## 2023-03-18 RX ADMIN — HYDROMORPHONE HYDROCHLORIDE 4 MILLIGRAM(S): 2 INJECTION INTRAMUSCULAR; INTRAVENOUS; SUBCUTANEOUS at 17:47

## 2023-03-18 RX ADMIN — HYDROMORPHONE HYDROCHLORIDE 4 MILLIGRAM(S): 2 INJECTION INTRAMUSCULAR; INTRAVENOUS; SUBCUTANEOUS at 00:29

## 2023-03-18 RX ADMIN — HEPARIN SODIUM 5000 UNIT(S): 5000 INJECTION INTRAVENOUS; SUBCUTANEOUS at 21:56

## 2023-03-18 RX ADMIN — PANTOPRAZOLE SODIUM 40 MILLIGRAM(S): 20 TABLET, DELAYED RELEASE ORAL at 08:51

## 2023-03-18 RX ADMIN — Medication 1 TABLET(S): at 08:50

## 2023-03-18 RX ADMIN — GABAPENTIN 300 MILLIGRAM(S): 400 CAPSULE ORAL at 05:11

## 2023-03-18 RX ADMIN — ATENOLOL 100 MILLIGRAM(S): 25 TABLET ORAL at 08:51

## 2023-03-18 RX ADMIN — GABAPENTIN 300 MILLIGRAM(S): 400 CAPSULE ORAL at 13:16

## 2023-03-18 RX ADMIN — DULOXETINE HYDROCHLORIDE 60 MILLIGRAM(S): 30 CAPSULE, DELAYED RELEASE ORAL at 08:51

## 2023-03-18 RX ADMIN — AMLODIPINE BESYLATE 10 MILLIGRAM(S): 2.5 TABLET ORAL at 08:51

## 2023-03-18 RX ADMIN — Medication 1000 UNIT(S): at 08:50

## 2023-03-18 RX ADMIN — Medication 500 MILLIGRAM(S): at 08:50

## 2023-03-18 RX ADMIN — DULOXETINE HYDROCHLORIDE 60 MILLIGRAM(S): 30 CAPSULE, DELAYED RELEASE ORAL at 21:55

## 2023-03-18 RX ADMIN — HYDROMORPHONE HYDROCHLORIDE 4 MILLIGRAM(S): 2 INJECTION INTRAMUSCULAR; INTRAVENOUS; SUBCUTANEOUS at 21:55

## 2023-03-18 RX ADMIN — Medication 75 MICROGRAM(S): at 05:10

## 2023-03-18 RX ADMIN — HYDROMORPHONE HYDROCHLORIDE 4 MILLIGRAM(S): 2 INJECTION INTRAMUSCULAR; INTRAVENOUS; SUBCUTANEOUS at 22:55

## 2023-03-18 RX ADMIN — GABAPENTIN 300 MILLIGRAM(S): 400 CAPSULE ORAL at 17:36

## 2023-03-18 RX ADMIN — LOSARTAN POTASSIUM 25 MILLIGRAM(S): 100 TABLET, FILM COATED ORAL at 08:51

## 2023-03-18 RX ADMIN — ZOLPIDEM TARTRATE 5 MILLIGRAM(S): 10 TABLET ORAL at 21:55

## 2023-03-18 RX ADMIN — HYDROMORPHONE HYDROCHLORIDE 4 MILLIGRAM(S): 2 INJECTION INTRAMUSCULAR; INTRAVENOUS; SUBCUTANEOUS at 04:21

## 2023-03-18 RX ADMIN — GABAPENTIN 300 MILLIGRAM(S): 400 CAPSULE ORAL at 23:53

## 2023-03-18 RX ADMIN — HYDROMORPHONE HYDROCHLORIDE 4 MILLIGRAM(S): 2 INJECTION INTRAMUSCULAR; INTRAVENOUS; SUBCUTANEOUS at 18:51

## 2023-03-18 RX ADMIN — PANTOPRAZOLE SODIUM 40 MILLIGRAM(S): 20 TABLET, DELAYED RELEASE ORAL at 21:55

## 2023-03-18 RX ADMIN — HYDROMORPHONE HYDROCHLORIDE 4 MILLIGRAM(S): 2 INJECTION INTRAMUSCULAR; INTRAVENOUS; SUBCUTANEOUS at 03:32

## 2023-03-18 RX ADMIN — HYDROMORPHONE HYDROCHLORIDE 4 MILLIGRAM(S): 2 INJECTION INTRAMUSCULAR; INTRAVENOUS; SUBCUTANEOUS at 13:16

## 2023-03-18 RX ADMIN — TAMSULOSIN HYDROCHLORIDE 0.4 MILLIGRAM(S): 0.4 CAPSULE ORAL at 21:55

## 2023-03-19 PROCEDURE — 99232 SBSQ HOSP IP/OBS MODERATE 35: CPT

## 2023-03-19 RX ADMIN — PANTOPRAZOLE SODIUM 40 MILLIGRAM(S): 20 TABLET, DELAYED RELEASE ORAL at 21:20

## 2023-03-19 RX ADMIN — HYDROMORPHONE HYDROCHLORIDE 4 MILLIGRAM(S): 2 INJECTION INTRAMUSCULAR; INTRAVENOUS; SUBCUTANEOUS at 05:19

## 2023-03-19 RX ADMIN — TAMSULOSIN HYDROCHLORIDE 0.4 MILLIGRAM(S): 0.4 CAPSULE ORAL at 21:20

## 2023-03-19 RX ADMIN — GABAPENTIN 300 MILLIGRAM(S): 400 CAPSULE ORAL at 17:13

## 2023-03-19 RX ADMIN — Medication 500 MILLIGRAM(S): at 08:37

## 2023-03-19 RX ADMIN — GABAPENTIN 300 MILLIGRAM(S): 400 CAPSULE ORAL at 05:19

## 2023-03-19 RX ADMIN — HEPARIN SODIUM 5000 UNIT(S): 5000 INJECTION INTRAVENOUS; SUBCUTANEOUS at 08:38

## 2023-03-19 RX ADMIN — HYDROMORPHONE HYDROCHLORIDE 4 MILLIGRAM(S): 2 INJECTION INTRAMUSCULAR; INTRAVENOUS; SUBCUTANEOUS at 09:52

## 2023-03-19 RX ADMIN — HEPARIN SODIUM 5000 UNIT(S): 5000 INJECTION INTRAVENOUS; SUBCUTANEOUS at 21:21

## 2023-03-19 RX ADMIN — GABAPENTIN 300 MILLIGRAM(S): 400 CAPSULE ORAL at 13:19

## 2023-03-19 RX ADMIN — Medication 1000 UNIT(S): at 08:37

## 2023-03-19 RX ADMIN — HYDROMORPHONE HYDROCHLORIDE 4 MILLIGRAM(S): 2 INJECTION INTRAMUSCULAR; INTRAVENOUS; SUBCUTANEOUS at 16:37

## 2023-03-19 RX ADMIN — HYDROMORPHONE HYDROCHLORIDE 4 MILLIGRAM(S): 2 INJECTION INTRAMUSCULAR; INTRAVENOUS; SUBCUTANEOUS at 06:20

## 2023-03-19 RX ADMIN — HYDROMORPHONE HYDROCHLORIDE 4 MILLIGRAM(S): 2 INJECTION INTRAMUSCULAR; INTRAVENOUS; SUBCUTANEOUS at 10:40

## 2023-03-19 RX ADMIN — LOSARTAN POTASSIUM 25 MILLIGRAM(S): 100 TABLET, FILM COATED ORAL at 08:38

## 2023-03-19 RX ADMIN — ERTAPENEM SODIUM 120 MILLIGRAM(S): 1 INJECTION, POWDER, LYOPHILIZED, FOR SOLUTION INTRAMUSCULAR; INTRAVENOUS at 13:43

## 2023-03-19 RX ADMIN — HYDROMORPHONE HYDROCHLORIDE 4 MILLIGRAM(S): 2 INJECTION INTRAMUSCULAR; INTRAVENOUS; SUBCUTANEOUS at 20:20

## 2023-03-19 RX ADMIN — HYDROMORPHONE HYDROCHLORIDE 4 MILLIGRAM(S): 2 INJECTION INTRAMUSCULAR; INTRAVENOUS; SUBCUTANEOUS at 21:00

## 2023-03-19 RX ADMIN — ZOLPIDEM TARTRATE 5 MILLIGRAM(S): 10 TABLET ORAL at 21:41

## 2023-03-19 RX ADMIN — DULOXETINE HYDROCHLORIDE 60 MILLIGRAM(S): 30 CAPSULE, DELAYED RELEASE ORAL at 08:37

## 2023-03-19 RX ADMIN — Medication 75 MICROGRAM(S): at 05:19

## 2023-03-19 RX ADMIN — Medication 1 TABLET(S): at 08:36

## 2023-03-19 RX ADMIN — DULOXETINE HYDROCHLORIDE 60 MILLIGRAM(S): 30 CAPSULE, DELAYED RELEASE ORAL at 21:20

## 2023-03-19 RX ADMIN — ATORVASTATIN CALCIUM 10 MILLIGRAM(S): 80 TABLET, FILM COATED ORAL at 21:20

## 2023-03-19 RX ADMIN — AMLODIPINE BESYLATE 10 MILLIGRAM(S): 2.5 TABLET ORAL at 08:36

## 2023-03-19 RX ADMIN — ATENOLOL 100 MILLIGRAM(S): 25 TABLET ORAL at 08:37

## 2023-03-19 RX ADMIN — PANTOPRAZOLE SODIUM 40 MILLIGRAM(S): 20 TABLET, DELAYED RELEASE ORAL at 08:36

## 2023-03-19 RX ADMIN — HYDROMORPHONE HYDROCHLORIDE 4 MILLIGRAM(S): 2 INJECTION INTRAMUSCULAR; INTRAVENOUS; SUBCUTANEOUS at 15:57

## 2023-03-19 NOTE — PROVIDER CONTACT NOTE (OTHER) - ACTION/TREATMENT ORDERED:
Incorrect medication taken down, not administered, taken by RN supervisor to pharmacy. Correct medication infusing at this time. Dr. Rockwell made aware. No new orders. [facility] form filled out.

## 2023-03-19 NOTE — PROVIDER CONTACT NOTE (OTHER) - SITUATION
Ertapenem 1G ordered daily for patient, went to administer to patient, correct patient label was on vial of MEROPENEM.

## 2023-03-20 PROCEDURE — 99232 SBSQ HOSP IP/OBS MODERATE 35: CPT

## 2023-03-20 RX ORDER — ERTAPENEM SODIUM 1 G/1
INJECTION, POWDER, LYOPHILIZED, FOR SOLUTION INTRAMUSCULAR; INTRAVENOUS
Refills: 0 | Status: DISCONTINUED | OUTPATIENT
Start: 2023-03-20 | End: 2023-03-21

## 2023-03-20 RX ORDER — ERTAPENEM SODIUM 1 G/1
INJECTION, POWDER, LYOPHILIZED, FOR SOLUTION INTRAMUSCULAR; INTRAVENOUS
Refills: 0 | Status: DISCONTINUED | OUTPATIENT
Start: 2023-03-20 | End: 2023-03-20

## 2023-03-20 RX ORDER — ERTAPENEM SODIUM 1 G/1
1000 INJECTION, POWDER, LYOPHILIZED, FOR SOLUTION INTRAMUSCULAR; INTRAVENOUS ONCE
Refills: 0 | Status: COMPLETED | OUTPATIENT
Start: 2023-03-20 | End: 2023-03-20

## 2023-03-20 RX ORDER — ERTAPENEM SODIUM 1 G/1
1000 INJECTION, POWDER, LYOPHILIZED, FOR SOLUTION INTRAMUSCULAR; INTRAVENOUS ONCE
Refills: 0 | Status: DISCONTINUED | OUTPATIENT
Start: 2023-03-20 | End: 2023-03-20

## 2023-03-20 RX ORDER — ERTAPENEM SODIUM 1 G/1
1000 INJECTION, POWDER, LYOPHILIZED, FOR SOLUTION INTRAMUSCULAR; INTRAVENOUS EVERY 24 HOURS
Refills: 0 | Status: DISCONTINUED | OUTPATIENT
Start: 2023-03-21 | End: 2023-03-21

## 2023-03-20 RX ADMIN — ZOLPIDEM TARTRATE 5 MILLIGRAM(S): 10 TABLET ORAL at 21:18

## 2023-03-20 RX ADMIN — LOSARTAN POTASSIUM 25 MILLIGRAM(S): 100 TABLET, FILM COATED ORAL at 13:50

## 2023-03-20 RX ADMIN — ATORVASTATIN CALCIUM 10 MILLIGRAM(S): 80 TABLET, FILM COATED ORAL at 21:18

## 2023-03-20 RX ADMIN — AMLODIPINE BESYLATE 10 MILLIGRAM(S): 2.5 TABLET ORAL at 10:04

## 2023-03-20 RX ADMIN — HYDROMORPHONE HYDROCHLORIDE 4 MILLIGRAM(S): 2 INJECTION INTRAMUSCULAR; INTRAVENOUS; SUBCUTANEOUS at 16:00

## 2023-03-20 RX ADMIN — PANTOPRAZOLE SODIUM 40 MILLIGRAM(S): 20 TABLET, DELAYED RELEASE ORAL at 10:05

## 2023-03-20 RX ADMIN — GABAPENTIN 300 MILLIGRAM(S): 400 CAPSULE ORAL at 23:07

## 2023-03-20 RX ADMIN — DULOXETINE HYDROCHLORIDE 60 MILLIGRAM(S): 30 CAPSULE, DELAYED RELEASE ORAL at 10:04

## 2023-03-20 RX ADMIN — HYDROMORPHONE HYDROCHLORIDE 4 MILLIGRAM(S): 2 INJECTION INTRAMUSCULAR; INTRAVENOUS; SUBCUTANEOUS at 04:29

## 2023-03-20 RX ADMIN — ERTAPENEM SODIUM 120 MILLIGRAM(S): 1 INJECTION, POWDER, LYOPHILIZED, FOR SOLUTION INTRAMUSCULAR; INTRAVENOUS at 15:56

## 2023-03-20 RX ADMIN — HYDROMORPHONE HYDROCHLORIDE 4 MILLIGRAM(S): 2 INJECTION INTRAMUSCULAR; INTRAVENOUS; SUBCUTANEOUS at 15:07

## 2023-03-20 RX ADMIN — HYDROMORPHONE HYDROCHLORIDE 4 MILLIGRAM(S): 2 INJECTION INTRAMUSCULAR; INTRAVENOUS; SUBCUTANEOUS at 23:07

## 2023-03-20 RX ADMIN — HEPARIN SODIUM 5000 UNIT(S): 5000 INJECTION INTRAVENOUS; SUBCUTANEOUS at 21:18

## 2023-03-20 RX ADMIN — Medication 1 TABLET(S): at 10:05

## 2023-03-20 RX ADMIN — GABAPENTIN 300 MILLIGRAM(S): 400 CAPSULE ORAL at 18:31

## 2023-03-20 RX ADMIN — DULOXETINE HYDROCHLORIDE 60 MILLIGRAM(S): 30 CAPSULE, DELAYED RELEASE ORAL at 21:18

## 2023-03-20 RX ADMIN — Medication 1000 UNIT(S): at 10:04

## 2023-03-20 RX ADMIN — HYDROMORPHONE HYDROCHLORIDE 4 MILLIGRAM(S): 2 INJECTION INTRAMUSCULAR; INTRAVENOUS; SUBCUTANEOUS at 01:00

## 2023-03-20 RX ADMIN — HYDROMORPHONE HYDROCHLORIDE 4 MILLIGRAM(S): 2 INJECTION INTRAMUSCULAR; INTRAVENOUS; SUBCUTANEOUS at 10:04

## 2023-03-20 RX ADMIN — GABAPENTIN 300 MILLIGRAM(S): 400 CAPSULE ORAL at 13:50

## 2023-03-20 RX ADMIN — HYDROMORPHONE HYDROCHLORIDE 4 MILLIGRAM(S): 2 INJECTION INTRAMUSCULAR; INTRAVENOUS; SUBCUTANEOUS at 10:20

## 2023-03-20 RX ADMIN — GABAPENTIN 300 MILLIGRAM(S): 400 CAPSULE ORAL at 00:06

## 2023-03-20 RX ADMIN — HYDROMORPHONE HYDROCHLORIDE 4 MILLIGRAM(S): 2 INJECTION INTRAMUSCULAR; INTRAVENOUS; SUBCUTANEOUS at 00:06

## 2023-03-20 RX ADMIN — HYDROMORPHONE HYDROCHLORIDE 4 MILLIGRAM(S): 2 INJECTION INTRAMUSCULAR; INTRAVENOUS; SUBCUTANEOUS at 19:02

## 2023-03-20 RX ADMIN — Medication 500 MILLIGRAM(S): at 10:04

## 2023-03-20 RX ADMIN — Medication 75 MICROGRAM(S): at 05:34

## 2023-03-20 RX ADMIN — HEPARIN SODIUM 5000 UNIT(S): 5000 INJECTION INTRAVENOUS; SUBCUTANEOUS at 10:04

## 2023-03-20 RX ADMIN — PANTOPRAZOLE SODIUM 40 MILLIGRAM(S): 20 TABLET, DELAYED RELEASE ORAL at 21:18

## 2023-03-20 RX ADMIN — ATENOLOL 100 MILLIGRAM(S): 25 TABLET ORAL at 10:04

## 2023-03-20 RX ADMIN — HYDROMORPHONE HYDROCHLORIDE 4 MILLIGRAM(S): 2 INJECTION INTRAMUSCULAR; INTRAVENOUS; SUBCUTANEOUS at 05:29

## 2023-03-20 RX ADMIN — GABAPENTIN 300 MILLIGRAM(S): 400 CAPSULE ORAL at 05:34

## 2023-03-20 RX ADMIN — TAMSULOSIN HYDROCHLORIDE 0.4 MILLIGRAM(S): 0.4 CAPSULE ORAL at 21:18

## 2023-03-20 NOTE — PROGRESS NOTE ADULT - SUBJECTIVE AND OBJECTIVE BOX
CC: Chills  History of Present Illness:   62 y/o M w/ PMH of obesity, spinal stenosis, hypothyroidism,  lumbar disc disorder, sleep apnea, preDM, HTN, GERD, hiatal hernia s/p repair, SBO, ESBL UTI on ertapenem via midline since previous admission, p/w chills. Patient states that for several days he has been having increasing urinary frequency again, and is associated with feeling chills w/ cold and hot flashes. States that he took his temperature day before yesterday and it was 100, and yesterday prior to arrival temperature was 99.9. Patient states he has been adherent to IV ertapenem at home. States he had 4 episodes of diarrhea day before yesterday, but no diarrhea yesterday at all. However, he feels like he's not getting any better. Denies cough, runny nose, abdominal pain. Denies noticing any abnormal skin changes around midline site    3/15:  Awake, alert, states he has some chronic pains but feeling better than when he arrived to ED.  Denies fever, chills, N, V, abd pain, CP, SOB.  23: Feels a bit better today.  23: some lower abd discomfort, otherwise stable      REVIEW OF SYSTEMS: All other review of systems is negative unless indicated above.      Vital Signs Last 24 Hrs  T(C): 36.4 (17 Mar 2023 09:05), Max: 36.6 (16 Mar 2023 23:08)  T(F): 97.6 (17 Mar 2023 09:05), Max: 97.9 (16 Mar 2023 23:08)  HR: 69 (17 Mar 2023 09:05) (56 - 69)  BP: 119/75 (17 Mar 2023 09:05) (118/68 - 137/87)  BP(mean): --  RR: 18 (17 Mar 2023 09:05) (18 - 18)  SpO2: 96% (17 Mar 2023 09:05) (94% - 96%)    Parameters below as of 17 Mar 2023 09:05  Patient On (Oxygen Delivery Method): room air            Physical Exam:   Physical Exam:  · Constitutional	well-groomed; no distress  · Eyes	PERRL; EOMI  · ENMT	neck  · Neck	supple; symmetric  · Respiratory	clear to auscultation bilaterally; no wheezes; no rales; no rhonchi; no respiratory distress  · Cardiovascular	regular rate and rhythm; S1 S2 present; no gallops; no rub; no murmur  · Gastrointestinal	soft; nontender; nondistended; normal active bowel sounds  · Neurological	sensation intact; responds to pain  · Skin	warm and dry  · Psychiatric	normal affect; alert and oriented x3; normal behavior      MEDICATIONS  (STANDING):  amLODIPine   Tablet 10 milliGRAM(s) Oral daily  ascorbic acid 500 milliGRAM(s) Oral daily  atenolol  Tablet 100 milliGRAM(s) Oral daily  atorvastatin 10 milliGRAM(s) Oral at bedtime  cholecalciferol 1000 Unit(s) Oral daily  dextrose 5%. 1000 milliLiter(s) (50 mL/Hr) IV Continuous <Continuous>  dextrose 5%. 1000 milliLiter(s) (100 mL/Hr) IV Continuous <Continuous>  dextrose 50% Injectable 25 Gram(s) IV Push once  dextrose 50% Injectable 12.5 Gram(s) IV Push once  dextrose 50% Injectable 25 Gram(s) IV Push once  DULoxetine 60 milliGRAM(s) Oral two times a day  ertapenem  IVPB 1000 milliGRAM(s) IV Intermittent every 24 hours  gabapentin 300 milliGRAM(s) Oral four times a day  glucagon  Injectable 1 milliGRAM(s) IntraMuscular once  heparin   Injectable 5000 Unit(s) SubCutaneous every 12 hours  insulin lispro (ADMELOG) corrective regimen sliding scale   SubCutaneous three times a day before meals  insulin lispro (ADMELOG) corrective regimen sliding scale   SubCutaneous at bedtime  levothyroxine 75 MICROGram(s) Oral daily  losartan 25 milliGRAM(s) Oral daily  multivitamin 1 Tablet(s) Oral daily  pantoprazole    Tablet 40 milliGRAM(s) Oral every 12 hours  tamsulosin 0.4 milliGRAM(s) Oral at bedtime    MEDICATIONS  (PRN):  dextrose Oral Gel 15 Gram(s) Oral once PRN Blood Glucose LESS THAN 70 milliGRAM(s)/deciliter  HYDROmorphone   Tablet 4 milliGRAM(s) Oral every 4 hours PRN Moderate Pain (4 - 6)  morphine  - Injectable 2 milliGRAM(s) IV Push every 6 hours PRN Moderate Pain (4 - 6)  sodium chloride 0.65% Nasal 1 Spray(s) Both Nostrils every 8 hours PRN Nasal Congestion  zolpidem 5 milliGRAM(s) Oral at bedtime PRN Insomnia                                15.0   8.00  )-----------( 145      ( 15 Mar 2023 07:58 )             43.3     15 Mar 2023 07:58    135    |  104    |  11     ----------------------------<  114    3.7     |  27     |  0.69     Ca    9.4        15 Mar 2023 07:58    TPro  8.0    /  Alb  3.7    /  TBili  1.0    /  DBili  x      /  AST  33     /  ALT  29     /  AlkPhos  102    15 Mar 2023 07:58    LIVER FUNCTIONS - ( 15 Mar 2023 07:58 )  Alb: 3.7 g/dL / Pro: 8.0 gm/dL / ALK PHOS: 102 U/L / ALT: 29 U/L / AST: 33 U/L / GGT: x           PT/INR - ( 15 Mar 2023 07:58 )   PT: 14.6 sec;   INR: 1.26 ratio         PTT - ( 15 Mar 2023 07:58 )  PTT:34.5 sec  CAPILLARY BLOOD GLUCOSE      POCT Blood Glucose.: 81 mg/dL (15 Mar 2023 22:29)        Urinalysis Basic - ( 14 Mar 2023 17:03 )    Color: Yellow / Appearance: Clear / S.005 / pH: x  Gluc: x / Ketone: Negative  / Bili: Negative / Urobili: Negative   Blood: x / Protein: Negative / Nitrite: Negative   Leuk Esterase: Negative / RBC: x / WBC x   Sq Epi: x / Non Sq Epi: x / Bacteria: x          Assessment/Plan:    62 y/o M w/ PMH of obesity, spinal stenosis, hypothyroidism,  lumbar disc disorder, sleep apnea, preDM, HTN, GERD, hiatal hernia s/p repair, SBO, ESBL UTI on ertapenem via midline since previous admission, p/w chills    *UTI w/ ESBL - has been receiving ertapenem via midline at home  -c/w ertapenem  -f/u cultures, no growth  -monitor for worsening signs or symptoms     *Pre-DM  Outpatient follow up    *Elevated alk phos  -F/u outpatient     *H/o spinal stenosis / hypothyroidism / sleep apnea / HTN  / GERD   -C/w home meds and f/u outpatient for further management     *Advance Directives  -Denies having any advance care directives in place     *DVT ppx   -Heparin SubQ    
Date of service: 23 @ 11:25    pt seen and examined  feels better   has some discomfort when urinating  no fever     ROS: no fever or chills; denies dizziness, no HA, no SOB or cough, no abdominal pain, no diarrhea or constipation; no legs pain, no rashes    MEDICATIONS  (STANDING):  amLODIPine   Tablet 10 milliGRAM(s) Oral daily  ascorbic acid 500 milliGRAM(s) Oral daily  atenolol  Tablet 100 milliGRAM(s) Oral daily  atorvastatin 10 milliGRAM(s) Oral at bedtime  cholecalciferol 1000 Unit(s) Oral daily  DULoxetine 60 milliGRAM(s) Oral two times a day  ertapenem  IVPB 1000 milliGRAM(s) IV Intermittent every 24 hours  gabapentin 300 milliGRAM(s) Oral four times a day  heparin   Injectable 5000 Unit(s) SubCutaneous every 12 hours  levothyroxine 75 MICROGram(s) Oral daily  losartan 25 milliGRAM(s) Oral daily  multivitamin 1 Tablet(s) Oral daily  pantoprazole    Tablet 40 milliGRAM(s) Oral every 12 hours  tamsulosin 0.4 milliGRAM(s) Oral at bedtime    Vital Signs Last 24 Hrs  T(C): 36.7 (16 Mar 2023 08:20), Max: 36.7 (16 Mar 2023 08:20)  T(F): 98 (16 Mar 2023 08:20), Max: 98 (16 Mar 2023 08:20)  HR: 58 (16 Mar 2023 08:20) (58 - 80)  BP: 115/70 (16 Mar 2023 08:20) (115/70 - 149/87)  BP(mean): --  RR: 18 (16 Mar 2023 08:20) (18 - 18)  SpO2: 95% (16 Mar 2023 08:20) (95% - 97%)    Parameters below as of 16 Mar 2023 08:20  Patient On (Oxygen Delivery Method): room air    PE:  Constitutional: NAD  HEENT: NC/AT, EOMI, PERRLA, conjunctivae clear; ears and nose atraumatic; pharynx benign  Neck: supple; thyroid not palpable  Back: no tenderness  Respiratory: respiratory effort normal; clear to auscultation  Cardiovascular: S1S2 regular, no murmurs  Abdomen: soft, not tender, not distended, positive BS; liver and spleen WNL  Genitourinary: no suprapubic tenderness  Lymphatic: no LN palpable  Musculoskeletal: no muscle tenderness, no joint swelling or tenderness  Extremities: no pedal edema  Neurological/ Psychiatric: AxOx3, Judgement and insight normal;  moving all extremities  Skin: no rashes; no palpable lesions    Labs: all available labs reviewed                                   15.0   8.00  )-----------( 145      ( 15 Mar 2023 07:58 )             43.3     03-15    135  |  104  |  11  ----------------------------<  114<H>  3.7   |  27  |  0.69    Ca    9.4      15 Mar 2023 07:58    TPro  8.0  /  Alb  3.7  /  TBili  1.0  /  DBili  x   /  AST  33  /  ALT  29  /  AlkPhos  102  03-15        Urinalysis Basic - ( 14 Mar 2023 17:03 )    Color: Yellow / Appearance: Clear / S.005 / pH: x  Gluc: x / Ketone: Negative  / Bili: Negative / Urobili: Negative   Blood: x / Protein: Negative / Nitrite: Negative   Leuk Esterase: Negative / RBC: x / WBC x   Sq Epi: x / Non Sq Epi: x / Bacteria: x    Culture - Blood (23 @ 17:20)   Specimen Source: .Blood None   Culture Results:   No growth to date.   Culture - Urine (23 @ 17:03)   Specimen Source: Clean Catch None   Culture Results:   No growth   Culture - Blood (23 @ 17:03)   Specimen Source: .Blood None   Culture Results:   No growth to date.     Radiology: all available radiological tests reviewed      ACC: [id number] EXAM:  XR CHEST PA LAT 2V   ORDERED BY: EDIN PAULINO     PROCEDURE DATE:  2023          INTERPRETATION:  INDICATION: Sepsis    PA and lateral chest    COMPARISON: 2022    FINDINGS:  Heart/Vascular: The heart size, mediastinum, hilum and aorta are within   normal limits for projection.  Pulmonary: Midline trachea. There is no focal infiltrate, congestion or   effusion.  Elevated right diaphragm.  Bones: There is no fracture.  Lines and catheter: None    Impression:    No acute pulmonary disease.        Advanced directives addressed: full resuscitation
CC: Chills  History of Present Illness:   64 y/o M w/ PMH of obesity, spinal stenosis, hypothyroidism,  lumbar disc disorder, sleep apnea, preDM, HTN, GERD, hiatal hernia s/p repair, SBO, ESBL UTI on ertapenem via midline since previous admission, p/w chills. Patient states that for several days he has been having increasing urinary frequency again, and is associated with feeling chills w/ cold and hot flashes. States that he took his temperature day before yesterday and it was 100, and yesterday prior to arrival temperature was 99.9. Patient states he has been adherent to IV ertapenem at home. States he had 4 episodes of diarrhea day before yesterday, but no diarrhea yesterday at all. However, he feels like he's not getting any better. Denies cough, runny nose, abdominal pain. Denies noticing any abnormal skin changes around midline site    3/15:  Awake, alert, states he has some chronic pains but feeling better than when he arrived to ED.  Denies fever, chills, N, V, abd pain, CP, SOB.  23: Feels a bit better today.  23: some lower abd discomfort, otherwise stable  23: No new issues.   23: Patient seen and examined. Feels OK.       REVIEW OF SYSTEMS: All other review of systems is negative unless indicated above.      Vital Signs Last 24 Hrs  T(C): 36.6 (19 Mar 2023 08:41), Max: 36.7 (18 Mar 2023 16:56)  T(F): 97.9 (19 Mar 2023 08:41), Max: 98.1 (18 Mar 2023 21:30)  HR: 68 (19 Mar 2023 08:41) (59 - 68)  BP: 135/68 (19 Mar 2023 08:41) (130/72 - 135/68)  BP(mean): --  RR: 18 (19 Mar 2023 08:41) (18 - 18)  SpO2: 96% (19 Mar 2023 08:41) (94% - 96%)    Parameters below as of 19 Mar 2023 08:41  Patient On (Oxygen Delivery Method): room air            Physical Exam:   Physical Exam:  · Constitutional	well-groomed; no distress  · Eyes	PERRL; EOMI  · ENMT	neck  · Neck	supple; symmetric  · Respiratory	clear to auscultation bilaterally; no wheezes; no rales; no rhonchi; no respiratory distress  · Cardiovascular	regular rate and rhythm; S1 S2 present; no gallops; no rub; no murmur  · Gastrointestinal	soft; nontender; nondistended; normal active bowel sounds  · Neurological	sensation intact; responds to pain  · Skin	warm and dry  · Psychiatric	normal affect; alert and oriented x3; normal behavior      MEDICATIONS  (STANDING):  amLODIPine   Tablet 10 milliGRAM(s) Oral daily  ascorbic acid 500 milliGRAM(s) Oral daily  atenolol  Tablet 100 milliGRAM(s) Oral daily  atorvastatin 10 milliGRAM(s) Oral at bedtime  cholecalciferol 1000 Unit(s) Oral daily  dextrose 5%. 1000 milliLiter(s) (50 mL/Hr) IV Continuous <Continuous>  dextrose 5%. 1000 milliLiter(s) (100 mL/Hr) IV Continuous <Continuous>  dextrose 50% Injectable 25 Gram(s) IV Push once  dextrose 50% Injectable 12.5 Gram(s) IV Push once  dextrose 50% Injectable 25 Gram(s) IV Push once  DULoxetine 60 milliGRAM(s) Oral two times a day  ertapenem  IVPB 1000 milliGRAM(s) IV Intermittent every 24 hours  gabapentin 300 milliGRAM(s) Oral four times a day  glucagon  Injectable 1 milliGRAM(s) IntraMuscular once  heparin   Injectable 5000 Unit(s) SubCutaneous every 12 hours  insulin lispro (ADMELOG) corrective regimen sliding scale   SubCutaneous three times a day before meals  insulin lispro (ADMELOG) corrective regimen sliding scale   SubCutaneous at bedtime  levothyroxine 75 MICROGram(s) Oral daily  losartan 25 milliGRAM(s) Oral daily  multivitamin 1 Tablet(s) Oral daily  pantoprazole    Tablet 40 milliGRAM(s) Oral every 12 hours  tamsulosin 0.4 milliGRAM(s) Oral at bedtime    MEDICATIONS  (PRN):  dextrose Oral Gel 15 Gram(s) Oral once PRN Blood Glucose LESS THAN 70 milliGRAM(s)/deciliter  HYDROmorphone   Tablet 4 milliGRAM(s) Oral every 4 hours PRN Moderate Pain (4 - 6)  morphine  - Injectable 2 milliGRAM(s) IV Push every 6 hours PRN Moderate Pain (4 - 6)  sodium chloride 0.65% Nasal 1 Spray(s) Both Nostrils every 8 hours PRN Nasal Congestion  zolpidem 5 milliGRAM(s) Oral at bedtime PRN Insomnia                                15.0   8.00  )-----------( 145      ( 15 Mar 2023 07:58 )             43.3     15 Mar 2023 07:58    135    |  104    |  11     ----------------------------<  114    3.7     |  27     |  0.69     Ca    9.4        15 Mar 2023 07:58    TPro  8.0    /  Alb  3.7    /  TBili  1.0    /  DBili  x      /  AST  33     /  ALT  29     /  AlkPhos  102    15 Mar 2023 07:58    LIVER FUNCTIONS - ( 15 Mar 2023 07:58 )  Alb: 3.7 g/dL / Pro: 8.0 gm/dL / ALK PHOS: 102 U/L / ALT: 29 U/L / AST: 33 U/L / GGT: x           PT/INR - ( 15 Mar 2023 07:58 )   PT: 14.6 sec;   INR: 1.26 ratio         PTT - ( 15 Mar 2023 07:58 )  PTT:34.5 sec  CAPILLARY BLOOD GLUCOSE      POCT Blood Glucose.: 81 mg/dL (15 Mar 2023 22:29)        Urinalysis Basic - ( 14 Mar 2023 17:03 )    Color: Yellow / Appearance: Clear / S.005 / pH: x  Gluc: x / Ketone: Negative  / Bili: Negative / Urobili: Negative   Blood: x / Protein: Negative / Nitrite: Negative   Leuk Esterase: Negative / RBC: x / WBC x   Sq Epi: x / Non Sq Epi: x / Bacteria: x          Assessment/Plan:    64 y/o M w/ PMH of obesity, spinal stenosis, hypothyroidism,  lumbar disc disorder, sleep apnea, preDM, HTN, GERD, hiatal hernia s/p repair, SBO, ESBL UTI on ertapenem via midline since previous admission, p/w chills    *UTI w/ ESBL - has been receiving ertapenem via midline at home  -c/w ertapenem until 23  -f/u cultures, no growth  -monitor for worsening signs or symptoms     *Pre-DM  Outpatient follow up    *Elevated alk phos  -F/u outpatient     *H/o spinal stenosis / hypothyroidism / sleep apnea / HTN  / GERD   -C/w home meds and f/u outpatient for further management     *Advance Directives  -Denies having any advance care directives in place     *DVT ppx   -Heparin SubQ    Disposition: possibly on Tuesday    
CC: Chills  History of Present Illness:     64 y/o M w/ PMH of obesity, spinal stenosis, hypothyroidism,  lumbar disc disorder, sleep apnea, preDM, HTN, GERD, hiatal hernia s/p repair, SBO, ESBL UTI on ertapenem via midline since previous admission, p/w chills. Patient states that for several days he has been having increasing urinary frequency again, and is associated with feeling chills w/ cold and hot flashes. States that he took his temperature day before yesterday and it was 100, and yesterday prior to arrival temperature was 99.9. Patient states he has been adherent to IV ertapenem at home. States he had 4 episodes of diarrhea day before yesterday, but no diarrhea yesterday at all. However, he feels like he's not getting any better. Denies cough, runny nose, abdominal pain. Denies noticing any abnormal skin changes around midline site    3/15:  Awake, alert, states he has some chronic pains but feeling better than when he arrived to ED.  Denies fever, chills, N, V, abd pain, CP, SOB.      REVIEW OF SYSTEMS: All other review of systems is negative unless indicated above.      Vital Signs Last 24 Hrs  T(C): 36.6 (15 Mar 2023 08:27), Max: 37.2 (14 Mar 2023 16:06)  T(F): 97.9 (15 Mar 2023 08:27), Max: 99 (14 Mar 2023 16:06)  HR: 62 (15 Mar 2023 08:27) (59 - 64)  BP: 150/87 (15 Mar 2023 08:27) (150/87 - 162/84)  BP(mean): 104 (14 Mar 2023 23:54) (102 - 110)  RR: 18 (15 Mar 2023 08:27) (18 - 18)  SpO2: 99% (15 Mar 2023 08:27) (95% - 99%)    Parameters below as of 15 Mar 2023 08:27  Patient On (Oxygen Delivery Method): room air        Physical Exam:   Physical Exam:  · Constitutional  well-groomed; no distress    · Eyes  PERRL; EOMI    · ENMT  neck    · Neck  supple; symmetric    · Respiratory  clear to auscultation bilaterally; no wheezes; no rales; no rhonchi; no respiratory distress    · Cardiovascular  regular rate and rhythm; S1 S2 present; no gallops; no rub; no murmur    · Gastrointestinal  soft; nontender; nondistended; normal active bowel sounds    · Neurological  sensation intact; responds to pain    · Skin  warm and dry    · Psychiatric  normal affect; alert and oriented x3; normal behavior        MEDICATIONS  (STANDING):  amLODIPine   Tablet 10 milliGRAM(s) Oral daily  ascorbic acid 500 milliGRAM(s) Oral daily  atenolol  Tablet 100 milliGRAM(s) Oral daily  atorvastatin 10 milliGRAM(s) Oral at bedtime  cholecalciferol 1000 Unit(s) Oral daily  dextrose 5%. 1000 milliLiter(s) (50 mL/Hr) IV Continuous <Continuous>  dextrose 5%. 1000 milliLiter(s) (100 mL/Hr) IV Continuous <Continuous>  dextrose 50% Injectable 25 Gram(s) IV Push once  dextrose 50% Injectable 12.5 Gram(s) IV Push once  dextrose 50% Injectable 25 Gram(s) IV Push once  DULoxetine 60 milliGRAM(s) Oral two times a day  ertapenem  IVPB 1000 milliGRAM(s) IV Intermittent every 24 hours  gabapentin 300 milliGRAM(s) Oral four times a day  glucagon  Injectable 1 milliGRAM(s) IntraMuscular once  heparin   Injectable 5000 Unit(s) SubCutaneous every 12 hours  insulin lispro (ADMELOG) corrective regimen sliding scale   SubCutaneous three times a day before meals  insulin lispro (ADMELOG) corrective regimen sliding scale   SubCutaneous at bedtime  levothyroxine 75 MICROGram(s) Oral daily  losartan 25 milliGRAM(s) Oral daily  multivitamin 1 Tablet(s) Oral daily  pantoprazole    Tablet 40 milliGRAM(s) Oral every 12 hours  tamsulosin 0.4 milliGRAM(s) Oral at bedtime    MEDICATIONS  (PRN):  dextrose Oral Gel 15 Gram(s) Oral once PRN Blood Glucose LESS THAN 70 milliGRAM(s)/deciliter  HYDROmorphone   Tablet 4 milliGRAM(s) Oral every 4 hours PRN Moderate Pain (4 - 6)  morphine  - Injectable 2 milliGRAM(s) IV Push every 6 hours PRN Moderate Pain (4 - 6)  sodium chloride 0.65% Nasal 1 Spray(s) Both Nostrils every 8 hours PRN Nasal Congestion  zolpidem 5 milliGRAM(s) Oral at bedtime PRN Insomnia                                15.0   8.00  )-----------( 145      ( 15 Mar 2023 07:58 )             43.3     03-15    135  |  104  |  11  ----------------------------<  114<H>  3.7   |  27  |  0.69    Ca    9.4      15 Mar 2023 07:58    TPro  8.0  /  Alb  3.7  /  TBili  1.0  /  DBili  x   /  AST  33  /  ALT  29  /  AlkPhos  102  03-15    CAPILLARY BLOOD GLUCOSE      POCT Blood Glucose.: 116 mg/dL (15 Mar 2023 11:43)  POCT Blood Glucose.: 85 mg/dL (15 Mar 2023 04:58)    LIVER FUNCTIONS - ( 15 Mar 2023 07:58 )  Alb: 3.7 g/dL / Pro: 8.0 gm/dL / ALK PHOS: 102 U/L / ALT: 29 U/L / AST: 33 U/L / GGT: x           PT/INR - ( 15 Mar 2023 07:58 )   PT: 14.6 sec;   INR: 1.26 ratio         PTT - ( 15 Mar 2023 07:58 )  PTT:34.5 sec  Urinalysis Basic - ( 14 Mar 2023 17:03 )    Color: Yellow / Appearance: Clear / S.005 / pH: x  Gluc: x / Ketone: Negative  / Bili: Negative / Urobili: Negative   Blood: x / Protein: Negative / Nitrite: Negative   Leuk Esterase: Negative / RBC: x / WBC x   Sq Epi: x / Non Sq Epi: x / Bacteria: x            Assessment/Plan:    64 y/o M w/ PMH of obesity, spinal stenosis, hypothyroidism,  lumbar disc disorder, sleep apnea, preDM, HTN, GERD, hiatal hernia s/p repair, SBO, ESBL UTI on ertapenem via midline since previous admission, p/w chills    *UTI w/ ESBL - has been receiving ertapenem via midline at home  -c/w ertapenem  -f/u cultures  -monitor for worsening signs or symptoms     *Pre-DM  -Humalog ISS   -Diabetic diet     *Elevated alk phos  -F/u outpatient     *H/o spinal stenosis / hypothyroidism / sleep apnea / HTN  / GERD   -C/w home meds and f/u outpatient for further management     *Advance Directives  -Denies having any advance care directives in place     *DVT ppx   -Heparin SubQ      
CC: Chills  History of Present Illness:   62 y/o M w/ PMH of obesity, spinal stenosis, hypothyroidism,  lumbar disc disorder, sleep apnea, preDM, HTN, GERD, hiatal hernia s/p repair, SBO, ESBL UTI on ertapenem via midline since previous admission, p/w chills. Patient states that for several days he has been having increasing urinary frequency again, and is associated with feeling chills w/ cold and hot flashes. States that he took his temperature day before yesterday and it was 100, and yesterday prior to arrival temperature was 99.9. Patient states he has been adherent to IV ertapenem at home. States he had 4 episodes of diarrhea day before yesterday, but no diarrhea yesterday at all. However, he feels like he's not getting any better. Denies cough, runny nose, abdominal pain. Denies noticing any abnormal skin changes around midline site    3/15:  Awake, alert, states he has some chronic pains but feeling better than when he arrived to ED.  Denies fever, chills, N, V, abd pain, CP, SOB.  23: Feels a bit better today.  23: some lower abd discomfort, otherwise stable  23: No new issues.       REVIEW OF SYSTEMS: All other review of systems is negative unless indicated above.      Vital Signs Last 24 Hrs  T(C): 36.6 (18 Mar 2023 08:02), Max: 37.1 (17 Mar 2023 16:45)  T(F): 97.8 (18 Mar 2023 08:02), Max: 98.7 (17 Mar 2023 16:45)  HR: 68 (18 Mar 2023 08:02) (61 - 68)  BP: 127/81 (18 Mar 2023 08:02) (127/81 - 150/87)  BP(mean): --  RR: 18 (18 Mar 2023 08:02) (18 - 18)  SpO2: 94% (18 Mar 2023 08:02) (94% - 96%)    Parameters below as of 18 Mar 2023 08:02  Patient On (Oxygen Delivery Method): room air            Physical Exam:   Physical Exam:  · Constitutional	well-groomed; no distress  · Eyes	PERRL; EOMI  · ENMT	neck  · Neck	supple; symmetric  · Respiratory	clear to auscultation bilaterally; no wheezes; no rales; no rhonchi; no respiratory distress  · Cardiovascular	regular rate and rhythm; S1 S2 present; no gallops; no rub; no murmur  · Gastrointestinal	soft; nontender; nondistended; normal active bowel sounds  · Neurological	sensation intact; responds to pain  · Skin	warm and dry  · Psychiatric	normal affect; alert and oriented x3; normal behavior      MEDICATIONS  (STANDING):  amLODIPine   Tablet 10 milliGRAM(s) Oral daily  ascorbic acid 500 milliGRAM(s) Oral daily  atenolol  Tablet 100 milliGRAM(s) Oral daily  atorvastatin 10 milliGRAM(s) Oral at bedtime  cholecalciferol 1000 Unit(s) Oral daily  dextrose 5%. 1000 milliLiter(s) (50 mL/Hr) IV Continuous <Continuous>  dextrose 5%. 1000 milliLiter(s) (100 mL/Hr) IV Continuous <Continuous>  dextrose 50% Injectable 25 Gram(s) IV Push once  dextrose 50% Injectable 12.5 Gram(s) IV Push once  dextrose 50% Injectable 25 Gram(s) IV Push once  DULoxetine 60 milliGRAM(s) Oral two times a day  ertapenem  IVPB 1000 milliGRAM(s) IV Intermittent every 24 hours  gabapentin 300 milliGRAM(s) Oral four times a day  glucagon  Injectable 1 milliGRAM(s) IntraMuscular once  heparin   Injectable 5000 Unit(s) SubCutaneous every 12 hours  insulin lispro (ADMELOG) corrective regimen sliding scale   SubCutaneous three times a day before meals  insulin lispro (ADMELOG) corrective regimen sliding scale   SubCutaneous at bedtime  levothyroxine 75 MICROGram(s) Oral daily  losartan 25 milliGRAM(s) Oral daily  multivitamin 1 Tablet(s) Oral daily  pantoprazole    Tablet 40 milliGRAM(s) Oral every 12 hours  tamsulosin 0.4 milliGRAM(s) Oral at bedtime    MEDICATIONS  (PRN):  dextrose Oral Gel 15 Gram(s) Oral once PRN Blood Glucose LESS THAN 70 milliGRAM(s)/deciliter  HYDROmorphone   Tablet 4 milliGRAM(s) Oral every 4 hours PRN Moderate Pain (4 - 6)  morphine  - Injectable 2 milliGRAM(s) IV Push every 6 hours PRN Moderate Pain (4 - 6)  sodium chloride 0.65% Nasal 1 Spray(s) Both Nostrils every 8 hours PRN Nasal Congestion  zolpidem 5 milliGRAM(s) Oral at bedtime PRN Insomnia                                15.0   8.00  )-----------( 145      ( 15 Mar 2023 07:58 )             43.3     15 Mar 2023 07:58    135    |  104    |  11     ----------------------------<  114    3.7     |  27     |  0.69     Ca    9.4        15 Mar 2023 07:58    TPro  8.0    /  Alb  3.7    /  TBili  1.0    /  DBili  x      /  AST  33     /  ALT  29     /  AlkPhos  102    15 Mar 2023 07:58    LIVER FUNCTIONS - ( 15 Mar 2023 07:58 )  Alb: 3.7 g/dL / Pro: 8.0 gm/dL / ALK PHOS: 102 U/L / ALT: 29 U/L / AST: 33 U/L / GGT: x           PT/INR - ( 15 Mar 2023 07:58 )   PT: 14.6 sec;   INR: 1.26 ratio         PTT - ( 15 Mar 2023 07:58 )  PTT:34.5 sec  CAPILLARY BLOOD GLUCOSE      POCT Blood Glucose.: 81 mg/dL (15 Mar 2023 22:29)        Urinalysis Basic - ( 14 Mar 2023 17:03 )    Color: Yellow / Appearance: Clear / S.005 / pH: x  Gluc: x / Ketone: Negative  / Bili: Negative / Urobili: Negative   Blood: x / Protein: Negative / Nitrite: Negative   Leuk Esterase: Negative / RBC: x / WBC x   Sq Epi: x / Non Sq Epi: x / Bacteria: x          Assessment/Plan:    62 y/o M w/ PMH of obesity, spinal stenosis, hypothyroidism,  lumbar disc disorder, sleep apnea, preDM, HTN, GERD, hiatal hernia s/p repair, SBO, ESBL UTI on ertapenem via midline since previous admission, p/w chills    *UTI w/ ESBL - has been receiving ertapenem via midline at home  -c/w ertapenem until 23  -f/u cultures, no growth  -monitor for worsening signs or symptoms     *Pre-DM  Outpatient follow up    *Elevated alk phos  -F/u outpatient     *H/o spinal stenosis / hypothyroidism / sleep apnea / HTN  / GERD   -C/w home meds and f/u outpatient for further management     *Advance Directives  -Denies having any advance care directives in place     *DVT ppx   -Heparin SubQ    
CC: Chills  History of Present Illness:   64 y/o M w/ PMH of obesity, spinal stenosis, hypothyroidism,  lumbar disc disorder, sleep apnea, pre DM, HTN, GERD, hiatal hernia s/p repair, SBO,  ESBL UTI on ertapenem via midline since previous admission, p/w chills. Patient states that for several days he has been having increasing urinary frequency again, and is associated with feeling chills w/ cold and hot flashes. States that he took his temperature day before yesterday and it was 100, and yesterday prior to arrival temperature was 99.9. Patient states he has been adherent to IV ertapenem at home. States he had 4 episodes of diarrhea day before yesterday, but no diarrhea yesterday at all. However, he feels like he's not getting any better. Denies cough, runny nose, abdominal pain. Denies noticing any abnormal skin changes around midline site.     3/15:  Awake, alert, states he has some chronic pains but feeling better than when he arrived to ED.  Denies fever, chills, N, V, abd pain, CP, SOB.  23: Feels a bit better today.  23: some lower abd discomfort, otherwise stable  23: No new issues.   23: Patient seen and examined. Feels OK.   23: Tolerating IV invanz. No new issues.       REVIEW OF SYSTEMS: All other review of systems is negative unless indicated above.      Vital Signs Last 24 Hrs  T(C): 36.7 (20 Mar 2023 09:21), Max: 36.7 (20 Mar 2023 09:21)  T(F): 98 (20 Mar 2023 09:21), Max: 98 (20 Mar 2023 09:21)  HR: 56 (20 Mar 2023 09:21) (56 - 65)  BP: 137/99 (20 Mar 2023 09:21) (137/99 - 158/97)  BP(mean): 113 (19 Mar 2023 20:00) (113 - 113)  RR: 18 (19 Mar 2023 20:00) (18 - 18)  SpO2: 98% (20 Mar 2023 09:21) (98% - 98%)    Parameters below as of 20 Mar 2023 09:21  Patient On (Oxygen Delivery Method): room air            Physical Exam:   Physical Exam:  · Constitutional	well-groomed; no distress  · Eyes	PERRL; EOMI  · ENMT	neck  · Neck	supple; symmetric  · Respiratory	clear to auscultation bilaterally; no wheezes; no rales; no rhonchi; no respiratory distress  · Cardiovascular	regular rate and rhythm; S1 S2 present; no gallops; no rub; no murmur  · Gastrointestinal	soft; nontender; nondistended; normal active bowel sounds  · Neurological	sensation intact; responds to pain  · Skin	warm and dry  · Psychiatric	normal affect; alert and oriented x3; normal behavior      MEDICATIONS  (STANDING):  amLODIPine   Tablet 10 milliGRAM(s) Oral daily  ascorbic acid 500 milliGRAM(s) Oral daily  atenolol  Tablet 100 milliGRAM(s) Oral daily  atorvastatin 10 milliGRAM(s) Oral at bedtime  cholecalciferol 1000 Unit(s) Oral daily  dextrose 5%. 1000 milliLiter(s) (50 mL/Hr) IV Continuous <Continuous>  dextrose 5%. 1000 milliLiter(s) (100 mL/Hr) IV Continuous <Continuous>  dextrose 50% Injectable 25 Gram(s) IV Push once  dextrose 50% Injectable 12.5 Gram(s) IV Push once  dextrose 50% Injectable 25 Gram(s) IV Push once  DULoxetine 60 milliGRAM(s) Oral two times a day  ertapenem  IVPB 1000 milliGRAM(s) IV Intermittent every 24 hours  gabapentin 300 milliGRAM(s) Oral four times a day  glucagon  Injectable 1 milliGRAM(s) IntraMuscular once  heparin   Injectable 5000 Unit(s) SubCutaneous every 12 hours  insulin lispro (ADMELOG) corrective regimen sliding scale   SubCutaneous three times a day before meals  insulin lispro (ADMELOG) corrective regimen sliding scale   SubCutaneous at bedtime  levothyroxine 75 MICROGram(s) Oral daily  losartan 25 milliGRAM(s) Oral daily  multivitamin 1 Tablet(s) Oral daily  pantoprazole    Tablet 40 milliGRAM(s) Oral every 12 hours  tamsulosin 0.4 milliGRAM(s) Oral at bedtime    MEDICATIONS  (PRN):  dextrose Oral Gel 15 Gram(s) Oral once PRN Blood Glucose LESS THAN 70 milliGRAM(s)/deciliter  HYDROmorphone   Tablet 4 milliGRAM(s) Oral every 4 hours PRN Moderate Pain (4 - 6)  morphine  - Injectable 2 milliGRAM(s) IV Push every 6 hours PRN Moderate Pain (4 - 6)  sodium chloride 0.65% Nasal 1 Spray(s) Both Nostrils every 8 hours PRN Nasal Congestion  zolpidem 5 milliGRAM(s) Oral at bedtime PRN Insomnia                                15.0   8.00  )-----------( 145      ( 15 Mar 2023 07:58 )             43.3     15 Mar 2023 07:58    135    |  104    |  11     ----------------------------<  114    3.7     |  27     |  0.69     Ca    9.4        15 Mar 2023 07:58    TPro  8.0    /  Alb  3.7    /  TBili  1.0    /  DBili  x      /  AST  33     /  ALT  29     /  AlkPhos  102    15 Mar 2023 07:58    LIVER FUNCTIONS - ( 15 Mar 2023 07:58 )  Alb: 3.7 g/dL / Pro: 8.0 gm/dL / ALK PHOS: 102 U/L / ALT: 29 U/L / AST: 33 U/L / GGT: x           PT/INR - ( 15 Mar 2023 07:58 )   PT: 14.6 sec;   INR: 1.26 ratio         PTT - ( 15 Mar 2023 07:58 )  PTT:34.5 sec  CAPILLARY BLOOD GLUCOSE      POCT Blood Glucose.: 81 mg/dL (15 Mar 2023 22:29)        Urinalysis Basic - ( 14 Mar 2023 17:03 )    Color: Yellow / Appearance: Clear / S.005 / pH: x  Gluc: x / Ketone: Negative  / Bili: Negative / Urobili: Negative   Blood: x / Protein: Negative / Nitrite: Negative   Leuk Esterase: Negative / RBC: x / WBC x   Sq Epi: x / Non Sq Epi: x / Bacteria: x          Assessment/Plan:    64 y/o M w/ PMH of obesity, spinal stenosis, hypothyroidism,  lumbar disc disorder, sleep apnea, preDM, HTN, GERD, hiatal hernia s/p repair, SBO, ESBL UTI on ertapenem via midline since previous admission, p/w chills    *UTI w/ ESBL - has been receiving ertapenem via midline at home  -c/w ertapenem until 23  -f/u cultures, no growth  -monitor for worsening signs or symptoms     *Pre-DM  Outpatient follow up    *Elevated alk phos  -F/u outpatient     *H/o spinal stenosis / hypothyroidism / sleep apnea / HTN  / GERD   -C/w home meds and f/u outpatient for further management     *Advance Directives  -Denies having any advance care directives in place     *DVT ppx   -Heparin SubQ    Disposition: possibly tomorrow    
CC: Chills  History of Present Illness:   64 y/o M w/ PMH of obesity, spinal stenosis, hypothyroidism,  lumbar disc disorder, sleep apnea, preDM, HTN, GERD, hiatal hernia s/p repair, SBO, ESBL UTI on ertapenem via midline since previous admission, p/w chills. Patient states that for several days he has been having increasing urinary frequency again, and is associated with feeling chills w/ cold and hot flashes. States that he took his temperature day before yesterday and it was 100, and yesterday prior to arrival temperature was 99.9. Patient states he has been adherent to IV ertapenem at home. States he had 4 episodes of diarrhea day before yesterday, but no diarrhea yesterday at all. However, he feels like he's not getting any better. Denies cough, runny nose, abdominal pain. Denies noticing any abnormal skin changes around midline site    3/15:  Awake, alert, states he has some chronic pains but feeling better than when he arrived to ED.  Denies fever, chills, N, V, abd pain, CP, SOB.  23: Feels a bit better today      REVIEW OF SYSTEMS: All other review of systems is negative unless indicated above.      Vital Signs Last 24 Hrs  T(C): 36.6 (15 Mar 2023 08:27), Max: 37.2 (14 Mar 2023 16:06)  T(F): 97.9 (15 Mar 2023 08:27), Max: 99 (14 Mar 2023 16:06)  HR: 62 (15 Mar 2023 08:27) (59 - 64)  BP: 150/87 (15 Mar 2023 08:27) (150/87 - 162/84)  BP(mean): 104 (14 Mar 2023 23:54) (102 - 110)  RR: 18 (15 Mar 2023 08:27) (18 - 18)  SpO2: 99% (15 Mar 2023 08:27) (95% - 99%)    Parameters below as of 15 Mar 2023 08:27  Patient On (Oxygen Delivery Method): room air        Physical Exam:   Physical Exam:  · Constitutional	well-groomed; no distress  · Eyes	PERRL; EOMI  · ENMT	neck  · Neck	supple; symmetric  · Respiratory	clear to auscultation bilaterally; no wheezes; no rales; no rhonchi; no respiratory distress  · Cardiovascular	regular rate and rhythm; S1 S2 present; no gallops; no rub; no murmur  · Gastrointestinal	soft; nontender; nondistended; normal active bowel sounds  · Neurological	sensation intact; responds to pain  · Skin	warm and dry  · Psychiatric	normal affect; alert and oriented x3; normal behavior      MEDICATIONS  (STANDING):  amLODIPine   Tablet 10 milliGRAM(s) Oral daily  ascorbic acid 500 milliGRAM(s) Oral daily  atenolol  Tablet 100 milliGRAM(s) Oral daily  atorvastatin 10 milliGRAM(s) Oral at bedtime  cholecalciferol 1000 Unit(s) Oral daily  dextrose 5%. 1000 milliLiter(s) (50 mL/Hr) IV Continuous <Continuous>  dextrose 5%. 1000 milliLiter(s) (100 mL/Hr) IV Continuous <Continuous>  dextrose 50% Injectable 25 Gram(s) IV Push once  dextrose 50% Injectable 12.5 Gram(s) IV Push once  dextrose 50% Injectable 25 Gram(s) IV Push once  DULoxetine 60 milliGRAM(s) Oral two times a day  ertapenem  IVPB 1000 milliGRAM(s) IV Intermittent every 24 hours  gabapentin 300 milliGRAM(s) Oral four times a day  glucagon  Injectable 1 milliGRAM(s) IntraMuscular once  heparin   Injectable 5000 Unit(s) SubCutaneous every 12 hours  insulin lispro (ADMELOG) corrective regimen sliding scale   SubCutaneous three times a day before meals  insulin lispro (ADMELOG) corrective regimen sliding scale   SubCutaneous at bedtime  levothyroxine 75 MICROGram(s) Oral daily  losartan 25 milliGRAM(s) Oral daily  multivitamin 1 Tablet(s) Oral daily  pantoprazole    Tablet 40 milliGRAM(s) Oral every 12 hours  tamsulosin 0.4 milliGRAM(s) Oral at bedtime    MEDICATIONS  (PRN):  dextrose Oral Gel 15 Gram(s) Oral once PRN Blood Glucose LESS THAN 70 milliGRAM(s)/deciliter  HYDROmorphone   Tablet 4 milliGRAM(s) Oral every 4 hours PRN Moderate Pain (4 - 6)  morphine  - Injectable 2 milliGRAM(s) IV Push every 6 hours PRN Moderate Pain (4 - 6)  sodium chloride 0.65% Nasal 1 Spray(s) Both Nostrils every 8 hours PRN Nasal Congestion  zolpidem 5 milliGRAM(s) Oral at bedtime PRN Insomnia                                15.0   8.00  )-----------( 145      ( 15 Mar 2023 07:58 )             43.3     15 Mar 2023 07:58    135    |  104    |  11     ----------------------------<  114    3.7     |  27     |  0.69     Ca    9.4        15 Mar 2023 07:58    TPro  8.0    /  Alb  3.7    /  TBili  1.0    /  DBili  x      /  AST  33     /  ALT  29     /  AlkPhos  102    15 Mar 2023 07:58    LIVER FUNCTIONS - ( 15 Mar 2023 07:58 )  Alb: 3.7 g/dL / Pro: 8.0 gm/dL / ALK PHOS: 102 U/L / ALT: 29 U/L / AST: 33 U/L / GGT: x           PT/INR - ( 15 Mar 2023 07:58 )   PT: 14.6 sec;   INR: 1.26 ratio         PTT - ( 15 Mar 2023 07:58 )  PTT:34.5 sec  CAPILLARY BLOOD GLUCOSE      POCT Blood Glucose.: 81 mg/dL (15 Mar 2023 22:29)        Urinalysis Basic - ( 14 Mar 2023 17:03 )    Color: Yellow / Appearance: Clear / S.005 / pH: x  Gluc: x / Ketone: Negative  / Bili: Negative / Urobili: Negative   Blood: x / Protein: Negative / Nitrite: Negative   Leuk Esterase: Negative / RBC: x / WBC x   Sq Epi: x / Non Sq Epi: x / Bacteria: x          Assessment/Plan:    64 y/o M w/ PMH of obesity, spinal stenosis, hypothyroidism,  lumbar disc disorder, sleep apnea, preDM, HTN, GERD, hiatal hernia s/p repair, SBO, ESBL UTI on ertapenem via midline since previous admission, p/w chills    *UTI w/ ESBL - has been receiving ertapenem via midline at home  -c/w ertapenem  -f/u cultures, no growth  -monitor for worsening signs or symptoms     *Pre-DM  Outpatient follow up    *Elevated alk phos  -F/u outpatient     *H/o spinal stenosis / hypothyroidism / sleep apnea / HTN  / GERD   -C/w home meds and f/u outpatient for further management     *Advance Directives  -Denies having any advance care directives in place     *DVT ppx   -Heparin SubQ

## 2023-03-21 ENCOUNTER — TRANSCRIPTION ENCOUNTER (OUTPATIENT)
Age: 63
End: 2023-03-21

## 2023-03-21 VITALS
DIASTOLIC BLOOD PRESSURE: 60 MMHG | SYSTOLIC BLOOD PRESSURE: 110 MMHG | TEMPERATURE: 98 F | HEART RATE: 61 BPM | OXYGEN SATURATION: 97 %

## 2023-03-21 PROCEDURE — 99239 HOSP IP/OBS DSCHRG MGMT >30: CPT

## 2023-03-21 RX ORDER — HYOSCYAMINE SULFATE 0.13 MG
1 TABLET ORAL
Qty: 0 | Refills: 0 | DISCHARGE

## 2023-03-21 RX ADMIN — ERTAPENEM SODIUM 120 MILLIGRAM(S): 1 INJECTION, POWDER, LYOPHILIZED, FOR SOLUTION INTRAMUSCULAR; INTRAVENOUS at 11:20

## 2023-03-21 RX ADMIN — HEPARIN SODIUM 5000 UNIT(S): 5000 INJECTION INTRAVENOUS; SUBCUTANEOUS at 09:04

## 2023-03-21 RX ADMIN — DULOXETINE HYDROCHLORIDE 60 MILLIGRAM(S): 30 CAPSULE, DELAYED RELEASE ORAL at 09:04

## 2023-03-21 RX ADMIN — HYDROMORPHONE HYDROCHLORIDE 4 MILLIGRAM(S): 2 INJECTION INTRAMUSCULAR; INTRAVENOUS; SUBCUTANEOUS at 05:10

## 2023-03-21 RX ADMIN — GABAPENTIN 300 MILLIGRAM(S): 400 CAPSULE ORAL at 05:10

## 2023-03-21 RX ADMIN — HYDROMORPHONE HYDROCHLORIDE 4 MILLIGRAM(S): 2 INJECTION INTRAMUSCULAR; INTRAVENOUS; SUBCUTANEOUS at 06:06

## 2023-03-21 RX ADMIN — HYDROMORPHONE HYDROCHLORIDE 4 MILLIGRAM(S): 2 INJECTION INTRAMUSCULAR; INTRAVENOUS; SUBCUTANEOUS at 09:03

## 2023-03-21 RX ADMIN — Medication 500 MILLIGRAM(S): at 09:04

## 2023-03-21 RX ADMIN — Medication 1 TABLET(S): at 09:04

## 2023-03-21 RX ADMIN — LOSARTAN POTASSIUM 25 MILLIGRAM(S): 100 TABLET, FILM COATED ORAL at 09:03

## 2023-03-21 RX ADMIN — AMLODIPINE BESYLATE 10 MILLIGRAM(S): 2.5 TABLET ORAL at 09:04

## 2023-03-21 RX ADMIN — Medication 75 MICROGRAM(S): at 05:10

## 2023-03-21 RX ADMIN — HYDROMORPHONE HYDROCHLORIDE 4 MILLIGRAM(S): 2 INJECTION INTRAMUSCULAR; INTRAVENOUS; SUBCUTANEOUS at 09:20

## 2023-03-21 RX ADMIN — Medication 1000 UNIT(S): at 09:03

## 2023-03-21 RX ADMIN — ATENOLOL 100 MILLIGRAM(S): 25 TABLET ORAL at 09:03

## 2023-03-21 RX ADMIN — PANTOPRAZOLE SODIUM 40 MILLIGRAM(S): 20 TABLET, DELAYED RELEASE ORAL at 09:03

## 2023-03-21 RX ADMIN — HYDROMORPHONE HYDROCHLORIDE 4 MILLIGRAM(S): 2 INJECTION INTRAMUSCULAR; INTRAVENOUS; SUBCUTANEOUS at 00:00

## 2023-03-21 NOTE — DISCHARGE NOTE PROVIDER - NSDCMRMEDTOKEN_GEN_ALL_CORE_FT
Ambien 5 mg oral tablet: 1 tab(s) orally once a day (at bedtime), As Needed  amLODIPine 10 mg oral tablet: 1 tab(s) orally once a day, am  ascorbic acid 500 mg oral tablet: 1 tab(s) orally once a day  atenolol 100 mg oral tablet: 1 tab(s) orally once a day, am  atorvastatin 10 mg oral tablet: 1 tab(s) orally once a day (in the evening)  B Complex 50 oral tablet, extended release: 1 tab(s) orally once a day  cholecalciferol 25 mcg (1000 intl units) oral tablet: 1 tab(s) orally once a day  Dilaudid 4 mg oral tablet: 1 tab(s) orally every 3-4 hours, As Needed for moderate to severe pain MDD:8  DULoxetine 60 mg oral delayed release capsule: 1 cap(s) orally 2 times a day  gabapentin 300 mg oral tablet: 1 cap(s) orally 4 times a day  levothyroxine 75 mcg (0.075 mg) oral tablet: 1 tab(s) orally once a day, am  losartan 25 mg oral tablet: 1 tab(s) orally once a day, am  metFORMIN 1000 mg oral tablet: 1 tab(s) orally 2 times a day  Multiple Vitamins oral tablet: 1 tab(s) orally once a day  pantoprazole 40 mg oral delayed release tablet: 1 tab(s) orally 2 times a day  sodium chloride 0.65% nasal spray: 1 spray(s) nasal every 2 minutes, As Needed for nasal congestion  tamsulosin 0.4 mg oral capsule: 1 cap(s) orally once a day (in the evening)  tiZANidine 4 mg oral capsule: 1 cap(s) orally 2 times a day, As Needed

## 2023-03-21 NOTE — DISCHARGE NOTE PROVIDER - NSDCCPCAREPLAN_GEN_ALL_CORE_FT
PRINCIPAL DISCHARGE DIAGNOSIS  Diagnosis: Urinary tract infection due to ESBL Klebsiella  Assessment and Plan of Treatment: Completed IV antibiotic. Follow up with PMD

## 2023-03-21 NOTE — DISCHARGE NOTE NURSING/CASE MANAGEMENT/SOCIAL WORK - NSDCPEFALRISK_GEN_ALL_CORE
For information on Fall & Injury Prevention, visit: [email or web address] OR  [email or web address] OR  https://www.cdc.gov/steadi/patient.html

## 2023-03-21 NOTE — DISCHARGE NOTE NURSING/CASE MANAGEMENT/SOCIAL WORK - PATIENT PORTAL LINK FT
You can access the FollowMyHealth Patient Portal offered by [facility] by registering at the following website: [email or web address]. By joining [employer]’s FollowMyHealth portal, you will also be able to view your health information using other applications (apps) compatible with our system.

## 2023-03-21 NOTE — DISCHARGE NOTE PROVIDER - NSDCFUSCHEDAPPT_GEN_ALL_CORE_FT
Rex Dent  [facility] Physician [facility] [phone]  Scheduled Appointment: 04/06/2023

## 2023-03-23 DIAGNOSIS — R19.7 DIARRHEA, UNSPECIFIED: ICD-10-CM

## 2023-03-23 DIAGNOSIS — R74.8 ABNORMAL LEVELS OF OTHER SERUM ENZYMES: ICD-10-CM

## 2023-03-23 DIAGNOSIS — Z79.899 OTHER LONG TERM (CURRENT) DRUG THERAPY: ICD-10-CM

## 2023-03-23 DIAGNOSIS — Z90.49 ACQUIRED ABSENCE OF OTHER SPECIFIED PARTS OF DIGESTIVE TRACT: ICD-10-CM

## 2023-03-23 DIAGNOSIS — N41.0 ACUTE PROSTATITIS: ICD-10-CM

## 2023-03-23 DIAGNOSIS — Z79.84 LONG TERM (CURRENT) USE OF ORAL HYPOGLYCEMIC DRUGS: ICD-10-CM

## 2023-03-23 DIAGNOSIS — I10 ESSENTIAL (PRIMARY) HYPERTENSION: ICD-10-CM

## 2023-03-23 DIAGNOSIS — E03.9 HYPOTHYROIDISM, UNSPECIFIED: ICD-10-CM

## 2023-03-23 DIAGNOSIS — E66.9 OBESITY, UNSPECIFIED: ICD-10-CM

## 2023-03-23 DIAGNOSIS — K21.9 GASTRO-ESOPHAGEAL REFLUX DISEASE WITHOUT ESOPHAGITIS: ICD-10-CM

## 2023-03-23 DIAGNOSIS — G47.33 OBSTRUCTIVE SLEEP APNEA (ADULT) (PEDIATRIC): ICD-10-CM

## 2023-03-23 DIAGNOSIS — Z98.1 ARTHRODESIS STATUS: ICD-10-CM

## 2023-03-23 DIAGNOSIS — B96.1 KLEBSIELLA PNEUMONIAE [K. PNEUMONIAE] AS THE CAUSE OF DISEASES CLASSIFIED ELSEWHERE: ICD-10-CM

## 2023-03-23 DIAGNOSIS — N39.0 URINARY TRACT INFECTION, SITE NOT SPECIFIED: ICD-10-CM

## 2023-03-23 DIAGNOSIS — Z98.890 OTHER SPECIFIED POSTPROCEDURAL STATES: ICD-10-CM

## 2023-03-23 DIAGNOSIS — N41.1 CHRONIC PROSTATITIS: ICD-10-CM

## 2023-03-23 DIAGNOSIS — Z16.12 EXTENDED SPECTRUM BETA LACTAMASE (ESBL) RESISTANCE: ICD-10-CM

## 2023-03-23 DIAGNOSIS — R73.03 PREDIABETES: ICD-10-CM

## 2023-04-06 ENCOUNTER — APPOINTMENT (OUTPATIENT)
Dept: THORACIC SURGERY | Facility: CLINIC | Age: 63
End: 2023-04-06

## 2023-04-06 NOTE — DISCHARGE NOTE PROVIDER - HOSPITAL COURSE
External Cooling Fan Speed: 0 62 y/o M w/ PMH of obesity, spinal stenosis, hypothyroidism,  lumbar disc disorder, sleep apnea, pre DM, HTN, GERD, hiatal hernia s/p repair, SBO,  ESBL UTI on ertapenem via midline since previous admission, p/w chills. Patient states that for several days he has been having increasing urinary frequency again, and is associated with feeling chills w/ cold and hot flashes. States that he took his temperature day before yesterday and it was 100, and yesterday prior to arrival temperature was 99.9. Patient states he has been adherent to IV ertapenem at home. States he had 4 episodes of diarrhea day before yesterday, but no diarrhea yesterday at all. However, he feels like he's not getting any better. Denies cough, runny nose, abdominal pain. Denies noticing any abnormal skin changes around midline site.    He was admitted to medical floor. He was continued with his Invanz via midline. He was seen by ID Dr Carter. Blood cultures and urine cultures sent from ED and were no growth. He started feeling better. His lower abd pain and diarrhea improved. Discussed with patient and also wife multiple times regarding management and d/c plan. Discussed with Dr Mon, completing his IV Invanz today. Stable for discharge.       Vital Signs Last 24 Hrs  T(C): 36.7 (21 Mar 2023 09:03), Max: 36.8 (20 Mar 2023 21:05)  T(F): 98 (21 Mar 2023 09:03), Max: 98.2 (20 Mar 2023 21:05)  HR: 61 (21 Mar 2023 09:03) (56 - 63)  BP: 110/60 (21 Mar 2023 09:03) (110/60 - 151/93)  BP(mean): 106 (20 Mar 2023 21:05) (106 - 106)  RR: 18 (20 Mar 2023 21:05) (18 - 18)  SpO2: 97% (21 Mar 2023 09:03) (96% - 97%)    Parameters below as of 21 Mar 2023 09:03  Patient On (Oxygen Delivery Method): room air          Physical Exam:  · Constitutional  well-groomed; no distress  · Eyes  PERRL; EOMI  · ENMT  neck  · Neck  supple; symmetric  · Respiratory  clear to auscultation bilaterally; no wheezes; no rales; no rhonchi; no respiratory distress  · Cardiovascular  regular rate and rhythm; S1 S2 present; no gallops; no rub; no murmur  · Gastrointestinal  soft; nontender; nondistended; normal active bowel sounds  · Neurological  sensation intact; responds to pain  · Skin  warm and dry          Assessment/Plan:    62 y/o M w/ PMH of obesity, spinal stenosis, hypothyroidism,  lumbar disc disorder, sleep apnea, preDM, HTN, GERD, hiatal hernia s/p repair, SBO, ESBL UTI on ertapenem via midline since previous admission, p/w chills    *UTI w/ ESBL - has been receiving ertapenem via midline at home  -Last dose ertapenem today  -f/u cultures, no growth    *Pre-DM  Outpatient follow up    *Elevated alk phos  -F/u outpatient     *H/o spinal stenosis / hypothyroidism / sleep apnea / HTN  / GERD   -C/w home meds and f/u outpatient for further management     Home today  Spent more than 30 min to prepare the discharge

## 2023-04-19 ENCOUNTER — EMERGENCY (EMERGENCY)
Facility: HOSPITAL | Age: 63
LOS: 0 days | Discharge: HOME | End: 2023-04-19
Attending: EMERGENCY MEDICINE
Payer: MEDICARE

## 2023-04-19 VITALS
DIASTOLIC BLOOD PRESSURE: 97 MMHG | OXYGEN SATURATION: 95 % | RESPIRATION RATE: 17 BRPM | TEMPERATURE: 99 F | HEART RATE: 64 BPM | SYSTOLIC BLOOD PRESSURE: 157 MMHG

## 2023-04-19 VITALS — WEIGHT: 266.1 LBS | HEIGHT: 68 IN

## 2023-04-19 DIAGNOSIS — Z98.890 OTHER SPECIFIED POSTPROCEDURAL STATES: Chronic | ICD-10-CM

## 2023-04-19 DIAGNOSIS — E03.9 HYPOTHYROIDISM, UNSPECIFIED: ICD-10-CM

## 2023-04-19 DIAGNOSIS — I10 ESSENTIAL (PRIMARY) HYPERTENSION: ICD-10-CM

## 2023-04-19 DIAGNOSIS — F41.8 OTHER SPECIFIED ANXIETY DISORDERS: ICD-10-CM

## 2023-04-19 DIAGNOSIS — Z90.49 ACQUIRED ABSENCE OF OTHER SPECIFIED PARTS OF DIGESTIVE TRACT: Chronic | ICD-10-CM

## 2023-04-19 DIAGNOSIS — M48.00 SPINAL STENOSIS, SITE UNSPECIFIED: ICD-10-CM

## 2023-04-19 DIAGNOSIS — Z87.19 PERSONAL HISTORY OF OTHER DISEASES OF THE DIGESTIVE SYSTEM: ICD-10-CM

## 2023-04-19 DIAGNOSIS — Z98.1 ARTHRODESIS STATUS: Chronic | ICD-10-CM

## 2023-04-19 DIAGNOSIS — Z86.39 PERSONAL HISTORY OF OTHER ENDOCRINE, NUTRITIONAL AND METABOLIC DISEASE: ICD-10-CM

## 2023-04-19 DIAGNOSIS — K58.9 IRRITABLE BOWEL SYNDROME WITHOUT DIARRHEA: ICD-10-CM

## 2023-04-19 DIAGNOSIS — R30.0 DYSURIA: ICD-10-CM

## 2023-04-19 DIAGNOSIS — K21.00 GASTRO-ESOPHAGEAL REFLUX DISEASE WITH ESOPHAGITIS, WITHOUT BLEEDING: ICD-10-CM

## 2023-04-19 DIAGNOSIS — Z90.49 ACQUIRED ABSENCE OF OTHER SPECIFIED PARTS OF DIGESTIVE TRACT: ICD-10-CM

## 2023-04-19 DIAGNOSIS — Z79.84 LONG TERM (CURRENT) USE OF ORAL HYPOGLYCEMIC DRUGS: ICD-10-CM

## 2023-04-19 DIAGNOSIS — K44.9 DIAPHRAGMATIC HERNIA WITHOUT OBSTRUCTION OR GANGRENE: ICD-10-CM

## 2023-04-19 DIAGNOSIS — E11.40 TYPE 2 DIABETES MELLITUS WITH DIABETIC NEUROPATHY, UNSPECIFIED: ICD-10-CM

## 2023-04-19 DIAGNOSIS — G47.33 OBSTRUCTIVE SLEEP APNEA (ADULT) (PEDIATRIC): ICD-10-CM

## 2023-04-19 LAB
ALBUMIN SERPL ELPH-MCNC: 3.8 G/DL — SIGNIFICANT CHANGE UP (ref 3.3–5)
ALP SERPL-CCNC: 111 U/L — SIGNIFICANT CHANGE UP (ref 40–120)
ALT FLD-CCNC: 38 U/L — SIGNIFICANT CHANGE UP (ref 12–78)
ANION GAP SERPL CALC-SCNC: 2 MMOL/L — LOW (ref 5–17)
APPEARANCE UR: CLEAR — SIGNIFICANT CHANGE UP
AST SERPL-CCNC: 26 U/L — SIGNIFICANT CHANGE UP (ref 15–37)
BASOPHILS # BLD AUTO: 0.05 K/UL — SIGNIFICANT CHANGE UP (ref 0–0.2)
BASOPHILS NFR BLD AUTO: 0.8 % — SIGNIFICANT CHANGE UP (ref 0–2)
BILIRUB SERPL-MCNC: 0.5 MG/DL — SIGNIFICANT CHANGE UP (ref 0.2–1.2)
BILIRUB UR-MCNC: NEGATIVE — SIGNIFICANT CHANGE UP
BUN SERPL-MCNC: 17 MG/DL — SIGNIFICANT CHANGE UP (ref 7–23)
CALCIUM SERPL-MCNC: 9.5 MG/DL — SIGNIFICANT CHANGE UP (ref 8.5–10.1)
CHLORIDE SERPL-SCNC: 106 MMOL/L — SIGNIFICANT CHANGE UP (ref 96–108)
CO2 SERPL-SCNC: 29 MMOL/L — SIGNIFICANT CHANGE UP (ref 22–31)
COLOR SPEC: YELLOW — SIGNIFICANT CHANGE UP
CREAT SERPL-MCNC: 0.8 MG/DL — SIGNIFICANT CHANGE UP (ref 0.5–1.3)
DIFF PNL FLD: NEGATIVE — SIGNIFICANT CHANGE UP
EGFR: 100 ML/MIN/1.73M2 — SIGNIFICANT CHANGE UP
EOSINOPHIL # BLD AUTO: 0.23 K/UL — SIGNIFICANT CHANGE UP (ref 0–0.5)
EOSINOPHIL NFR BLD AUTO: 3.7 % — SIGNIFICANT CHANGE UP (ref 0–6)
GLUCOSE SERPL-MCNC: 101 MG/DL — HIGH (ref 70–99)
GLUCOSE UR QL: NEGATIVE — SIGNIFICANT CHANGE UP
HCT VFR BLD CALC: 41.6 % — SIGNIFICANT CHANGE UP (ref 39–50)
HGB BLD-MCNC: 14.2 G/DL — SIGNIFICANT CHANGE UP (ref 13–17)
IMM GRANULOCYTES NFR BLD AUTO: 0.2 % — SIGNIFICANT CHANGE UP (ref 0–0.9)
KETONES UR-MCNC: NEGATIVE — SIGNIFICANT CHANGE UP
LEUKOCYTE ESTERASE UR-ACNC: NEGATIVE — SIGNIFICANT CHANGE UP
LYMPHOCYTES # BLD AUTO: 2.36 K/UL — SIGNIFICANT CHANGE UP (ref 1–3.3)
LYMPHOCYTES # BLD AUTO: 38.1 % — SIGNIFICANT CHANGE UP (ref 13–44)
MCHC RBC-ENTMCNC: 30.9 PG — SIGNIFICANT CHANGE UP (ref 27–34)
MCHC RBC-ENTMCNC: 34.1 GM/DL — SIGNIFICANT CHANGE UP (ref 32–36)
MCV RBC AUTO: 90.4 FL — SIGNIFICANT CHANGE UP (ref 80–100)
MONOCYTES # BLD AUTO: 0.74 K/UL — SIGNIFICANT CHANGE UP (ref 0–0.9)
MONOCYTES NFR BLD AUTO: 12 % — SIGNIFICANT CHANGE UP (ref 2–14)
NEUTROPHILS # BLD AUTO: 2.8 K/UL — SIGNIFICANT CHANGE UP (ref 1.8–7.4)
NEUTROPHILS NFR BLD AUTO: 45.2 % — SIGNIFICANT CHANGE UP (ref 43–77)
NITRITE UR-MCNC: NEGATIVE — SIGNIFICANT CHANGE UP
PH UR: 7 — SIGNIFICANT CHANGE UP (ref 5–8)
PLATELET # BLD AUTO: 172 K/UL — SIGNIFICANT CHANGE UP (ref 150–400)
POTASSIUM SERPL-MCNC: 4.2 MMOL/L — SIGNIFICANT CHANGE UP (ref 3.5–5.3)
POTASSIUM SERPL-SCNC: 4.2 MMOL/L — SIGNIFICANT CHANGE UP (ref 3.5–5.3)
PROT SERPL-MCNC: 7.9 GM/DL — SIGNIFICANT CHANGE UP (ref 6–8.3)
PROT UR-MCNC: NEGATIVE — SIGNIFICANT CHANGE UP
RBC # BLD: 4.6 M/UL — SIGNIFICANT CHANGE UP (ref 4.2–5.8)
RBC # FLD: 12.3 % — SIGNIFICANT CHANGE UP (ref 10.3–14.5)
SODIUM SERPL-SCNC: 137 MMOL/L — SIGNIFICANT CHANGE UP (ref 135–145)
SP GR SPEC: 1.01 — SIGNIFICANT CHANGE UP (ref 1.01–1.02)
UROBILINOGEN FLD QL: NEGATIVE — SIGNIFICANT CHANGE UP
WBC # BLD: 6.19 K/UL — SIGNIFICANT CHANGE UP (ref 3.8–10.5)
WBC # FLD AUTO: 6.19 K/UL — SIGNIFICANT CHANGE UP (ref 3.8–10.5)

## 2023-04-19 PROCEDURE — 87086 URINE CULTURE/COLONY COUNT: CPT

## 2023-04-19 PROCEDURE — 99283 EMERGENCY DEPT VISIT LOW MDM: CPT

## 2023-04-19 PROCEDURE — 81003 URINALYSIS AUTO W/O SCOPE: CPT

## 2023-04-19 PROCEDURE — 80053 COMPREHEN METABOLIC PANEL: CPT

## 2023-04-19 PROCEDURE — 85025 COMPLETE CBC W/AUTO DIFF WBC: CPT

## 2023-04-19 PROCEDURE — 36415 COLL VENOUS BLD VENIPUNCTURE: CPT

## 2023-04-19 PROCEDURE — 99284 EMERGENCY DEPT VISIT MOD MDM: CPT

## 2023-04-19 NOTE — ED STATDOCS - PATIENT PORTAL LINK FT
You can access the FollowMyHealth Patient Portal offered by [facility] by registering at the following website: [email or web address]. By joining [employer]’s FollowMyHealth portal, you will also be able to view your health information using other applications (apps) compatible with our system.

## 2023-04-19 NOTE — ED STATDOCS - PROGRESS NOTE DETAILS
pt aware of ua results and will fu with urologist and nephrologist. pt aware to call for urine culture results. pt well appearing on dc and agrees with plan. -Michela Reece PA-C

## 2023-04-19 NOTE — ED ADULT TRIAGE NOTE - CHIEF COMPLAINT QUOTE
Pt c/o recurrent ESBL infection. Endorses increased urinary frequency, dysuria, low grade fevers x1 week.

## 2023-04-19 NOTE — ED STATDOCS - OBJECTIVE STATEMENT
62 yo male with hx htn, dm, hypothyroid c/o dysuria x 2 weeks. denies fever or chills. denies nausea or vomiting   denies fever or chills  reports hx ESBL x few months ago

## 2023-04-19 NOTE — ED STATDOCS - NS ED ATTENDING STATEMENT MOD
Attending Only This was a shared visit with the DEANNE. I reviewed and verified the documentation and independently performed the documented:

## 2023-04-19 NOTE — ED STATDOCS - NSFOLLOWUPINSTRUCTIONS_ED_ALL_ED_FT
Alert-The patient is alert, awake and responds to voice. The patient is oriented to time, place, and person. The triage nurse is able to obtain subjective information.
Dysuria  Dysuria is pain or discomfort during urination. The pain or discomfort may be felt in the part of the body that drains urine from the bladder (urethra) or in the surrounding tissue of the genitals. The pain may also be felt in the groin area, lower abdomen, or lower back.    You may have to urinate frequently or have the sudden feeling that you have to urinate (urgency). Dysuria can affect anyone, but it is more common in females. Dysuria can be caused by many different things, including:  Urinary tract infection.  Kidney stones or bladder stones.  Certain STIs (sexually transmitted infections), such as chlamydia.  Dehydration.  Inflammation of the tissues of the vagina.  Use of certain medicines.  Use of certain soaps or scented products that cause irritation.  Follow these instructions at home:  Medicines    Take over-the-counter and prescription medicines only as told by your health care provider.  If you were prescribed an antibiotic medicine, take it as told by your health care provider. Do not stop taking the antibiotic even if you start to feel better.  Eating and drinking      Drink enough fluid to keep your urine pale yellow.  Avoid caffeinated beverages, tea, and alcohol. These beverages can irritate the bladder and make dysuria worse. In males, alcohol may irritate the prostate.  General instructions    Watch your condition for any changes.  Urinate often. Avoid holding urine for long periods of time.  If you are female, you should wipe from front to back after urinating or having a bowel movement. Use each piece of toilet paper only once.  Empty your bladder after sex.  Keep all follow-up visits. This is important.  If you had any tests done to find the cause of dysuria, it is up to you to get your test results. Ask your health care provider, or the department that is doing the test, when your results will be ready.  Contact a health care provider if:  You have a fever.  You develop pain in your back or sides.  You have nausea or vomiting.  You have blood in your urine.  You are not urinating as often as you usually do.  Get help right away if:  Your pain is severe and not relieved with medicines.  You cannot eat or drink without vomiting.  You are confused.  You have a rapid heartbeat while resting.  You have shaking or chills.  You feel extremely weak.  Summary  Dysuria is pain or discomfort while urinating. Many different conditions can lead to dysuria.  If you have dysuria, you may have to urinate frequently or have the sudden feeling that you have to urinate (urgency).  Watch your condition for any changes. Keep all follow-up visits.  Make sure that you urinate often and drink enough fluid to keep your urine pale yellow.  This information is not intended to replace advice given to you by your health care provider. Make sure you discuss any questions you have with your health care provider.

## 2023-04-19 NOTE — ED STATDOCS - ATTENDING APP SHARED VISIT CONTRIBUTION OF CARE
I,Nicolas Galicia MD,  performed the initial face to face bedside interview with this patient regarding history of present illness, review of symptoms and relevant past medical, social and family history.  I completed an independent physical examination.  I was the initial provider who evaluated this patient. I have signed out the follow up of any pending tests (i.e. labs, radiological studies) to the ACP.  I have communicated the patient’s plan of care and disposition with the ACP.

## 2023-04-20 LAB
CULTURE RESULTS: NO GROWTH — SIGNIFICANT CHANGE UP
SPECIMEN SOURCE: SIGNIFICANT CHANGE UP

## 2023-04-26 ENCOUNTER — EMERGENCY (EMERGENCY)
Facility: HOSPITAL | Age: 63
LOS: 0 days | Discharge: HOME | End: 2023-04-26
Attending: STUDENT IN AN ORGANIZED HEALTH CARE EDUCATION/TRAINING PROGRAM
Payer: MEDICARE

## 2023-04-26 VITALS — HEIGHT: 68 IN | WEIGHT: 229.94 LBS

## 2023-04-26 VITALS
OXYGEN SATURATION: 97 % | DIASTOLIC BLOOD PRESSURE: 94 MMHG | SYSTOLIC BLOOD PRESSURE: 166 MMHG | HEART RATE: 63 BPM | RESPIRATION RATE: 18 BRPM | TEMPERATURE: 99 F

## 2023-04-26 DIAGNOSIS — R13.10 DYSPHAGIA, UNSPECIFIED: ICD-10-CM

## 2023-04-26 DIAGNOSIS — R35.0 FREQUENCY OF MICTURITION: ICD-10-CM

## 2023-04-26 DIAGNOSIS — R07.0 PAIN IN THROAT: ICD-10-CM

## 2023-04-26 DIAGNOSIS — K44.9 DIAPHRAGMATIC HERNIA WITHOUT OBSTRUCTION OR GANGRENE: ICD-10-CM

## 2023-04-26 DIAGNOSIS — F41.8 OTHER SPECIFIED ANXIETY DISORDERS: ICD-10-CM

## 2023-04-26 DIAGNOSIS — I10 ESSENTIAL (PRIMARY) HYPERTENSION: ICD-10-CM

## 2023-04-26 DIAGNOSIS — R68.83 CHILLS (WITHOUT FEVER): ICD-10-CM

## 2023-04-26 DIAGNOSIS — K58.9 IRRITABLE BOWEL SYNDROME WITHOUT DIARRHEA: ICD-10-CM

## 2023-04-26 DIAGNOSIS — Z90.49 ACQUIRED ABSENCE OF OTHER SPECIFIED PARTS OF DIGESTIVE TRACT: ICD-10-CM

## 2023-04-26 DIAGNOSIS — Z90.49 ACQUIRED ABSENCE OF OTHER SPECIFIED PARTS OF DIGESTIVE TRACT: Chronic | ICD-10-CM

## 2023-04-26 DIAGNOSIS — Z86.39 PERSONAL HISTORY OF OTHER ENDOCRINE, NUTRITIONAL AND METABOLIC DISEASE: ICD-10-CM

## 2023-04-26 DIAGNOSIS — E11.40 TYPE 2 DIABETES MELLITUS WITH DIABETIC NEUROPATHY, UNSPECIFIED: ICD-10-CM

## 2023-04-26 DIAGNOSIS — K21.00 GASTRO-ESOPHAGEAL REFLUX DISEASE WITH ESOPHAGITIS, WITHOUT BLEEDING: ICD-10-CM

## 2023-04-26 DIAGNOSIS — Z98.1 ARTHRODESIS STATUS: Chronic | ICD-10-CM

## 2023-04-26 DIAGNOSIS — Z98.890 OTHER SPECIFIED POSTPROCEDURAL STATES: Chronic | ICD-10-CM

## 2023-04-26 DIAGNOSIS — Z79.84 LONG TERM (CURRENT) USE OF ORAL HYPOGLYCEMIC DRUGS: ICD-10-CM

## 2023-04-26 DIAGNOSIS — R30.9 PAINFUL MICTURITION, UNSPECIFIED: ICD-10-CM

## 2023-04-26 DIAGNOSIS — G47.33 OBSTRUCTIVE SLEEP APNEA (ADULT) (PEDIATRIC): ICD-10-CM

## 2023-04-26 DIAGNOSIS — Z87.19 PERSONAL HISTORY OF OTHER DISEASES OF THE DIGESTIVE SYSTEM: ICD-10-CM

## 2023-04-26 DIAGNOSIS — J02.9 ACUTE PHARYNGITIS, UNSPECIFIED: ICD-10-CM

## 2023-04-26 DIAGNOSIS — E03.9 HYPOTHYROIDISM, UNSPECIFIED: ICD-10-CM

## 2023-04-26 DIAGNOSIS — M48.00 SPINAL STENOSIS, SITE UNSPECIFIED: ICD-10-CM

## 2023-04-26 LAB
ALBUMIN SERPL ELPH-MCNC: 4.1 G/DL — SIGNIFICANT CHANGE UP (ref 3.3–5)
ALP SERPL-CCNC: 101 U/L — SIGNIFICANT CHANGE UP (ref 40–120)
ALT FLD-CCNC: 47 U/L — SIGNIFICANT CHANGE UP (ref 12–78)
ANION GAP SERPL CALC-SCNC: 7 MMOL/L — SIGNIFICANT CHANGE UP (ref 5–17)
APPEARANCE UR: CLEAR — SIGNIFICANT CHANGE UP
AST SERPL-CCNC: 56 U/L — HIGH (ref 15–37)
BASOPHILS # BLD AUTO: 0.07 K/UL — SIGNIFICANT CHANGE UP (ref 0–0.2)
BASOPHILS NFR BLD AUTO: 0.9 % — SIGNIFICANT CHANGE UP (ref 0–2)
BILIRUB SERPL-MCNC: 0.8 MG/DL — SIGNIFICANT CHANGE UP (ref 0.2–1.2)
BILIRUB UR-MCNC: NEGATIVE — SIGNIFICANT CHANGE UP
BUN SERPL-MCNC: 11 MG/DL — SIGNIFICANT CHANGE UP (ref 7–23)
CALCIUM SERPL-MCNC: 9.5 MG/DL — SIGNIFICANT CHANGE UP (ref 8.5–10.1)
CHLORIDE SERPL-SCNC: 104 MMOL/L — SIGNIFICANT CHANGE UP (ref 96–108)
CO2 SERPL-SCNC: 24 MMOL/L — SIGNIFICANT CHANGE UP (ref 22–31)
COLOR SPEC: YELLOW — SIGNIFICANT CHANGE UP
CREAT SERPL-MCNC: 0.81 MG/DL — SIGNIFICANT CHANGE UP (ref 0.5–1.3)
DIFF PNL FLD: NEGATIVE — SIGNIFICANT CHANGE UP
EGFR: 100 ML/MIN/1.73M2 — SIGNIFICANT CHANGE UP
EOSINOPHIL # BLD AUTO: 0.22 K/UL — SIGNIFICANT CHANGE UP (ref 0–0.5)
EOSINOPHIL NFR BLD AUTO: 2.8 % — SIGNIFICANT CHANGE UP (ref 0–6)
GLUCOSE SERPL-MCNC: 113 MG/DL — HIGH (ref 70–99)
GLUCOSE UR QL: NEGATIVE — SIGNIFICANT CHANGE UP
HCT VFR BLD CALC: 43.9 % — SIGNIFICANT CHANGE UP (ref 39–50)
HGB BLD-MCNC: 15.1 G/DL — SIGNIFICANT CHANGE UP (ref 13–17)
IMM GRANULOCYTES NFR BLD AUTO: 0.1 % — SIGNIFICANT CHANGE UP (ref 0–0.9)
KETONES UR-MCNC: NEGATIVE — SIGNIFICANT CHANGE UP
LEUKOCYTE ESTERASE UR-ACNC: NEGATIVE — SIGNIFICANT CHANGE UP
LYMPHOCYTES # BLD AUTO: 2.8 K/UL — SIGNIFICANT CHANGE UP (ref 1–3.3)
LYMPHOCYTES # BLD AUTO: 35.3 % — SIGNIFICANT CHANGE UP (ref 13–44)
MCHC RBC-ENTMCNC: 31.1 PG — SIGNIFICANT CHANGE UP (ref 27–34)
MCHC RBC-ENTMCNC: 34.4 GM/DL — SIGNIFICANT CHANGE UP (ref 32–36)
MCV RBC AUTO: 90.5 FL — SIGNIFICANT CHANGE UP (ref 80–100)
MONOCYTES # BLD AUTO: 0.73 K/UL — SIGNIFICANT CHANGE UP (ref 0–0.9)
MONOCYTES NFR BLD AUTO: 9.2 % — SIGNIFICANT CHANGE UP (ref 2–14)
NEUTROPHILS # BLD AUTO: 4.1 K/UL — SIGNIFICANT CHANGE UP (ref 1.8–7.4)
NEUTROPHILS NFR BLD AUTO: 51.7 % — SIGNIFICANT CHANGE UP (ref 43–77)
NITRITE UR-MCNC: NEGATIVE — SIGNIFICANT CHANGE UP
PH UR: 8 — SIGNIFICANT CHANGE UP (ref 5–8)
PLATELET # BLD AUTO: 176 K/UL — SIGNIFICANT CHANGE UP (ref 150–400)
POTASSIUM SERPL-MCNC: 4.9 MMOL/L — SIGNIFICANT CHANGE UP (ref 3.5–5.3)
POTASSIUM SERPL-SCNC: 4.9 MMOL/L — SIGNIFICANT CHANGE UP (ref 3.5–5.3)
PROT SERPL-MCNC: 8.6 GM/DL — HIGH (ref 6–8.3)
PROT UR-MCNC: NEGATIVE — SIGNIFICANT CHANGE UP
RBC # BLD: 4.85 M/UL — SIGNIFICANT CHANGE UP (ref 4.2–5.8)
RBC # FLD: 12.5 % — SIGNIFICANT CHANGE UP (ref 10.3–14.5)
SODIUM SERPL-SCNC: 135 MMOL/L — SIGNIFICANT CHANGE UP (ref 135–145)
SP GR SPEC: 1.01 — SIGNIFICANT CHANGE UP (ref 1.01–1.02)
UROBILINOGEN FLD QL: NEGATIVE — SIGNIFICANT CHANGE UP
WBC # BLD: 7.93 K/UL — SIGNIFICANT CHANGE UP (ref 3.8–10.5)
WBC # FLD AUTO: 7.93 K/UL — SIGNIFICANT CHANGE UP (ref 3.8–10.5)

## 2023-04-26 PROCEDURE — G1004: CPT

## 2023-04-26 PROCEDURE — 80053 COMPREHEN METABOLIC PANEL: CPT

## 2023-04-26 PROCEDURE — 85025 COMPLETE CBC W/AUTO DIFF WBC: CPT

## 2023-04-26 PROCEDURE — 87086 URINE CULTURE/COLONY COUNT: CPT

## 2023-04-26 PROCEDURE — 99284 EMERGENCY DEPT VISIT MOD MDM: CPT | Mod: 25

## 2023-04-26 PROCEDURE — 36415 COLL VENOUS BLD VENIPUNCTURE: CPT

## 2023-04-26 PROCEDURE — 99284 EMERGENCY DEPT VISIT MOD MDM: CPT

## 2023-04-26 PROCEDURE — 70491 CT SOFT TISSUE NECK W/DYE: CPT | Mod: MG

## 2023-04-26 PROCEDURE — 70491 CT SOFT TISSUE NECK W/DYE: CPT | Mod: 26,MG

## 2023-04-26 PROCEDURE — 81003 URINALYSIS AUTO W/O SCOPE: CPT

## 2023-04-26 RX ORDER — DIPHENHYDRAMINE HYDROCHLORIDE AND LIDOCAINE HYDROCHLORIDE AND ALUMINUM HYDROXIDE AND MAGNESIUM HYDRO
30 KIT
Qty: 2 | Refills: 0
Start: 2023-04-26 | End: 2023-04-30

## 2023-04-26 RX ORDER — DIPHENHYDRAMINE HYDROCHLORIDE AND LIDOCAINE HYDROCHLORIDE AND ALUMINUM HYDROXIDE AND MAGNESIUM HYDRO
30 KIT ONCE
Refills: 0 | Status: COMPLETED | OUTPATIENT
Start: 2023-04-26 | End: 2023-04-26

## 2023-04-26 RX ADMIN — DIPHENHYDRAMINE HYDROCHLORIDE AND LIDOCAINE HYDROCHLORIDE AND ALUMINUM HYDROXIDE AND MAGNESIUM HYDRO 30 MILLILITER(S): KIT at 15:33

## 2023-04-26 NOTE — ED STATDOCS - NS_ ATTENDINGSCRIBEDETAILS _ED_A_ED_FT
The scribe's documentation has been prepared under my direction and personally reviewed by me in its entirety. I confirm that the note above accurately reflects all work, treatment, procedures, and medical decision making performed by me.  JASPER Pang-MS, MD  Internal/Emergency/Critical Care Medicine

## 2023-04-26 NOTE — ED STATDOCS - PROGRESS NOTE DETAILS
Patient seen and evaluated, ED attending note and orders reviewed, will continue with patient follow up and care -Sparkle Thrasher PA-C labs WNL, UA w/o signs of infection, CT neck with no acute findings, pt feeling well after mouthwash, has ENT f/u scheduled will dc home with outpt f/u return precautions given  Sparkle Thrasher PA-C

## 2023-04-26 NOTE — ED STATDOCS - OBJECTIVE STATEMENT
64 y/o male with PMHx of anxiety, hiatal hernia, neuropathy, hypothyroid, prediabetes, prostatitis, SBO, s/p bowel resection, UTIs, HTN, GERD, IBS, cholecystectomy, appendectomy, lumbar fusion, fundoplication presents to the ED c/o sore throat and urinary frequency and burning for the past 4 days. States he has been using throat numbing medication with no relief. Pt states he was hospitalized a month ago for ESBL UTI. Endorses low grade fevers at home. Denies hematuria. States he has a FHx of prostate CA. Denies smoking, alcohol, drug use. Pt is a 64 y/o male with PMHx of anxiety, hiatal hernia, neuropathy, hypothyroid, prediabetes, prostatitis, SBO, s/p bowel resection, UTIs, HTN, GERD, IBS, cholecystectomy, appendectomy, lumbar fusion, fundoplication presents to the ED c/o sore throat and urinary frequency and burning for the past 4 days. States he has been using throat numbing medication with no relief. Pt states he was hospitalized a month ago for ESBL UTI. Endorses low grade fevers at home. Denies hematuria. States he has a FHx of prostate CA. Denies smoking, alcohol, drug use.

## 2023-04-26 NOTE — ED STATDOCS - CLINICAL SUMMARY MEDICAL DECISION MAKING FREE TEXT BOX
62 y/o male with PMHX of GERD s/p fundoplication approx 1 year ago and ESBL UTI in the past presents with sore throat and dysphagia solids and urinary frequency, likely in the setting of UTI, possible continued GERD, no evidence of PTA and low suspicion for RPA based on hx and physical. Possible UTI vs. retention from BPH, currently does not meet sepsis criteria  Will get:  CBC, CMP, UA, urine culture, check CT of neck to r/o any mass effect or abscesses. If UA positive, will need admission for ESBL UTI. If negative, likely urinary retention and can follow up with urology. If CT neck negative, likely can follow up with GI for discussion of possible need for repeat endoscopy.

## 2023-04-26 NOTE — ED STATDOCS - PATIENT PORTAL LINK FT
You can access the FollowMyHealth Patient Portal offered by [facility] by registering at the following website: [email or web address]. By joining [employer]’s FollowMyHealth portal, you will also be able to view your health information using other applications (apps) compatible with our system.

## 2023-04-26 NOTE — ED STATDOCS - NS ED ROS FT
Constitutional: Denies fevers & chills  HEENT: Denies Rhinorrhea, +sore throat  Cardiac: Denies Chest pain & new LE edema  Pulmonary: Denies Shortness of breath & Cough  Abdomen: Denies Abdominal pain, N/V, blood in stools, diarrhea   : +urinary frequency, +burning urination  Skin: Denies Rash or itching  Neuro: Denies new visual changes, confusion, headaches, and one sided paralysis  Psych: Denies SI & HI & Depression Constitutional: +chills  HEENT: Denies Rhinorrhea, +sore throat  Cardiac: Denies Chest pain & new LE edema  Pulmonary: Denies Shortness of breath & Cough  Abdomen: Denies Abdominal pain, N/V, blood in stools, diarrhea   : +urinary frequency, +burning urination  Skin: Denies Rash or itching  Neuro: Denies new visual changes, confusion, headaches, and one sided paralysis

## 2023-04-26 NOTE — ED STATDOCS - ATTENDING APP SHARED VISIT CONTRIBUTION OF CARE
I personally saw the patient with the PA, and completed the key components of the history and physical exam. I then discussed the management plan with the PA.     -JASPER Pang-MS, MD  Internal/Emergency/Critical Medicine

## 2023-04-26 NOTE — ED STATDOCS - PHYSICAL EXAMINATION
PHYSICAL EXAM:  GENERAL: in NAD, Sitting comfortable in bed, in no respiratory distress  HEAD: Atraumatic, no hernandez's sign, no periorbital ecchymosis   EYES: PERRL, EOMs intact b/l w/out deficits  ENMT: Moist membranes, no anterior/posterior, or supraclavicular LAD  CHEST/LUNG: CTAB no wheezes/rhonchi/rales  HEART: RRR no murmur/gallops/rubs  ABDOMEN: +BS, soft, NT, ND  EXTREMITIES: No LE edema, +2 radial pulses b/l  NERVOUS SYSTEM:  A&Ox4, No motor deficits or sensory deficits to b/l UEs  Heme/LYMPH: No ecchymosis or bruising or LAD  SKIN:  No new rashes or DTIs

## 2023-04-26 NOTE — ED ADULT NURSE NOTE - OBJECTIVE STATEMENT
presents with sore throat that causes difficulties swallowing x1 week. Urinary frequency, urgency, oliguria also generalized weakness. Recent ESBL infection treated with IVabx. low grade fevers, chills, nausea, diarrhea. PMH: BPH, chronic UTIs, hiatal hernia. denies cough, chest pain, sob.

## 2023-04-27 LAB
CULTURE RESULTS: SIGNIFICANT CHANGE UP
SPECIMEN SOURCE: SIGNIFICANT CHANGE UP

## 2023-05-04 ENCOUNTER — APPOINTMENT (OUTPATIENT)
Dept: THORACIC SURGERY | Facility: CLINIC | Age: 63
End: 2023-05-04

## 2023-05-11 ENCOUNTER — APPOINTMENT (OUTPATIENT)
Dept: THORACIC SURGERY | Facility: CLINIC | Age: 63
End: 2023-05-11

## 2023-05-19 ENCOUNTER — NON-APPOINTMENT (OUTPATIENT)
Age: 63
End: 2023-05-19

## 2023-05-22 ENCOUNTER — APPOINTMENT (OUTPATIENT)
Dept: OTOLARYNGOLOGY | Facility: CLINIC | Age: 63
End: 2023-05-22
Payer: MEDICARE

## 2023-05-22 VITALS — HEIGHT: 68 IN | TEMPERATURE: 95.8 F | BODY MASS INDEX: 39.4 KG/M2 | WEIGHT: 260 LBS

## 2023-05-22 DIAGNOSIS — R07.0 PAIN IN THROAT: ICD-10-CM

## 2023-05-22 DIAGNOSIS — H61.22 IMPACTED CERUMEN, LEFT EAR: ICD-10-CM

## 2023-05-22 DIAGNOSIS — H93.8X2 OTHER SPECIFIED DISORDERS OF LEFT EAR: ICD-10-CM

## 2023-05-22 DIAGNOSIS — E66.9 OBESITY, UNSPECIFIED: ICD-10-CM

## 2023-05-22 DIAGNOSIS — K21.9 GASTRO-ESOPHAGEAL REFLUX DISEASE W/OUT ESOPHAGITIS: ICD-10-CM

## 2023-05-22 PROCEDURE — 99204 OFFICE O/P NEW MOD 45 MIN: CPT | Mod: 25

## 2023-05-22 PROCEDURE — 31575 DIAGNOSTIC LARYNGOSCOPY: CPT

## 2023-05-22 NOTE — REASON FOR VISIT
[Initial Consultation] : an initial consultation for [Spouse] : spouse [FreeTextEntry2] : chronic sore throat

## 2023-05-22 NOTE — REVIEW OF SYSTEMS
[Hoarseness] : hoarseness [Throat Pain] : throat pain [Throat Dryness] : throat dryness [Negative] : Heme/Lymph

## 2023-05-22 NOTE — ASSESSMENT
[FreeTextEntry1] : Patient with hx of severe reflux and has had fundoplication for this and is still on 2x a day protonic and reflux diet.   Here to evaluaate larynx - exam today - no laryngeal lesion noted - advised of findings of LPR and advised to continue current meds and speak to his GI MD and his surgeon.  Also recommended laryngology eval and patient will be seen by Dr Saldaña due to continued complaints to r/o other issues.

## 2023-05-22 NOTE — HISTORY OF PRESENT ILLNESS
[de-identified] : Hx of acid reflux - had fundoplication for reflux - hx of chronic sore throat and hx of LPR.  Here r/o laryngeal lesion.  Occ raspy voice.  On protonix - bid 40 mgm.   Watching diet.   followed also by GI and surgeon.

## 2023-05-22 NOTE — PHYSICAL EXAM
[FreeTextEntry1] : overweight  [de-identified] : xs cerumen left ear ; right normal  [de-identified] : after cerumen removal  [Midline] : trachea located in midline position [Laryngoscopy Performed] : laryngoscopy was performed, see procedure section for findings [Normal] : no rashes

## 2023-05-25 ENCOUNTER — APPOINTMENT (OUTPATIENT)
Dept: THORACIC SURGERY | Facility: CLINIC | Age: 63
End: 2023-05-25
Payer: MEDICARE

## 2023-05-25 VITALS
BODY MASS INDEX: 38.19 KG/M2 | HEART RATE: 61 BPM | OXYGEN SATURATION: 98 % | DIASTOLIC BLOOD PRESSURE: 83 MMHG | WEIGHT: 252 LBS | HEIGHT: 68 IN | SYSTOLIC BLOOD PRESSURE: 146 MMHG | RESPIRATION RATE: 16 BRPM

## 2023-05-25 DIAGNOSIS — K44.9 DIAPHRAGMATIC HERNIA W/OUT OBSTRUCTION OR GANGRENE: ICD-10-CM

## 2023-05-25 DIAGNOSIS — K21.9 DIAPHRAGMATIC HERNIA W/OUT OBSTRUCTION OR GANGRENE: ICD-10-CM

## 2023-05-25 PROCEDURE — 99213 OFFICE O/P EST LOW 20 MIN: CPT

## 2023-05-25 NOTE — PHYSICAL EXAM
[] : no respiratory distress [Respiration, Rhythm And Depth] : normal respiratory rhythm and effort [Exaggerated Use Of Accessory Muscles For Inspiration] : no accessory muscle use [Auscultation Breath Sounds / Voice Sounds] : lungs were clear to auscultation bilaterally [Apical Impulse] : the apical impulse was normal [Heart Rate And Rhythm] : heart rate was normal and rhythm regular [Heart Sounds] : normal S1 and S2 [Heart Sounds Gallop] : no gallops [Examination Of The Chest] : the chest was normal in appearance [No CVA Tenderness] : no ~M costovertebral angle tenderness [No Focal Deficits] : no focal deficits [Oriented To Time, Place, And Person] : oriented to person, place, and time [Affect] : the affect was normal [Mood] : the mood was normal

## 2023-05-26 NOTE — ASSESSMENT
[FreeTextEntry1] : Mr. STEPHANIE LAWTON, 63 year old male, never smoker, w/ hx of HTN, Depression/Anxiety, Sleep Apnea on CPAP, chronic GERD since age 17, been on PPIs, currently on Protonix 40mg BID and Pepcid QHS.\par \par Now 8 mons s/p EGD, Lap, R.A., extensive lysis of intraabdominal adhesion, more then 2 hours, repair of hiatal hernia, Toupet fundoplication, extra 2.5 hours of intraoperative dissection 2/2 to difficulty of procedure on 8/10/22. Path revealed esophageal lipoma and mature adipose tissue, scanty fibrovascular tissue and small reactive LN\par \par I have reviewed the patient's medical records and diagnostic images at time of this office consultation and have made the following recommendation:\par 1. Upper GI series to further evaluate acid reflux. \par 2. CT chest w/o contrast now. \par 3. RTC after above. \par \par \par I, CARLOS Kaufman, personally performed the evaluation and management (E/M) services for this established patient who follow up today with an existing condition.  That E/M includes conducting the examination, assessing all new/exacerbated/existing conditions, and establishing a plan of care.  Today, my ACP, Selin Horowitz NP, was here to observe my evaluation and management services for this existing condition to be followed going forward.\par \par \par

## 2023-05-26 NOTE — CONSULT LETTER
[FreeTextEntry2] : Dr. Eden Matthews (GI Motility/Referring) [FreeTextEntry3] : Rex Dent MD, MPH \par System Director of Thoracic Surgery \par Director of Comprehensive Lung and Foregut [place] \par Professor Cardiovascular & Thoracic Surgery  \par [employer] [facility]\par \par [facility]\par [address]\par Oncology [address]\[place] [address]\par Tel: [phone]\par Fax: [phone]\par

## 2023-05-26 NOTE — HISTORY OF PRESENT ILLNESS
[FreeTextEntry1] : Mr. STEPHANIE LAWTON, 63 year old male, never smoker, w/ hx of HTN, Depression/Anxiety, Sleep Apnea on CPAP, chronic GERD since age 17, been on PPIs, currently on Protonix 40mg BID and Pepcid QHS.\par \par EGD on 2/1/22 by Dr. Patel: GE Flap valve classified as Hill Grade I, multiple gastric body and fundus polyps.\par \par Patient also had 24-hour pH P. while on Dexilant 60 mg twice a day/Zantac 300 each bedtime\par Protocol was 6 channel impedance catheter with 2 Versed flex pH sensors -10 cm in the distal esophagus and 5 cm above in the proximal esophagus. The total number of acid events was 74, with a AET of 3.6, upright AET of 6.6%, supine AET of 0.3%, Gastric acid AET was one hour and one minute in 24-hour period\par \par BRAVO pH Study on 4/12/22: [place] = 78.3\par Manometry on 4/12/22 by Dr. Eden Matthews. LES = 34.2 (13-43), UES = 34.6 (). Normal LES pressures w/ complete deglutitive relaxation. On supine, 100% swallows w/ normal amplitude peristalsis. On supine, 100% swallows w/ incomplete bolus clearance by impedence analysis w/ fluid escape at the level of the upper esophagus (where skeletal and smooth muscle meet). 1.5cm hiatal hernia. Normal motility study.\par \par CT Chest on 6/9/22:\par - small LLL calcified granuloma\par - nondistended esophagus and no H.H\par \par Barium esophagram on 8/4/22: mild GE reflux\par \par Now 8 mons s/p EGD, Lap, R.A., extensive lysis of intraabdominal adhesion, more then 2 hours, repair of hiatal hernia, Toupet fundoplication, extra 2.5 hours of intraoperative dissection 2/2 to difficulty of procedure on 8/10/22. Path revealed esophageal lipoma and mature adipose tissue, scanty fibrovascular tissue and small reactive LN\par \par CT Chest w/PO contrast on 10/24/22:\par - atelectasis of RML stable \par - post-op changes\par \par Barium esophagram on 10/28/22:\par - no evidence of H.H.\par - narrowing of the distal esophagus may represent post-op changes\par \par Patient is here today for follow up (no show for his Feb appointment). He admits cough, heartburn, choking sometimes. Denies N/V, weight loss. \par

## 2023-06-16 ENCOUNTER — EMERGENCY (EMERGENCY)
Facility: HOSPITAL | Age: 63
LOS: 0 days | Discharge: HOME | End: 2023-06-16
Attending: STUDENT IN AN ORGANIZED HEALTH CARE EDUCATION/TRAINING PROGRAM
Payer: MEDICARE

## 2023-06-16 VITALS
DIASTOLIC BLOOD PRESSURE: 89 MMHG | SYSTOLIC BLOOD PRESSURE: 152 MMHG | HEART RATE: 67 BPM | TEMPERATURE: 99 F | RESPIRATION RATE: 18 BRPM | OXYGEN SATURATION: 97 %

## 2023-06-16 VITALS — HEIGHT: 68 IN | WEIGHT: 265 LBS

## 2023-06-16 DIAGNOSIS — R10.9 UNSPECIFIED ABDOMINAL PAIN: ICD-10-CM

## 2023-06-16 DIAGNOSIS — K52.9 NONINFECTIVE GASTROENTERITIS AND COLITIS, UNSPECIFIED: ICD-10-CM

## 2023-06-16 DIAGNOSIS — E03.9 HYPOTHYROIDISM, UNSPECIFIED: ICD-10-CM

## 2023-06-16 DIAGNOSIS — G47.33 OBSTRUCTIVE SLEEP APNEA (ADULT) (PEDIATRIC): ICD-10-CM

## 2023-06-16 DIAGNOSIS — Z98.1 ARTHRODESIS STATUS: Chronic | ICD-10-CM

## 2023-06-16 DIAGNOSIS — K21.9 GASTRO-ESOPHAGEAL REFLUX DISEASE WITHOUT ESOPHAGITIS: ICD-10-CM

## 2023-06-16 DIAGNOSIS — K57.30 DIVERTICULOSIS OF LARGE INTESTINE WITHOUT PERFORATION OR ABSCESS WITHOUT BLEEDING: ICD-10-CM

## 2023-06-16 DIAGNOSIS — Z90.49 ACQUIRED ABSENCE OF OTHER SPECIFIED PARTS OF DIGESTIVE TRACT: ICD-10-CM

## 2023-06-16 DIAGNOSIS — E78.5 HYPERLIPIDEMIA, UNSPECIFIED: ICD-10-CM

## 2023-06-16 DIAGNOSIS — R18.8 OTHER ASCITES: ICD-10-CM

## 2023-06-16 DIAGNOSIS — N28.9 DISORDER OF KIDNEY AND URETER, UNSPECIFIED: ICD-10-CM

## 2023-06-16 DIAGNOSIS — E11.9 TYPE 2 DIABETES MELLITUS WITHOUT COMPLICATIONS: ICD-10-CM

## 2023-06-16 DIAGNOSIS — Z79.84 LONG TERM (CURRENT) USE OF ORAL HYPOGLYCEMIC DRUGS: ICD-10-CM

## 2023-06-16 DIAGNOSIS — Z87.19 PERSONAL HISTORY OF OTHER DISEASES OF THE DIGESTIVE SYSTEM: ICD-10-CM

## 2023-06-16 DIAGNOSIS — Z90.49 ACQUIRED ABSENCE OF OTHER SPECIFIED PARTS OF DIGESTIVE TRACT: Chronic | ICD-10-CM

## 2023-06-16 DIAGNOSIS — Z98.890 OTHER SPECIFIED POSTPROCEDURAL STATES: Chronic | ICD-10-CM

## 2023-06-16 DIAGNOSIS — Z86.39 PERSONAL HISTORY OF OTHER ENDOCRINE, NUTRITIONAL AND METABOLIC DISEASE: ICD-10-CM

## 2023-06-16 DIAGNOSIS — I10 ESSENTIAL (PRIMARY) HYPERTENSION: ICD-10-CM

## 2023-06-16 DIAGNOSIS — F41.8 OTHER SPECIFIED ANXIETY DISORDERS: ICD-10-CM

## 2023-06-16 LAB
ALBUMIN SERPL ELPH-MCNC: 3.8 G/DL — SIGNIFICANT CHANGE UP (ref 3.3–5)
ALP SERPL-CCNC: 102 U/L — SIGNIFICANT CHANGE UP (ref 40–120)
ALT FLD-CCNC: 33 U/L — SIGNIFICANT CHANGE UP (ref 12–78)
ANION GAP SERPL CALC-SCNC: 6 MMOL/L — SIGNIFICANT CHANGE UP (ref 5–17)
AST SERPL-CCNC: 29 U/L — SIGNIFICANT CHANGE UP (ref 15–37)
BASOPHILS # BLD AUTO: 0.06 K/UL — SIGNIFICANT CHANGE UP (ref 0–0.2)
BASOPHILS NFR BLD AUTO: 0.8 % — SIGNIFICANT CHANGE UP (ref 0–2)
BILIRUB SERPL-MCNC: 0.9 MG/DL — SIGNIFICANT CHANGE UP (ref 0.2–1.2)
BUN SERPL-MCNC: 14 MG/DL — SIGNIFICANT CHANGE UP (ref 7–23)
CALCIUM SERPL-MCNC: 9.4 MG/DL — SIGNIFICANT CHANGE UP (ref 8.5–10.1)
CHLORIDE SERPL-SCNC: 106 MMOL/L — SIGNIFICANT CHANGE UP (ref 96–108)
CO2 SERPL-SCNC: 25 MMOL/L — SIGNIFICANT CHANGE UP (ref 22–31)
CREAT SERPL-MCNC: 0.89 MG/DL — SIGNIFICANT CHANGE UP (ref 0.5–1.3)
EGFR: 97 ML/MIN/1.73M2 — SIGNIFICANT CHANGE UP
EOSINOPHIL # BLD AUTO: 0.09 K/UL — SIGNIFICANT CHANGE UP (ref 0–0.5)
EOSINOPHIL NFR BLD AUTO: 1.2 % — SIGNIFICANT CHANGE UP (ref 0–6)
GLUCOSE SERPL-MCNC: 163 MG/DL — HIGH (ref 70–99)
HCT VFR BLD CALC: 41.7 % — SIGNIFICANT CHANGE UP (ref 39–50)
HGB BLD-MCNC: 14.6 G/DL — SIGNIFICANT CHANGE UP (ref 13–17)
IMM GRANULOCYTES NFR BLD AUTO: 0.3 % — SIGNIFICANT CHANGE UP (ref 0–0.9)
LACTATE SERPL-SCNC: 1.7 MMOL/L — SIGNIFICANT CHANGE UP (ref 0.7–2)
LIDOCAIN IGE QN: 69 U/L — LOW (ref 73–393)
LYMPHOCYTES # BLD AUTO: 2.4 K/UL — SIGNIFICANT CHANGE UP (ref 1–3.3)
LYMPHOCYTES # BLD AUTO: 30.8 % — SIGNIFICANT CHANGE UP (ref 13–44)
MAGNESIUM SERPL-MCNC: 2.2 MG/DL — SIGNIFICANT CHANGE UP (ref 1.6–2.6)
MCHC RBC-ENTMCNC: 31.1 PG — SIGNIFICANT CHANGE UP (ref 27–34)
MCHC RBC-ENTMCNC: 35 GM/DL — SIGNIFICANT CHANGE UP (ref 32–36)
MCV RBC AUTO: 88.7 FL — SIGNIFICANT CHANGE UP (ref 80–100)
MONOCYTES # BLD AUTO: 0.64 K/UL — SIGNIFICANT CHANGE UP (ref 0–0.9)
MONOCYTES NFR BLD AUTO: 8.2 % — SIGNIFICANT CHANGE UP (ref 2–14)
NEUTROPHILS # BLD AUTO: 4.59 K/UL — SIGNIFICANT CHANGE UP (ref 1.8–7.4)
NEUTROPHILS NFR BLD AUTO: 58.7 % — SIGNIFICANT CHANGE UP (ref 43–77)
PLATELET # BLD AUTO: 201 K/UL — SIGNIFICANT CHANGE UP (ref 150–400)
POTASSIUM SERPL-MCNC: 4 MMOL/L — SIGNIFICANT CHANGE UP (ref 3.5–5.3)
POTASSIUM SERPL-SCNC: 4 MMOL/L — SIGNIFICANT CHANGE UP (ref 3.5–5.3)
PROT SERPL-MCNC: 7.9 GM/DL — SIGNIFICANT CHANGE UP (ref 6–8.3)
RBC # BLD: 4.7 M/UL — SIGNIFICANT CHANGE UP (ref 4.2–5.8)
RBC # FLD: 12.7 % — SIGNIFICANT CHANGE UP (ref 10.3–14.5)
SODIUM SERPL-SCNC: 137 MMOL/L — SIGNIFICANT CHANGE UP (ref 135–145)
WBC # BLD: 7.8 K/UL — SIGNIFICANT CHANGE UP (ref 3.8–10.5)
WBC # FLD AUTO: 7.8 K/UL — SIGNIFICANT CHANGE UP (ref 3.8–10.5)

## 2023-06-16 PROCEDURE — 99284 EMERGENCY DEPT VISIT MOD MDM: CPT | Mod: 25

## 2023-06-16 PROCEDURE — 83735 ASSAY OF MAGNESIUM: CPT

## 2023-06-16 PROCEDURE — 83605 ASSAY OF LACTIC ACID: CPT

## 2023-06-16 PROCEDURE — 74177 CT ABD & PELVIS W/CONTRAST: CPT | Mod: MA

## 2023-06-16 PROCEDURE — 74177 CT ABD & PELVIS W/CONTRAST: CPT | Mod: 26,MA

## 2023-06-16 PROCEDURE — 36415 COLL VENOUS BLD VENIPUNCTURE: CPT

## 2023-06-16 PROCEDURE — 96374 THER/PROPH/DIAG INJ IV PUSH: CPT | Mod: XU

## 2023-06-16 PROCEDURE — 80053 COMPREHEN METABOLIC PANEL: CPT

## 2023-06-16 PROCEDURE — 85025 COMPLETE CBC W/AUTO DIFF WBC: CPT

## 2023-06-16 PROCEDURE — 99285 EMERGENCY DEPT VISIT HI MDM: CPT

## 2023-06-16 PROCEDURE — 96375 TX/PRO/DX INJ NEW DRUG ADDON: CPT

## 2023-06-16 PROCEDURE — 83690 ASSAY OF LIPASE: CPT

## 2023-06-16 RX ORDER — SUCRALFATE 1 G
10 TABLET ORAL
Qty: 200 | Refills: 0
Start: 2023-06-16

## 2023-06-16 RX ORDER — ONDANSETRON 8 MG/1
4 TABLET, FILM COATED ORAL ONCE
Refills: 0 | Status: COMPLETED | OUTPATIENT
Start: 2023-06-16 | End: 2023-06-16

## 2023-06-16 RX ORDER — SODIUM CHLORIDE 9 MG/ML
1000 INJECTION INTRAMUSCULAR; INTRAVENOUS; SUBCUTANEOUS ONCE
Refills: 0 | Status: COMPLETED | OUTPATIENT
Start: 2023-06-16 | End: 2023-06-16

## 2023-06-16 RX ORDER — FAMOTIDINE 10 MG/ML
20 INJECTION INTRAVENOUS ONCE
Refills: 0 | Status: COMPLETED | OUTPATIENT
Start: 2023-06-16 | End: 2023-06-16

## 2023-06-16 RX ORDER — SUCRALFATE 1 G
1 TABLET ORAL ONCE
Refills: 0 | Status: COMPLETED | OUTPATIENT
Start: 2023-06-16 | End: 2023-06-16

## 2023-06-16 RX ORDER — ACETAMINOPHEN 500 MG
1000 TABLET ORAL ONCE
Refills: 0 | Status: COMPLETED | OUTPATIENT
Start: 2023-06-16 | End: 2023-06-16

## 2023-06-16 RX ORDER — LIDOCAINE 4 G/100G
10 CREAM TOPICAL ONCE
Refills: 0 | Status: DISCONTINUED | OUTPATIENT
Start: 2023-06-16 | End: 2023-06-16

## 2023-06-16 RX ADMIN — FAMOTIDINE 20 MILLIGRAM(S): 10 INJECTION INTRAVENOUS at 17:47

## 2023-06-16 RX ADMIN — Medication 30 MILLILITER(S): at 17:47

## 2023-06-16 RX ADMIN — Medication 1 GRAM(S): at 21:26

## 2023-06-16 RX ADMIN — ONDANSETRON 4 MILLIGRAM(S): 8 TABLET, FILM COATED ORAL at 17:47

## 2023-06-16 RX ADMIN — Medication 400 MILLIGRAM(S): at 17:47

## 2023-06-16 RX ADMIN — SODIUM CHLORIDE 1000 MILLILITER(S): 9 INJECTION INTRAMUSCULAR; INTRAVENOUS; SUBCUTANEOUS at 17:47

## 2023-06-16 NOTE — ED STATDOCS - OBJECTIVE STATEMENT
63 year old male with PMHx of colitis, ESBL, c.diff in 2013, pre diabetes on metformin, hypothyroidism on synthroid, HTN, HLD, hiatal hernia s/p repair, SBO, appendectomy presents to the ED c/o worsening diarrhea for x4-5 days. Endorses nausea, decreased PO and fever with tmax 99.5. Reports multiple episodes with loose diarrhea throughout the day. +urine frequency. Pt was recently admitted for ESBL in April 2023 and was placed on antibiotics, last does of antibiotics was in april. NKDA. PCP: Dr. Chi. GI: Dr. Patel

## 2023-06-16 NOTE — ED STATDOCS - PATIENT PORTAL LINK FT
You can access the FollowMyHealth Patient Portal offered by [facility] by registering at the following website: [email or web address]. By joining [employer]’s FollowMyHealth portal, you will also be able to view your health information using other applications (apps) compatible with our system.

## 2023-06-16 NOTE — ED STATDOCS - PROGRESS NOTE DETAILS
signed Jaylin Art PA-C Pt seen and evaluated initially in intake by Dr. Blanc.   63M c/o worsening diarrhea x 4-5 days. Low grade temp 99.5. Some nausea. No blood in stool. Pt s/p many courses of abx for prostate infection, then ESBL infection, last abx a couple of months ago. Hx of c diff many years ago, treated successfully. PMH HTN, preDM, HLD, hypothyroid, colitis. Pt alert, NAD. PLan labs, fluids, UA, CT, re-eval. Pt agrees with plan of  care.

## 2023-06-16 NOTE — ED STATDOCS - NSFOLLOWUPINSTRUCTIONS_ED_ALL_ED_FT
FOLLOW UP WITH YOUR GASTROENTEROLOGIST IN 2-3 DAYS. CALL THE OFFICE TO MAKE AN APPOINTMENT. RETURN TO ER FOR ANY WORSENING SYMPTOMS OR NEW CONCERNS. IF SYMPTOMS DO NOT IMPROVE WITHIN 24 HOURS MAY START TAKING AUGMENTIN.     Colitis     Colitis is inflammation of the colon. Colitis may last a short time (be acute), or it may last a long time (become chronic).  What are the causes?  This condition may be caused by:  Viruses.Bacteria.Reaction to medicine.Certain autoimmune diseases such as Crohn's disease or ulcerative colitis.Radiation treatment.Decreased blood flow to the bowel (ischemia).What are the signs or symptoms?  Symptoms of this condition include:  Watery diarrhea.Passing bloody or tarry stool.Pain.Fever.Vomiting.Tiredness (fatigue).Weight loss.Bloating.Abdominal pain.Having fewer bowel movements than usual.A strong and sudden urge to have a bowel movement.Feeling like the bowel is not empty after a bowel movement.How is this diagnosed?  This condition is diagnosed with a stool test or a blood test.  You may also have other tests, such as:  X-rays.CT scan.Colonoscopy.Endoscopy.Biopsy.How is this treated?  Treatment for this condition depends on the cause. The condition may be treated by:  Resting the bowel. This involves not eating or drinking for a period of time.Fluids that are given through an IV.Medicine for pain and diarrhea.Antibiotic medicines.Cortisone medicines.Surgery.Follow these instructions at home:  Eating and drinking        Follow instructions from your health care provider about eating or drinking restrictions.Drink enough fluid to keep your urine pale yellow.Work with a dietitian to determine which foods cause your condition to flare up.Avoid foods that cause flare-ups.Eat a well-balanced diet.General instructions     If you were prescribed an antibiotic medicine, take it as told by your health care provider. Do not stop taking the antibiotic even if you start to feel better.Take over-the-counter and prescription medicines only as told by your health care provider.Keep all follow-up visits as told by your health care provider. This is important.Contact a health care provider if:  Your symptoms do not go away.You develop new symptoms.Get help right away if you:  Have a fever that does not go away with treatment.Develop chills.Have extreme weakness, fainting, or dehydration.Have repeated vomiting.Develop severe pain in your abdomen.Pass bloody or tarry stool.Summary  Colitis is inflammation of the colon. Colitis may last a short time (be acute), or it may last a long time (become chronic).Treatment for this condition depends on the cause and may include resting the bowel, taking medicines, or having surgery.If you were prescribed an antibiotic medicine, take it as told by your health care provider. Do not stop taking the antibiotic even if you start to feel better.Get help right away if you develop severe pain in your abdomen.Keep all follow-up visits as told by your health care provider. This is important.This information is not intended to replace advice given to you by your health care provider. Make sure you discuss any questions you have with your health care provider.

## 2023-06-16 NOTE — ED STATDOCS - ATTENDING APP SHARED VISIT CONTRIBUTION OF CARE
I, Sami Blanc MD,  performed the initial face to face bedside interview with this patient regarding history of present illness, review of symptoms and relevant past medical, social and family history.  I completed an independent physical examination.  I was the initial provider who evaluated this patient.   I personally saw the patient and performed a substantive portion of the visit including all aspects of the medical decision making.  I have signed out the follow up of any pending tests (i.e. labs, radiological studies) to the DEANNE.  I have communicated the patient’s plan of care and disposition with the DEANNE.  The history, relevant review of systems, past medical and surgical history, medical decision making, and physical examination was documented by the scribe in my presence and I attest to the accuracy of the documentation.

## 2023-06-16 NOTE — ED STATDOCS - PHYSICAL EXAMINATION
Constitutional: Awake, Alert, non-toxic. No acute distress.  HEAD: Normocephalic, atraumatic.   EYES: PERRL, EOM intact, conjunctiva and sclera are clear bilaterally.  ENT: External ears normal. No rhinorrhea, no tracheal deviation   NECK: Supple, non-tender  CARDIOVASCULAR: regular rate and rhythm.  RESPIRATORY: Normal respiratory effort; breath sounds CTAB, no wheezes, rhonchi, or rales. Speaking in full sentences. No accessory muscle use.   ABDOMEN: Soft; epigastric TTP, non-distended. No rebound or guarding.   MSK:  no lower extremity edema, no deformities  SKIN: Warm, dry  NEURO: A&O x3. Sensory and motor functions are grossly intact. Speech is normal. No facial droop.  PSYCH: Appearance and judgement seem appropriate for gender and age.

## 2023-06-16 NOTE — ED ADULT NURSE NOTE - OBJECTIVE STATEMENT
Patient present to the ER with complaints of epigastric and abdominal pain. Patient reports pain is associated with nausea and watery diarrhea for four days. Patient report temp of 99. Patient reports history of colitis and ESBL which he reports was recently treated in April. Patient denies CP, SOB, chills.

## 2023-06-16 NOTE — ED STATDOCS - CLINICAL SUMMARY MEDICAL DECISION MAKING FREE TEXT BOX
With frequent antibiotics use and hospitalizations, will check for c.diff. Otherwise, possible colitis vs gastritis. Hx not consistent with SBO. Plan on labs, CT, fluids. With frequent antibiotics use and hospitalizations, will check for c.diff. Otherwise, possible colitis vs gastritis. Hx not consistent with SBO. Plan on labs, CT, fluids.    signed Jaylin Art PA-C Pt seen and evaluated initially in intake by Dr. Blanc.   No significant findings on labwork. CT with colitis. pt unable to provide stool sample in ED. Also has reflux pain, requesting GI cocktail. will give in ED and sent rx for Augmentin for colitis if symptoms do not improve by tomorrow. cirrhosis on CT, pt says he has fatty liver, has not heard dx of cirrhosis, not a drinker. prompt f/u GI. return precautions given. Pt feeling well, pt and family agree with DC and plan of care.

## 2023-06-16 NOTE — ED ADULT NURSE NOTE - NSFALLLASTSIX_ED_ALL_ED
[de-identified] : Mr. WAEVER comes in for reassessment of hypertension. He denies edema, chest pain, dizziness, LIRA, PND and orthopnea.  He  has had no problems with his medications.\par 
No.

## 2023-06-22 ENCOUNTER — APPOINTMENT (OUTPATIENT)
Dept: OTOLARYNGOLOGY | Facility: CLINIC | Age: 63
End: 2023-06-22

## 2023-06-22 ENCOUNTER — APPOINTMENT (OUTPATIENT)
Dept: GASTROENTEROLOGY | Facility: CLINIC | Age: 63
End: 2023-06-22
Payer: MEDICARE

## 2023-06-22 VITALS
TEMPERATURE: 96 F | HEIGHT: 68 IN | WEIGHT: 260 LBS | OXYGEN SATURATION: 97 % | HEART RATE: 70 BPM | DIASTOLIC BLOOD PRESSURE: 90 MMHG | SYSTOLIC BLOOD PRESSURE: 145 MMHG | BODY MASS INDEX: 39.4 KG/M2

## 2023-06-22 DIAGNOSIS — K21.9 GASTRO-ESOPHAGEAL REFLUX DISEASE W/OUT ESOPHAGITIS: ICD-10-CM

## 2023-06-22 PROCEDURE — 99215 OFFICE O/P EST HI 40 MIN: CPT

## 2023-06-23 ENCOUNTER — APPOINTMENT (OUTPATIENT)
Dept: CT IMAGING | Facility: CLINIC | Age: 63
End: 2023-06-23
Payer: MEDICARE

## 2023-06-23 ENCOUNTER — OUTPATIENT (OUTPATIENT)
Dept: OUTPATIENT SERVICES | Facility: HOSPITAL | Age: 63
LOS: 1 days | End: 2023-06-23
Payer: MEDICARE

## 2023-06-23 DIAGNOSIS — Z98.890 OTHER SPECIFIED POSTPROCEDURAL STATES: Chronic | ICD-10-CM

## 2023-06-23 DIAGNOSIS — Z90.49 ACQUIRED ABSENCE OF OTHER SPECIFIED PARTS OF DIGESTIVE TRACT: Chronic | ICD-10-CM

## 2023-06-23 DIAGNOSIS — K44.9 DIAPHRAGMATIC HERNIA WITHOUT OBSTRUCTION OR GANGRENE: ICD-10-CM

## 2023-06-23 DIAGNOSIS — Z98.1 ARTHRODESIS STATUS: Chronic | ICD-10-CM

## 2023-06-23 PROCEDURE — 71250 CT THORAX DX C-: CPT

## 2023-06-23 PROCEDURE — 71250 CT THORAX DX C-: CPT | Mod: 26

## 2023-06-30 ENCOUNTER — RESULT REVIEW (OUTPATIENT)
Age: 63
End: 2023-06-30

## 2023-06-30 ENCOUNTER — OUTPATIENT (OUTPATIENT)
Dept: OUTPATIENT SERVICES | Facility: HOSPITAL | Age: 63
LOS: 1 days | Discharge: HOME | End: 2023-06-30
Payer: MEDICARE

## 2023-06-30 ENCOUNTER — APPOINTMENT (OUTPATIENT)
Dept: GASTROENTEROLOGY | Facility: HOSPITAL | Age: 63
End: 2023-06-30
Payer: MEDICARE

## 2023-06-30 VITALS
WEIGHT: 253.09 LBS | HEART RATE: 60 BPM | SYSTOLIC BLOOD PRESSURE: 128 MMHG | HEIGHT: 68 IN | RESPIRATION RATE: 12 BRPM | DIASTOLIC BLOOD PRESSURE: 75 MMHG | TEMPERATURE: 98 F

## 2023-06-30 VITALS
SYSTOLIC BLOOD PRESSURE: 112 MMHG | OXYGEN SATURATION: 98 % | DIASTOLIC BLOOD PRESSURE: 67 MMHG | HEART RATE: 55 BPM | RESPIRATION RATE: 18 BRPM

## 2023-06-30 DIAGNOSIS — K21.9 GASTRO-ESOPHAGEAL REFLUX DISEASE WITHOUT ESOPHAGITIS: ICD-10-CM

## 2023-06-30 DIAGNOSIS — Z98.890 OTHER SPECIFIED POSTPROCEDURAL STATES: Chronic | ICD-10-CM

## 2023-06-30 DIAGNOSIS — Z98.1 ARTHRODESIS STATUS: Chronic | ICD-10-CM

## 2023-06-30 DIAGNOSIS — Z90.49 ACQUIRED ABSENCE OF OTHER SPECIFIED PARTS OF DIGESTIVE TRACT: Chronic | ICD-10-CM

## 2023-06-30 LAB
GLUCOSE BLDC GLUCOMTR-MCNC: 108 MG/DL — HIGH (ref 70–99)
GLUCOSE BLDC GLUCOMTR-MCNC: 124 MG/DL — HIGH (ref 70–99)

## 2023-06-30 PROCEDURE — 91040 ESOPH BALLOON DISTENSION TST: CPT | Mod: 26

## 2023-06-30 PROCEDURE — 43239 EGD BIOPSY SINGLE/MULTIPLE: CPT

## 2023-06-30 PROCEDURE — 88305 TISSUE EXAM BY PATHOLOGIST: CPT | Mod: 26

## 2023-06-30 DEVICE — CATH ENDOFLIP MEASURE 16MM: Type: IMPLANTABLE DEVICE | Status: FUNCTIONAL

## 2023-06-30 DEVICE — CLIP RESOLUTION 360 235CM: Type: IMPLANTABLE DEVICE | Status: FUNCTIONAL

## 2023-06-30 DEVICE — IMPLANTABLE DEVICE: Type: IMPLANTABLE DEVICE | Status: FUNCTIONAL

## 2023-06-30 RX ORDER — SODIUM CHLORIDE 9 MG/ML
500 INJECTION, SOLUTION INTRAVENOUS
Refills: 0 | Status: DISCONTINUED | OUTPATIENT
Start: 2023-06-30 | End: 2023-07-14

## 2023-06-30 NOTE — ASU PATIENT PROFILE, ADULT - FALL HARM RISK - HARM RISK INTERVENTIONS

## 2023-06-30 NOTE — PRE PROCEDURE NOTE - PRE PROCEDURE EVALUATION
Pre-Endoscopy Evaluation    Referring Physician:                                  Dr Rex Dent    Indication for Procedure:  GERD post fundo     Sedation by Anesthesia [X ]    PAST MEDICAL & SURGICAL HISTORY:  HTN (hypertension)      IBS (irritable bowel syndrome)      SBO (small bowel obstruction)      Gastro-esophageal reflux disease with esophagitis      Obese      Diabetes      H/O neuropathy      Hypothyroidism      History of chronic pain      Unsteady gait      H/O hiatal hernia      H/O small bowel obstruction      History of herniated intervertebral disc      H/O spinal stenosis      Anxiety and depression      NANCY (obstructive sleep apnea)      History of lumbar fusion      H/O exploratory laparotomy      H/O hernia repair  abdominal      History of appendectomy      S/P cholecystectomy      S/P small bowel resection          Latex allergy: [ ] yes [X] no    Smoking: [ ] yes  [X] no    AICD/PPM: [ ] yes   [X] no    Pertinent lab data:                      Physical Examination:  Daily     Daily   Vital Signs Last 24 Hrs  T(C): --  T(F): --  HR: --  BP: --  BP(mean): --  RR: --  SpO2: --        Constitutional: NAD    HEENT: PERRLA, EOMI,       Neck:  No JVD    Respiratory: CTAB/L    Cardiovascular: S1 and S2    Gastrointestinal: BS+, soft, NT/ND    Extremities: No peripheral edema    Neurological: A/O x 3, no focal deficits    Psychiatric: Normal mood, normal affect    : No Nuñez    Skin: No rashes    Comments:    ASA Class: I [ ]  II [ ]  III [X ]  IV [ ]    The patient is a suitable candidate for the planned procedure unless box checked [ ]  No, explain:

## 2023-06-30 NOTE — ASU PATIENT PROFILE, ADULT - CAREGIVER
[FreeTextEntry1] : The patient presents today for colon cancer screening due to a personal history of polyps and family history of colon cancer. She will be scheduled for a colonoscopy with PEG 3350 prep. I have discussed the indications (including but not limited to ruling out inflammatory bowel disease, colorectal neoplasm, GI bleed, and AVM's), benefits, risks  (including but not limited to reaction to the anesthesia, infection, bleeding, and perforation),  and alternatives to colonoscopy with the patient. The patient understands all options and has agreed to have a colonoscopy and is medically optimized for the planned procedure. \par \par Labs prior\par Will need neuro clearance due to cerebral aneurysm
Declines

## 2023-07-04 PROCEDURE — 91035 G-ESOPH REFLX TST W/ELECTROD: CPT | Mod: 26

## 2023-07-05 LAB — SURGICAL PATHOLOGY STUDY: SIGNIFICANT CHANGE UP

## 2023-07-31 ENCOUNTER — APPOINTMENT (OUTPATIENT)
Dept: GASTROENTEROLOGY | Facility: CLINIC | Age: 63
End: 2023-07-31
Payer: MEDICARE

## 2023-07-31 VITALS
DIASTOLIC BLOOD PRESSURE: 96 MMHG | OXYGEN SATURATION: 98 % | SYSTOLIC BLOOD PRESSURE: 160 MMHG | BODY MASS INDEX: 38.19 KG/M2 | WEIGHT: 252 LBS | TEMPERATURE: 98 F | HEART RATE: 77 BPM | HEIGHT: 68 IN

## 2023-07-31 DIAGNOSIS — K30 FUNCTIONAL DYSPEPSIA: ICD-10-CM

## 2023-07-31 PROCEDURE — 99214 OFFICE O/P EST MOD 30 MIN: CPT

## 2023-07-31 NOTE — ASSESSMENT
[FreeTextEntry1] : 63-year-old male with longstanding history of GERD status post toupee fundoplication in August 2022 here for follow-up care.  Still with complaints of excessive throat pain/burning and retrosternal burning sensation in his chest, objective reflux testing with Bravo pH capsule demonstrates no significant acid reflux.  Patient was confirmed to be off of PPI for 7 days prior to procedure and during the 4 days of recording.  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  He is already taking Cymbalta 60 mg twice daily which can also be used for functional dyspepsia.  I have asked him to discontinue pantoprazole as there is no evidence of objective reflux at this time and risk outweigh benefits of maintaining antisecretory therapy at this time.  1.  Patient will discuss with his pain management physician in terms of starting a TCA such as amitriptyline for functional dyspepsia in the setting of continuing his SNRI Cymbalta as he feels that that has been very effective in treating his depression and he would not like to discontinue or change at this time.  2.  We can add on Gaviscon for cycle protection of the esophagus. 3.  We will also give him a prescription for first mouthwash as he is mainly complaining of oropharyngeal pain, differential diagnosis also includes postnasal drip.  He should follow-up with ENT, as he no longer has LPR.  I also counseled him on taking Flonase intranasal spray 1 spray in each nostril x4 weeks to see if his throat pain improves

## 2023-07-31 NOTE — HISTORY OF PRESENT ILLNESS
[FreeTextEntry1] : 63-year-old pleasant male with longstanding history of GERD that is post toupee fundoplication August 2022 here for follow-up care.  Patient is status post recent upper endoscopy/Bravo pH capsule off of medications on 6/30/2023 due to complaints of recurrent heartburn and chest pain symptoms.  His upper endoscopy demonstrated gastroesophageal flap valve as Hill grade 1.  There was evidence of a partial fundoplication found at the GE junction and appeared intact.  His Bravo pH capsule while off of antacid therapy demonstrated a total acid exposure percentage time of 0.2, 0.1% upright, 0.3% supine for DeMeester score of 1.5 total over 4 days.  There was 99.3% SAP significance with chest pain but 0% SI.  There was 98.1% significant SAP with heartburn, SI was 3.7%.  Patient is here for further discussion of test results and management.

## 2023-07-31 NOTE — CONSULT LETTER
[Dear  ___] : Dear  [unfilled], [Consult Letter:] : I had the pleasure of evaluating your patient, [unfilled]. [Please see my note below.] : Please see my note below. [Consult Closing:] : Thank you very much for allowing me to participate in the care of this patient.  If you have any questions, please do not hesitate to contact me. [Sincerely,] : Sincerely, [FreeTextEntry2] : Dr. Bryce Chi [FreeTextEntry3] : Eden Matthews MD Director of GI Motility, [facility]  of Medicine Division of Gastroenterology [facility] at [address], [address] [place] [address] Tel: [phone] Motility Appts-Motility Coordinator: Verenice Karri: [phone]   [DrErika  ___] : Dr. CASTAÑEDA

## 2023-08-01 ENCOUNTER — RX RENEWAL (OUTPATIENT)
Age: 63
End: 2023-08-01

## 2023-08-15 ENCOUNTER — APPOINTMENT (OUTPATIENT)
Dept: RADIOLOGY | Facility: HOSPITAL | Age: 63
End: 2023-08-15

## 2023-11-01 ENCOUNTER — RX RENEWAL (OUTPATIENT)
Age: 63
End: 2023-11-01

## 2023-11-01 RX ORDER — DIPHENHYDRAMINE HYDROCHLORIDE AND LIDOCAINE HYDROCHLORIDE AND ALUMINUM HYDROXIDE AND MAGNESIUM HYDRO
KIT
Qty: 1 | Refills: 3 | Status: ACTIVE | COMMUNITY
Start: 2023-06-22 | End: 1900-01-01

## 2024-01-01 NOTE — DISCHARGE NOTE PROVIDER - NSDCHC_MEDRECSTATUS_GEN_ALL_CORE
Problem: Physiological Stability  Goal: Vital signs and physical assessments stable and within expected parameters  Outcome: Monitoring/Evaluating progress  Goal: Remains free of signs and symptoms of infection  Outcome: Monitoring/Evaluating progress  Goal: Parent / caregiver verbalizes understanding of NICU care related to patient-specific condition and treatment  Description: Document on Patient Education Activity  Outcome: Monitoring/Evaluating progress     Problem: Thermoregulation  Goal: Body temperature maintained within normal range  Outcome: Monitoring/Evaluating progress     Problem: Pain  Goal: Acceptable level of comfort exhibited by infant (based on N-Pass/NIPS Scoring)  Outcome: Monitoring/Evaluating progress     Problem: Oxygenation/Respiratory Function  Goal: Optimized respiratory function and gas exchange  Outcome: Monitoring/Evaluating progress  Goal: Parent / caregiver verbalizes understanding of infant's respiratory condition and treatment  Description: Document on Patient Education Activity  Outcome: Monitoring/Evaluating progress     Problem: Skin Integrity  Goal: Skin integrity is maintained or improved (skin will be intact without erythma or breakdown)  Outcome: Monitoring/Evaluating progress     Problem: Alteration in Family Bonding  Goal: Family Interaction is supported  Outcome: Monitoring/Evaluating progress  Goal: Family demonstrates appropriate coping mechanisms  Outcome: Monitoring/Evaluating progress     Problem: Nutrition  Goal: Tolerates feedings  Outcome: Monitoring/Evaluating progress  Goal: Consumes sufficient dietary intake without complications  Outcome: Monitoring/Evaluating progress  Goal: Progresses toward ability to receive all feeding from breast or bottle  Outcome: Monitoring/Evaluating progress  Goal: Achieves catch up weight gain and growth consistent with birth weight percentile  Outcome: Monitoring/Evaluating progress  Goal: Parent/ caregiver verbalizes  understanding of milk preparation,  storage and bottle feeding techniques  Description: Document on Patient Education Activity  Outcome: Monitoring/Evaluating progress     Problem: Breastfeeding  Goal: Breast milk supply is established and maintained to provide breast milk to infant in accordance with mother's preference  Outcome: Monitoring/Evaluating progress  Goal: Successful breastfeeding as evidenced by proper latch and adequate suck/ swallow  Outcome: Monitoring/Evaluating progress  Goal: Parent / caregiver verbalizes understanding of benefits of exclusive breastfeeding and breastfeeding techniques  Description: Document on Patient Education Activity  Outcome: Monitoring/Evaluating progress     Problem: Risk of altered growth and development secondary to gestational age or condition  Goal: Demonstrates improved/ appropriate age-corrected neurobehavioral competence at time of discharge or will be referred for ongoing therapy  Outcome: Monitoring/Evaluating progress  Goal: Parent / caregiver verbalizes understanding of developmentally appropriate interaction & environment  Description: Document on Patient Education Activity  Outcome: Monitoring/Evaluating progress      Admission Reconciliation is Completed  Discharge Reconciliation is Not Complete Admission Reconciliation is Completed  Discharge Reconciliation is Completed

## 2024-03-07 ENCOUNTER — APPOINTMENT (OUTPATIENT)
Dept: GASTROENTEROLOGY | Facility: CLINIC | Age: 64
End: 2024-03-07
Payer: MEDICARE

## 2024-03-07 VITALS
SYSTOLIC BLOOD PRESSURE: 142 MMHG | OXYGEN SATURATION: 94 % | HEIGHT: 68 IN | DIASTOLIC BLOOD PRESSURE: 84 MMHG | BODY MASS INDEX: 40.92 KG/M2 | WEIGHT: 270 LBS | HEART RATE: 89 BPM

## 2024-03-07 DIAGNOSIS — R07.0 PAIN IN THROAT: ICD-10-CM

## 2024-03-07 PROCEDURE — 99214 OFFICE O/P EST MOD 30 MIN: CPT

## 2024-03-07 RX ORDER — FAMOTIDINE 20 MG/1
20 TABLET, FILM COATED ORAL
Qty: 90 | Refills: 1 | Status: DISCONTINUED | COMMUNITY
Start: 2022-01-19 | End: 2024-03-07

## 2024-03-07 RX ORDER — MORPHINE SULFATE 30 MG/1
30 TABLET ORAL
Refills: 0 | Status: DISCONTINUED | COMMUNITY
End: 2024-03-07

## 2024-03-07 NOTE — HISTORY OF PRESENT ILLNESS
[FreeTextEntry1] : 64-year-old male with longstanding history of GERD status post toupet fundoplication August 2022, with repeat endoscopy/Bravo pH capsule 6/30/2023 for complaints of recurrent heartburn/chest pain, confirmed to have no significant pathologic GERD but likely functional dyspepsia.  On her last visit July 2023, patient was already taking Cymbalta 60 mg twice daily, added on Gaviscon for cytoprotection, and first mouthwash.   Despite these measures, patient is still complaining of a burning sensation which starts at his throat and back of his mouth, radiating down his chest and into the epigastric area and left upper quadrant.  The symptoms are not abating, occurs 24 hours a day, not related to eating or drinking though sometimes when he eats certain foods he feels that this burning sensation worsens.  He was given Gaviscon which did not relieve any of his burning sensation.  He is maintained on Cymbalta 60 mg twice daily, gabapentin 300 mg every morning, 300 mg in the afternoon, and 900 mg at night.  On top of that he is taking oxycodone 5 mg 2 tablets 3 times a day.  He denies any issues with constipation, says that he has what diarrhea once in a while.  He has gained weight from our last visit, 18 pounds.  He does state that when he was using the Flonase, for which she has ran out for the last month, his throat symptoms were mildly improved.

## 2024-03-07 NOTE — ASSESSMENT
[FreeTextEntry1] : 64-year-old male with longstanding history of GERD status post partial fundoplication August 2022, here for follow-up care.  Patient with complaints of throat burning/retrosternal burning/epigastric burning 24 hours a day, despite negative Bravo pH study in 2023.  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  This is clearly not a GERD related problem.  1.  I counseled patient on taking famotidine when he is eating spicy foods that upset his stomach 2.  I suggested weight loss, possibly a candidate for GLP-1's given his diabetes and obesity 3.  Increase his gabapentin to 600 mg in the a.m.

## 2024-03-29 NOTE — PATIENT PROFILE ADULT - NSPROPTRIGHTSUPPORTNAME_GEN_A_NUR
revealing ketones 40, no evidence of urinary tract infection.    Patient was medicated with 2L IV normal saline and Reglan 10 mg IV.  Please see ED course below for additional MDM.    ED Course as of 03/29/24 1704   Fri Mar 29, 2024   1553 Patient reevaluated.  Labs and UA discussed.  She is feeling much improved.  She is feeling hungry.  She is tolerating water.  She is going to try to eat some crackers and see if she is able to tolerate that.  The second liter of IV fluids is infusing.  Will continue to monitor and will reevaluate after second liter IV fluids is completed. [SH]   1650 Patient tolerated crackers, no episodes of vomiting while in ED. Feeling hungry, Wanting to go home. ED return precautions were discussed. Patient verbalizes understanding and is agreeable with plan of care. She will follow up with her obgyn  []      ED Course User Index  [SH] Kristina Schultz APRN - CNP         CONSULTS: (Who and What was discussed)  None        I am the Primary Clinician of Record.    FINAL IMPRESSION      1. Nausea and vomiting during pregnancy          DISPOSITION/PLAN     DISPOSITION Decision To Discharge 03/29/2024 04:59:56 PM      PATIENT REFERRED TO:  Marge Loja MD  [address] [id number] [phone]    Call   physician follow up with your obgyn      DISCHARGE MEDICATIONS:  Discharge Medication List as of 3/29/2024  4:52 PM          DISCONTINUED MEDICATIONS:  Discharge Medication List as of 3/29/2024  4:52 PM                 (Please note that portions of this note were completed with a voice recognition program.  Efforts were made to edit the dictations but occasionally words are mis-transcribed.)    RIRI Alford CNP (electronically signed)          Kristina Schultz APRN - CNP  03/29/24 1704    
Sheri

## 2024-06-10 NOTE — ED STATDOCS - NSICDXPASTSURGICALHX_GEN_ALL_CORE_FT
Chronic retention.  He previously required drew catheter and had hypospadias.  HX significant for CVA.  He remains on bethanechol.    PAST SURGICAL HISTORY:  H/O exploratory laparotomy     H/O hernia repair abdominal    History of appendectomy     History of lumbar fusion     S/P cholecystectomy     S/P small bowel resection

## 2024-06-16 ENCOUNTER — EMERGENCY (EMERGENCY)
Facility: HOSPITAL | Age: 64
LOS: 0 days | Discharge: HOME | End: 2024-06-16
Attending: STUDENT IN AN ORGANIZED HEALTH CARE EDUCATION/TRAINING PROGRAM
Payer: MEDICARE

## 2024-06-16 VITALS
RESPIRATION RATE: 18 BRPM | TEMPERATURE: 98 F | HEART RATE: 80 BPM | WEIGHT: 276.9 LBS | SYSTOLIC BLOOD PRESSURE: 150 MMHG | OXYGEN SATURATION: 95 % | DIASTOLIC BLOOD PRESSURE: 87 MMHG

## 2024-06-16 VITALS
RESPIRATION RATE: 16 BRPM | SYSTOLIC BLOOD PRESSURE: 149 MMHG | TEMPERATURE: 98 F | HEART RATE: 73 BPM | OXYGEN SATURATION: 98 % | DIASTOLIC BLOOD PRESSURE: 99 MMHG

## 2024-06-16 DIAGNOSIS — R35.0 FREQUENCY OF MICTURITION: ICD-10-CM

## 2024-06-16 DIAGNOSIS — D72.829 ELEVATED WHITE BLOOD CELL COUNT, UNSPECIFIED: ICD-10-CM

## 2024-06-16 DIAGNOSIS — E03.9 HYPOTHYROIDISM, UNSPECIFIED: ICD-10-CM

## 2024-06-16 DIAGNOSIS — Z98.890 OTHER SPECIFIED POSTPROCEDURAL STATES: Chronic | ICD-10-CM

## 2024-06-16 DIAGNOSIS — R50.9 FEVER, UNSPECIFIED: ICD-10-CM

## 2024-06-16 DIAGNOSIS — I10 ESSENTIAL (PRIMARY) HYPERTENSION: ICD-10-CM

## 2024-06-16 DIAGNOSIS — E11.9 TYPE 2 DIABETES MELLITUS WITHOUT COMPLICATIONS: ICD-10-CM

## 2024-06-16 DIAGNOSIS — R22.0 LOCALIZED SWELLING, MASS AND LUMP, HEAD: ICD-10-CM

## 2024-06-16 DIAGNOSIS — Z90.49 ACQUIRED ABSENCE OF OTHER SPECIFIED PARTS OF DIGESTIVE TRACT: Chronic | ICD-10-CM

## 2024-06-16 DIAGNOSIS — Z98.1 ARTHRODESIS STATUS: Chronic | ICD-10-CM

## 2024-06-16 DIAGNOSIS — R19.7 DIARRHEA, UNSPECIFIED: ICD-10-CM

## 2024-06-16 DIAGNOSIS — K08.89 OTHER SPECIFIED DISORDERS OF TEETH AND SUPPORTING STRUCTURES: ICD-10-CM

## 2024-06-16 LAB
ALBUMIN SERPL ELPH-MCNC: 4.3 G/DL — SIGNIFICANT CHANGE UP (ref 3.3–5)
ALP SERPL-CCNC: 133 U/L — HIGH (ref 40–120)
ALT FLD-CCNC: 64 U/L — SIGNIFICANT CHANGE UP (ref 12–78)
ANION GAP SERPL CALC-SCNC: 6 MMOL/L — SIGNIFICANT CHANGE UP (ref 5–17)
APPEARANCE UR: CLEAR — SIGNIFICANT CHANGE UP
APTT BLD: 33.9 SEC — SIGNIFICANT CHANGE UP (ref 24.5–35.6)
AST SERPL-CCNC: 52 U/L — HIGH (ref 15–37)
BASOPHILS # BLD AUTO: 0.09 K/UL — SIGNIFICANT CHANGE UP (ref 0–0.2)
BASOPHILS NFR BLD AUTO: 0.8 % — SIGNIFICANT CHANGE UP (ref 0–2)
BILIRUB SERPL-MCNC: 0.7 MG/DL — SIGNIFICANT CHANGE UP (ref 0.2–1.2)
BILIRUB UR-MCNC: NEGATIVE — SIGNIFICANT CHANGE UP
BUN SERPL-MCNC: 15 MG/DL — SIGNIFICANT CHANGE UP (ref 7–23)
CALCIUM SERPL-MCNC: 10.1 MG/DL — SIGNIFICANT CHANGE UP (ref 8.5–10.1)
CHLORIDE SERPL-SCNC: 106 MMOL/L — SIGNIFICANT CHANGE UP (ref 96–108)
CO2 SERPL-SCNC: 25 MMOL/L — SIGNIFICANT CHANGE UP (ref 22–31)
COLOR SPEC: SIGNIFICANT CHANGE UP
CREAT SERPL-MCNC: 0.89 MG/DL — SIGNIFICANT CHANGE UP (ref 0.5–1.3)
DIFF PNL FLD: NEGATIVE — SIGNIFICANT CHANGE UP
EGFR: 96 ML/MIN/1.73M2 — SIGNIFICANT CHANGE UP
EOSINOPHIL # BLD AUTO: 0.28 K/UL — SIGNIFICANT CHANGE UP (ref 0–0.5)
EOSINOPHIL NFR BLD AUTO: 2.5 % — SIGNIFICANT CHANGE UP (ref 0–6)
GLUCOSE SERPL-MCNC: 105 MG/DL — HIGH (ref 70–99)
GLUCOSE UR QL: NEGATIVE MG/DL — SIGNIFICANT CHANGE UP
HCT VFR BLD CALC: 46.6 % — SIGNIFICANT CHANGE UP (ref 39–50)
HGB BLD-MCNC: 16.4 G/DL — SIGNIFICANT CHANGE UP (ref 13–17)
IMM GRANULOCYTES NFR BLD AUTO: 0.3 % — SIGNIFICANT CHANGE UP (ref 0–0.9)
INR BLD: 1.09 RATIO — SIGNIFICANT CHANGE UP (ref 0.85–1.18)
KETONES UR-MCNC: NEGATIVE MG/DL — SIGNIFICANT CHANGE UP
LACTATE SERPL-SCNC: 2.1 MMOL/L — HIGH (ref 0.7–2)
LACTATE SERPL-SCNC: 2.5 MMOL/L — HIGH (ref 0.7–2)
LEUKOCYTE ESTERASE UR-ACNC: NEGATIVE — SIGNIFICANT CHANGE UP
LYMPHOCYTES # BLD AUTO: 35.1 % — SIGNIFICANT CHANGE UP (ref 13–44)
LYMPHOCYTES # BLD AUTO: 4.01 K/UL — HIGH (ref 1–3.3)
MCHC RBC-ENTMCNC: 31.4 PG — SIGNIFICANT CHANGE UP (ref 27–34)
MCHC RBC-ENTMCNC: 35.2 GM/DL — SIGNIFICANT CHANGE UP (ref 32–36)
MCV RBC AUTO: 89.1 FL — SIGNIFICANT CHANGE UP (ref 80–100)
MONOCYTES # BLD AUTO: 1.37 K/UL — HIGH (ref 0–0.9)
MONOCYTES NFR BLD AUTO: 12 % — SIGNIFICANT CHANGE UP (ref 2–14)
NEUTROPHILS # BLD AUTO: 5.64 K/UL — SIGNIFICANT CHANGE UP (ref 1.8–7.4)
NEUTROPHILS NFR BLD AUTO: 49.3 % — SIGNIFICANT CHANGE UP (ref 43–77)
NITRITE UR-MCNC: NEGATIVE — SIGNIFICANT CHANGE UP
PH UR: 6 — SIGNIFICANT CHANGE UP (ref 5–8)
PLATELET # BLD AUTO: 236 K/UL — SIGNIFICANT CHANGE UP (ref 150–400)
POTASSIUM SERPL-MCNC: 4.3 MMOL/L — SIGNIFICANT CHANGE UP (ref 3.5–5.3)
POTASSIUM SERPL-SCNC: 4.3 MMOL/L — SIGNIFICANT CHANGE UP (ref 3.5–5.3)
PROT SERPL-MCNC: 8.8 GM/DL — HIGH (ref 6–8.3)
PROT UR-MCNC: SIGNIFICANT CHANGE UP MG/DL
PROTHROM AB SERPL-ACNC: 12.3 SEC — SIGNIFICANT CHANGE UP (ref 9.5–13)
RBC # BLD: 5.23 M/UL — SIGNIFICANT CHANGE UP (ref 4.2–5.8)
RBC # FLD: 12.7 % — SIGNIFICANT CHANGE UP (ref 10.3–14.5)
SODIUM SERPL-SCNC: 137 MMOL/L — SIGNIFICANT CHANGE UP (ref 135–145)
SP GR SPEC: 1.01 — SIGNIFICANT CHANGE UP (ref 1–1.03)
UROBILINOGEN FLD QL: 0.2 MG/DL — SIGNIFICANT CHANGE UP (ref 0.2–1)
WBC # BLD: 11.42 K/UL — HIGH (ref 3.8–10.5)
WBC # FLD AUTO: 11.42 K/UL — HIGH (ref 3.8–10.5)

## 2024-06-16 PROCEDURE — 81003 URINALYSIS AUTO W/O SCOPE: CPT

## 2024-06-16 PROCEDURE — 85025 COMPLETE CBC W/AUTO DIFF WBC: CPT

## 2024-06-16 PROCEDURE — 71046 X-RAY EXAM CHEST 2 VIEWS: CPT | Mod: 26

## 2024-06-16 PROCEDURE — 83605 ASSAY OF LACTIC ACID: CPT

## 2024-06-16 PROCEDURE — 36415 COLL VENOUS BLD VENIPUNCTURE: CPT

## 2024-06-16 PROCEDURE — 99285 EMERGENCY DEPT VISIT HI MDM: CPT | Mod: 25

## 2024-06-16 PROCEDURE — 85730 THROMBOPLASTIN TIME PARTIAL: CPT

## 2024-06-16 PROCEDURE — 80053 COMPREHEN METABOLIC PANEL: CPT

## 2024-06-16 PROCEDURE — 85610 PROTHROMBIN TIME: CPT

## 2024-06-16 PROCEDURE — 36000 PLACE NEEDLE IN VEIN: CPT

## 2024-06-16 PROCEDURE — 71046 X-RAY EXAM CHEST 2 VIEWS: CPT

## 2024-06-16 PROCEDURE — 93005 ELECTROCARDIOGRAM TRACING: CPT

## 2024-06-16 PROCEDURE — 87040 BLOOD CULTURE FOR BACTERIA: CPT

## 2024-06-16 PROCEDURE — 99284 EMERGENCY DEPT VISIT MOD MDM: CPT

## 2024-06-16 PROCEDURE — 93010 ELECTROCARDIOGRAM REPORT: CPT

## 2024-06-16 RX ORDER — OXYCODONE HYDROCHLORIDE 5 MG/1
1 TABLET ORAL
Qty: 9 | Refills: 0
Start: 2024-06-16 | End: 2024-06-18

## 2024-06-16 RX ORDER — SODIUM CHLORIDE 9 MG/ML
1000 INJECTION INTRAMUSCULAR; INTRAVENOUS; SUBCUTANEOUS ONCE
Refills: 0 | Status: COMPLETED | OUTPATIENT
Start: 2024-06-16 | End: 2024-06-16

## 2024-06-16 RX ORDER — OXYCODONE HYDROCHLORIDE 5 MG/1
1 TABLET ORAL
Qty: 12 | Refills: 0
Start: 2024-06-16 | End: 2024-06-18

## 2024-06-16 RX ORDER — OXYCODONE HYDROCHLORIDE 5 MG/1
5 TABLET ORAL ONCE
Refills: 0 | Status: DISCONTINUED | OUTPATIENT
Start: 2024-06-16 | End: 2024-06-16

## 2024-06-16 RX ADMIN — SODIUM CHLORIDE 1000 MILLILITER(S): 9 INJECTION INTRAMUSCULAR; INTRAVENOUS; SUBCUTANEOUS at 16:47

## 2024-06-16 RX ADMIN — Medication 1 TABLET(S): at 17:08

## 2024-06-16 RX ADMIN — OXYCODONE HYDROCHLORIDE 5 MILLIGRAM(S): 5 TABLET ORAL at 17:08

## 2024-06-16 NOTE — ED ADULT NURSE NOTE - NSFALLUNIVINTERV_ED_ALL_ED
Bed/Stretcher in lowest position, wheels locked, appropriate side rails in place/Call bell, personal items and telephone in reach/Instruct patient to call for assistance before getting out of bed/chair/stretcher/Non-slip footwear applied when patient is off stretcher/[place] to call system/Physically safe environment - no spills, clutter or unnecessary equipment/Purposeful proactive rounding/Room/bathroom lighting operational, light cord in reach

## 2024-06-16 NOTE — ED ADULT NURSE NOTE - CAS EDN DISCHARGE ASSESSMENT
No
Alert and oriented to person, place and time/Patient baseline mental status/Awake/Symptoms improved

## 2024-06-16 NOTE — ED STATDOCS - CLINICAL SUMMARY MEDICAL DECISION MAKING FREE TEXT BOX
64-year-old male presenting with general malaise, fevers, chills, diarrhea, and urinary frequency.  Has a history of ESBL, concerned he might have repeat UTI.  Also complaining of some dental pain, pending dental implants and recently had all his teeth removed on 5/8/24.  Will evaluate for infectious etiologies with blood work, chest x-ray, urinalysis, symptomatic treatment, reassess. 64-year-old male presenting with general malaise, fevers, chills, diarrhea, and urinary frequency.  Has a history of ESBL, concerned he might have repeat UTI.  Also complaining of some dental pain, pending dental implants and recently had all his teeth removed on 5/8/24.  Will evaluate for infectious etiologies with blood work, chest x-ray, urinalysis, symptomatic treatment, reassess.          Lactate 2.1 down from 2.5.  Elevated LFTs.  Pt. will receive Augmentin and short term pain management for ?dental infection.  Leukocytosis 11.4  I discussed labs and need to follow with his oral surgeon.  Return for any worsening symptoms.  Mena Prather PA-C

## 2024-06-16 NOTE — ED ADULT NURSE NOTE - OBJECTIVE STATEMENT
Pt coming into the to ER with c/o fever; gum swelling x 2 weeks. Patient had recent oral surgery to remove all teeth on 5/8. Pt unable to get in touch with his oral surgeon. Pt endorses low grade fevers of 100.0 and 99.5 at home. Pt took Tylenol with no pain relief. Pt has IV access. EKG completed. Denies any other complaints at this time.

## 2024-06-16 NOTE — ED STATDOCS - OBJECTIVE STATEMENT
Formerly [facility] at [facility] Surgery History and Physical    History of Present Illness:      Efrain Perez is a 81 y.o. male who is admitted for small bowel obstruction. He has had multiple incisional hernias repaired over a number of years. He has had a recurrent hernia in the midline of his abdomen but has not had any problems with it since. He came in today having increased abdominal pain nausea vomiting. The pain he had was a 4-5 out of 10. He says now the pain is feeling much better this morning. He had a large bowel movement right before I came in this morning. He is not having any more pain currently. He has not had any nausea or vomiting. He has never had a bowel obstruction before    Past Medical History:   Diagnosis Date    Advanced care planning/counseling discussion 10/8/2015    Basal cell carcinoma, face     now sees Dr. Alisha Sands. Bladder cancer [facility]) 2012    Dr. Jose Chrsitian. cyto 9/2014, 2/2016. s/p surgery, BCG irrigation [place]. saw Dr. Patsy Cummins    BPH with obstruction/lower urinary tract symptoms     Chronic lower back pain     injection in [place].   saw Dr. Esperanza Nix    Chronic neck pain     limited motion to side    Depression, major     taking sertaline since before 2014    Dupuytren's contracture of left hand     5th finger    Encephalopathy 8/2021, 11/21/21    admitted     Epistaxis 2013    saw ENT at [facility] history of skin cancer     Fecal incontinence     with loose stools    Hearing loss     has hearing aids    HTN (hypertension)     Insomnia     IVCD (intraventricular conduction defect) 11/30/2015    Dr Sugar Nagy    Loose stools 7/18/2014    MRI contraindicated due to metal implant     has scleral buckle    Parkinsonian features 3/10/2022    Paroxysmal atrial fibrillation (HCC)     EF 66%, 1-2+ LAE on flecainide;Dr. Sugar Nagy. saw Dr. Kylah Garcia at [facility]    Peripheral neuropathy     severe foot neuropathy    RLS (restless legs syndrome) 7/3/2017    SCC (squamous cell carcinoma), face     prior hx, now sees Dr. Yulisa Delacruz allergic reaction     Skin cancer     SSS (sick sinus syndrome) ([facility] [address] 2022    Urothelial carcinoma of right distal ureter ([facility] [address] 5/1/15    excision Dr. Victor Manuel Manuel. low grade, papillary. repeat 3 month       Past Surgical History:   Procedure Laterality Date    HX BLADDER REPAIR  2012    cancer    HX COLONOSCOPY  2012    polyps. repeat 2017? HX HERNIA REPAIR      periumibical    HX MALIGNANT SKIN LESION EXCISION  06/14/2018    basal cell left lip    HX OTHER SURGICAL  10/2021    Skin cancer removed from nose.      HX RETINAL DETACHMENT REPAIR Right     CA CYSTOURETHROSCOPY  9/2014    Dr. Jodie Baugh CYSTOURETHROSCOPY  5/1/15    low grade right pap urothelial cancer    CA CYSTOURETHROSCOPY  2/29/16         Current Facility-Administered Medications:     amLODIPine (NORVASC) tablet 10 mg, 10 mg, Oral, DAILY, Ofilia Carrel, MD    carbidopa-levodopa (SINEMET)  mg per tablet 1 Tablet, 1 Tablet, Oral, TID, Ofilia Carrel, MD    finasteride (PROSCAR) tablet 5 mg, 5 mg, Oral, DAILY, Ofilia Carrel, MD    lactated [place] infusion, 125 mL/hr, IntraVENous, CONTINUOUS, Ofilia Carrel, MD, Last Rate: 125 mL/hr at 04/11/23 0327, 125 mL/hr at 04/11/23 0327    sodium chloride (NS) flush 5-40 mL, 5-40 mL, IntraVENous, Q8H, Ofilia Carrel, MD    sodium chloride (NS) flush 5-40 mL, 5-40 mL, IntraVENous, PRN, Ofilia Carrel, MD    HYDROmorphone (DILAUDID) injection 1 mg, 1 mg, IntraVENous, Q4H PRN, Ofilia Carrel, MD    naloxone [facility]) injection 0.4 mg, 0.4 mg, IntraVENous, PRN, Ofilia Carrel, MD    ondansetron [facility]) injection 4 mg, 4 mg, IntraVENous, Q4H PRN, Ofilia Carrel, MD    diphenhydrAMINE (BENADRYL) injection 12.5 mg, 12.5 mg, IntraVENous, ONCE PRN, Ofilia Carrel, MD    LORazepam (ATIVAN) 2 mg/mL injection 1 mg, 1 mg, IntraVENous, Q6H PRN, Ofilia Carrel, MD    enoxaparin (LOVENOX) injection 40 mg, 40 mg, SubCUTAneous, Q24H, Ofilia Carrel, MD    heparin (porcine) injection 5,000 Units, 5,000 Units, SubCUTAneous, Q8H, Nigel Earl MD    ondansetron [facility]) injection 4 mg, 4 mg, IntraVENous, Q4H PRN, Alphonso Bolden, DO, 4 mg at 04/11/23 0227    fentaNYL citrate (PF) injection 50 mcg, 50 mcg, IntraVENous, Q4H PRN, Alphonso Bolden, DO, 50 mcg at 04/11/23 0227    0.9% sodium chloride infusion, 100 mL/hr, IntraVENous, CONTINUOUS, Alphonso Bolden E, DO, Stopped at 04/11/23 0328    losartan (COZAAR) tablet 50 mg, 50 mg, Oral, BID, Nigel Earl MD    Current Outpatient Medications:     nystatin (MYCOSTATIN) topical cream, APPLY A FINGER TIP AROUND RECTUM 2 TIMES A DAY FOR 14 DAYS FOR YEAST INFECTION, Disp: 15 g, Rfl: 2    colestipoL (COLESTID) 1 gram tablet, Take 1 Tablet by mouth two (2) times a day. Indications: for diarrhea, Disp: 60 Tablet, Rfl: 2    amLODIPine (NORVASC) 10 mg tablet, Take 1 Tablet by mouth daily. , Disp: 90 Tablet, Rfl: 1    cyanocobalamin 1,000 mcg tablet, Take 1 Tablet by mouth daily. , Disp: 90 Tablet, Rfl: 1    ergocalciferol (ERGOCALCIFEROL) 1,250 mcg (50,000 unit) capsule, Take 1 Capsule by mouth every seven (7) days. , Disp: 4 Capsule, Rfl: 5    irbesartan (AVAPRO) 150 mg tablet, TAKE 1 TABLET BY MOUTH TWO (2) TIMES A DAY. INCREASED DOSE 1/9/22, Disp: 180 Tablet, Rfl: 1    sertraline (ZOLOFT) 50 mg tablet, Take 1 Tablet by mouth daily. Decreased 3/31/22, Disp: 30 Tablet, Rfl: 3    carbidopa-levodopa (SINEMET)  mg per tablet, Take 1 Tablet by mouth three (3) times daily. Indications: a type of movement disorder called parkinsonism, Disp: 90 Tablet, Rfl: 3    atorvastatin (LIPITOR) 40 mg tablet, Take 1 Tablet by mouth nightly. Indications: high cholesterol, Disp: 30 Tablet, Rfl: 2    finasteride (PROSCAR) 5 mg tablet, Take 1 Tablet by mouth daily. Indications: enlarged prostate with urination problem, Disp: 30 Tablet, Rfl: 2    aspirin 81 mg chewable tablet, Take 1 Tablet by mouth daily.  Indications: treatment to prevent a 63 y/o male with a PMHx of HTN, DM, obesity, hypothyroid, and ESBL presenting to the ED c/o fever; gum swelling x2 weeks. Pt endorses general malaise, fevers, chills, diarrhea, and urinary frequency. Pt is concerned he might have repeat UTI. Pt is also complaining of some dental pain, pending dental implants and recently had all his teeth removed on 5/8/24. Pt took Tylenol with no relief. Pt has no known drug allergies. heart attack, Disp: 30 Tablet, Rfl: 5    acetaminophen (TYLENOL) 500 mg tablet, Take 2 Tablets by mouth two (2) times a day. Indications: pain associated with arthritis, backache, Disp: 60 Tablet, Rfl: 2    Allergies   Allergen Reactions    Trazodone Palpitations       Social History     Socioeconomic History    Marital status:      Spouse name: Scooter Caldera    Number of children: Not on file    Years of education: Not on file    Highest education level: Not on file   Occupational History    Not on file   Tobacco Use    Smoking status: Former    Smokeless tobacco: Never    Tobacco comments:     socially   Vaping Use    Vaping Use: Never used   Substance and Sexual Activity    Alcohol use: Yes     Alcohol/week: 3.0 standard drinks     Types: 3 Glasses of wine per week     Comment: occ    Drug use: Never    Sexual activity: Not Currently   Other Topics Concern     Service Not Asked    Blood Transfusions Not Asked    Caffeine Concern Not Asked    Occupational Exposure Not Asked    Hobby Hazards Not Asked    Sleep Concern Not Asked    Stress Concern Not Asked    Weight Concern Not Asked    Special Diet Not Asked    Back Care Not Asked    Exercise Not Asked    Bike Helmet Not Asked    Seat Belt Not Asked    Self-Exams Not Asked   Social History Narrative    Not on file     Social Determinants of Health     Financial Resource Strain: Not on file   Food Insecurity: Not on file   Transportation Needs: Not on file   Physical Activity: Not on file   Stress: Not on file   Social Connections: Not on file   Intimate Partner Violence: Not on file   Housing Stability: Not on file       No family history on file.     ROS   Constitutional: negative  Ears, Nose, Mouth, Throat, and Face: negative  Respiratory: negative  Cardiovascular: negative  Gastrointestinal: negative  Genitourinary:negative  Integument/Breast: negative  Hematologic/Lymphatic: negative  Behavioral/Psychiatric: negative  Allergic/Immunologic: negative      Physical Exam:     Visit Vitals  /73   Pulse 80   Temp 97.7 °F (36.5 °C)   Resp 16   Wt 231 lb 7.7 oz (105 kg)   SpO2 94%   BMI 32.29 kg/m²       General - alert and oriented, no apparent distress  HEENT - no jaundice, no hearing imparement  Pulm - CTAB, no C/W/R  CV - RRR, no M/R/G  Abd -soft, mild distention, bowel sounds present, nontender to palpation, palpable hernia in the midline which feels a little bit more horizontally oriented soft and reducible  Ext - pulses intact in UE and LE bilaterally, no edema  Skin - supple, no rashes  Psychiatric - normal affect, good mood    Labs  Recent Results (from the past 12 hour(s))   CBC WITH AUTOMATED DIFF    Collection Time: 04/10/23 10:43 PM   Result Value Ref Range    WBC 11.7 (H) 4.1 - 11.1 K/uL    RBC 4.27 4. 10 - 5.70 M/uL    HGB 13.7 12.1 - 17.0 g/dL    HCT 41.5 36.6 - 50.3 %    MCV 97.2 80.0 - 99.0 FL    MCH 32.1 26.0 - 34.0 PG    MCHC 33.0 30.0 - 36.5 g/dL    RDW 13.1 11.5 - 14.5 %    PLATELET 172 881 - 396 K/uL    MPV 10.8 8.9 - 12.9 FL    NRBC 0.0 0  WBC    ABSOLUTE NRBC 0.00 0.00 - 0.01 K/uL    NEUTROPHILS 78 (H) 32 - 75 %    LYMPHOCYTES 16 12 - 49 %    MONOCYTES 5 5 - 13 %    EOSINOPHILS 1 0 - 7 %    BASOPHILS 0 0 - 1 %    IMMATURE GRANULOCYTES 0 0.0 - 0.5 %    ABS. NEUTROPHILS 9.0 (H) 1.8 - 8.0 K/UL    ABS. LYMPHOCYTES 1.8 0.8 - 3.5 K/UL    ABS. MONOCYTES 0.6 0.0 - 1.0 K/UL    ABS. EOSINOPHILS 0.2 0.0 - 0.4 K/UL    ABS. BASOPHILS 0.0 0.0 - 0.1 K/UL    ABS. IMM.  GRANS. 0.0 0.00 - 0.04 K/UL    DF AUTOMATED     METABOLIC PANEL, COMPREHENSIVE    Collection Time: 04/10/23 10:43 PM   Result Value Ref Range    Sodium 137 136 - 145 mmol/L    Potassium 4.0 3.5 - 5.1 mmol/L    Chloride 110 (H) 97 - 108 mmol/L    CO2 23 21 - 32 mmol/L    Anion gap 4 (L) 5 - 15 mmol/L    Glucose 147 (H) 65 - 100 mg/dL    BUN 28 (H) 6 - 20 MG/DL    Creatinine 1.73 (H) 0.70 - 1.30 MG/DL    BUN/Creatinine ratio 16 12 - 20      eGFR 37 (L) >60 ml/min/1.73m2 Calcium 9.4 8.5 - 10.1 MG/DL    Bilirubin, total 0.5 0.2 - 1.0 MG/DL    ALT (SGPT) 13 12 - 78 U/L    AST (SGOT) 25 15 - 37 U/L    Alk. phosphatase 93 45 - 117 U/L    Protein, total 7.9 6.4 - 8.2 g/dL    Albumin 3.9 3.5 - 5.0 g/dL    Globulin 4.0 2.0 - 4.0 g/dL    A-G Ratio 1.0 (L) 1.1 - 2.2     LACTIC ACID    Collection Time: 04/10/23 11:52 PM   Result Value Ref Range    Lactic acid 1.9 0.4 - 2.0 MMOL/L   METABOLIC PANEL, BASIC    Collection Time: 04/11/23  3:11 AM   Result Value Ref Range    Sodium 138 136 - 145 mmol/L    Potassium 3.8 3.5 - 5.1 mmol/L    Chloride 110 (H) 97 - 108 mmol/L    CO2 19 (L) 21 - 32 mmol/L    Anion gap 9 5 - 15 mmol/L    Glucose 140 (H) 65 - 100 mg/dL    BUN 28 (H) 6 - 20 MG/DL    Creatinine 1.74 (H) 0.70 - 1.30 MG/DL    BUN/Creatinine ratio 16 12 - 20      eGFR 37 (L) >60 ml/min/1.73m2    Calcium 9.2 8.5 - 10.1 MG/DL   CBC WITH AUTOMATED DIFF    Collection Time: 04/11/23  3:11 AM   Result Value Ref Range    WBC 10.6 4.1 - 11.1 K/uL    RBC 4.21 4.10 - 5.70 M/uL    HGB 13.3 12.1 - 17.0 g/dL    HCT 41.6 36.6 - 50.3 %    MCV 98.8 80.0 - 99.0 FL    MCH 31.6 26.0 - 34.0 PG    MCHC 32.0 30.0 - 36.5 g/dL    RDW 12.9 11.5 - 14.5 %    PLATELET 417 871 - 908 K/uL    MPV 11.3 8.9 - 12.9 FL    NRBC 0.0 0  WBC    ABSOLUTE NRBC 0.00 0.00 - 0.01 K/uL    NEUTROPHILS 83 (H) 32 - 75 %    LYMPHOCYTES 11 (L) 12 - 49 %    MONOCYTES 5 5 - 13 %    EOSINOPHILS 1 0 - 7 %    BASOPHILS 0 0 - 1 %    IMMATURE GRANULOCYTES 0 0.0 - 0.5 %    ABS. NEUTROPHILS 8.8 (H) 1.8 - 8.0 K/UL    ABS. LYMPHOCYTES 1.2 0.8 - 3.5 K/UL    ABS. MONOCYTES 0.5 0.0 - 1.0 K/UL    ABS. EOSINOPHILS 0.1 0.0 - 0.4 K/UL    ABS. BASOPHILS 0.0 0.0 - 0.1 K/UL    ABS. IMM. GRANS. 0.0 0.00 - 0.04 K/UL    DF AUTOMATED     LACTIC ACID    Collection Time: 04/11/23  3:13 AM   Result Value Ref Range    Lactic acid 1.5 0.4 - 2.0 MMOL/L         Imaging  CT - FINDINGS:   LOWER THORAX: Bibasilar scarring and atelectasis. LIVER: No mass.   BILIARY TREE: Large stone in the gallbladder. No gallbladder wall thickening. CBD is not dilated. SPLEEN: within normal limits. PANCREAS: No focal abnormality. ADRENALS: Unremarkable. KIDNEYS/URETERS: Bilateral renal cortical thinning with bilateral renal cysts  not requiring further follow-up. No hydronephrosis. STOMACH: Unremarkable. SMALL BOWEL: Dilated loops of proximal small bowel with a change in caliber to  decompressed distal small bowel. COLON: Complex ventral hernia containing transverse colon without evidence of  incarceration or large bowel obstruction. APPENDIX: Nonvisualized  PERITONEUM: No ascites or pneumoperitoneum. RETROPERITONEUM: No lymphadenopathy or aortic aneurysm. REPRODUCTIVE ORGANS: Normal  URINARY BLADDER: No mass or calculus. BONES: No destructive bone lesion. ABDOMINAL WALL: Ventral hernia containing fat and transverse colon without  evidence of incarceration. ADDITIONAL COMMENTS: N/A     IMPRESSION  1. There is a early or partial small bowel obstruction which is separate from  the ventral hernia which contains large bowel, without evidence of incarceration  or large bowel obstruction. The small bowel bowel dilation may be secondary to  an internal hernia or adhesions. 2.  Cholelithiasis. I have reviewed and agree with all of the pertinent images    Assessment:     Brennan Godinez. is a 81 y.o. male with small bowel obstruction    Recommendations:     1. He appears to have a small bowel obstruction related to adhesions from his previous surgery. He has a very large mouth hernia which is not incarcerated. He said he had a large bowel movement just this morning and feels a whole lot better after it. He says he has no abdominal pain right now. He is feeling much better. I will start him on clears and advance his diet slowly over the next few days. At this point he does not appear to need any operation but will watch his course and treat conservatively for now.   Surgical repair for him would likely be complicated since he has such a large hernia and likely has a lot of adhesions. Vidal Germain MD    Greater than half of the time: 60 minutes was used in counciling the patient about diagnosis and treatment plan    Mr. Pricila Bailey has a reminder for a \"due or due soon\" health maintenance. I have asked that he contact his primary care provider for follow-up on this health maintenance.

## 2024-06-16 NOTE — ED STATDOCS - NSFOLLOWUPINSTRUCTIONS_ED_ALL_ED_FT
Gingivitis  Gingivitis is inflammation of the gums. It can cause redness, soreness, bleeding, and swelling of the gums. This condition is usually mild and clears up with improved home care or treatment from a dental care provider. If left untreated, gingivitis can get worse and lead to other problems with the teeth and gums.    What are the causes?  This condition is usually caused by a buildup of a sticky substance called plaque.  Plaque is made up of mucus, bacteria, and food particles.  When plaque builds up, it reacts with the saliva in the mouth to form a hard deposit called tartar or calculus, which becomes trapped around the base of the tooth.  Plaque and tartar cause irritation of the gums, as well as a breeding ground for bacteria, that leads to gingivitis.  Gingivitis usually results from poor home care and not having regular dental cleaning of the mouth and teeth (oral hygiene).    What increases the risk?  The following factors may make you more likely to develop this condition:  Not practicing proper oral hygiene.  Eating a diet that does not provide proper nutrition.  Taking certain medicines.  Being pregnant.  Going through puberty or menopause.  Using products that contain nicotine and tobacco.  Having certain medical conditions, such as:  Diabetes.  Some viral or fungal infections.  Dry mouth.  Wearing dental appliances that do not fit properly.  What are the signs or symptoms?  Normal teeth and gums compared with teeth and gums that show plaque and gingivitis.   Symptoms of this condition include:  Gums that bleed easily, especially during flossing or brushing.  Swollen gums.  Gums that are bright red or purple.  Receding gums. This means that the gums are wearing away from the teeth so that more of the tooth is exposed.  Bad breath.  How is this diagnosed?  This condition is diagnosed with a medical history and an examination of the teeth and gums. You will also need X-rays to see if the inflammation has spread to the supporting structures of the teeth.    How is this treated?  This condition may be treated by:  Having your teeth and gums cleaned at a dental care provider's office to have the plaque and tartar removed.  Practicing good oral hygiene at home. This includes careful brushing and regular flossing.  Using a mouthwash. In some cases, a mouthwash may be prescribed or recommended.  In more advanced cases, this condition may be treated with antibiotic medicine or surgery.    Follow these instructions at home:  Using a toothbrush to brush the front and back sides of the teeth.  How to floss your teeth, with close-up showing that a person should floss the sides of each tooth.  Follow instructions from your dental care provider about how to clean your teeth. Make sure you:  Brush your teeth twice a day using a soft-bristled toothbrush.  Floss at least one time each day. It is best to floss before you brush your teeth.  Use a mouthwash as recommended by your dental care provider  Eat a well-balanced diet. Limit your intake of foods and beverages that contain sugar between meals.  See a dental care provider on a regular basis for cleaning and checkups.  Do not use any products that contain nicotine or tobacco, such as cigarettes, e-cigarettes, and chewing tobacco. If you need help quitting, ask your dental care provider.  If you were prescribed an antibiotic medicine, take it as told by your dental care provider. Do not stop using the antibiotic even if you start to feel better.  Keep all follow-up visits. This is important.  Contact a health care provider if:  You have a fever.  You have a lot of bleeding from your gums.  You have pain in your gums or teeth.  You have difficulty chewing.  You have any loose or infected teeth.  You have swollen glands in your face or neck.  Summary  Gingivitis is inflammation of the gums. It can cause redness, soreness, bleeding, and swelling of the gums. This condition is usually mild and clears up with treatment.  Follow instructions from your dental care provider about how to clean your teeth.  Use a mouthwash as told by your dental care provider.  Contact a dental care provider if you have new or worsening symptoms.  Keep all follow-up visits. This is important.  This information is not intended to replace advice given to you by your health care provider. Make sure you discuss any questions you have with your health care provider.    Document Revised: 05/19/2022 Document Reviewed: 05/19/2022

## 2024-06-16 NOTE — ED ADULT TRIAGE NOTE - CHIEF COMPLAINT QUOTE
Ambulatory to ER with c/o fever; gum swelling x 2 weeks. Patient has oral surgery to remove all teeth 05/08/24. Tylenol taken with no relief.

## 2024-06-16 NOTE — ED STATDOCS - PHYSICAL EXAMINATION
GENERAL: A&Ox4, non-toxic appearing, no acute distress  HEENT: NCAT, EOMI, oral mucosa moist, normal conjunctiva, no dentition, scattered gingival ulcers, no significant gum hypertrophy or hyperemia  RESP: CTAB, no respiratory distress, no wheezes/rhonchi/rales, speaking in full sentences  CV: RRR, no murmurs/rubs/gallops  ABDOMEN: soft, non-tender, non-distended, no guarding, no rebound tenderness  MSK: no visible deformities  NEURO: no focal sensory or motor deficits, CN 2-12 grossly intact  SKIN: warm, normal color, well perfused, no rash  PSYCH: normal affect

## 2024-06-16 NOTE — ED ADULT NURSE REASSESSMENT NOTE - NS ED NURSE REASSESS COMMENT FT1
Pt c/o jaw pain 7/10, requesting pain medication. MD Lima aware, will medicated as per orders in MAR.

## 2024-06-16 NOTE — ED STATDOCS - PATIENT PORTAL LINK FT
You can access the FollowMyHealth Patient Portal offered by [facility] by registering at the following website: [email or web address]. By joining [employer]’s FollowMyHealth portal, you will also be able to view your health information using other applications (apps) compatible with our system.

## 2024-06-16 NOTE — ED STATDOCS - PROGRESS NOTE DETAILS
Pt. is a 64 year old male Hx HTN, DM, hypothyroid, ESBL, presents with fever, gum swelling x 2 weeks.  Pt. with malaise, chills, diarrhea, urinary frequency.  Pt. concerned he has UTI.  Dental pain reported and he is due to have dental implants.  All of his teeth were removed 5/8/24.  Tylenol taken without minimal relief.  Tmax 100. Very poor dentition with gingival swelling diffusely.  Mena Prather PA-C Lactate 2.1 down from 2.5.  Elevated LFTs.  Pt. will receive Augmentin and short term pain management for ?dental infection.  Leukocytosis 11.4  I discussed labs and need to follow with his oral surgeon.  Return for any worsening symptoms.  Mena Prather PA-C

## 2024-09-05 ENCOUNTER — APPOINTMENT (OUTPATIENT)
Dept: GASTROENTEROLOGY | Facility: CLINIC | Age: 64
End: 2024-09-05

## 2024-10-10 NOTE — PATIENT PROFILE ADULT - DO YOU LACK THE NECESSARY SUPPORT TO HELP YOU COPE WITH LIFE CHALLENGES?
New Standing   CBC ordered entered to avoid interruption/delay in care.    Encounter to remain open for upcoming CBC  
Results documented in 8/20/24 telephone/convey results encounter.   Closing encounter.   
no

## 2024-12-13 ENCOUNTER — NON-APPOINTMENT (OUTPATIENT)
Age: 64
End: 2024-12-13

## 2024-12-15 PROBLEM — M25.812 MASS OF JOINT OF LEFT SHOULDER: Status: ACTIVE | Noted: 2024-12-15

## 2024-12-16 ENCOUNTER — APPOINTMENT (OUTPATIENT)
Dept: ORTHOPEDIC SURGERY | Facility: CLINIC | Age: 64
End: 2024-12-16
Payer: MEDICARE

## 2024-12-16 ENCOUNTER — INPATIENT (INPATIENT)
Facility: HOSPITAL | Age: 64
LOS: 14 days | Discharge: HOME | End: 2024-12-31
Attending: HOSPITALIST | Admitting: HOSPITALIST
Payer: MEDICARE

## 2024-12-16 VITALS
RESPIRATION RATE: 18 BRPM | WEIGHT: 270.07 LBS | OXYGEN SATURATION: 95 % | TEMPERATURE: 98 F | DIASTOLIC BLOOD PRESSURE: 94 MMHG | HEART RATE: 74 BPM | SYSTOLIC BLOOD PRESSURE: 172 MMHG | HEIGHT: 68 IN

## 2024-12-16 VITALS — WEIGHT: 270 LBS | HEIGHT: 68 IN | BODY MASS INDEX: 40.92 KG/M2

## 2024-12-16 DIAGNOSIS — Z98.1 ARTHRODESIS STATUS: Chronic | ICD-10-CM

## 2024-12-16 DIAGNOSIS — Z01.818 ENCOUNTER FOR OTHER PREPROCEDURAL EXAMINATION: ICD-10-CM

## 2024-12-16 DIAGNOSIS — M25.512 PAIN IN LEFT SHOULDER: ICD-10-CM

## 2024-12-16 DIAGNOSIS — Z90.49 ACQUIRED ABSENCE OF OTHER SPECIFIED PARTS OF DIGESTIVE TRACT: Chronic | ICD-10-CM

## 2024-12-16 DIAGNOSIS — M25.812 OTHER SPECIFIED JOINT DISORDERS, LEFT SHOULDER: ICD-10-CM

## 2024-12-16 DIAGNOSIS — Z98.890 OTHER SPECIFIED POSTPROCEDURAL STATES: Chronic | ICD-10-CM

## 2024-12-16 LAB
A1C WITH ESTIMATED AVERAGE GLUCOSE RESULT: 6.1 % — HIGH (ref 4–5.6)
ADD ON TEST-SPECIMEN IN LAB: SIGNIFICANT CHANGE UP
ALBUMIN SERPL ELPH-MCNC: 4.3 G/DL — SIGNIFICANT CHANGE UP (ref 3.3–5)
ALP SERPL-CCNC: 135 U/L — HIGH (ref 40–120)
ALT FLD-CCNC: 49 U/L — HIGH (ref 4–41)
ANION GAP SERPL CALC-SCNC: 13 MMOL/L — SIGNIFICANT CHANGE UP (ref 7–14)
APTT BLD: 32.5 SEC — SIGNIFICANT CHANGE UP (ref 24.5–35.6)
AST SERPL-CCNC: 68 U/L — HIGH (ref 4–40)
BASOPHILS # BLD AUTO: 0.08 K/UL — SIGNIFICANT CHANGE UP (ref 0–0.2)
BASOPHILS NFR BLD AUTO: 0.9 % — SIGNIFICANT CHANGE UP (ref 0–2)
BILIRUB SERPL-MCNC: 0.4 MG/DL — SIGNIFICANT CHANGE UP (ref 0.2–1.2)
BLD GP AB SCN SERPL QL: NEGATIVE — SIGNIFICANT CHANGE UP
BUN SERPL-MCNC: 18 MG/DL — SIGNIFICANT CHANGE UP (ref 7–23)
CALCIUM SERPL-MCNC: 10 MG/DL — SIGNIFICANT CHANGE UP (ref 8.4–10.5)
CHLORIDE SERPL-SCNC: 98 MMOL/L — SIGNIFICANT CHANGE UP (ref 98–107)
CO2 SERPL-SCNC: 25 MMOL/L — SIGNIFICANT CHANGE UP (ref 22–31)
CREAT SERPL-MCNC: 0.61 MG/DL — SIGNIFICANT CHANGE UP (ref 0.5–1.3)
EGFR: 108 ML/MIN/1.73M2 — SIGNIFICANT CHANGE UP
EOSINOPHIL # BLD AUTO: 0.37 K/UL — SIGNIFICANT CHANGE UP (ref 0–0.5)
EOSINOPHIL NFR BLD AUTO: 4.1 % — SIGNIFICANT CHANGE UP (ref 0–6)
ESTIMATED AVERAGE GLUCOSE: 128 — SIGNIFICANT CHANGE UP
GLUCOSE SERPL-MCNC: 129 MG/DL — HIGH (ref 70–99)
HCT VFR BLD CALC: 41.7 % — SIGNIFICANT CHANGE UP (ref 39–50)
HGB BLD-MCNC: 14.6 G/DL — SIGNIFICANT CHANGE UP (ref 13–17)
IANC: 5.38 K/UL — SIGNIFICANT CHANGE UP (ref 1.8–7.4)
IMM GRANULOCYTES NFR BLD AUTO: 0.2 % — SIGNIFICANT CHANGE UP (ref 0–0.9)
INR BLD: 1.08 RATIO — SIGNIFICANT CHANGE UP (ref 0.85–1.16)
LYMPHOCYTES # BLD AUTO: 2.17 K/UL — SIGNIFICANT CHANGE UP (ref 1–3.3)
LYMPHOCYTES # BLD AUTO: 24 % — SIGNIFICANT CHANGE UP (ref 13–44)
MCHC RBC-ENTMCNC: 32 PG — SIGNIFICANT CHANGE UP (ref 27–34)
MCHC RBC-ENTMCNC: 35 G/DL — SIGNIFICANT CHANGE UP (ref 32–36)
MCV RBC AUTO: 91.4 FL — SIGNIFICANT CHANGE UP (ref 80–100)
MONOCYTES # BLD AUTO: 1.03 K/UL — HIGH (ref 0–0.9)
MONOCYTES NFR BLD AUTO: 11.4 % — SIGNIFICANT CHANGE UP (ref 2–14)
NEUTROPHILS # BLD AUTO: 5.38 K/UL — SIGNIFICANT CHANGE UP (ref 1.8–7.4)
NEUTROPHILS NFR BLD AUTO: 59.4 % — SIGNIFICANT CHANGE UP (ref 43–77)
NRBC # BLD: 0 /100 WBCS — SIGNIFICANT CHANGE UP (ref 0–0)
NRBC # FLD: 0 K/UL — SIGNIFICANT CHANGE UP (ref 0–0)
PLATELET # BLD AUTO: 164 K/UL — SIGNIFICANT CHANGE UP (ref 150–400)
POTASSIUM SERPL-MCNC: 4.6 MMOL/L — SIGNIFICANT CHANGE UP (ref 3.5–5.3)
POTASSIUM SERPL-SCNC: 4.6 MMOL/L — SIGNIFICANT CHANGE UP (ref 3.5–5.3)
PROT SERPL-MCNC: 7.8 G/DL — SIGNIFICANT CHANGE UP (ref 6–8.3)
PROTHROM AB SERPL-ACNC: 12.8 SEC — SIGNIFICANT CHANGE UP (ref 9.9–13.4)
RBC # BLD: 4.56 M/UL — SIGNIFICANT CHANGE UP (ref 4.2–5.8)
RBC # FLD: 12.3 % — SIGNIFICANT CHANGE UP (ref 10.3–14.5)
RH IG SCN BLD-IMP: POSITIVE — SIGNIFICANT CHANGE UP
SODIUM SERPL-SCNC: 136 MMOL/L — SIGNIFICANT CHANGE UP (ref 135–145)
WBC # BLD: 9.05 K/UL — SIGNIFICANT CHANGE UP (ref 3.8–10.5)
WBC # FLD AUTO: 9.05 K/UL — SIGNIFICANT CHANGE UP (ref 3.8–10.5)

## 2024-12-16 PROCEDURE — 99223 1ST HOSP IP/OBS HIGH 75: CPT

## 2024-12-16 PROCEDURE — 71046 X-RAY EXAM CHEST 2 VIEWS: CPT | Mod: 26

## 2024-12-16 PROCEDURE — 99205 OFFICE O/P NEW HI 60 MIN: CPT

## 2024-12-16 PROCEDURE — 71270 CT THORAX DX C-/C+: CPT | Mod: 26

## 2024-12-16 PROCEDURE — 99285 EMERGENCY DEPT VISIT HI MDM: CPT

## 2024-12-16 RX ORDER — OXYCODONE HCL 15 MG
5 TABLET ORAL EVERY 4 HOURS
Refills: 0 | Status: DISCONTINUED | OUTPATIENT
Start: 2024-12-16 | End: 2024-12-19

## 2024-12-16 RX ORDER — SODIUM CHLORIDE 9 MG/ML
1000 INJECTION, SOLUTION INTRAMUSCULAR; INTRAVENOUS; SUBCUTANEOUS
Refills: 0 | Status: DISCONTINUED | OUTPATIENT
Start: 2024-12-16 | End: 2024-12-17

## 2024-12-16 RX ORDER — TAMSULOSIN HYDROCHLORIDE 0.4 MG/1
0.4 CAPSULE ORAL AT BEDTIME
Refills: 0 | Status: DISCONTINUED | OUTPATIENT
Start: 2024-12-16 | End: 2024-12-31

## 2024-12-16 RX ORDER — PANTOPRAZOLE 40 MG/1
40 TABLET, DELAYED RELEASE ORAL
Refills: 0 | Status: DISCONTINUED | OUTPATIENT
Start: 2024-12-16 | End: 2024-12-23

## 2024-12-16 RX ORDER — POVIDONE IODINE USP, 10% W/W 10 MG/ML
1 SWAB TOPICAL ONCE
Refills: 0 | Status: COMPLETED | OUTPATIENT
Start: 2024-12-16 | End: 2024-12-17

## 2024-12-16 RX ORDER — DEXTROSE MONOHYDRATE 25 G/50ML
15 INJECTION, SOLUTION INTRAVENOUS ONCE
Refills: 0 | Status: DISCONTINUED | OUTPATIENT
Start: 2024-12-16 | End: 2024-12-31

## 2024-12-16 RX ORDER — ASCORBIC ACID 1000 MG
500 TABLET ORAL DAILY
Refills: 0 | Status: DISCONTINUED | OUTPATIENT
Start: 2024-12-16 | End: 2024-12-31

## 2024-12-16 RX ORDER — SENNOSIDES 8.6 MG/1
2 TABLET, FILM COATED ORAL AT BEDTIME
Refills: 0 | Status: DISCONTINUED | OUTPATIENT
Start: 2024-12-16 | End: 2024-12-20

## 2024-12-16 RX ORDER — ACETAMINOPHEN 80 MG/.8ML
975 SOLUTION/ DROPS ORAL EVERY 8 HOURS
Refills: 0 | Status: DISCONTINUED | OUTPATIENT
Start: 2024-12-16 | End: 2024-12-18

## 2024-12-16 RX ORDER — SUCRALFATE 1 G/10ML
1 SUSPENSION ORAL ONCE
Refills: 0 | Status: DISCONTINUED | OUTPATIENT
Start: 2024-12-16 | End: 2024-12-31

## 2024-12-16 RX ORDER — SODIUM CHLORIDE 9 MG/ML
1000 INJECTION, SOLUTION INTRAVENOUS
Refills: 0 | Status: DISCONTINUED | OUTPATIENT
Start: 2024-12-16 | End: 2024-12-31

## 2024-12-16 RX ORDER — B COMPLEX, C NO.20/FOLIC ACID 1 MG
1 CAPSULE ORAL DAILY
Refills: 0 | Status: DISCONTINUED | OUTPATIENT
Start: 2024-12-16 | End: 2024-12-31

## 2024-12-16 RX ORDER — OXYCODONE HCL 15 MG
10 TABLET ORAL EVERY 4 HOURS
Refills: 0 | Status: DISCONTINUED | OUTPATIENT
Start: 2024-12-16 | End: 2024-12-19

## 2024-12-16 RX ORDER — INSULIN LISPRO 100/ML
VIAL (ML) SUBCUTANEOUS AT BEDTIME
Refills: 0 | Status: DISCONTINUED | OUTPATIENT
Start: 2024-12-16 | End: 2024-12-16

## 2024-12-16 RX ORDER — FAMOTIDINE 20 MG/1
20 TABLET, FILM COATED ORAL ONCE
Refills: 0 | Status: COMPLETED | OUTPATIENT
Start: 2024-12-16 | End: 2024-12-16

## 2024-12-16 RX ORDER — INSULIN LISPRO 100/ML
VIAL (ML) SUBCUTANEOUS EVERY 6 HOURS
Refills: 0 | Status: DISCONTINUED | OUTPATIENT
Start: 2024-12-16 | End: 2024-12-17

## 2024-12-16 RX ORDER — OXYCODONE HCL 15 MG
5 TABLET ORAL EVERY 4 HOURS
Refills: 0 | Status: DISCONTINUED | OUTPATIENT
Start: 2024-12-16 | End: 2024-12-16

## 2024-12-16 RX ORDER — LOSARTAN POTASSIUM 100 MG/1
25 TABLET, FILM COATED ORAL DAILY
Refills: 0 | Status: DISCONTINUED | OUTPATIENT
Start: 2024-12-17 | End: 2024-12-31

## 2024-12-16 RX ORDER — OXYCODONE HCL 15 MG
2.5 TABLET ORAL EVERY 4 HOURS
Refills: 0 | Status: DISCONTINUED | OUTPATIENT
Start: 2024-12-16 | End: 2024-12-16

## 2024-12-16 RX ORDER — DIPHENHYDRAMINE HCL 25 MG
50 TABLET ORAL ONCE
Refills: 0 | Status: COMPLETED | OUTPATIENT
Start: 2024-12-16 | End: 2024-12-16

## 2024-12-16 RX ORDER — ATENOLOL 50 MG
100 TABLET ORAL DAILY
Refills: 0 | Status: DISCONTINUED | OUTPATIENT
Start: 2024-12-16 | End: 2024-12-31

## 2024-12-16 RX ORDER — DEXTROSE MONOHYDRATE 25 G/50ML
25 INJECTION, SOLUTION INTRAVENOUS ONCE
Refills: 0 | Status: DISCONTINUED | OUTPATIENT
Start: 2024-12-16 | End: 2024-12-31

## 2024-12-16 RX ORDER — LEVOTHYROXINE SODIUM 175 UG/1
75 TABLET ORAL DAILY
Refills: 0 | Status: DISCONTINUED | OUTPATIENT
Start: 2024-12-16 | End: 2024-12-31

## 2024-12-16 RX ORDER — GABAPENTIN 300 MG/1
300 CAPSULE ORAL
Refills: 0 | Status: DISCONTINUED | OUTPATIENT
Start: 2024-12-16 | End: 2024-12-31

## 2024-12-16 RX ORDER — CHLORHEXIDINE GLUCONATE 1.2 MG/ML
1 RINSE ORAL EVERY 12 HOURS
Refills: 0 | Status: COMPLETED | OUTPATIENT
Start: 2024-12-16 | End: 2024-12-17

## 2024-12-16 RX ORDER — METOCLOPRAMIDE 10 MG/1
10 TABLET ORAL ONCE
Refills: 0 | Status: COMPLETED | OUTPATIENT
Start: 2024-12-16 | End: 2024-12-16

## 2024-12-16 RX ORDER — ZOLPIDEM TARTRATE 5 MG
5 TABLET ORAL AT BEDTIME
Refills: 0 | Status: DISCONTINUED | OUTPATIENT
Start: 2024-12-16 | End: 2024-12-19

## 2024-12-16 RX ORDER — GLUCAGON INJECTION, SOLUTION 0.5 MG/.1ML
1 INJECTION, SOLUTION SUBCUTANEOUS ONCE
Refills: 0 | Status: DISCONTINUED | OUTPATIENT
Start: 2024-12-16 | End: 2024-12-31

## 2024-12-16 RX ORDER — LOSARTAN POTASSIUM 100 MG/1
25 TABLET, FILM COATED ORAL DAILY
Refills: 0 | Status: DISCONTINUED | OUTPATIENT
Start: 2024-12-16 | End: 2024-12-16

## 2024-12-16 RX ORDER — TIZANIDINE 4 MG/1
4 TABLET ORAL
Refills: 0 | Status: DISCONTINUED | OUTPATIENT
Start: 2024-12-16 | End: 2024-12-31

## 2024-12-16 RX ORDER — INSULIN LISPRO 100/ML
VIAL (ML) SUBCUTANEOUS
Refills: 0 | Status: DISCONTINUED | OUTPATIENT
Start: 2024-12-16 | End: 2024-12-16

## 2024-12-16 RX ORDER — DULOXETINE HYDROCHLORIDE 30 MG/1
60 CAPSULE, DELAYED RELEASE ORAL
Refills: 0 | Status: DISCONTINUED | OUTPATIENT
Start: 2024-12-16 | End: 2024-12-17

## 2024-12-16 RX ORDER — DEXTROSE MONOHYDRATE 25 G/50ML
12.5 INJECTION, SOLUTION INTRAVENOUS ONCE
Refills: 0 | Status: DISCONTINUED | OUTPATIENT
Start: 2024-12-16 | End: 2024-12-31

## 2024-12-16 RX ORDER — ATORVASTATIN CALCIUM 40 MG/1
10 TABLET, FILM COATED ORAL AT BEDTIME
Refills: 0 | Status: DISCONTINUED | OUTPATIENT
Start: 2024-12-16 | End: 2024-12-17

## 2024-12-16 RX ADMIN — Medication 50 MILLIGRAM(S): at 18:02

## 2024-12-16 RX ADMIN — Medication 10 MILLIGRAM(S): at 18:20

## 2024-12-16 RX ADMIN — Medication 10 MILLIGRAM(S): at 22:37

## 2024-12-16 RX ADMIN — Medication 10 MILLIGRAM(S): at 00:23

## 2024-12-16 RX ADMIN — Medication 10 MILLIGRAM(S): at 19:40

## 2024-12-16 RX ADMIN — TAMSULOSIN HYDROCHLORIDE 0.4 MILLIGRAM(S): 0.4 CAPSULE ORAL at 22:37

## 2024-12-16 RX ADMIN — DULOXETINE HYDROCHLORIDE 60 MILLIGRAM(S): 30 CAPSULE, DELAYED RELEASE ORAL at 22:37

## 2024-12-16 RX ADMIN — METOCLOPRAMIDE 10 MILLIGRAM(S): 10 TABLET ORAL at 18:02

## 2024-12-16 RX ADMIN — FAMOTIDINE 20 MILLIGRAM(S): 20 TABLET, FILM COATED ORAL at 18:02

## 2024-12-16 RX ADMIN — ATORVASTATIN CALCIUM 10 MILLIGRAM(S): 40 TABLET, FILM COATED ORAL at 22:37

## 2024-12-16 RX ADMIN — TIZANIDINE 4 MILLIGRAM(S): 4 TABLET ORAL at 22:37

## 2024-12-16 RX ADMIN — GABAPENTIN 300 MILLIGRAM(S): 300 CAPSULE ORAL at 18:20

## 2024-12-16 NOTE — ED PROVIDER NOTE - SKIN, MLM
Skin normal color for race, warm, dry and intact. 10 CM round mildly tender erythematous mass over sup aspect of left shoulder

## 2024-12-16 NOTE — ED PROVIDER NOTE - CLINICAL SUMMARY MEDICAL DECISION MAKING FREE TEXT BOX
The patient is a 64y Obese Male who has a past medical and surgery history of   HTN IBS HHernia/GERD DM  SBO/Ex Lap  Neuropathy Herniated discs/spinal stenos  Hypothyroidism Chronic pain Unsteady gait Anxiety and depression Lumbar fusion Appendectomy Cholecystectomy PTED for admission to Dr Cruz's service for resection of known tumor/malignancy    Vital Signs Last 24 Hrs  T(F): 97.7 HR: 74 BP: 172/94 RR: 18 SpO2: 95% (16 Dec 2024 15:17) (95  PE as described     DATA:  EKG: pending at time of evaluation  LAB: Pending at time of evaluation    IMPRESSION/RISK:  Dx=  ST malignancy shoulder   Consideration include Ortho paged on arrival desired test studies ordered Pain status assessed  Plan  as above  TBA

## 2024-12-16 NOTE — ED PROVIDER NOTE - OBJECTIVE STATEMENT
The patient is a 64y Obese Male who has a past medical and surgery history of   HTN IBS HHernia/GERD DM  SBO/Ex Lap  Neuropathy Herniated discs/spinal stenos  Hypothyroidism Chronic pain Unsteady gait Anxiety and depression Lumbar fusion Appendectomy Cholecystectomy PTED for admission to Dr Cruz's service for resection of known tumor/malignancy

## 2024-12-16 NOTE — ED ADULT NURSE NOTE - OBJECTIVE STATEMENT
64 year old male received to intake A&Ox4 ambulatory with cane at baseline. pt sent for admission by orthopedist Dr. Cruz.  pt has large tumor to his left shoulder. pt c/o 7/10 pain to left shoulder. pt denies numbness, +ROM. respirations even and unlabored. PIV#20 right hand, labs drawn and sent. vs as noted. meds given as ordered. pending CT

## 2024-12-16 NOTE — CONSULT NOTE ADULT - SUBJECTIVE AND OBJECTIVE BOX
64y old  Male who presents with a chief complaint of L shoulder mass and pain. Notes that he started to have L shoulder pain 8wks ago, and 6 wks ago he started to notice a lesion on his L shoulder. This has grown over time and has changed color. Pt now has a large mass and erythematous area over his L shoulder. Pt is able to range L shoulder but ROM has become more painful. Pt presented to Dr. Cruz's office today and was sent in for this mass to undergo full onc work up and L shoulder mass biopsy tomorrow 12/17.    Of note, pt has hx DM, chronic back pain, and multiple abdominal surgeries. Pt has no hx of malignancy and has never received chemo or radiation.    Pt with h/o NANCY not on NIPPV, reports baseline exercise tolerance ( can walk several steps in house with walker, which he reports approximates ability when he underwent last abd surgery 2 years). Denies h/o cad, chf, stroke, pad, insulin dependence or ckd  ekg SR, 1st degree avb. Denies cp, sob or lightheadedness.  Notes mild occasional lightheadedness upon standing over last month, often does not occur,   but no LOC or falls      Review of Systems:   CONSTITUTIONAL: No fever  EYES: No eye pain, visual disturbances, or discharge  ENMT:  No difficulty hearing, tinnitus, vertigo; No sinus or throat pain  NECK: left neck/shoulder pain   RESPIRATORY: No cough, wheezing, chills or hemoptysis; No shortness of breath  CARDIOVASCULAR: No chest pain, palpitations, dizziness, or leg swelling  GASTROINTESTINAL: No abdominal or epigastric pain. No nausea  NEUROLOGICAL: No headaches  SKIN: No itching, burning, rashes, or lesions   MUSCULOSKELETAL: left shoulder pain, chronic back pain      MEDICATIONS  (STANDING):  amLODIPine   Tablet 10 milliGRAM(s) Oral daily  ascorbic acid 500 milliGRAM(s) Oral daily  atenolol  Tablet 100 milliGRAM(s) Oral daily  atorvastatin 10 milliGRAM(s) Oral at bedtime  chlorhexidine 2% Cloths 1 Application(s) Topical every 12 hours  dextrose 5%. 1000 milliLiter(s) (50 mL/Hr) IV Continuous <Continuous>  dextrose 5%. 1000 milliLiter(s) (100 mL/Hr) IV Continuous <Continuous>  dextrose 50% Injectable 25 Gram(s) IV Push once  dextrose 50% Injectable 12.5 Gram(s) IV Push once  dextrose 50% Injectable 25 Gram(s) IV Push once  DULoxetine 60 milliGRAM(s) Oral two times a day  gabapentin 300 milliGRAM(s) Oral four times a day  glucagon  Injectable 1 milliGRAM(s) IntraMuscular once  insulin lispro (ADMELOG) corrective regimen sliding scale   SubCutaneous every 6 hours  levothyroxine 75 MICROGram(s) Oral daily  multivitamin 1 Tablet(s) Oral daily  pantoprazole    Tablet 40 milliGRAM(s) Oral before breakfast  povidone iodine 10% Nasal Swab 1 Application(s) Both Nostrils once  senna 2 Tablet(s) Oral at bedtime  sodium chloride 0.9%. 1000 milliLiter(s) (100 mL/Hr) IV Continuous <Continuous>  tamsulosin 0.4 milliGRAM(s) Oral at bedtime    MEDICATIONS  (PRN):  acetaminophen     Tablet .. 975 milliGRAM(s) Oral every 8 hours PRN Mild Pain (1 - 3)  dextrose Oral Gel 15 Gram(s) Oral once PRN Blood Glucose LESS THAN 70 milliGRAM(s)/deciliter  oxyCODONE    IR 5 milliGRAM(s) Oral every 4 hours PRN Moderate Pain (4 - 6)  oxyCODONE    IR 10 milliGRAM(s) Oral every 4 hours PRN Severe Pain (7 - 10)  sucralfate suspension 1 Gram(s) Oral Once PRN for esophageal pain  tiZANidine 4 milliGRAM(s) Oral <User Schedule> PRN Muscle Spasm  zolpidem 5 milliGRAM(s) Oral at bedtime PRN Insomnia      PAST MEDICAL & SURGICAL HISTORY:  HTN (hypertension)      IBS (irritable bowel syndrome)      SBO (small bowel obstruction)      Gastro-esophageal reflux disease with esophagitis      Obese      Diabetes      H/O neuropathy      Hypothyroidism      History of chronic pain      Unsteady gait      H/O hiatal hernia      H/O small bowel obstruction      History of herniated intervertebral disc      H/O spinal stenosis      Anxiety and depression      NANCY (obstructive sleep apnea)      History of lumbar fusion      H/O exploratory laparotomy      H/O hernia repair  abdominal      History of appendectomy      S/P cholecystectomy      S/P small bowel resection          PHYSICAL EXAM:      Constitutional: NAD, well-groomed, well-developed  HEENT:  EOMI, Normal Hearing  Neck: No LAD, No JVD  Back: Normal spine flexure, No CVA tenderness  Respiratory: CTAB  Cardiovascular: S1 and S2, RRR, 2/6 syst murmur   Gastrointestinal: BS+, soft, NT/ND  Extremities: No peripheral edema  Vascular: 2+ peripheral pulses  Neurological: A/O x 3, no focal deficits  Psychiatric: Normal mood, normal affect  Musculoskeletal: 5/5 strength b/l upper and lower extremities, save for weaker BL hip flexion (chronic)   Skin: left shoulder ecchymosis (subacute)                              14.6   9.05  )-----------( 164      ( 16 Dec 2024 17:36 )             41.7     12-16    136  |  98  |  18  ----------------------------<  129[H]  4.6   |  25  |  0.61    Ca    10.0      16 Dec 2024 17:36    TPro  7.8  /  Alb  4.3  /  TBili  0.4  /  DBili  x   /  AST  68[H]  /  ALT  49[H]  /  AlkPhos  135[H]  12-16    CAPILLARY BLOOD GLUCOSE      POCT Blood Glucose.: 136 mg/dL (16 Dec 2024 22:20)  POCT Blood Glucose.: 101 mg/dL (16 Dec 2024 19:29)  POCT Blood Glucose.: 136 mg/dL (16 Dec 2024 17:30)    PT/INR - ( 16 Dec 2024 17:36 )   PT: 12.8 sec;   INR: 1.08 ratio         PTT - ( 16 Dec 2024 17:36 )  PTT:32.5 sec  Urinalysis Basic - ( 16 Dec 2024 17:36 )    Color: x / Appearance: x / SG: x / pH: x  Gluc: 129 mg/dL / Ketone: x  / Bili: x / Urobili: x   Blood: x / Protein: x / Nitrite: x   Leuk Esterase: x / RBC: x / WBC x   Sq Epi: x / Non Sq Epi: x / Bacteria: x      Vital Signs Last 24 Hrs  T(C): 37 (16 Dec 2024 19:47), Max: 37.1 (16 Dec 2024 19:05)  T(F): 98.6 (16 Dec 2024 19:47), Max: 98.7 (16 Dec 2024 19:05)  HR: 66 (16 Dec 2024 19:47) (66 - 74)  BP: 134/77 (16 Dec 2024 19:47) (134/77 - 172/94)  BP(mean): --  RR: 18 (16 Dec 2024 19:47) (16 - 18)  SpO2: 96% (16 Dec 2024 19:47) (92% - 96%)    Parameters below as of 16 Dec 2024 19:47  Patient On (Oxygen Delivery Method): room air

## 2024-12-16 NOTE — ED ADULT NURSE NOTE - NSFALLRISKINTERV_ED_ALL_ED

## 2024-12-16 NOTE — ED PROVIDER NOTE - CPE EDP EYES NORM
MECHE MILLER  [id number]      Interval History: The patient reports feeling ok, denies palpitations or chest pain.      Tele: atrial fibrillation in 110s BPM      Current meds:   acetaminophen   Tablet .. 625 milliGRAM(s) Oral every 6 hours PRN  cefTRIAXone   IVPB 1000 milliGRAM(s) IV Intermittent every 24 hours  cefTRIAXone   IVPB      enoxaparin Injectable 40 milliGRAM(s) SubCutaneous daily  furosemide   Injectable 40 milliGRAM(s) IV Push daily  metoprolol tartrate 50 milliGRAM(s) Oral every 6 hours  metoprolol tartrate Injectable 5 milliGRAM(s) IV Push every 6 hours PRN  multivitamin 1 Tablet(s) Oral daily  ofloxacin 0.3% Solution 1 Drop(s) Both EYES four times a day  pantoprazole    Tablet 40 milliGRAM(s) Oral before breakfast  PRESERVISION 2 Tablet(s) 2 Tablet(s) Oral daily  tamsulosin 0.4 milliGRAM(s) Oral at bedtime      Objective:     Vital Signs:   T(C): 36.7 (01-20-20 @ 15:35), Max: 37.2 (01-20-20 @ 09:24)  HR: 102 (01-20-20 @ 15:35) (93 - 102)  BP: 113/75 (01-20-20 @ 15:35) (102/75 - 128/72)  RR: 18 (01-20-20 @ 15:35) (18 - 21)  SpO2: 95% (01-20-20 @ 15:35) (92% - 100%)  Wt(kg): --    Physical Exam:   General: elderly man, sitting upright, no distress  HEENT: dry oral mucosa  Neck: supple, no carotid bruits b/l  CVS: JVP ~9 cm H20, irregularly irregular, s1, s2, no murmurs  Chest: unlabored respirations, CTAB  Abdomen: non-distended  Extremities: no lower extremity edema b/l  Neuro: A&O x3  Psych: Normal affect        Labs:                               11.6   11.01 )-----------( 140      ( 19 Jan 2020 05:52 )             36.4     PT/INR - ( 19 Jan 2020 05:52 )   PT: 19.6 sec;   INR: 1.68 ratio                   Outpatient TTE (3/2019):  LVEF 55%, severely dilated left atrium, severely dilated right atrium  Normal right ventricle size and systolic function  Mild dilatation of the aortic root  Mild aortic valve sclerosis without stenosis  Mild AR  Moderate MR    TTE (1/17/2020):  Summary:   1. Left ventricular ejection fraction, by visual estimation, is 35 to 40%.   2. Moderately decreased global left ventricular systolic function.   3. Multiple left ventricular regional wall motion abnormalities exist. See wall motion findings.   4. The mitral in-flow pattern reveals no discernable A-wave, therefore no comment on diastolic function can be made.   5. There is no evidence of pericardial effusion.   6. Moderate to severe mitral valve regurgitation.   7. Mild prolapse of the anterior and posterior mitral valve leaflets.   8. Mild-moderate tricuspid regurgitation.   9. Mild aortic regurgitation.  10. Sclerotic aortic valve with normal opening.  11. Mobile intra-atrial septum.    ECG (1/16/2020): atrial fibrillation with RVR, RBBB    CXR (1/16/2020):  Findings:  Lines: None    Heart/Mediastinum/Lungs/Other: The heart size is normal. The lungs are clear. There are no pleural effusions.    CT Head (1/16/2020):  IMPRESSION:  Brain CT:   No acute intracranial hemorrhage, mass effect, or CT evidence of a large acute or recent subacute transcortical infarct. Small right parietal scalp hematoma.    Cervical spine CT:  No acute displaced cervical spine fracture or evidence of traumatic malalignment. Compression of T2 body with interval progression. If clinical symptoms persist, MRI of the cervical spine can be considered to assess for ligamentous or cord injury.    Xray Left Hip (1/16/2020):  Old right superior and inferior pubic rami fractures. Acute appearing left superior and inferior pubic rami fracture. No definite hip fracture. Degenerative changes and osteopenia.    Impression:  Left superior and inferior pubic rami fractures.      Carotid US (1/17/2020):IMPRESSION:       1.  Right carotid system:  No hemodynamically significant plaque disease seen within the RIGHT carotid bulb, proximal internal carotid artery. External carotid artery patent.  2.  Left carotid system:  Nonhemodynamically significant plaque disease within LEFT carotid bulb, proximal external carotid artery and proximal internal carotid artery. Adequate: hears normal conversation without difficulty normal...

## 2024-12-16 NOTE — H&P ADULT - HISTORY OF PRESENT ILLNESS
Orthopaedic Surgery Consult Note    Patient is a 64y old  Male who presents with a chief complaint of L shoulder mass and pain. Notes that he started to have L shoulder pain 8wks ago, and 6 wks ago he started to notice a lesion on his L shoulder. This has grown over time and has changed color. Pt now has a large mass and erythematous area over his L shoulder. Pt is able to range L shoulder but ROM has become more painful. Pt presented to Dr. Cruz's office today and was sent in for this mass to undergo full onc work up and L shoulder mass biopsy tomorrow 12/17.    Of note, pt has hx DM, chronic back pain, and multiple abdominal surgeries. Pt has not hx of malignancy and has never received chemo or radiation.      PAST MEDICAL & SURGICAL HISTORY:  HTN (hypertension)      IBS (irritable bowel syndrome)      SBO (small bowel obstruction)      Gastro-esophageal reflux disease with esophagitis      Obese      Diabetes      H/O neuropathy      Hypothyroidism      History of chronic pain      Unsteady gait      H/O hiatal hernia      H/O small bowel obstruction      History of herniated intervertebral disc      H/O spinal stenosis      Anxiety and depression      NANCY (obstructive sleep apnea)      History of lumbar fusion      H/O exploratory laparotomy      H/O hernia repair  abdominal      History of appendectomy      S/P cholecystectomy      S/P small bowel resection        [] No significant past history as reviewed with the patient and family    MEDICATIONS  (STANDING):  amLODIPine   Tablet 10 milliGRAM(s) Oral daily  ascorbic acid 500 milliGRAM(s) Oral daily  atenolol  Tablet 100 milliGRAM(s) Oral daily  atorvastatin 10 milliGRAM(s) Oral at bedtime  chlorhexidine 2% Cloths 1 Application(s) Topical every 12 hours  dextrose 5%. 1000 milliLiter(s) (50 mL/Hr) IV Continuous <Continuous>  dextrose 5%. 1000 milliLiter(s) (100 mL/Hr) IV Continuous <Continuous>  dextrose 50% Injectable 25 Gram(s) IV Push once  dextrose 50% Injectable 12.5 Gram(s) IV Push once  dextrose 50% Injectable 25 Gram(s) IV Push once  diphenhydrAMINE Injectable 50 milliGRAM(s) IV Push once  DULoxetine 60 milliGRAM(s) Oral two times a day  famotidine Injectable 20 milliGRAM(s) IV Push once  gabapentin 300 milliGRAM(s) Oral four times a day  glucagon  Injectable 1 milliGRAM(s) IntraMuscular once  insulin lispro (ADMELOG) corrective regimen sliding scale   SubCutaneous three times a day before meals  insulin lispro (ADMELOG) corrective regimen sliding scale   SubCutaneous at bedtime  levothyroxine 75 MICROGram(s) Oral daily  losartan 25 milliGRAM(s) Oral daily  metoclopramide Injectable 10 milliGRAM(s) IV Push Once  multivitamin 1 Tablet(s) Oral daily  pantoprazole    Tablet 40 milliGRAM(s) Oral before breakfast  povidone iodine 10% Nasal Swab 1 Application(s) Both Nostrils once  senna 2 Tablet(s) Oral at bedtime  sodium chloride 0.9%. 1000 milliLiter(s) (100 mL/Hr) IV Continuous <Continuous>  tamsulosin 0.4 milliGRAM(s) Oral at bedtime    MEDICATIONS  (PRN):  acetaminophen     Tablet .. 975 milliGRAM(s) Oral every 8 hours PRN Mild Pain (1 - 3)  dextrose Oral Gel 15 Gram(s) Oral once PRN Blood Glucose LESS THAN 70 milliGRAM(s)/deciliter  oxyCODONE    IR 5 milliGRAM(s) Oral every 4 hours PRN Moderate Pain (4 - 6)  oxyCODONE    IR 10 milliGRAM(s) Oral every 4 hours PRN Severe Pain (7 - 10)  sucralfate suspension 1 Gram(s) Oral Once PRN for esophageal pain  tiZANidine 4 milliGRAM(s) Oral <User Schedule> PRN Muscle Spasm  zolpidem 5 milliGRAM(s) Oral at bedtime PRN Insomnia    Allergies    No Known Allergies    Intolerances    IV Contrast (Nausea)      Vital Signs Last 24 Hrs  T(C): 36.5 (16 Dec 2024 15:17), Max: 36.5 (16 Dec 2024 15:17)  T(F): 97.7 (16 Dec 2024 15:17), Max: 97.7 (16 Dec 2024 15:17)  HR: 74 (16 Dec 2024 15:17) (74 - 74)  BP: 172/94 (16 Dec 2024 15:17) (172/94 - 172/94)  BP(mean): --  RR: 18 (16 Dec 2024 15:17) (18 - 18)  SpO2: 95% (16 Dec 2024 15:17) (95% - 95%)    Parameters below as of 16 Dec 2024 15:17  Patient On (Oxygen Delivery Method): room air          PHYSICAL EXAM:  Gen: NAD  LUE:  Superior aspect of shoulder w 10cm round erythematous, indurated lesion; mild ttp over lesion  SILT axillary/med/rad/ulnar  +Motor AIN/PIN/Ulnar/Radial/Musc/Median,   +painless elbow/wrist ROM, shoulder ROM mildly painful   2+radial pulse, soft compartments.                      IMAGING STUDIES:

## 2024-12-16 NOTE — ED ADULT NURSE NOTE - NSFALLRISKASMTTYPE_ED_ALL_ED
Has The Growth Been Previously Biopsied?: has been previously biopsied Family Member: Brother & Daughter Year Removed: 1997 Location: Back Initial (On Arrival)

## 2024-12-16 NOTE — ED ADULT TRIAGE NOTE - NS ED TRIAGE AVPU SCALE
Patient would like to have her cystoscopy at the surgery center due to her wanting to be put to sleep. Please schedule.    Alert-The patient is alert, awake and responds to voice. The patient is oriented to time, place, and person. The triage nurse is able to obtain subjective information.

## 2024-12-16 NOTE — ED PROVIDER NOTE - CARE PLAN
Problem: Adult Inpatient Plan of Care  Goal: Optimal Comfort and Wellbeing  Outcome: Progressing  Intervention: Monitor Pain and Promote Comfort  Recent Flowsheet Documentation  Taken 7/9/2024 1010 by Zollinger, Nancy K, RN  Pain Management Interventions: medication (see MAR)  Taken 7/9/2024 0853 by Zollinger, Nancy K, RN  Pain Management Interventions: medication (see MAR)   Goal Outcome Evaluation:  Patient complaining of 9/10 right buttocks pain.  Tylenol and dilaudid given for pain control.  K+ and Mag replaced per protocol.  Attempted to wean oxygen.  Sats dropped to 85% on room air.  Placed back on 1L NC.  Had 2 incontinent stools, a medium and small.  Continues on IV lasix.  Purewick in place.  Educated patient on importance of not wearing briefs in bed for wound healing.  Up with assist x1.                             Principal Discharge DX:	Left shoulder pain   1

## 2024-12-16 NOTE — CONSULT NOTE ADULT - PROBLEM SELECTOR RECOMMENDATION 9
-based on aha/acc guidelines , no additional cardiovascular assessments required prior to OR.  -defer to anesthesiology regarding perioperative NANCY management       If not already, done would ensure outpt TTE obtained to evaluate 2/6 systolic murmur auscultated on exam

## 2024-12-16 NOTE — H&P ADULT - ASSESSMENT
ASSESSMENT  64y Male w enlarging L shoulder mass for past 6 wks    PLAN  - obtain CT Chest  - CXR  - CBC, CMP, ESR/CRP, PSA, LDH, SPEP ordered  - WBAT LUE  - Pain control  - Patient will need tissue diagnosis, plan for L shoulder bx tomorrow in OR w Dr. Cruz  - NPO/IVF at midnight      Discussed with attending orthopaedic oncologist, Dr. Goodman Ella Suarez, PGY-2  Orthopedic Surgery    [facility]: [id number]  [facility]: [id number]  [facility]:  [id number]/ [phone]

## 2024-12-17 DIAGNOSIS — M79.89 OTHER SPECIFIED SOFT TISSUE DISORDERS: ICD-10-CM

## 2024-12-17 DIAGNOSIS — I10 ESSENTIAL (PRIMARY) HYPERTENSION: ICD-10-CM

## 2024-12-17 DIAGNOSIS — R22.32 LOCALIZED SWELLING, MASS AND LUMP, LEFT UPPER LIMB: ICD-10-CM

## 2024-12-17 DIAGNOSIS — Z29.9 ENCOUNTER FOR PROPHYLACTIC MEASURES, UNSPECIFIED: ICD-10-CM

## 2024-12-17 LAB
ANION GAP SERPL CALC-SCNC: 12 MMOL/L — SIGNIFICANT CHANGE UP (ref 7–14)
ANION GAP SERPL CALC-SCNC: 13 MMOL/L — SIGNIFICANT CHANGE UP (ref 7–14)
APTT BLD: 38.1 SEC — HIGH (ref 24.5–35.6)
BLD GP AB SCN SERPL QL: NEGATIVE — SIGNIFICANT CHANGE UP
BUN SERPL-MCNC: 18 MG/DL — SIGNIFICANT CHANGE UP (ref 7–23)
BUN SERPL-MCNC: 20 MG/DL — SIGNIFICANT CHANGE UP (ref 7–23)
CALCIUM SERPL-MCNC: 8.7 MG/DL — SIGNIFICANT CHANGE UP (ref 8.4–10.5)
CALCIUM SERPL-MCNC: 9.1 MG/DL — SIGNIFICANT CHANGE UP (ref 8.4–10.5)
CHLORIDE SERPL-SCNC: 100 MMOL/L — SIGNIFICANT CHANGE UP (ref 98–107)
CHLORIDE SERPL-SCNC: 100 MMOL/L — SIGNIFICANT CHANGE UP (ref 98–107)
CO2 SERPL-SCNC: 24 MMOL/L — SIGNIFICANT CHANGE UP (ref 22–31)
CO2 SERPL-SCNC: 24 MMOL/L — SIGNIFICANT CHANGE UP (ref 22–31)
CREAT SERPL-MCNC: 0.63 MG/DL — SIGNIFICANT CHANGE UP (ref 0.5–1.3)
CREAT SERPL-MCNC: 0.72 MG/DL — SIGNIFICANT CHANGE UP (ref 0.5–1.3)
EGFR: 103 ML/MIN/1.73M2 — SIGNIFICANT CHANGE UP
EGFR: 107 ML/MIN/1.73M2 — SIGNIFICANT CHANGE UP
GLUCOSE SERPL-MCNC: 111 MG/DL — HIGH (ref 70–99)
GLUCOSE SERPL-MCNC: 116 MG/DL — HIGH (ref 70–99)
HCT VFR BLD CALC: 38.5 % — LOW (ref 39–50)
HCT VFR BLD CALC: 38.8 % — LOW (ref 39–50)
HGB BLD-MCNC: 13.6 G/DL — SIGNIFICANT CHANGE UP (ref 13–17)
HGB BLD-MCNC: 13.7 G/DL — SIGNIFICANT CHANGE UP (ref 13–17)
INR BLD: 1.15 RATIO — SIGNIFICANT CHANGE UP (ref 0.85–1.16)
LDH SERPL L TO P-CCNC: 270 U/L — HIGH (ref 135–225)
MAGNESIUM SERPL-MCNC: 1.8 MG/DL — SIGNIFICANT CHANGE UP (ref 1.6–2.6)
MCHC RBC-ENTMCNC: 31.9 PG — SIGNIFICANT CHANGE UP (ref 27–34)
MCHC RBC-ENTMCNC: 32.1 PG — SIGNIFICANT CHANGE UP (ref 27–34)
MCHC RBC-ENTMCNC: 35.1 G/DL — SIGNIFICANT CHANGE UP (ref 32–36)
MCHC RBC-ENTMCNC: 35.6 G/DL — SIGNIFICANT CHANGE UP (ref 32–36)
MCV RBC AUTO: 90.2 FL — SIGNIFICANT CHANGE UP (ref 80–100)
MCV RBC AUTO: 90.9 FL — SIGNIFICANT CHANGE UP (ref 80–100)
NRBC # BLD: 0 /100 WBCS — SIGNIFICANT CHANGE UP (ref 0–0)
NRBC # BLD: 0 /100 WBCS — SIGNIFICANT CHANGE UP (ref 0–0)
NRBC # FLD: 0 K/UL — SIGNIFICANT CHANGE UP (ref 0–0)
NRBC # FLD: 0 K/UL — SIGNIFICANT CHANGE UP (ref 0–0)
PHOSPHATE SERPL-MCNC: 3.3 MG/DL — SIGNIFICANT CHANGE UP (ref 2.5–4.5)
PLATELET # BLD AUTO: 134 K/UL — LOW (ref 150–400)
PLATELET # BLD AUTO: 153 K/UL — SIGNIFICANT CHANGE UP (ref 150–400)
POTASSIUM SERPL-MCNC: 4.2 MMOL/L — SIGNIFICANT CHANGE UP (ref 3.5–5.3)
POTASSIUM SERPL-MCNC: 4.4 MMOL/L — SIGNIFICANT CHANGE UP (ref 3.5–5.3)
POTASSIUM SERPL-SCNC: 4.2 MMOL/L — SIGNIFICANT CHANGE UP (ref 3.5–5.3)
POTASSIUM SERPL-SCNC: 4.4 MMOL/L — SIGNIFICANT CHANGE UP (ref 3.5–5.3)
PROTHROM AB SERPL-ACNC: 13.3 SEC — SIGNIFICANT CHANGE UP (ref 9.9–13.4)
RBC # BLD: 4.27 M/UL — SIGNIFICANT CHANGE UP (ref 4.2–5.8)
RBC # BLD: 4.27 M/UL — SIGNIFICANT CHANGE UP (ref 4.2–5.8)
RBC # FLD: 12.2 % — SIGNIFICANT CHANGE UP (ref 10.3–14.5)
RBC # FLD: 12.3 % — SIGNIFICANT CHANGE UP (ref 10.3–14.5)
RH IG SCN BLD-IMP: POSITIVE — SIGNIFICANT CHANGE UP
SODIUM SERPL-SCNC: 136 MMOL/L — SIGNIFICANT CHANGE UP (ref 135–145)
SODIUM SERPL-SCNC: 137 MMOL/L — SIGNIFICANT CHANGE UP (ref 135–145)
URATE SERPL-MCNC: 5.3 MG/DL — SIGNIFICANT CHANGE UP (ref 3.4–8.8)
WBC # BLD: 6.58 K/UL — SIGNIFICANT CHANGE UP (ref 3.8–10.5)
WBC # BLD: 7.83 K/UL — SIGNIFICANT CHANGE UP (ref 3.8–10.5)
WBC # FLD AUTO: 6.58 K/UL — SIGNIFICANT CHANGE UP (ref 3.8–10.5)
WBC # FLD AUTO: 7.83 K/UL — SIGNIFICANT CHANGE UP (ref 3.8–10.5)

## 2024-12-17 PROCEDURE — 23065 BIOPSY SHOULDER TISSUES: CPT | Mod: LT

## 2024-12-17 PROCEDURE — 88374 M/PHMTRC ALYS ISHQUANT/SEMIQ: CPT | Mod: 26

## 2024-12-17 PROCEDURE — 88342 IMHCHEM/IMCYTCHM 1ST ANTB: CPT | Mod: 26

## 2024-12-17 PROCEDURE — 88307 TISSUE EXAM BY PATHOLOGIST: CPT | Mod: 26

## 2024-12-17 PROCEDURE — 88341 IMHCHEM/IMCYTCHM EA ADD ANTB: CPT | Mod: 26

## 2024-12-17 PROCEDURE — 88305 TISSUE EXAM BY PATHOLOGIST: CPT | Mod: 26

## 2024-12-17 PROCEDURE — 88360 TUMOR IMMUNOHISTOCHEM/MANUAL: CPT | Mod: 26,59

## 2024-12-17 PROCEDURE — 88365 INSITU HYBRIDIZATION (FISH): CPT | Mod: 26,59

## 2024-12-17 PROCEDURE — 88291 CYTO/MOLECULAR REPORT: CPT

## 2024-12-17 PROCEDURE — 99223 1ST HOSP IP/OBS HIGH 75: CPT

## 2024-12-17 PROCEDURE — 88189 FLOWCYTOMETRY/READ 16 & >: CPT | Mod: 59

## 2024-12-17 PROCEDURE — 88331 PATH CONSLTJ SURG 1 BLK 1SPC: CPT | Mod: 26

## 2024-12-17 PROCEDURE — 99232 SBSQ HOSP IP/OBS MODERATE 35: CPT

## 2024-12-17 RX ORDER — ATENOLOL 50 MG
100 TABLET ORAL DAILY
Refills: 0 | Status: DISCONTINUED | OUTPATIENT
Start: 2024-12-17 | End: 2024-12-17

## 2024-12-17 RX ORDER — ACETAMINOPHEN 80 MG/.8ML
975 SOLUTION/ DROPS ORAL EVERY 8 HOURS
Refills: 0 | Status: DISCONTINUED | OUTPATIENT
Start: 2024-12-17 | End: 2024-12-31

## 2024-12-17 RX ORDER — ONDANSETRON 4 MG/1
4 TABLET ORAL ONCE
Refills: 0 | Status: DISCONTINUED | OUTPATIENT
Start: 2024-12-17 | End: 2024-12-17

## 2024-12-17 RX ORDER — INSULIN LISPRO 100/ML
VIAL (ML) SUBCUTANEOUS
Refills: 0 | Status: DISCONTINUED | OUTPATIENT
Start: 2024-12-17 | End: 2024-12-26

## 2024-12-17 RX ORDER — INSULIN LISPRO 100/ML
VIAL (ML) SUBCUTANEOUS AT BEDTIME
Refills: 0 | Status: DISCONTINUED | OUTPATIENT
Start: 2024-12-17 | End: 2024-12-26

## 2024-12-17 RX ORDER — ZOLPIDEM TARTRATE 5 MG
5 TABLET ORAL AT BEDTIME
Refills: 0 | Status: DISCONTINUED | OUTPATIENT
Start: 2024-12-17 | End: 2024-12-17

## 2024-12-17 RX ORDER — CEFAZOLIN SODIUM 1 G
2000 VIAL (EA) INJECTION EVERY 8 HOURS
Refills: 0 | Status: COMPLETED | OUTPATIENT
Start: 2024-12-17 | End: 2024-12-18

## 2024-12-17 RX ORDER — GLUCAGON INJECTION, SOLUTION 0.5 MG/.1ML
1 INJECTION, SOLUTION SUBCUTANEOUS ONCE
Refills: 0 | Status: DISCONTINUED | OUTPATIENT
Start: 2024-12-17 | End: 2024-12-31

## 2024-12-17 RX ORDER — DEXTROSE MONOHYDRATE 25 G/50ML
25 INJECTION, SOLUTION INTRAVENOUS ONCE
Refills: 0 | Status: DISCONTINUED | OUTPATIENT
Start: 2024-12-17 | End: 2024-12-31

## 2024-12-17 RX ORDER — CHOLECALCIFEROL (VITAMIN D3) 10 MCG
1000 TABLET ORAL DAILY
Refills: 0 | Status: DISCONTINUED | OUTPATIENT
Start: 2024-12-17 | End: 2024-12-31

## 2024-12-17 RX ORDER — SODIUM CHLORIDE 9 MG/ML
1000 INJECTION, SOLUTION INTRAMUSCULAR; INTRAVENOUS; SUBCUTANEOUS
Refills: 0 | Status: DISCONTINUED | OUTPATIENT
Start: 2024-12-17 | End: 2024-12-18

## 2024-12-17 RX ORDER — ONDANSETRON 4 MG/1
4 TABLET ORAL EVERY 6 HOURS
Refills: 0 | Status: DISCONTINUED | OUTPATIENT
Start: 2024-12-17 | End: 2024-12-31

## 2024-12-17 RX ORDER — ASCORBIC ACID 1000 MG
500 TABLET ORAL DAILY
Refills: 0 | Status: DISCONTINUED | OUTPATIENT
Start: 2024-12-17 | End: 2024-12-17

## 2024-12-17 RX ORDER — HYDROMORPHONE HCL 4 MG
0.5 TABLET ORAL
Refills: 0 | Status: DISCONTINUED | OUTPATIENT
Start: 2024-12-17 | End: 2024-12-17

## 2024-12-17 RX ORDER — TAMSULOSIN HYDROCHLORIDE 0.4 MG/1
0.4 CAPSULE ORAL AT BEDTIME
Refills: 0 | Status: DISCONTINUED | OUTPATIENT
Start: 2024-12-17 | End: 2024-12-17

## 2024-12-17 RX ORDER — LEVOTHYROXINE SODIUM 175 UG/1
75 TABLET ORAL DAILY
Refills: 0 | Status: DISCONTINUED | OUTPATIENT
Start: 2024-12-17 | End: 2024-12-17

## 2024-12-17 RX ORDER — SUCRALFATE 1 G/10ML
1 SUSPENSION ORAL ONCE
Refills: 0 | Status: DISCONTINUED | OUTPATIENT
Start: 2024-12-17 | End: 2024-12-17

## 2024-12-17 RX ORDER — LOSARTAN POTASSIUM 100 MG/1
25 TABLET, FILM COATED ORAL DAILY
Refills: 0 | Status: DISCONTINUED | OUTPATIENT
Start: 2024-12-17 | End: 2024-12-17

## 2024-12-17 RX ORDER — DULOXETINE HYDROCHLORIDE 30 MG/1
60 CAPSULE, DELAYED RELEASE ORAL DAILY
Refills: 0 | Status: DISCONTINUED | OUTPATIENT
Start: 2024-12-17 | End: 2024-12-31

## 2024-12-17 RX ORDER — POLYETHYLENE GLYCOL 3350 17 G/DOSE
17 POWDER (GRAM) ORAL AT BEDTIME
Refills: 0 | Status: DISCONTINUED | OUTPATIENT
Start: 2024-12-17 | End: 2024-12-23

## 2024-12-17 RX ORDER — OXYCODONE HCL 15 MG
5 TABLET ORAL
Refills: 0 | Status: DISCONTINUED | OUTPATIENT
Start: 2024-12-17 | End: 2024-12-17

## 2024-12-17 RX ORDER — SODIUM CHLORIDE 9 MG/ML
1000 INJECTION, SOLUTION INTRAVENOUS
Refills: 0 | Status: DISCONTINUED | OUTPATIENT
Start: 2024-12-17 | End: 2024-12-31

## 2024-12-17 RX ORDER — B COMPLEX, C NO.20/FOLIC ACID 1 MG
1 CAPSULE ORAL DAILY
Refills: 0 | Status: DISCONTINUED | OUTPATIENT
Start: 2024-12-17 | End: 2024-12-17

## 2024-12-17 RX ORDER — PANTOPRAZOLE 40 MG/1
40 TABLET, DELAYED RELEASE ORAL
Refills: 0 | Status: DISCONTINUED | OUTPATIENT
Start: 2024-12-17 | End: 2024-12-17

## 2024-12-17 RX ORDER — ATORVASTATIN CALCIUM 40 MG/1
10 TABLET, FILM COATED ORAL AT BEDTIME
Refills: 0 | Status: DISCONTINUED | OUTPATIENT
Start: 2024-12-17 | End: 2024-12-31

## 2024-12-17 RX ORDER — DEXTROSE MONOHYDRATE 25 G/50ML
15 INJECTION, SOLUTION INTRAVENOUS ONCE
Refills: 0 | Status: DISCONTINUED | OUTPATIENT
Start: 2024-12-17 | End: 2024-12-31

## 2024-12-17 RX ORDER — MAGNESIUM HYDROXIDE 400 MG/5ML
30 SUSPENSION, ORAL (FINAL DOSE FORM) ORAL DAILY
Refills: 0 | Status: DISCONTINUED | OUTPATIENT
Start: 2024-12-17 | End: 2024-12-31

## 2024-12-17 RX ORDER — OXYCODONE HCL 15 MG
10 TABLET ORAL
Refills: 0 | Status: DISCONTINUED | OUTPATIENT
Start: 2024-12-17 | End: 2024-12-17

## 2024-12-17 RX ORDER — GABAPENTIN 300 MG/1
300 CAPSULE ORAL
Refills: 0 | Status: DISCONTINUED | OUTPATIENT
Start: 2024-12-17 | End: 2024-12-17

## 2024-12-17 RX ORDER — DEXTROSE MONOHYDRATE 25 G/50ML
12.5 INJECTION, SOLUTION INTRAVENOUS ONCE
Refills: 0 | Status: DISCONTINUED | OUTPATIENT
Start: 2024-12-17 | End: 2024-12-31

## 2024-12-17 RX ORDER — SENNOSIDES 8.6 MG/1
2 TABLET, FILM COATED ORAL AT BEDTIME
Refills: 0 | Status: DISCONTINUED | OUTPATIENT
Start: 2024-12-17 | End: 2024-12-17

## 2024-12-17 RX ORDER — SODIUM CHLORIDE 9 MG/ML
1000 INJECTION, SOLUTION INTRAVENOUS
Refills: 0 | Status: DISCONTINUED | OUTPATIENT
Start: 2024-12-17 | End: 2024-12-17

## 2024-12-17 RX ADMIN — ATORVASTATIN CALCIUM 10 MILLIGRAM(S): 40 TABLET, FILM COATED ORAL at 21:32

## 2024-12-17 RX ADMIN — Medication 0.5 MILLIGRAM(S): at 15:29

## 2024-12-17 RX ADMIN — Medication 10 MILLIGRAM(S): at 21:37

## 2024-12-17 RX ADMIN — Medication 10 MILLIGRAM(S): at 11:01

## 2024-12-17 RX ADMIN — Medication 100 MILLIGRAM(S): at 21:32

## 2024-12-17 RX ADMIN — SENNOSIDES 2 TABLET(S): 8.6 TABLET, FILM COATED ORAL at 21:31

## 2024-12-17 RX ADMIN — Medication 10 MILLIGRAM(S): at 06:02

## 2024-12-17 RX ADMIN — LEVOTHYROXINE SODIUM 75 MICROGRAM(S): 175 TABLET ORAL at 06:02

## 2024-12-17 RX ADMIN — Medication 5 MILLIGRAM(S): at 23:55

## 2024-12-17 RX ADMIN — SODIUM CHLORIDE 100 MILLILITER(S): 9 INJECTION, SOLUTION INTRAMUSCULAR; INTRAVENOUS; SUBCUTANEOUS at 06:02

## 2024-12-17 RX ADMIN — Medication 10 MILLIGRAM(S): at 06:01

## 2024-12-17 RX ADMIN — ACETAMINOPHEN 975 MILLIGRAM(S): 80 SOLUTION/ DROPS ORAL at 21:33

## 2024-12-17 RX ADMIN — LOSARTAN POTASSIUM 25 MILLIGRAM(S): 100 TABLET, FILM COATED ORAL at 06:02

## 2024-12-17 RX ADMIN — DULOXETINE HYDROCHLORIDE 60 MILLIGRAM(S): 30 CAPSULE, DELAYED RELEASE ORAL at 06:01

## 2024-12-17 RX ADMIN — GABAPENTIN 300 MILLIGRAM(S): 300 CAPSULE ORAL at 17:11

## 2024-12-17 RX ADMIN — GABAPENTIN 300 MILLIGRAM(S): 300 CAPSULE ORAL at 06:01

## 2024-12-17 RX ADMIN — POVIDONE IODINE USP, 10% W/W 1 APPLICATION(S): 10 SWAB TOPICAL at 06:11

## 2024-12-17 RX ADMIN — Medication 10 MILLIGRAM(S): at 22:30

## 2024-12-17 RX ADMIN — TIZANIDINE 4 MILLIGRAM(S): 4 TABLET ORAL at 10:08

## 2024-12-17 RX ADMIN — TAMSULOSIN HYDROCHLORIDE 0.4 MILLIGRAM(S): 0.4 CAPSULE ORAL at 21:32

## 2024-12-17 RX ADMIN — DULOXETINE HYDROCHLORIDE 60 MILLIGRAM(S): 30 CAPSULE, DELAYED RELEASE ORAL at 21:31

## 2024-12-17 RX ADMIN — Medication 100 MILLIGRAM(S): at 06:02

## 2024-12-17 RX ADMIN — GABAPENTIN 300 MILLIGRAM(S): 300 CAPSULE ORAL at 00:10

## 2024-12-17 RX ADMIN — Medication 5 MILLIGRAM(S): at 00:10

## 2024-12-17 RX ADMIN — Medication 17 GRAM(S): at 21:33

## 2024-12-17 RX ADMIN — ACETAMINOPHEN 975 MILLIGRAM(S): 80 SOLUTION/ DROPS ORAL at 22:30

## 2024-12-17 RX ADMIN — CHLORHEXIDINE GLUCONATE 1 APPLICATION(S): 1.2 RINSE ORAL at 06:02

## 2024-12-17 RX ADMIN — Medication 10 MILLIGRAM(S): at 11:25

## 2024-12-17 RX ADMIN — GABAPENTIN 300 MILLIGRAM(S): 300 CAPSULE ORAL at 23:55

## 2024-12-17 RX ADMIN — Medication 10 MILLIGRAM(S): at 07:51

## 2024-12-17 NOTE — PROGRESS NOTE ADULT - SUBJECTIVE AND OBJECTIVE BOX
Orthopedics Post-Op Check  Patient was seen and examined at bedside. Denies CP/SOB/N/V/HA. Resting comfortably without complaints s/p left shoulder Bx today. Pain is controlled.    Vital Signs Last 24 Hrs  T(C): 36.3 (17 Dec 2024 14:45), Max: 37.1 (16 Dec 2024 19:05)  T(F): 97.3 (17 Dec 2024 14:45), Max: 98.7 (16 Dec 2024 19:05)  HR: 55 (17 Dec 2024 16:00) (53 - 77)  BP: 121/72 (17 Dec 2024 16:00) (119/72 - 142/84)  BP(mean): 86 (17 Dec 2024 16:00) (86 - 90)  RR: 14 (17 Dec 2024 16:00) (14 - 19)  SpO2: 95% (17 Dec 2024 16:00) (92% - 100%)    Parameters below as of 17 Dec 2024 14:45  Patient On (Oxygen Delivery Method): room air    Labs:                        13.6   6.58  )-----------( 134      ( 17 Dec 2024 14:30 )             38.8     12-17    136  |  100  |  20  ----------------------------<  116[H]  4.4   |  24  |  0.72    Ca    8.7      17 Dec 2024 15:03  Phos  3.3     12-17  Mg     1.80     12-17    TPro  7.8  /  Alb  4.3  /  TBili  0.4  /  DBili  x   /  AST  68[H]  /  ALT  49[H]  /  AlkPhos  135[H]  12-16    Physical Exam:  LUE:    Dressing clean/dry/intact  Median/ulnar/radial function intact motor and sensory  radial pulse 2+   Extremity warm

## 2024-12-17 NOTE — PRE-OP CHECKLIST - IDENTIFICATION BAND VERIFIED
Anesthesia Pre Eval Note    Anesthesia ROS/Med Hx        Anesthetic Complication History:  Patient does not have a history of anesthetic complications  Comment: No surgery before    Pulmonary Review:  Patient does not have a pulmonary history      Neuro/Psych Review:  Patient does not have a neuro/psych history       Cardiovascular Review:    Positive for hyperlipidemia    GI/HEPATIC/RENAL Review:    Positive for GERD    End/Other Review:    Positive for obesity class I - 30.00 - 34.99  Additional Results:     ALLERGIES:  No Known Allergies       Lab Results       Component                Value               Date                       WBC                      9.0                 03/28/2021                 WBC                      4.3                 07/31/2019                 RBC                      5.39                03/28/2021                 RBC                      5.68                07/31/2019                 HGB                      14.9                03/28/2021                 HGB                      15.6                07/31/2019                 HCT                      46.7                03/28/2021                 HCT                      48.1                07/31/2019                 MCHC                     31.9 (L)            03/28/2021                 MCHC                     32.4                07/31/2019                 SODIUM                   138                 03/28/2021                 SODIUM                   143                 07/31/2019                 POTASSIUM                4.5                 03/28/2021                 POTASSIUM                4.1                 07/31/2019                 CHLORIDE                 106                 03/28/2021                 CHLORIDE                 107                 07/31/2019                 CO2                      30                  03/28/2021                 CO2                      29                  07/31/2019                 GLUCOSE                   99                  03/28/2021                 GLUCOSE                  94                  07/31/2019                 BUN                      10                  03/28/2021                 BUN                      11                  07/31/2019                 CREATININE               0.90                03/28/2021                 CREATININE               0.82                07/31/2019                 GFRESTIMATE              >90                 03/28/2021                 GFRA                     >90                 07/31/2019                 GFRNA                    >90                 07/31/2019                 CALCIUM                  8.7                 03/28/2021                 CALCIUM                  9.4                 07/31/2019                 PLT                      261                 03/28/2021                 PLT                      286                 07/31/2019                 PLT                      267                 04/09/2012             Past Medical History:  10/18/2012: Cholelithiasis  No date: Consumes a vegan diet  No date: GERD (gastroesophageal reflux disease)  No date: Hyperlipidemia  No date: Subcutaneous abscess      Comment:  right lower abdomen and abdominal wall, S/P incision and               drainage    Past Surgical History:  No date: No past surgeries       Prior to Admission medications :  Medication Multiple Vitamins-Minerals (vitamin - therapeutic multivitamins w/minerals) tablet, Sig Take 1 tablet by mouth daily., Start Date , End Date , Taking? Yes, Authorizing Provider Outside Provider    Medication ibuprofen (MOTRIN) 200 MG tablet, Sig Take 400 mg by mouth every 6 hours as needed for Pain (headache). Dose: 2 tablets ( = 400 mg), Start Date , End Date , Taking? Yes, Authorizing Provider Outside Provider    Medication rosuvastatin (CRESTOR) 5 MG tablet, Sig Take 1 tablet by mouth daily., Start Date 9/20/19, End Date 3/28/21, Taking? , Authorizing Provider  Sea Gillespie MD    Medication Cholecalciferol (VITAMIN D-3) 5000 units Tab, Sig , Start Date 9/20/19, End Date 3/28/21, Taking? , Authorizing Provider Sea Gillespie MD            Relevant Problems   No relevant active problems       Physical Exam     Airway   Mallampati: II    Cardiovascular  Cardiovascular exam normal    Head Assessment  Head assessment: Normocephalic    General Assessment  General Assessment: Alert and oriented    Dental Exam  Dental exam normal    Pulmonary Exam  Pulmonary exam normal    Abdominal Exam  Abdominal exam normal      Anesthesia Plan:  Anesthesia Plan    ASA Status: 2  Emergent    Anesthesia Type: General    Preferred Airway Type: ETT  Maintenance: Inhalational  Premedication: IV      Post-op Pain Management: Per Surgeon      Checklist  Reviewed: Lab Results, NPO Status, EKG, Allergies, Medications, Problem list and Past Med History  Consent/Risks Discussed Statement:  The proposed anesthetic plan, including its risks and benefits, have been discussed with the Patient along with the risks and benefits of alternatives. Questions were encouraged and answered and the patient and/or representative understands and agrees to proceed.        I discussed with the patient (and/or patient's legal representative) the risks and benefits of the proposed anesthesia plan, General, which may include services performed by other anesthesia providers.    Alternative anesthesia plans, if available, were reviewed with the patient (and/or patient's legal representative). Discussion has been held with the patient (and/or patient's legal representative) regarding risks of anesthesia, which include allergic reaction, sore throat, vomiting, nausea and dental injury and emergent situations that may require change in anesthesia plan.    The patient (and/or patient's legal representative) has indicated understanding, his/her questions have been answered, and he/she wishes to proceed with the  planned anesthetic.  Blood Consent    Blood Products: Not Anticipated     done

## 2024-12-17 NOTE — CONSULT NOTE ADULT - SUBJECTIVE AND OBJECTIVE BOX
HEMATOLOGY ONCOLOGY CONSULT     Patient is a 64y old  Male who presents with a chief complaint of L shoulder mass (17 Dec 2024 17:06)      HPI:  64M with h/o hiatal hernia s/p fundoplication, NANCY, DM2 admitted for biopsy of large and painful L shoulder mass. Hematology consulted due to suspicion for lymphoma on preliminary frozen section from OR.    Upon interview pt states that all symptoms began about 6 weeks ago with what he describes as "hot flashes" spontaneously as well as growing shoulder pain and swelling. He denies fevers, night sweats, and weight loss. His mobility is limited due to history of degenerative joint disease and back issues--he is mostly sedentary but has a cane for ambulation. Denies history of smoking or exposure to toxic chemicals. Family history is positive for stomach cancer in his mother and prostate cancer in his father. He used to work for the  and is now no longer working due to his disability.    PAST MEDICAL & SURGICAL HISTORY:  HTN (hypertension)      IBS (irritable bowel syndrome)      SBO (small bowel obstruction)      Gastro-esophageal reflux disease with esophagitis      Obese      Diabetes      H/O neuropathy      Hypothyroidism      History of chronic pain      Unsteady gait      H/O hiatal hernia      H/O small bowel obstruction      History of herniated intervertebral disc      H/O spinal stenosis      Anxiety and depression      ANNCY (obstructive sleep apnea)      History of lumbar fusion      H/O exploratory laparotomy      H/O hernia repair  abdominal      History of appendectomy      S/P cholecystectomy      S/P small bowel resection        [] No significant past history as reviewed with the patient and family    MEDICATIONS  (STANDING):  amLODIPine   Tablet 10 milliGRAM(s) Oral daily  ascorbic acid 500 milliGRAM(s) Oral daily  atenolol  Tablet 100 milliGRAM(s) Oral daily  atorvastatin 10 milliGRAM(s) Oral at bedtime  chlorhexidine 2% Cloths 1 Application(s) Topical every 12 hours  dextrose 5%. 1000 milliLiter(s) (50 mL/Hr) IV Continuous <Continuous>  dextrose 5%. 1000 milliLiter(s) (100 mL/Hr) IV Continuous <Continuous>  dextrose 50% Injectable 25 Gram(s) IV Push once  dextrose 50% Injectable 12.5 Gram(s) IV Push once  dextrose 50% Injectable 25 Gram(s) IV Push once  diphenhydrAMINE Injectable 50 milliGRAM(s) IV Push once  DULoxetine 60 milliGRAM(s) Oral two times a day  famotidine Injectable 20 milliGRAM(s) IV Push once  gabapentin 300 milliGRAM(s) Oral four times a day  glucagon  Injectable 1 milliGRAM(s) IntraMuscular once  insulin lispro (ADMELOG) corrective regimen sliding scale   SubCutaneous three times a day before meals  insulin lispro (ADMELOG) corrective regimen sliding scale   SubCutaneous at bedtime  levothyroxine 75 MICROGram(s) Oral daily  losartan 25 milliGRAM(s) Oral daily  metoclopramide Injectable 10 milliGRAM(s) IV Push Once  multivitamin 1 Tablet(s) Oral daily  pantoprazole    Tablet 40 milliGRAM(s) Oral before breakfast  povidone iodine 10% Nasal Swab 1 Application(s) Both Nostrils once  senna 2 Tablet(s) Oral at bedtime  sodium chloride 0.9%. 1000 milliLiter(s) (100 mL/Hr) IV Continuous <Continuous>  tamsulosin 0.4 milliGRAM(s) Oral at bedtime    MEDICATIONS  (PRN):  acetaminophen     Tablet .. 975 milliGRAM(s) Oral every 8 hours PRN Mild Pain (1 - 3)  dextrose Oral Gel 15 Gram(s) Oral once PRN Blood Glucose LESS THAN 70 milliGRAM(s)/deciliter  oxyCODONE    IR 5 milliGRAM(s) Oral every 4 hours PRN Moderate Pain (4 - 6)  oxyCODONE    IR 10 milliGRAM(s) Oral every 4 hours PRN Severe Pain (7 - 10)  sucralfate suspension 1 Gram(s) Oral Once PRN for esophageal pain  tiZANidine 4 milliGRAM(s) Oral <User Schedule> PRN Muscle Spasm  zolpidem 5 milliGRAM(s) Oral at bedtime PRN Insomnia    Allergies    No Known Allergies    Intolerances    IV Contrast (Nausea)      Vital Signs Last 24 Hrs  T(C): 36.5 (16 Dec 2024 15:17), Max: 36.5 (16 Dec 2024 15:17)  T(F): 97.7 (16 Dec 2024 15:17), Max: 97.7 (16 Dec 2024 15:17)  HR: 74 (16 Dec 2024 15:17) (74 - 74)  BP: 172/94 (16 Dec 2024 15:17) (172/94 - 172/94)  BP(mean): --  RR: 18 (16 Dec 2024 15:17) (18 - 18)  SpO2: 95% (16 Dec 2024 15:17) (95% - 95%)    Parameters below as of 16 Dec 2024 15:17  Patient On (Oxygen Delivery Method): room air      PAST MEDICAL & SURGICAL HISTORY:  HTN (hypertension)      IBS (irritable bowel syndrome)      SBO (small bowel obstruction)      Gastro-esophageal reflux disease with esophagitis      Obese      Diabetes      H/O neuropathy      Hypothyroidism      History of chronic pain      Unsteady gait      H/O hiatal hernia      H/O small bowel obstruction      History of herniated intervertebral disc      H/O spinal stenosis      Anxiety and depression      NANCY (obstructive sleep apnea)      History of lumbar fusion      H/O exploratory laparotomy      H/O hernia repair  abdominal      History of appendectomy      S/P cholecystectomy      S/P small bowel resection          FAMILY HISTORY:  FH: heart disease (Father)    FH: stomach cancer (Mother)    FH: type 2 diabetes (Mother)    Family history of TIAs (Mother)        MEDICATIONS  (STANDING):  acetaminophen     Tablet .. 975 milliGRAM(s) Oral every 8 hours  amLODIPine   Tablet 10 milliGRAM(s) Oral daily  ascorbic acid 500 milliGRAM(s) Oral daily  atenolol  Tablet 100 milliGRAM(s) Oral daily  atorvastatin 10 milliGRAM(s) Oral at bedtime  ceFAZolin   IVPB 2000 milliGRAM(s) IV Intermittent every 8 hours  cholecalciferol 1000 Unit(s) Oral daily  dextrose 5%. 1000 milliLiter(s) (100 mL/Hr) IV Continuous <Continuous>  dextrose 5%. 1000 milliLiter(s) (50 mL/Hr) IV Continuous <Continuous>  dextrose 5%. 1000 milliLiter(s) (100 mL/Hr) IV Continuous <Continuous>  dextrose 5%. 1000 milliLiter(s) (50 mL/Hr) IV Continuous <Continuous>  dextrose 50% Injectable 25 Gram(s) IV Push once  dextrose 50% Injectable 12.5 Gram(s) IV Push once  dextrose 50% Injectable 25 Gram(s) IV Push once  dextrose 50% Injectable 12.5 Gram(s) IV Push once  dextrose 50% Injectable 25 Gram(s) IV Push once  dextrose 50% Injectable 25 Gram(s) IV Push once  DULoxetine 60 milliGRAM(s) Oral daily  gabapentin 300 milliGRAM(s) Oral four times a day  glucagon  Injectable 1 milliGRAM(s) IntraMuscular once  glucagon  Injectable 1 milliGRAM(s) IntraMuscular once  insulin lispro (ADMELOG) corrective regimen sliding scale   SubCutaneous three times a day before meals  insulin lispro (ADMELOG) corrective regimen sliding scale   SubCutaneous every 6 hours  levothyroxine 75 MICROGram(s) Oral daily  losartan 25 milliGRAM(s) Oral daily  multivitamin 1 Tablet(s) Oral daily  pantoprazole    Tablet 40 milliGRAM(s) Oral before breakfast  polyethylene glycol 3350 17 Gram(s) Oral at bedtime  senna 2 Tablet(s) Oral at bedtime  sodium chloride 0.9%. 1000 milliLiter(s) (100 mL/Hr) IV Continuous <Continuous>  tamsulosin 0.4 milliGRAM(s) Oral at bedtime    MEDICATIONS  (PRN):  acetaminophen     Tablet .. 975 milliGRAM(s) Oral every 8 hours PRN Mild Pain (1 - 3)  dextrose Oral Gel 15 Gram(s) Oral once PRN Blood Glucose LESS THAN 70 milliGRAM(s)/deciliter  dextrose Oral Gel 15 Gram(s) Oral once PRN Blood Glucose LESS THAN 70 milliGRAM(s)/deciliter  magnesium hydroxide Suspension 30 milliLiter(s) Oral daily PRN Constipation  ondansetron Injectable 4 milliGRAM(s) IV Push every 6 hours PRN Nausea and/or Vomiting  oxyCODONE    IR 5 milliGRAM(s) Oral every 4 hours PRN Moderate Pain (4 - 6)  oxyCODONE    IR 10 milliGRAM(s) Oral every 4 hours PRN Severe Pain (7 - 10)  sucralfate suspension 1 Gram(s) Oral Once PRN for esophageal pain  tiZANidine 4 milliGRAM(s) Oral <User Schedule> PRN Muscle Spasm  zolpidem 5 milliGRAM(s) Oral at bedtime PRN Insomnia      Allergies    No Known Allergies    Intolerances    IV Contrast (Nausea)      Vital Signs Last 24 Hrs  T(C): 36.6 (17 Dec 2024 17:32), Max: 37.1 (16 Dec 2024 19:05)  T(F): 97.8 (17 Dec 2024 17:32), Max: 98.7 (16 Dec 2024 19:05)  HR: 64 (17 Dec 2024 17:32) (53 - 77)  BP: 124/63 (17 Dec 2024 17:32) (119/72 - 142/84)  BP(mean): 86 (17 Dec 2024 16:00) (86 - 90)  RR: 18 (17 Dec 2024 17:32) (14 - 19)  SpO2: 96% (17 Dec 2024 17:32) (92% - 100%)    Parameters below as of 17 Dec 2024 17:32  Patient On (Oxygen Delivery Method): room air        PHYSICAL EXAM  General: adult in NAD  HEENT: clear oropharynx, anicteric sclera, pink conjunctiva. ~12cm area of edema and purple discoloration, mild tenderness to palpation overlying L shoulder  Neck: supple  CV: normal S1/S2 with no murmur rubs or gallops  Lungs: positive air movement b/l ant lungs,clear to auscultation, no wheezes, no rales  Abdomen: soft non-tender non-distended, no hepatosplenomegaly. Multiple surgical scars visible  Ext: no clubbing cyanosis or edema  Skin: no rashes and no petechiae  Neuro: alert and oriented X 4, no focal deficits      12-17-24 @ 07:01  -  12-17-24 @ 17:49  --------------------------------------------------------  IN: 0 mL / OUT: 700 mL / NET: -700 mL      LABS:                          13.6   6.58  )-----------( 134      ( 17 Dec 2024 14:30 )             38.8         Mean Cell Volume : 90.9 fL  Mean Cell Hemoglobin : 31.9 pg  Mean Cell Hemoglobin Concentration : 35.1 g/dL  Auto Neutrophil # : x  Auto Lymphocyte # : x  Auto Monocyte # : x  Auto Eosinophil # : x  Auto Basophil # : x  Auto Neutrophil % : x  Auto Lymphocyte % : x  Auto Monocyte % : x  Auto Eosinophil % : x  Auto Basophil % : x      12-17    136  |  100  |  20  ----------------------------<  116[H]  4.4   |  24  |  0.72    Ca    8.7      17 Dec 2024 15:03  Phos  3.3     12-17  Mg     1.80     12-17    TPro  7.8  /  Alb  4.3  /  TBili  0.4  /  DBili  x   /  AST  68[H]  /  ALT  49[H]  /  AlkPhos  135[H]  12-16      PT/INR - ( 17 Dec 2024 00:37 )   PT: 13.3 sec;   INR: 1.15 ratio         PTT - ( 17 Dec 2024 00:37 )  PTT:38.1 sec                BLOOD SMEAR INTERPRETATION:       RADIOLOGY & ADDITIONAL STUDIES:       HEMATOLOGY ONCOLOGY CONSULT     Patient is a 64y old  Male who presents with a chief complaint of L shoulder mass (17 Dec 2024 17:06)      HPI:  64M with h/o hiatal hernia s/p fundoplication, NANCY, DM2 admitted for biopsy of large and painful L shoulder mass. Hematology consulted due to suspicion for lymphoma on preliminary frozen section from OR.    Upon interview pt states that all symptoms began about 6 weeks ago with what he describes as "hot flashes" spontaneously as well as growing shoulder pain and swelling. He denies fevers, night sweats, and weight loss. His mobility is limited due to history of degenerative joint disease and back issues--he is mostly sedentary but has a cane for ambulation. Denies history of smoking or exposure to toxic chemicals. Family history is positive for stomach cancer in his mother and prostate cancer in his father. He used to work for the  and is now no longer working due to his disability.    ROS - as per HPI    PAST MEDICAL & SURGICAL HISTORY:  HTN (hypertension)  IBS (irritable bowel syndrome)  SBO (small bowel obstruction)  Gastro-esophageal reflux disease with esophagitis  Obese  Diabetes  H/O neuropathy  Hypothyroidism  History of chronic pain  Unsteady gait  H/O hiatal hernia  Hx of small bowel obstruction  History of herniated intervertebral disc  H/O spinal stenosis  Anxiety and depression  NANCY (obstructive sleep apnea)  History of lumbar fusion  H/O exploratory laparotomy  H/O hernia repair  abdominal  History of appendectomy  S/P cholecystectomy  S/P small bowel resection    FAMILY HISTORY:  FH: heart disease (Father)  FH: prostate cancer (Father)  FH: stomach cancer (Mother)  FH: type 2 diabetes (Mother)  Family history of TIAs (Mother)        MEDICATIONS  (STANDING):  amLODIPine   Tablet 10 milliGRAM(s) Oral daily  ascorbic acid 500 milliGRAM(s) Oral daily  atenolol  Tablet 100 milliGRAM(s) Oral daily  atorvastatin 10 milliGRAM(s) Oral at bedtime  chlorhexidine 2% Cloths 1 Application(s) Topical every 12 hours  dextrose 5%. 1000 milliLiter(s) (50 mL/Hr) IV Continuous <Continuous>  dextrose 5%. 1000 milliLiter(s) (100 mL/Hr) IV Continuous <Continuous>  dextrose 50% Injectable 25 Gram(s) IV Push once  dextrose 50% Injectable 12.5 Gram(s) IV Push once  dextrose 50% Injectable 25 Gram(s) IV Push once  diphenhydrAMINE Injectable 50 milliGRAM(s) IV Push once  DULoxetine 60 milliGRAM(s) Oral two times a day  famotidine Injectable 20 milliGRAM(s) IV Push once  gabapentin 300 milliGRAM(s) Oral four times a day  glucagon  Injectable 1 milliGRAM(s) IntraMuscular once  insulin lispro (ADMELOG) corrective regimen sliding scale   SubCutaneous three times a day before meals  insulin lispro (ADMELOG) corrective regimen sliding scale   SubCutaneous at bedtime  levothyroxine 75 MICROGram(s) Oral daily  losartan 25 milliGRAM(s) Oral daily  metoclopramide Injectable 10 milliGRAM(s) IV Push Once  multivitamin 1 Tablet(s) Oral daily  pantoprazole    Tablet 40 milliGRAM(s) Oral before breakfast  povidone iodine 10% Nasal Swab 1 Application(s) Both Nostrils once  senna 2 Tablet(s) Oral at bedtime  sodium chloride 0.9%. 1000 milliLiter(s) (100 mL/Hr) IV Continuous <Continuous>  tamsulosin 0.4 milliGRAM(s) Oral at bedtime    MEDICATIONS  (PRN):  acetaminophen     Tablet .. 975 milliGRAM(s) Oral every 8 hours PRN Mild Pain (1 - 3)  dextrose Oral Gel 15 Gram(s) Oral once PRN Blood Glucose LESS THAN 70 milliGRAM(s)/deciliter  oxyCODONE    IR 5 milliGRAM(s) Oral every 4 hours PRN Moderate Pain (4 - 6)  oxyCODONE    IR 10 milliGRAM(s) Oral every 4 hours PRN Severe Pain (7 - 10)  sucralfate suspension 1 Gram(s) Oral Once PRN for esophageal pain  tiZANidine 4 milliGRAM(s) Oral <User Schedule> PRN Muscle Spasm  zolpidem 5 milliGRAM(s) Oral at bedtime PRN Insomnia    Allergies  No Known Allergies    Intolerances  IV Contrast (Nausea)    Vital Signs Last 24 Hrs  T(C): 36.6 (17 Dec 2024 17:32), Max: 37.1 (16 Dec 2024 19:05)  T(F): 97.8 (17 Dec 2024 17:32), Max: 98.7 (16 Dec 2024 19:05)  HR: 64 (17 Dec 2024 17:32) (53 - 77)  BP: 124/63 (17 Dec 2024 17:32) (119/72 - 142/84)  BP(mean): 86 (17 Dec 2024 16:00) (86 - 90)  RR: 18 (17 Dec 2024 17:32) (14 - 19)  SpO2: 96% (17 Dec 2024 17:32) (92% - 100%)    Parameters below as of 17 Dec 2024 17:32  Patient On (Oxygen Delivery Method): room air        PHYSICAL EXAM  General: adult in NAD  HEENT: clear oropharynx, anicteric sclera, pink conjunctiva. ~12cm area of edema and purple discoloration, mild tenderness to palpation overlying L shoulder  Neck: supple  CV: normal S1/S2 with no murmur rubs or gallops  Lungs: positive air movement b/l ant lungs,clear to auscultation, no wheezes, no rales  Abdomen: soft non-tender non-distended, no hepatosplenomegaly. Multiple surgical scars visible  Ext: no clubbing cyanosis or edema  Skin: no rashes and no petechiae  Neuro: alert and oriented X 4, no focal deficits      12-17-24 @ 07:01  -  12-17-24 @ 17:49  --------------------------------------------------------  IN: 0 mL / OUT: 700 mL / NET: -700 mL      LABS:                          13.6   6.58  )-----------( 134      ( 17 Dec 2024 14:30 )             38.8         Mean Cell Volume : 90.9 fL  Mean Cell Hemoglobin : 31.9 pg  Mean Cell Hemoglobin Concentration : 35.1 g/dL        12-17    136  |  100  |  20  ----------------------------<  116[H]  4.4   |  24  |  0.72    Ca    8.7      17 Dec 2024 15:03  Phos  3.3     12-17  Mg     1.80     12-17    TPro  7.8  /  Alb  4.3  /  TBili  0.4  /  DBili  x   /  AST  68[H]  /  ALT  49[H]  /  AlkPhos  135[H]  12-16    PT/INR - ( 17 Dec 2024 00:37 )   PT: 13.3 sec;   INR: 1.15 ratio    PTT - ( 17 Dec 2024 00:37 )  PTT:38.1 sec      RADIOLOGY & ADDITIONAL STUDIES:  CT scans of the chest/shoulder reviewed

## 2024-12-17 NOTE — CONSULT NOTE ADULT - PROBLEM SELECTOR RECOMMENDATION 9
s/p biopsy 12/16 with prelim results concerning for lymphoma. heme/onc following, pending final results to determine whether to initiate treatment inpatient   - CT chest with 10.9 x6.6 x6 cm lobulated soft tissue mass involving anterior L shoulder- suspicious for sacromatous/neoplastic etiology with additional 2.9cm lobulated lesion noted in R anterior chest wall, cervical lymphadenopathy    - continue ancef per ortho   - CT A/P w/ IV contrast for staging   - f/u final results/heme/onc recs s/p biopsy 12/17 with prelim results concerning for lymphoma. heme/onc following, pending final results to determine whether to initiate treatment inpatient   - CT chest with 10.9 x6.6 x6 cm lobulated soft tissue mass involving anterior L shoulder- suspicious for sacromatous/neoplastic etiology with additional 2.9cm lobulated lesion noted in R anterior chest wall, cervical lymphadenopathy    - continue ancef per ortho   - CT A/P w/ IV contrast for staging   - f/u final results/heme/onc recs

## 2024-12-17 NOTE — PROGRESS NOTE ADULT - SUBJECTIVE AND OBJECTIVE BOX
SUBJECTIVE  No acute events overnight. Pain controlled. Denies fevers/chills, chest pain, or dyspnea.    OBJECTIVE  Vital Signs Last 24 Hrs  T(C): 36.8 (17 Dec 2024 02:00), Max: 37.1 (16 Dec 2024 19:05)  T(F): 98.3 (17 Dec 2024 02:00), Max: 98.7 (16 Dec 2024 19:05)  HR: 73 (17 Dec 2024 02:00) (66 - 74)  BP: 125/72 (17 Dec 2024 02:00) (125/72 - 172/94)  BP(mean): --  RR: 18 (17 Dec 2024 02:00) (16 - 18)  SpO2: 95% (17 Dec 2024 02:00) (92% - 96%)    Parameters below as of 17 Dec 2024 02:00  Patient On (Oxygen Delivery Method): room air        PHYSICAL EXAM:  Gen: NAD  LUE:  Superior aspect of shoulder w 10cm round erythematous, indurated lesion; mild ttp over lesion  SILT axillary/med/rad/ulnar  +Motor AIN/PIN/Ulnar/Radial/Musc/Median,   +painless elbow/wrist ROM, shoulder ROM mildly painful   2+radial pulse, soft compartments.    LABS                        13.7   7.83  )-----------( 153      ( 17 Dec 2024 00:37 )             38.5     12-17    137  |  100  |  18  ----------------------------<  111[H]  4.2   |  24  |  0.63    Ca    9.1      17 Dec 2024 00:37  Phos  3.3     12-17  Mg     1.80     12-17    TPro  7.8  /  Alb  4.3  /  TBili  0.4  /  DBili  x   /  AST  68[H]  /  ALT  49[H]  /  AlkPhos  135[H]  12-16    PT/INR - ( 17 Dec 2024 00:37 )   PT: 13.3 sec;   INR: 1.15 ratio         PTT - ( 17 Dec 2024 00:37 )  PTT:38.1 sec    ASSESSMENT & PLAN  64yMale w/ enlarged L shoulder mass for past 6 weeks. CT chest w/soft tissue mass L shoulder, R anterior chest wall lesion, cervical lymphadenopathy    PLAN:  -OR today for L shoulder biopsy, possible I&D with Dr. Cruz  -NPO  -Preop labs  -Consented  -Medical clearance documented

## 2024-12-17 NOTE — PROGRESS NOTE ADULT - ASSESSMENT
A/P: 64y y/o Male s/p L shoulder bx, POD #0   -FU Heme/Onc consult   -WBAT  -Pain control/analgesia  -DVT ppx - Venodynes  -FU OR path and Cx  -FU AM Labs  -Notify orthopedics with any questions

## 2024-12-17 NOTE — CONSULT NOTE ADULT - PROBLEM SELECTOR RECOMMENDATION 3
with h/o chronic back pain s/p lumbar surgery, on chronic opioids. I-STOP reviewed (#[id number])  - can continue current pain regimen with gabapentin, duloxetine 60mg daily, tylenol, tizanidine 4mg BID PRN  - oxycodone 5mg q4h PRN moderate pain, oxycodone 10mg q4-6 hrs PRN severe pain   - bowel regimen with senna and miralax

## 2024-12-17 NOTE — CONSULT NOTE ADULT - ASSESSMENT
64M with PMH hiatal hernia s/p fundoplication, prior SBO s/p multiple abdominal surgeries, T2DM, chronic back pain, and NANCY (not on CPAP) initially admitted to orthopedics for biopsy of enlarging painful L anterior shoulder mass, now s/p biopsy 12/16/24 with preliminary results suspicious for lymphoma. Pending final results for potential inpatient initiation of treatment, heme/onc following. Medicine consulted for transfer to medicine service.    64M with PMH hiatal hernia s/p fundoplication, prior SBO s/p multiple abdominal surgeries, T2DM, chronic back pain, and NANCY (not on CPAP) initially admitted to orthopedics for biopsy of enlarging painful L anterior shoulder mass, now s/p biopsy 12/17 with preliminary results suspicious for lymphoma. Pending final results for potential inpatient initiation of treatment, heme/onc following. Medicine consulted for transfer to medicine service.

## 2024-12-17 NOTE — CONSULT NOTE ADULT - ASSESSMENT
64M with h/o hiatal hernia s/p fundoplication, NANCY, DM2 admitted for biopsy of large and painful L shoulder mass. Hematology consulted due to suspicion for lymphoma on preliminary frozen section from OR.    #L Shoulder Mass  -expanding mass over past 6 weeks accompanied by "hot flashes"  -, uric acid 5.3  -s/p biopsy with OR 12/17, prelim c/f lymphoma per ortho  - CT chest 12/16 showing the 10.9x6.6x6cm lobulated soft tissue mass on shoulder, sarcomatous/neoplastic etiology, also with 2.9cm lobulated lesion on R anterior chest wall. Multiple enlarged cervical LNs.   - ECOG ~2; limited mobility due to back issues    Recommend:  - please obtain CT abdomen/pelvis with IV contrast to complete staging  - Please keep patient inpatient until pathology results since we may still plan for inpatient initiation of treatment  - Hematology will follow along  - Please contact us if patient is planned for discharge as we will help coordinate referral to [facility] for follow-up.     Seen and discussed with attending Dr Debbi Reardon MD PGY-4  Hematology/Oncology Fellow  Available on MS TEAMS  Pagers: NAN [phone]; [facility] [phone]

## 2024-12-17 NOTE — PRE-OP CHECKLIST - SELECT TESTS ORDERED
BMP/CBC/Type and Screen BMP/CBC/PT/PTT/INR/Type and Screen/POCT Blood Glucose FS- 120 @1059/BMP/CBC/PT/PTT/INR/Type and Screen/POCT Blood Glucose

## 2024-12-17 NOTE — CONSULT NOTE ADULT - SUBJECTIVE AND OBJECTIVE BOX
Medicine Accept Note     HPI:  64M with PMH hiatal hernia s/p fundoplication, prior SBO s/p multiple abdominal surgeries, T2DM, chronic back pain, and NANCY (not on CPAP) admitted to orthopedics for biopsy of enlarging painful L shoulder mass that he noticed 2 months ago. CT chest with 10.9 x6.6 x6 cm lobulated soft tissue mass involving anterior L shoulder- suspicious for sacromatous/neoplastic etiology with additional 2.9cm lobulated lesion noted in R anterior chest wall, cervical lymphadenopathy. Patient is now s/p biopsy 12/16 with preliminary pathology suspicious for lymphoma. Heme/onc now also following, recommending patient remain inpatient for final pathology results to decide whether to initiate treatment inpatient. Ortho requesting transfer to medicine.     Patient seen and examined at bedside. Reporting some pain over biopsy site but improved from yesterday.     Review of Systems:   CONSTITUTIONAL: No fever, chills  HEENT: No sore throat, vision changes  RESPIRATORY: No cough, wheezing, shortness of breath  CARDIOVASCULAR: No chest pain, palpitations, leg edema  GASTROINTESTINAL: No abdominal pain, nausea, vomiting, diarrhea or constipation  GENITOURINARY: No dysuria, frequency  NEUROLOGICAL: No headaches, loss of strength, numbness  MUSCULOSKELETAL: No muscle, back, or extremity pain    PAST MEDICAL & SURGICAL HISTORY:  HTN (hypertension)  IBS (irritable bowel syndrome)  SBO (small bowel obstruction)  Gastro-esophageal reflux disease with esophagitis  Obese  Diabetes  H/O neuropathy  Hypothyroidism  History of chronic pain  Unsteady gait  H/O hiatal hernia  H/O small bowel obstruction  History of herniated intervertebral disc  H/O spinal stenosis  Anxiety and depression  NANCY (obstructive sleep apnea)    Allergies: No Known Allergies  Intolerances: IV Contrast (Nausea)    History of lumbar fusion  H/O exploratory laparotomy  H/O hernia repair, abdominal  History of appendectomy  S/P cholecystectomy  S/P small bowel resection    FAMILY HISTORY:  FH: heart disease (Father)  FH: stomach cancer (Mother)  FH: type 2 diabetes (Mother)  Family history of TIAs (Mother)    MEDICATIONS  (STANDING):  acetaminophen     Tablet .. 975 milliGRAM(s) Oral every 8 hours  amLODIPine   Tablet 10 milliGRAM(s) Oral daily  ascorbic acid 500 milliGRAM(s) Oral daily  atenolol  Tablet 100 milliGRAM(s) Oral daily  atorvastatin 10 milliGRAM(s) Oral at bedtime  ceFAZolin   IVPB 2000 milliGRAM(s) IV Intermittent every 8 hours  cholecalciferol 1000 Unit(s) Oral daily  dextrose 5%. 1000 milliLiter(s) (50 mL/Hr) IV Continuous <Continuous>  dextrose 5%. 1000 milliLiter(s) (50 mL/Hr) IV Continuous <Continuous>  dextrose 5%. 1000 milliLiter(s) (100 mL/Hr) IV Continuous <Continuous>  dextrose 5%. 1000 milliLiter(s) (100 mL/Hr) IV Continuous <Continuous>  dextrose 50% Injectable 25 Gram(s) IV Push once  dextrose 50% Injectable 12.5 Gram(s) IV Push once  dextrose 50% Injectable 25 Gram(s) IV Push once  dextrose 50% Injectable 25 Gram(s) IV Push once  dextrose 50% Injectable 12.5 Gram(s) IV Push once  dextrose 50% Injectable 25 Gram(s) IV Push once  DULoxetine 60 milliGRAM(s) Oral daily  gabapentin 300 milliGRAM(s) Oral four times a day  glucagon  Injectable 1 milliGRAM(s) IntraMuscular once  glucagon  Injectable 1 milliGRAM(s) IntraMuscular once  insulin lispro (ADMELOG) corrective regimen sliding scale   SubCutaneous at bedtime  insulin lispro (ADMELOG) corrective regimen sliding scale   SubCutaneous three times a day before meals  levothyroxine 75 MICROGram(s) Oral daily  losartan 25 milliGRAM(s) Oral daily  multivitamin 1 Tablet(s) Oral daily  pantoprazole    Tablet 40 milliGRAM(s) Oral before breakfast  polyethylene glycol 3350 17 Gram(s) Oral at bedtime  senna 2 Tablet(s) Oral at bedtime  sodium chloride 0.9%. 1000 milliLiter(s) (100 mL/Hr) IV Continuous <Continuous>  tamsulosin 0.4 milliGRAM(s) Oral at bedtime    MEDICATIONS  (PRN):  acetaminophen     Tablet .. 975 milliGRAM(s) Oral every 8 hours PRN Mild Pain (1 - 3)  dextrose Oral Gel 15 Gram(s) Oral once PRN Blood Glucose LESS THAN 70 milliGRAM(s)/deciliter  dextrose Oral Gel 15 Gram(s) Oral once PRN Blood Glucose LESS THAN 70 milliGRAM(s)/deciliter  magnesium hydroxide Suspension 30 milliLiter(s) Oral daily PRN Constipation  ondansetron Injectable 4 milliGRAM(s) IV Push every 6 hours PRN Nausea and/or Vomiting  oxyCODONE    IR 5 milliGRAM(s) Oral every 4 hours PRN Moderate Pain (4 - 6)  oxyCODONE    IR 10 milliGRAM(s) Oral every 4 hours PRN Severe Pain (7 - 10)  sucralfate suspension 1 Gram(s) Oral Once PRN for esophageal pain  tiZANidine 4 milliGRAM(s) Oral <User Schedule> PRN Muscle Spasm  zolpidem 5 milliGRAM(s) Oral at bedtime PRN Insomnia    CAPILLARY BLOOD GLUCOSE  POCT Blood Glucose.: 104 mg/dL (17 Dec 2024 16:30)  POCT Blood Glucose.: 120 mg/dL (17 Dec 2024 10:59)  POCT Blood Glucose.: 118 mg/dL (17 Dec 2024 06:10)  POCT Blood Glucose.: 136 mg/dL (16 Dec 2024 22:20)    I&O's Summary    17 Dec 2024 07:01  -  17 Dec 2024 22:00  --------------------------------------------------------  IN: 0 mL / OUT: 700 mL / NET: -700 mL    Physical Exam:  Vital Signs Last 24 Hrs  T(C): 36.6 (17 Dec 2024 17:32), Max: 36.9 (17 Dec 2024 06:16)  T(F): 97.8 (17 Dec 2024 17:32), Max: 98.5 (17 Dec 2024 06:16)  HR: 64 (17 Dec 2024 17:32) (53 - 77)  BP: 124/63 (17 Dec 2024 17:32) (119/72 - 137/77)  BP(mean): 86 (17 Dec 2024 16:00) (86 - 90)  RR: 18 (17 Dec 2024 17:32) (14 - 19)  SpO2: 96% (17 Dec 2024 17:32) (94% - 100%)    Parameters below as of 17 Dec 2024 17:32  Patient On (Oxygen Delivery Method): room air    General: NAD, non-toxic appearing   HEENT: EOMi, no scleral icterus  CV: RRR, normal S1 and S2  Lungs: normal respiratory effort, CTAB  Abd: soft, nontender, nondistended  Ext: no BLE edema, +ecchymosis over L anterior shoulder biopsy site, peripheral pulses intact and symmetric   Pysch: AAOx3, appropriate affect   Neuro: grossly non-focal, moving all extremities spontaneously   Skin: no rashes or lesions       LABS:                        13.6   6.58  )-----------( 134      ( 17 Dec 2024 14:30 )             38.8     12-17    136  |  100  |  20  ----------------------------<  116[H]  4.4   |  24  |  0.72    Ca    8.7      17 Dec 2024 15:03  Phos  3.3     12-17  Mg     1.80     12-17    TPro  7.8  /  Alb  4.3  /  TBili  0.4  /  DBili  x   /  AST  68[H]  /  ALT  49[H]  /  AlkPhos  135[H]  12-16    PT/INR - ( 17 Dec 2024 00:37 )   PT: 13.3 sec;   INR: 1.15 ratio         PTT - ( 17 Dec 2024 00:37 )  PTT:38.1 sec      Urinalysis Basic - ( 17 Dec 2024 15:03 )    Color: x / Appearance: x / SG: x / pH: x  Gluc: 116 mg/dL / Ketone: x  / Bili: x / Urobili: x   Blood: x / Protein: x / Nitrite: x   Leuk Esterase: x / RBC: x / WBC x   Sq Epi: x / Non Sq Epi: x / Bacteria: x    RADIOLOGY & ADDITIONAL TESTS: Reviewed  < from: CT Chest w/wo IV Cont (12.16.24 @ 18:56) >  FINDINGS:    LUNGS AND LARGE AIRWAYS: Patent central airways. No pulmonary nodules.  PLEURA: No pleural effusion.  VESSELS: Within normal limits.  HEART: Heart size is normal. No pericardial effusion. Calcification of   the mitral annulus.  MEDIASTINUM AND KEVIN: No mediastinal lymphadenopathy. Prominent   pericardial recess in the paratracheal space.  CHEST WALL AND LOWER NECK: A 6.0 x 10.9 x 6.6 cm (APxTRxCC) lobulated   soft tissue density lesion involving the anterior left shoulder is   inseparable from the deltoid and trapezius musculature. There is   overlying cutaneous thickening. A second lobulated soft tissue lesion   measures up to 2.9 cm in the right anterior chest wall. Multiple   bilateral mildly enlarged cervical lymph nodes.  VISUALIZED UPPER ABDOMEN: Cirrhosis. Cholecystectomy.  BONES: Degenerative changes.    IMPRESSION:    Lobulated soft tissue mass involving the anterior left shoulder, as   described. Sarcomatous/neoplastic etiology is the primary consideration.   An additional lesion is noted in the right anterior chest wall.    Cervical lymphadenopathy.    --- End of Report ---    < end of copied text >   Medicine Accept Note     HPI:  64M with PMH hiatal hernia s/p fundoplication, prior SBO s/p multiple abdominal surgeries, T2DM, chronic back pain, and NANCY (not on CPAP) admitted to orthopedics for biopsy of enlarging painful L shoulder mass that he noticed 2 months ago. CT chest with 10.9 x6.6 x6 cm lobulated soft tissue mass involving anterior L shoulder- suspicious for sacromatous/neoplastic etiology with additional 2.9cm lobulated lesion noted in R anterior chest wall, cervical lymphadenopathy. Patient is now s/p biopsy earlier today with preliminary pathology suspicious for lymphoma. Heme/onc now also following, recommending patient remain inpatient for final pathology results to decide whether to initiate treatment inpatient. Ortho requesting transfer to medicine.     Patient seen and examined at bedside. Reporting some pain over biopsy site but improved from yesterday.     Review of Systems:   CONSTITUTIONAL: No fever, chills  HEENT: No sore throat, vision changes  RESPIRATORY: No cough, wheezing, shortness of breath  CARDIOVASCULAR: No chest pain, palpitations, leg edema  GASTROINTESTINAL: No abdominal pain, nausea, vomiting, diarrhea or constipation  GENITOURINARY: No dysuria, frequency  NEUROLOGICAL: No headaches, loss of strength, numbness  MUSCULOSKELETAL: No muscle, back, or extremity pain    PAST MEDICAL & SURGICAL HISTORY:  HTN (hypertension)  IBS (irritable bowel syndrome)  SBO (small bowel obstruction)  Gastro-esophageal reflux disease with esophagitis  Obese  Diabetes  H/O neuropathy  Hypothyroidism  History of chronic pain  Unsteady gait  H/O hiatal hernia  H/O small bowel obstruction  History of herniated intervertebral disc  H/O spinal stenosis  Anxiety and depression  NANCY (obstructive sleep apnea)    Allergies: No Known Allergies  Intolerances: IV Contrast (Nausea)    History of lumbar fusion  H/O exploratory laparotomy  H/O hernia repair, abdominal  History of appendectomy  S/P cholecystectomy  S/P small bowel resection    FAMILY HISTORY:  FH: heart disease (Father)  FH: stomach cancer (Mother)  FH: type 2 diabetes (Mother)  Family history of TIAs (Mother)    MEDICATIONS  (STANDING):  acetaminophen     Tablet .. 975 milliGRAM(s) Oral every 8 hours  amLODIPine   Tablet 10 milliGRAM(s) Oral daily  ascorbic acid 500 milliGRAM(s) Oral daily  atenolol  Tablet 100 milliGRAM(s) Oral daily  atorvastatin 10 milliGRAM(s) Oral at bedtime  ceFAZolin   IVPB 2000 milliGRAM(s) IV Intermittent every 8 hours  cholecalciferol 1000 Unit(s) Oral daily  dextrose 5%. 1000 milliLiter(s) (50 mL/Hr) IV Continuous <Continuous>  dextrose 5%. 1000 milliLiter(s) (50 mL/Hr) IV Continuous <Continuous>  dextrose 5%. 1000 milliLiter(s) (100 mL/Hr) IV Continuous <Continuous>  dextrose 5%. 1000 milliLiter(s) (100 mL/Hr) IV Continuous <Continuous>  dextrose 50% Injectable 25 Gram(s) IV Push once  dextrose 50% Injectable 12.5 Gram(s) IV Push once  dextrose 50% Injectable 25 Gram(s) IV Push once  dextrose 50% Injectable 25 Gram(s) IV Push once  dextrose 50% Injectable 12.5 Gram(s) IV Push once  dextrose 50% Injectable 25 Gram(s) IV Push once  DULoxetine 60 milliGRAM(s) Oral daily  gabapentin 300 milliGRAM(s) Oral four times a day  glucagon  Injectable 1 milliGRAM(s) IntraMuscular once  glucagon  Injectable 1 milliGRAM(s) IntraMuscular once  insulin lispro (ADMELOG) corrective regimen sliding scale   SubCutaneous at bedtime  insulin lispro (ADMELOG) corrective regimen sliding scale   SubCutaneous three times a day before meals  levothyroxine 75 MICROGram(s) Oral daily  losartan 25 milliGRAM(s) Oral daily  multivitamin 1 Tablet(s) Oral daily  pantoprazole    Tablet 40 milliGRAM(s) Oral before breakfast  polyethylene glycol 3350 17 Gram(s) Oral at bedtime  senna 2 Tablet(s) Oral at bedtime  sodium chloride 0.9%. 1000 milliLiter(s) (100 mL/Hr) IV Continuous <Continuous>  tamsulosin 0.4 milliGRAM(s) Oral at bedtime    MEDICATIONS  (PRN):  acetaminophen     Tablet .. 975 milliGRAM(s) Oral every 8 hours PRN Mild Pain (1 - 3)  dextrose Oral Gel 15 Gram(s) Oral once PRN Blood Glucose LESS THAN 70 milliGRAM(s)/deciliter  dextrose Oral Gel 15 Gram(s) Oral once PRN Blood Glucose LESS THAN 70 milliGRAM(s)/deciliter  magnesium hydroxide Suspension 30 milliLiter(s) Oral daily PRN Constipation  ondansetron Injectable 4 milliGRAM(s) IV Push every 6 hours PRN Nausea and/or Vomiting  oxyCODONE    IR 5 milliGRAM(s) Oral every 4 hours PRN Moderate Pain (4 - 6)  oxyCODONE    IR 10 milliGRAM(s) Oral every 4 hours PRN Severe Pain (7 - 10)  sucralfate suspension 1 Gram(s) Oral Once PRN for esophageal pain  tiZANidine 4 milliGRAM(s) Oral <User Schedule> PRN Muscle Spasm  zolpidem 5 milliGRAM(s) Oral at bedtime PRN Insomnia    CAPILLARY BLOOD GLUCOSE  POCT Blood Glucose.: 104 mg/dL (17 Dec 2024 16:30)  POCT Blood Glucose.: 120 mg/dL (17 Dec 2024 10:59)  POCT Blood Glucose.: 118 mg/dL (17 Dec 2024 06:10)  POCT Blood Glucose.: 136 mg/dL (16 Dec 2024 22:20)    I&O's Summary    17 Dec 2024 07:01  -  17 Dec 2024 22:00  --------------------------------------------------------  IN: 0 mL / OUT: 700 mL / NET: -700 mL    Physical Exam:  Vital Signs Last 24 Hrs  T(C): 36.6 (17 Dec 2024 17:32), Max: 36.9 (17 Dec 2024 06:16)  T(F): 97.8 (17 Dec 2024 17:32), Max: 98.5 (17 Dec 2024 06:16)  HR: 64 (17 Dec 2024 17:32) (53 - 77)  BP: 124/63 (17 Dec 2024 17:32) (119/72 - 137/77)  BP(mean): 86 (17 Dec 2024 16:00) (86 - 90)  RR: 18 (17 Dec 2024 17:32) (14 - 19)  SpO2: 96% (17 Dec 2024 17:32) (94% - 100%)    Parameters below as of 17 Dec 2024 17:32  Patient On (Oxygen Delivery Method): room air    General: NAD, non-toxic appearing   HEENT: EOMi, no scleral icterus  CV: RRR, normal S1 and S2  Lungs: normal respiratory effort, CTAB  Abd: soft, nontender, nondistended  Ext: no BLE edema, +ecchymosis over L anterior shoulder biopsy site, peripheral pulses intact and symmetric   Pysch: AAOx3, appropriate affect   Neuro: grossly non-focal, moving all extremities spontaneously   Skin: no rashes or lesions       LABS:                        13.6   6.58  )-----------( 134      ( 17 Dec 2024 14:30 )             38.8     12-17    136  |  100  |  20  ----------------------------<  116[H]  4.4   |  24  |  0.72    Ca    8.7      17 Dec 2024 15:03  Phos  3.3     12-17  Mg     1.80     12-17    TPro  7.8  /  Alb  4.3  /  TBili  0.4  /  DBili  x   /  AST  68[H]  /  ALT  49[H]  /  AlkPhos  135[H]  12-16    PT/INR - ( 17 Dec 2024 00:37 )   PT: 13.3 sec;   INR: 1.15 ratio         PTT - ( 17 Dec 2024 00:37 )  PTT:38.1 sec      Urinalysis Basic - ( 17 Dec 2024 15:03 )    Color: x / Appearance: x / SG: x / pH: x  Gluc: 116 mg/dL / Ketone: x  / Bili: x / Urobili: x   Blood: x / Protein: x / Nitrite: x   Leuk Esterase: x / RBC: x / WBC x   Sq Epi: x / Non Sq Epi: x / Bacteria: x    RADIOLOGY & ADDITIONAL TESTS: Reviewed  < from: CT Chest w/wo IV Cont (12.16.24 @ 18:56) >  FINDINGS:    LUNGS AND LARGE AIRWAYS: Patent central airways. No pulmonary nodules.  PLEURA: No pleural effusion.  VESSELS: Within normal limits.  HEART: Heart size is normal. No pericardial effusion. Calcification of   the mitral annulus.  MEDIASTINUM AND KEVIN: No mediastinal lymphadenopathy. Prominent   pericardial recess in the paratracheal space.  CHEST WALL AND LOWER NECK: A 6.0 x 10.9 x 6.6 cm (APxTRxCC) lobulated   soft tissue density lesion involving the anterior left shoulder is   inseparable from the deltoid and trapezius musculature. There is   overlying cutaneous thickening. A second lobulated soft tissue lesion   measures up to 2.9 cm in the right anterior chest wall. Multiple   bilateral mildly enlarged cervical lymph nodes.  VISUALIZED UPPER ABDOMEN: Cirrhosis. Cholecystectomy.  BONES: Degenerative changes.    IMPRESSION:    Lobulated soft tissue mass involving the anterior left shoulder, as   described. Sarcomatous/neoplastic etiology is the primary consideration.   An additional lesion is noted in the right anterior chest wall.    Cervical lymphadenopathy.    --- End of Report ---    < end of copied text >

## 2024-12-18 LAB
ANION GAP SERPL CALC-SCNC: 13 MMOL/L — SIGNIFICANT CHANGE UP (ref 7–14)
BUN SERPL-MCNC: 21 MG/DL — SIGNIFICANT CHANGE UP (ref 7–23)
CALCIUM SERPL-MCNC: 8.6 MG/DL — SIGNIFICANT CHANGE UP (ref 8.4–10.5)
CHLORIDE SERPL-SCNC: 98 MMOL/L — SIGNIFICANT CHANGE UP (ref 98–107)
CO2 SERPL-SCNC: 23 MMOL/L — SIGNIFICANT CHANGE UP (ref 22–31)
CREAT SERPL-MCNC: 0.59 MG/DL — SIGNIFICANT CHANGE UP (ref 0.5–1.3)
EGFR: 109 ML/MIN/1.73M2 — SIGNIFICANT CHANGE UP
GLUCOSE BLDC GLUCOMTR-MCNC: 108 MG/DL — HIGH (ref 70–99)
GLUCOSE BLDC GLUCOMTR-MCNC: 118 MG/DL — HIGH (ref 70–99)
GLUCOSE BLDC GLUCOMTR-MCNC: 119 MG/DL — HIGH (ref 70–99)
GLUCOSE SERPL-MCNC: 100 MG/DL — HIGH (ref 70–99)
HCT VFR BLD CALC: 37.7 % — LOW (ref 39–50)
HGB BLD-MCNC: 13.3 G/DL — SIGNIFICANT CHANGE UP (ref 13–17)
MCHC RBC-ENTMCNC: 31.7 PG — SIGNIFICANT CHANGE UP (ref 27–34)
MCHC RBC-ENTMCNC: 35.3 G/DL — SIGNIFICANT CHANGE UP (ref 32–36)
MCV RBC AUTO: 89.8 FL — SIGNIFICANT CHANGE UP (ref 80–100)
NRBC # BLD: 0 /100 WBCS — SIGNIFICANT CHANGE UP (ref 0–0)
NRBC # FLD: 0 K/UL — SIGNIFICANT CHANGE UP (ref 0–0)
PLATELET # BLD AUTO: 132 K/UL — LOW (ref 150–400)
POTASSIUM SERPL-MCNC: 4 MMOL/L — SIGNIFICANT CHANGE UP (ref 3.5–5.3)
POTASSIUM SERPL-SCNC: 4 MMOL/L — SIGNIFICANT CHANGE UP (ref 3.5–5.3)
PROT SERPL-MCNC: 6.6 G/DL — SIGNIFICANT CHANGE UP (ref 6–8.3)
PSA FLD-MCNC: 0.24 NG/ML — SIGNIFICANT CHANGE UP (ref 0–4)
RBC # BLD: 4.2 M/UL — SIGNIFICANT CHANGE UP (ref 4.2–5.8)
RBC # FLD: 12 % — SIGNIFICANT CHANGE UP (ref 10.3–14.5)
SODIUM SERPL-SCNC: 134 MMOL/L — LOW (ref 135–145)
WBC # BLD: 6.13 K/UL — SIGNIFICANT CHANGE UP (ref 3.8–10.5)
WBC # FLD AUTO: 6.13 K/UL — SIGNIFICANT CHANGE UP (ref 3.8–10.5)

## 2024-12-18 PROCEDURE — 99232 SBSQ HOSP IP/OBS MODERATE 35: CPT

## 2024-12-18 PROCEDURE — 84165 PROTEIN E-PHORESIS SERUM: CPT | Mod: 26

## 2024-12-18 RX ORDER — DIPHENHYDRAMINE HCL 25 MG
25 TABLET ORAL EVERY 6 HOURS
Refills: 0 | Status: DISCONTINUED | OUTPATIENT
Start: 2024-12-18 | End: 2024-12-31

## 2024-12-18 RX ADMIN — Medication 500 MILLIGRAM(S): at 12:14

## 2024-12-18 RX ADMIN — Medication 1000 UNIT(S): at 12:14

## 2024-12-18 RX ADMIN — GABAPENTIN 300 MILLIGRAM(S): 300 CAPSULE ORAL at 17:19

## 2024-12-18 RX ADMIN — LEVOTHYROXINE SODIUM 75 MICROGRAM(S): 175 TABLET ORAL at 05:19

## 2024-12-18 RX ADMIN — Medication 10 MILLIGRAM(S): at 05:24

## 2024-12-18 RX ADMIN — Medication 10 MILLIGRAM(S): at 11:42

## 2024-12-18 RX ADMIN — Medication 10 MILLIGRAM(S): at 16:32

## 2024-12-18 RX ADMIN — Medication 10 MILLIGRAM(S): at 06:14

## 2024-12-18 RX ADMIN — Medication 25 MILLIGRAM(S): at 08:00

## 2024-12-18 RX ADMIN — Medication 10 MILLIGRAM(S): at 19:40

## 2024-12-18 RX ADMIN — ACETAMINOPHEN 975 MILLIGRAM(S): 80 SOLUTION/ DROPS ORAL at 14:13

## 2024-12-18 RX ADMIN — ATORVASTATIN CALCIUM 10 MILLIGRAM(S): 40 TABLET, FILM COATED ORAL at 21:40

## 2024-12-18 RX ADMIN — Medication 10 MILLIGRAM(S): at 10:42

## 2024-12-18 RX ADMIN — DULOXETINE HYDROCHLORIDE 60 MILLIGRAM(S): 30 CAPSULE, DELAYED RELEASE ORAL at 12:13

## 2024-12-18 RX ADMIN — ACETAMINOPHEN 975 MILLIGRAM(S): 80 SOLUTION/ DROPS ORAL at 21:39

## 2024-12-18 RX ADMIN — ACETAMINOPHEN 975 MILLIGRAM(S): 80 SOLUTION/ DROPS ORAL at 13:13

## 2024-12-18 RX ADMIN — Medication 25 MILLIGRAM(S): at 19:40

## 2024-12-18 RX ADMIN — GABAPENTIN 300 MILLIGRAM(S): 300 CAPSULE ORAL at 05:19

## 2024-12-18 RX ADMIN — GABAPENTIN 300 MILLIGRAM(S): 300 CAPSULE ORAL at 12:13

## 2024-12-18 RX ADMIN — ACETAMINOPHEN 975 MILLIGRAM(S): 80 SOLUTION/ DROPS ORAL at 05:25

## 2024-12-18 RX ADMIN — Medication 5 MILLIGRAM(S): at 21:38

## 2024-12-18 RX ADMIN — ACETAMINOPHEN 975 MILLIGRAM(S): 80 SOLUTION/ DROPS ORAL at 22:40

## 2024-12-18 RX ADMIN — TAMSULOSIN HYDROCHLORIDE 0.4 MILLIGRAM(S): 0.4 CAPSULE ORAL at 21:39

## 2024-12-18 RX ADMIN — Medication 1 TABLET(S): at 12:13

## 2024-12-18 RX ADMIN — Medication 10 MILLIGRAM(S): at 15:32

## 2024-12-18 RX ADMIN — Medication 10 MILLIGRAM(S): at 20:40

## 2024-12-18 RX ADMIN — ACETAMINOPHEN 975 MILLIGRAM(S): 80 SOLUTION/ DROPS ORAL at 06:14

## 2024-12-18 RX ADMIN — PANTOPRAZOLE 40 MILLIGRAM(S): 40 TABLET, DELAYED RELEASE ORAL at 05:19

## 2024-12-18 RX ADMIN — Medication 100 MILLIGRAM(S): at 05:19

## 2024-12-18 NOTE — PROGRESS NOTE ADULT - SUBJECTIVE AND OBJECTIVE BOX
Orthopedic Progress Note     S:  No acute events overnight, pain is well controlled.  Patient denies any chest pain, SOB, N/V, fevers/chills.    T(C): 36.7 (12-18-24 @ 05:30), Max: 36.9 (12-17-24 @ 06:16)  HR: 61 (12-18-24 @ 05:30) (53 - 64)  BP: 106/56 (12-18-24 @ 05:30) (106/56 - 130/69)  RR: 16 (12-18-24 @ 05:30) (14 - 19)  SpO2: 98% (12-18-24 @ 05:30) (94% - 100%)  Wt(kg): --I&O's Summary    17 Dec 2024 07:01  -  18 Dec 2024 06:15  --------------------------------------------------------  IN: 0 mL / OUT: 1850 mL / NET: -1850 mL        O:  Physical exam:  Gen: Alert and Oriented x3, No Acute Distress  Left upper ext:            Dressing: c/d/i with serosanguinous saturation, no leaking            Motor: Median/ulnar/radial function intact motor             Sensation: SILT            Pulses: 2+ radial           Labs:                        13.6   6.58  )-----------( 134      ( 17 Dec 2024 14:30 )             38.8    12-17    136  |  100  |  20  ----------------------------<  116[H]  4.4   |  24  |  0.72    Ca    8.7      17 Dec 2024 15:03  Phos  3.3     12-17  Mg     1.80     12-17    TPro  7.8  /  Alb  4.3  /  TBili  0.4  /  DBili  x   /  AST  68[H]  /  ALT  49[H]  /  AlkPhos  135[H]  12-16           [[id number]] [[id number]]

## 2024-12-18 NOTE — OCCUPATIONAL THERAPY INITIAL EVALUATION ADULT - NSOTDISCHREC_GEN_A_CORE
No skilled OT needs Home with no OT needs. Patient appears to be at baseline for ADLs and functional mobility. Patient will not be placed on OT program. Please reconsult this discipline with any changes./No skilled OT needs

## 2024-12-18 NOTE — DIETITIAN INITIAL EVALUATION ADULT - OTHER INFO
Nutrition assessment for oncology nutrition risk prioritization.    Internal Medicine Attending - 12/17/24 - 64M with PMH hiatal hernia s/p fundoplication, prior SBO s/p multiple abdominal surgeries, T2DM, chronic back pain, and NANCY (not on CPAP) admitted to orthopedics for biopsy of enlarging painful L shoulder mass that he noticed 2 months ago. CT chest with 10.9 x6.6 x6 cm lobulated soft tissue mass involving anterior L shoulder- suspicious for sacromatous/neoplastic etiology with additional 2.9cm lobulated lesion noted in R anterior chest wall, cervical lymphadenopathy. Patient is now s/p biopsy earlier today with preliminary pathology suspicious for lymphoma. Heme/onc now also following, recommending patient remain inpatient for final pathology results to decide whether to initiate treatment inpatient. Ortho requesting transfer to medicine.     Patient endorsed good appetite / PO intake at this time.  No swallowing difficulties reported. Stated he was in process of getting dentures PTA and that they should be ready in a few weeks.  He denied need for diet modification i.e. soft diet at this time.  Patient stated usual body weight ~270 pounds, reported unspecified weight loss over a time frame of 2 years through diet modification.  No noted or reported food allergies.

## 2024-12-18 NOTE — OCCUPATIONAL THERAPY INITIAL EVALUATION ADULT - GENERAL OBSERVATIONS, REHAB EVAL
Patient received semisupine in bed in NAD; agreeable to participate in OT evaluation. +IV. HR 67 bpm.

## 2024-12-18 NOTE — DIETITIAN INITIAL EVALUATION ADULT - ORAL INTAKE PTA/DIET HISTORY
Patient reported good appetite and PO intake PTA, not on any therapeutic diet restrictions, reported DM well managed

## 2024-12-18 NOTE — DIETITIAN INITIAL EVALUATION ADULT - PERTINENT LABORATORY DATA
12-18    134[L]  |  98  |  21  ----------------------------<  100[H]  4.0   |  23  |  0.59    Ca    8.6      18 Dec 2024 05:32  Phos  3.3     12-17  Mg     1.80     12-17    TPro  6.6  /  Alb  x   /  TBili  x   /  DBili  x   /  AST  x   /  ALT  x   /  AlkPhos  x   12-18    A1C with Estimated Average Glucose Result: 6.1 % (12-16-24 @ 17:36)    CAPILLARY BLOOD GLUCOSE  POCT Blood Glucose.: 100 mg/dL (18 Dec 2024 07:08)  POCT Blood Glucose.: 121 mg/dL (17 Dec 2024 22:21)  POCT Blood Glucose.: 104 mg/dL (17 Dec 2024 16:30)  POCT Blood Glucose.: 120 mg/dL (17 Dec 2024 10:59)

## 2024-12-18 NOTE — PROGRESS NOTE ADULT - ASSESSMENT
A/P  Patient S/P L shoulder biopsy 12/17/2024.  VSS. NAD.  PT/OT-WBAT  IS  DVT PPx: venodynes & ambulation  Pain Control  FU Transfer to medicine  FU OR cultures and pathology

## 2024-12-18 NOTE — PHYSICAL THERAPY INITIAL EVALUATION ADULT - ADDITIONAL COMMENTS
Patient lives with wife in private home, flight to bedroom/bathroom. Patient was independent in all ADL prior to admission except would need help with shoes and socks from past spinal surgery. Patient uses a cane for ambulation.

## 2024-12-18 NOTE — PHYSICAL THERAPY INITIAL EVALUATION ADULT - RANGE OF MOTION EXAMINATION, REHAB EVAL
Left shoulder flexion 30 degrees, elbow and wrist WNL/Right UE ROM was WFL (within functional limits)/bilateral lower extremity ROM was WFL (within functional limits)

## 2024-12-18 NOTE — OCCUPATIONAL THERAPY INITIAL EVALUATION ADULT - PERTINENT HX OF CURRENT PROBLEM, REHAB EVAL
Allergy and Anaphylaxis Emergency Plan     Child's name: Raúl Laura Date of plan: 5/31/2019     YOB: 2002 Age: 16 year old Weight:  141 lbs     Child has allergy to: PEANUT, PECAN AND WALNUT     Child has asthma YES   Child has had anaphylaxis  No   Child may carry medicine No   Child may give him/herself medicine YES       IMPORTANT REMINDER   Anaphylaxis is a potentially life-threating , severe allergic reaction.  If in doubt, give epinephrine.    For Severe Allergy and Anaphylaxis   What to look for Give epinephrine!  What to do   If child has ANY of these severe symptoms after eating the food or having a sting, give epinephrine   · Shortness of breath, wheezing, or coughing   · Skin color is pale or has bluish color   · Weak Pulse   · Fainting or dizziness  · Tight or hoarse throat   · Trouble breathing or swallowing  · Swelling of lips or tongue that bother breathing   · Vomiting or diarrhea (if severe or combined with other symptoms)  · Many hives or redness over body  · Feeling of \"doom,\" confusion, altered consciousness, or agitation   SPECIAL SITUATION: If this box is checked, child has has an extremely severe allergy to an insect sting or the following food(s): _________________.  Even if the child has MILD symptoms after a sting or eating these foods, give epinephrine.    1. Inject epinephrine right away! Note time when epinephrine was given.  2. Call 911   · Ask for ambulance with epinephrine   · Tell rescue squad when epinephrine was given  3. Stay with child and:  · Call parents and child's doctor  · Give second dose of epinephrine, if symptoms get worse, continue, or do not get better in 5 minutes  · Keep child lying on back.  If the child vomits or has trouble breathing, keep child lying on his or her side  4. Give other medicine, if prescribed.  Do not use othe medicine in place of epinephrine   · Antihistamine   · Inhaler/bronchodialator              For Mild Allergic Reaction      What to look for  Monitor child  What to do    If child has had any mild symptoms, monitor child.  Symptoms may include:  · Itchy nose, sneezing, itchy mouth   · A few hives  · Mild stomach nausea or discomfort  Stay with child and:  · Watch closely  · Give antihistamine (if prescribes)  · Call parents and child's doctor   · If symptoms of severe allergy/anaphylaxis develop, use epinephrine. (See \"For Severe Allergy and Anaphylaxis.\")     Medicines/Doses  Epinephrine, intramuscular (list type) Dose: 0.15 mg   0.30 mg (weight more than 25 kg)   Antihistamine, by mouth (type and dose): BENADRYL 50 MG    Other ( for example inhaler/bronchodialator if child has asthma:  PROAIR 2 PUFFS EVERY 4 HOURS AS NEEDED             Parent/Guardian Authorization Signature  Date  Physician/HCP Authorization Signature  Date         Additional Instructions:       Contacts    Call 911/ Rescue squad:       Doctor:  Phone:   Parent/Guardian: Phone:   Parent/Guardian:  Phone:     Other Emergency Contacts    Name/Relationship: Phone:   Name/Relationship:  Phone:                                                      64 year old male with history of hiatal hernia s/p fundoplication, prior SBO s/p multiple abdominal surgeries, T2DM, chronic back pain, and NANCY initially admitted to orthopedics for biopsy of enlarging painful left anterior shoulder mass, now s/p biopsy 12/17 with preliminary results suspicious for lymphoma.

## 2024-12-18 NOTE — PROGRESS NOTE ADULT - PROBLEM SELECTOR PROBLEM 2
Benign essential HTN Carrillo Mary MD Well appearing. No distress. PEERL, EOMI, supple neck, FROM, chest clear, RRR, Nonfocal neuro, minimal swelling and point tenderness over proximal left 4th metatarsal.  NV intact.

## 2024-12-18 NOTE — PROGRESS NOTE ADULT - PROBLEM SELECTOR PLAN 3
Hx of chronic lower back pain s/p lumbar surgery on chronic opioids   - Cont current regimen: gabapentin, duloxetine 60mg daily, tylenol, tizanidine 4mg BID PRN, oxycodone 5mg q4h PRN moderate pain, oxycodone 10mg q4-6 hrs PRN severe pain   - bowel regimen with senna and miralax

## 2024-12-18 NOTE — OCCUPATIONAL THERAPY INITIAL EVALUATION ADULT - DIAGNOSIS, OT EVAL
s/p left shoulder mass biopsy; Patient appears to be at baseline performance for ADLs and functional mobility

## 2024-12-18 NOTE — DIETITIAN INITIAL EVALUATION ADULT - PERTINENT MEDS FT
MEDICATIONS  (STANDING):  acetaminophen     Tablet .. 975 milliGRAM(s) Oral every 8 hours  amLODIPine   Tablet 10 milliGRAM(s) Oral daily  ascorbic acid 500 milliGRAM(s) Oral daily  atenolol  Tablet 100 milliGRAM(s) Oral daily  atorvastatin 10 milliGRAM(s) Oral at bedtime  cholecalciferol 1000 Unit(s) Oral daily  dextrose 5%. 1000 milliLiter(s) (50 mL/Hr) IV Continuous <Continuous>  dextrose 5%. 1000 milliLiter(s) (100 mL/Hr) IV Continuous <Continuous>  dextrose 5%. 1000 milliLiter(s) (50 mL/Hr) IV Continuous <Continuous>  dextrose 5%. 1000 milliLiter(s) (100 mL/Hr) IV Continuous <Continuous>  dextrose 50% Injectable 25 Gram(s) IV Push once  dextrose 50% Injectable 12.5 Gram(s) IV Push once  dextrose 50% Injectable 25 Gram(s) IV Push once  dextrose 50% Injectable 25 Gram(s) IV Push once  dextrose 50% Injectable 12.5 Gram(s) IV Push once  dextrose 50% Injectable 25 Gram(s) IV Push once  DULoxetine 60 milliGRAM(s) Oral daily  gabapentin 300 milliGRAM(s) Oral four times a day  glucagon  Injectable 1 milliGRAM(s) IntraMuscular once  glucagon  Injectable 1 milliGRAM(s) IntraMuscular once  insulin lispro (ADMELOG) corrective regimen sliding scale   SubCutaneous at bedtime  insulin lispro (ADMELOG) corrective regimen sliding scale   SubCutaneous three times a day before meals  levothyroxine 75 MICROGram(s) Oral daily  losartan 25 milliGRAM(s) Oral daily  multivitamin 1 Tablet(s) Oral daily  pantoprazole    Tablet 40 milliGRAM(s) Oral before breakfast  polyethylene glycol 3350 17 Gram(s) Oral at bedtime  senna 2 Tablet(s) Oral at bedtime  tamsulosin 0.4 milliGRAM(s) Oral at bedtime    MEDICATIONS  (PRN):  acetaminophen     Tablet .. 975 milliGRAM(s) Oral every 8 hours PRN Mild Pain (1 - 3)  dextrose Oral Gel 15 Gram(s) Oral once PRN Blood Glucose LESS THAN 70 milliGRAM(s)/deciliter  dextrose Oral Gel 15 Gram(s) Oral once PRN Blood Glucose LESS THAN 70 milliGRAM(s)/deciliter  diphenhydrAMINE 25 milliGRAM(s) Oral every 6 hours PRN Rash and/or Itching  magnesium hydroxide Suspension 30 milliLiter(s) Oral daily PRN Constipation  ondansetron Injectable 4 milliGRAM(s) IV Push every 6 hours PRN Nausea and/or Vomiting  oxyCODONE    IR 5 milliGRAM(s) Oral every 4 hours PRN Moderate Pain (4 - 6)  oxyCODONE    IR 10 milliGRAM(s) Oral every 4 hours PRN Severe Pain (7 - 10)  sucralfate suspension 1 Gram(s) Oral Once PRN for esophageal pain  tiZANidine 4 milliGRAM(s) Oral <User Schedule> PRN Muscle Spasm  zolpidem 5 milliGRAM(s) Oral at bedtime PRN Insomnia

## 2024-12-18 NOTE — PROGRESS NOTE ADULT - PROBLEM SELECTOR PLAN 1
Fast growing mass of L shoulder associated with hot flashes.   - CT chest: 10.9 x6.6 x6 cm lobulated soft tissue mass involving anterior L shoulder- suspicious for sacromatous/neoplastic etiology with additional 2.9cm lobulated lesion noted in R anterior chest wall, cervical lymphadenopathy  - S/p bx on 12/17 w/ Ortho, prelim results c/f lymphoma, onc reccs pending final path results to determine if IP initiation of treatment is recommended   [ ] CT A/P w/ IV contrast for staging   - Onc & Ortho following   - Pain control

## 2024-12-18 NOTE — DIETITIAN NUTRITION RISK NOTIFICATION - TREATMENT: THE FOLLOWING DIET HAS BEEN RECOMMENDED
Diet, Regular:   Consistent Carbohydrate {No Snacks} (CSTCHO) (12-17-24 @ 15:07) [Active]  Diet, Clear Liquid (12-17-24 @ 12:04) [Available for Activation]

## 2024-12-18 NOTE — PROGRESS NOTE ADULT - PROBLEM SELECTOR PLAN 2
continue home amlodipine, atenolol, and losartan    # Hypothyroidism   - Cont synthroid    # HLD   - Cont statin

## 2024-12-18 NOTE — PROGRESS NOTE ADULT - ASSESSMENT
64M with PMH hiatal hernia s/p fundoplication, prior SBO s/p multiple abdominal surgeries, T2DM, chronic back pain, and NANCY (not on CPAP) initially admitted to orthopedics for biopsy of enlarging painful L anterior shoulder mass, now s/p biopsy 12/17 with preliminary results suspicious for lymphoma. Pending final results for potential inpatient initiation of treatment, heme/onc following. Medicine consulted for transfer to medicine service.

## 2024-12-18 NOTE — PROGRESS NOTE ADULT - SUBJECTIVE AND OBJECTIVE BOX
Merlin Mathew, MD   Hospitalist  Pager #[phone]    PROGRESS NOTE:     Patient is a 64y old  Male who presents with a chief complaint of Left shoulder pain     (18 Dec 2024 10:01)      SUBJECTIVE / OVERNIGHT EVENTS:  NEON   Patient reporting shoulder and back pain, currently controlled   Understands prelim path report c/f cancer, and need to wait inpatient for results     ADDITIONAL REVIEW OF SYSTEMS:    MEDICATIONS  (STANDING):  acetaminophen     Tablet .. 975 milliGRAM(s) Oral every 8 hours  amLODIPine   Tablet 10 milliGRAM(s) Oral daily  ascorbic acid 500 milliGRAM(s) Oral daily  atenolol  Tablet 100 milliGRAM(s) Oral daily  atorvastatin 10 milliGRAM(s) Oral at bedtime  cholecalciferol 1000 Unit(s) Oral daily  dextrose 5%. 1000 milliLiter(s) (50 mL/Hr) IV Continuous <Continuous>  dextrose 5%. 1000 milliLiter(s) (50 mL/Hr) IV Continuous <Continuous>  dextrose 5%. 1000 milliLiter(s) (100 mL/Hr) IV Continuous <Continuous>  dextrose 5%. 1000 milliLiter(s) (100 mL/Hr) IV Continuous <Continuous>  dextrose 50% Injectable 25 Gram(s) IV Push once  dextrose 50% Injectable 12.5 Gram(s) IV Push once  dextrose 50% Injectable 25 Gram(s) IV Push once  dextrose 50% Injectable 25 Gram(s) IV Push once  dextrose 50% Injectable 12.5 Gram(s) IV Push once  dextrose 50% Injectable 25 Gram(s) IV Push once  DULoxetine 60 milliGRAM(s) Oral daily  gabapentin 300 milliGRAM(s) Oral four times a day  glucagon  Injectable 1 milliGRAM(s) IntraMuscular once  glucagon  Injectable 1 milliGRAM(s) IntraMuscular once  insulin lispro (ADMELOG) corrective regimen sliding scale   SubCutaneous at bedtime  insulin lispro (ADMELOG) corrective regimen sliding scale   SubCutaneous three times a day before meals  levothyroxine 75 MICROGram(s) Oral daily  losartan 25 milliGRAM(s) Oral daily  multivitamin 1 Tablet(s) Oral daily  pantoprazole    Tablet 40 milliGRAM(s) Oral before breakfast  polyethylene glycol 3350 17 Gram(s) Oral at bedtime  senna 2 Tablet(s) Oral at bedtime  tamsulosin 0.4 milliGRAM(s) Oral at bedtime    MEDICATIONS  (PRN):  dextrose Oral Gel 15 Gram(s) Oral once PRN Blood Glucose LESS THAN 70 milliGRAM(s)/deciliter  dextrose Oral Gel 15 Gram(s) Oral once PRN Blood Glucose LESS THAN 70 milliGRAM(s)/deciliter  diphenhydrAMINE 25 milliGRAM(s) Oral every 6 hours PRN Rash and/or Itching  magnesium hydroxide Suspension 30 milliLiter(s) Oral daily PRN Constipation  ondansetron Injectable 4 milliGRAM(s) IV Push every 6 hours PRN Nausea and/or Vomiting  oxyCODONE    IR 5 milliGRAM(s) Oral every 4 hours PRN Moderate Pain (4 - 6)  oxyCODONE    IR 10 milliGRAM(s) Oral every 4 hours PRN Severe Pain (7 - 10)  sucralfate suspension 1 Gram(s) Oral Once PRN for esophageal pain  tiZANidine 4 milliGRAM(s) Oral <User Schedule> PRN Muscle Spasm  zolpidem 5 milliGRAM(s) Oral at bedtime PRN Insomnia      CAPILLARY BLOOD GLUCOSE      POCT Blood Glucose.: 108 mg/dL (18 Dec 2024 11:24)  POCT Blood Glucose.: 100 mg/dL (18 Dec 2024 07:08)  POCT Blood Glucose.: 121 mg/dL (17 Dec 2024 22:21)  POCT Blood Glucose.: 104 mg/dL (17 Dec 2024 16:30)    I&O's Summary    17 Dec 2024 07:01  -  18 Dec 2024 07:00  --------------------------------------------------------  IN: 0 mL / OUT: 1850 mL / NET: -1850 mL        PHYSICAL EXAM:  Vital Signs Last 24 Hrs  T(C): 36.8 (18 Dec 2024 13:16), Max: 36.8 (17 Dec 2024 22:16)  T(F): 98.2 (18 Dec 2024 13:16), Max: 98.2 (17 Dec 2024 22:16)  HR: 67 (18 Dec 2024 13:16) (53 - 67)  BP: 152/74 (18 Dec 2024 13:16) (106/56 - 152/74)  BP(mean): 86 (17 Dec 2024 16:00) (86 - 90)  RR: 17 (18 Dec 2024 13:16) (14 - 19)  SpO2: 98% (18 Dec 2024 13:16) (94% - 100%)    Parameters below as of 18 Dec 2024 13:16  Patient On (Oxygen Delivery Method): room air        CONSTITUTIONAL: NAD, resting comfortably   RESPIRATORY: Normal respiratory effort; lungs are clear to auscultation bilaterally  CARDIOVASCULAR: Regular rate and rhythm, normal S1 and S2, no murmur/rub/gallop; No lower extremity edema  ABDOMEN: Nontender to palpation, normoactive bowel sounds, no rebound/guarding; No hepatosplenomegaly  MUSCULOSKELETAL: L shoulder ecchymosis with bx site bandaged, no clubbing or cyanosis of digits; no joint swelling or tenderness to palpation  PSYCH: A+O to person, place, and time; affect appropriate    LABS:                        13.3   6.13  )-----------( 132      ( 18 Dec 2024 05:32 )             37.7     12-18    134[L]  |  98  |  21  ----------------------------<  100[H]  4.0   |  23  |  0.59    Ca    8.6      18 Dec 2024 05:32  Phos  3.3     12-17  Mg     1.80     12-17    TPro  6.6  /  Alb  x   /  TBili  x   /  DBili  x   /  AST  x   /  ALT  x   /  AlkPhos  x   12-18    PT/INR - ( 17 Dec 2024 00:37 )   PT: 13.3 sec;   INR: 1.15 ratio         PTT - ( 17 Dec 2024 00:37 )  PTT:38.1 sec      Urinalysis Basic - ( 18 Dec 2024 05:32 )    Color: x / Appearance: x / SG: x / pH: x  Gluc: 100 mg/dL / Ketone: x  / Bili: x / Urobili: x   Blood: x / Protein: x / Nitrite: x   Leuk Esterase: x / RBC: x / WBC x   Sq Epi: x / Non Sq Epi: x / Bacteria: x          RADIOLOGY & ADDITIONAL TESTS:  Results Reviewed:   Imaging Personally Reviewed:  Electrocardiogram Personally Reviewed:    COORDINATION OF CARE:  Care Discussed with Consultants/Other Providers [Y/N]:  Prior or Outpatient Records Reviewed [Y/N]:

## 2024-12-19 DIAGNOSIS — E03.9 HYPOTHYROIDISM, UNSPECIFIED: ICD-10-CM

## 2024-12-19 DIAGNOSIS — E78.5 HYPERLIPIDEMIA, UNSPECIFIED: ICD-10-CM

## 2024-12-19 LAB
ANION GAP SERPL CALC-SCNC: 15 MMOL/L — HIGH (ref 7–14)
BUN SERPL-MCNC: 17 MG/DL — SIGNIFICANT CHANGE UP (ref 7–23)
CALCIUM SERPL-MCNC: 9.3 MG/DL — SIGNIFICANT CHANGE UP (ref 8.4–10.5)
CHLORIDE SERPL-SCNC: 99 MMOL/L — SIGNIFICANT CHANGE UP (ref 98–107)
CO2 SERPL-SCNC: 21 MMOL/L — LOW (ref 22–31)
CREAT SERPL-MCNC: 0.64 MG/DL — SIGNIFICANT CHANGE UP (ref 0.5–1.3)
EGFR: 106 ML/MIN/1.73M2 — SIGNIFICANT CHANGE UP
GLUCOSE BLDC GLUCOMTR-MCNC: 112 MG/DL — HIGH (ref 70–99)
GLUCOSE SERPL-MCNC: 104 MG/DL — HIGH (ref 70–99)
HCT VFR BLD CALC: 39.9 % — SIGNIFICANT CHANGE UP (ref 39–50)
HGB BLD-MCNC: 14.2 G/DL — SIGNIFICANT CHANGE UP (ref 13–17)
LDH SERPL L TO P-CCNC: 274 U/L — HIGH (ref 135–225)
MAGNESIUM SERPL-MCNC: 1.8 MG/DL — SIGNIFICANT CHANGE UP (ref 1.6–2.6)
MCHC RBC-ENTMCNC: 32.2 PG — SIGNIFICANT CHANGE UP (ref 27–34)
MCHC RBC-ENTMCNC: 35.6 G/DL — SIGNIFICANT CHANGE UP (ref 32–36)
MCV RBC AUTO: 90.5 FL — SIGNIFICANT CHANGE UP (ref 80–100)
NRBC # BLD: 0 /100 WBCS — SIGNIFICANT CHANGE UP (ref 0–0)
NRBC # FLD: 0 K/UL — SIGNIFICANT CHANGE UP (ref 0–0)
PHOSPHATE SERPL-MCNC: 3.1 MG/DL — SIGNIFICANT CHANGE UP (ref 2.5–4.5)
PLATELET # BLD AUTO: 134 K/UL — LOW (ref 150–400)
POTASSIUM SERPL-MCNC: 3.9 MMOL/L — SIGNIFICANT CHANGE UP (ref 3.5–5.3)
POTASSIUM SERPL-SCNC: 3.9 MMOL/L — SIGNIFICANT CHANGE UP (ref 3.5–5.3)
RBC # BLD: 4.41 M/UL — SIGNIFICANT CHANGE UP (ref 4.2–5.8)
RBC # FLD: 12.1 % — SIGNIFICANT CHANGE UP (ref 10.3–14.5)
SODIUM SERPL-SCNC: 135 MMOL/L — SIGNIFICANT CHANGE UP (ref 135–145)
URATE SERPL-MCNC: 5.2 MG/DL — SIGNIFICANT CHANGE UP (ref 3.4–8.8)
WBC # BLD: 5.56 K/UL — SIGNIFICANT CHANGE UP (ref 3.8–10.5)
WBC # FLD AUTO: 5.56 K/UL — SIGNIFICANT CHANGE UP (ref 3.8–10.5)

## 2024-12-19 PROCEDURE — 99233 SBSQ HOSP IP/OBS HIGH 50: CPT

## 2024-12-19 PROCEDURE — 74177 CT ABD & PELVIS W/CONTRAST: CPT | Mod: 26

## 2024-12-19 RX ORDER — OXYCODONE HCL 15 MG
5 TABLET ORAL EVERY 4 HOURS
Refills: 0 | Status: DISCONTINUED | OUTPATIENT
Start: 2024-12-19 | End: 2024-12-20

## 2024-12-19 RX ORDER — OXYCODONE HCL 15 MG
10 TABLET ORAL EVERY 4 HOURS
Refills: 0 | Status: DISCONTINUED | OUTPATIENT
Start: 2024-12-19 | End: 2024-12-20

## 2024-12-19 RX ORDER — ZOLPIDEM TARTRATE 5 MG
5 TABLET ORAL AT BEDTIME
Refills: 0 | Status: DISCONTINUED | OUTPATIENT
Start: 2024-12-19 | End: 2024-12-26

## 2024-12-19 RX ADMIN — GABAPENTIN 300 MILLIGRAM(S): 300 CAPSULE ORAL at 12:16

## 2024-12-19 RX ADMIN — Medication 10 MILLIGRAM(S): at 02:04

## 2024-12-19 RX ADMIN — Medication 10 MILLIGRAM(S): at 13:50

## 2024-12-19 RX ADMIN — TAMSULOSIN HYDROCHLORIDE 0.4 MILLIGRAM(S): 0.4 CAPSULE ORAL at 21:27

## 2024-12-19 RX ADMIN — ATORVASTATIN CALCIUM 10 MILLIGRAM(S): 40 TABLET, FILM COATED ORAL at 21:27

## 2024-12-19 RX ADMIN — Medication 10 MILLIGRAM(S): at 07:10

## 2024-12-19 RX ADMIN — ACETAMINOPHEN 975 MILLIGRAM(S): 80 SOLUTION/ DROPS ORAL at 06:03

## 2024-12-19 RX ADMIN — Medication 10 MILLIGRAM(S): at 05:10

## 2024-12-19 RX ADMIN — GABAPENTIN 300 MILLIGRAM(S): 300 CAPSULE ORAL at 23:32

## 2024-12-19 RX ADMIN — ACETAMINOPHEN 975 MILLIGRAM(S): 80 SOLUTION/ DROPS ORAL at 22:20

## 2024-12-19 RX ADMIN — Medication 25 MILLIGRAM(S): at 21:29

## 2024-12-19 RX ADMIN — Medication 10 MILLIGRAM(S): at 10:20

## 2024-12-19 RX ADMIN — Medication 10 MILLIGRAM(S): at 23:32

## 2024-12-19 RX ADMIN — Medication 10 MILLIGRAM(S): at 09:26

## 2024-12-19 RX ADMIN — Medication 5 MILLIGRAM(S): at 21:26

## 2024-12-19 RX ADMIN — Medication 10 MILLIGRAM(S): at 14:49

## 2024-12-19 RX ADMIN — DULOXETINE HYDROCHLORIDE 60 MILLIGRAM(S): 30 CAPSULE, DELAYED RELEASE ORAL at 12:18

## 2024-12-19 RX ADMIN — Medication 25 MILLIGRAM(S): at 06:03

## 2024-12-19 RX ADMIN — ONDANSETRON 4 MILLIGRAM(S): 4 TABLET ORAL at 12:16

## 2024-12-19 RX ADMIN — Medication 500 MILLIGRAM(S): at 12:18

## 2024-12-19 RX ADMIN — Medication 1 TABLET(S): at 12:18

## 2024-12-19 RX ADMIN — ACETAMINOPHEN 975 MILLIGRAM(S): 80 SOLUTION/ DROPS ORAL at 14:49

## 2024-12-19 RX ADMIN — ACETAMINOPHEN 975 MILLIGRAM(S): 80 SOLUTION/ DROPS ORAL at 13:50

## 2024-12-19 RX ADMIN — Medication 10 MILLIGRAM(S): at 19:00

## 2024-12-19 RX ADMIN — GABAPENTIN 300 MILLIGRAM(S): 300 CAPSULE ORAL at 01:10

## 2024-12-19 RX ADMIN — LOSARTAN POTASSIUM 25 MILLIGRAM(S): 100 TABLET, FILM COATED ORAL at 05:09

## 2024-12-19 RX ADMIN — ACETAMINOPHEN 975 MILLIGRAM(S): 80 SOLUTION/ DROPS ORAL at 05:09

## 2024-12-19 RX ADMIN — Medication 10 MILLIGRAM(S): at 18:08

## 2024-12-19 RX ADMIN — PANTOPRAZOLE 40 MILLIGRAM(S): 40 TABLET, DELAYED RELEASE ORAL at 05:09

## 2024-12-19 RX ADMIN — GABAPENTIN 300 MILLIGRAM(S): 300 CAPSULE ORAL at 18:06

## 2024-12-19 RX ADMIN — ACETAMINOPHEN 975 MILLIGRAM(S): 80 SOLUTION/ DROPS ORAL at 21:27

## 2024-12-19 RX ADMIN — LEVOTHYROXINE SODIUM 75 MICROGRAM(S): 175 TABLET ORAL at 04:45

## 2024-12-19 RX ADMIN — Medication 100 MILLIGRAM(S): at 05:09

## 2024-12-19 RX ADMIN — Medication 10 MILLIGRAM(S): at 01:12

## 2024-12-19 RX ADMIN — Medication 10 MILLIGRAM(S): at 06:03

## 2024-12-19 RX ADMIN — GABAPENTIN 300 MILLIGRAM(S): 300 CAPSULE ORAL at 05:08

## 2024-12-19 NOTE — PROGRESS NOTE ADULT - SUBJECTIVE AND OBJECTIVE BOX
Division of Hospital Medicine  Jere Boudreaux MD  Available via MS Teams    SUBJECTIVE / OVERNIGHT EVENTS: No active issues overnight; pt denies any new symptoms    MEDICATIONS  (STANDING):  acetaminophen     Tablet .. 975 milliGRAM(s) Oral every 8 hours  amLODIPine   Tablet 10 milliGRAM(s) Oral daily  ascorbic acid 500 milliGRAM(s) Oral daily  atenolol  Tablet 100 milliGRAM(s) Oral daily  atorvastatin 10 milliGRAM(s) Oral at bedtime  cholecalciferol 1000 Unit(s) Oral daily  dextrose 5%. 1000 milliLiter(s) (50 mL/Hr) IV Continuous <Continuous>  dextrose 5%. 1000 milliLiter(s) (50 mL/Hr) IV Continuous <Continuous>  dextrose 5%. 1000 milliLiter(s) (100 mL/Hr) IV Continuous <Continuous>  dextrose 5%. 1000 milliLiter(s) (100 mL/Hr) IV Continuous <Continuous>  dextrose 50% Injectable 25 Gram(s) IV Push once  dextrose 50% Injectable 12.5 Gram(s) IV Push once  dextrose 50% Injectable 25 Gram(s) IV Push once  dextrose 50% Injectable 25 Gram(s) IV Push once  dextrose 50% Injectable 12.5 Gram(s) IV Push once  dextrose 50% Injectable 25 Gram(s) IV Push once  DULoxetine 60 milliGRAM(s) Oral daily  gabapentin 300 milliGRAM(s) Oral four times a day  glucagon  Injectable 1 milliGRAM(s) IntraMuscular once  glucagon  Injectable 1 milliGRAM(s) IntraMuscular once  insulin lispro (ADMELOG) corrective regimen sliding scale   SubCutaneous at bedtime  insulin lispro (ADMELOG) corrective regimen sliding scale   SubCutaneous three times a day before meals  levothyroxine 75 MICROGram(s) Oral daily  losartan 25 milliGRAM(s) Oral daily  multivitamin 1 Tablet(s) Oral daily  pantoprazole    Tablet 40 milliGRAM(s) Oral before breakfast  polyethylene glycol 3350 17 Gram(s) Oral at bedtime  senna 2 Tablet(s) Oral at bedtime  tamsulosin 0.4 milliGRAM(s) Oral at bedtime    MEDICATIONS  (PRN):  dextrose Oral Gel 15 Gram(s) Oral once PRN Blood Glucose LESS THAN 70 milliGRAM(s)/deciliter  dextrose Oral Gel 15 Gram(s) Oral once PRN Blood Glucose LESS THAN 70 milliGRAM(s)/deciliter  diphenhydrAMINE 25 milliGRAM(s) Oral every 6 hours PRN Rash and/or Itching  magnesium hydroxide Suspension 30 milliLiter(s) Oral daily PRN Constipation  ondansetron Injectable 4 milliGRAM(s) IV Push every 6 hours PRN Nausea and/or Vomiting  oxyCODONE    IR 5 milliGRAM(s) Oral every 4 hours PRN Moderate Pain (4 - 6)  oxyCODONE    IR 10 milliGRAM(s) Oral every 4 hours PRN Severe Pain (7 - 10)  sucralfate suspension 1 Gram(s) Oral Once PRN for esophageal pain  tiZANidine 4 milliGRAM(s) Oral <User Schedule> PRN Muscle Spasm  zolpidem 5 milliGRAM(s) Oral at bedtime PRN Insomnia      I&O's Summary    18 Dec 2024 07:01  -  19 Dec 2024 07:00  --------------------------------------------------------  IN: 0 mL / OUT: 500 mL / NET: -500 mL    19 Dec 2024 07:01  -  19 Dec 2024 10:20  --------------------------------------------------------  IN: 0 mL / OUT: 250 mL / NET: -250 mL        PHYSICAL EXAM:  Vital Signs Last 24 Hrs  T(C): 36.7 (19 Dec 2024 09:06), Max: 37 (19 Dec 2024 01:42)  T(F): 98.1 (19 Dec 2024 09:06), Max: 98.6 (19 Dec 2024 01:42)  HR: 82 (19 Dec 2024 09:06) (67 - 86)  BP: 139/65 (19 Dec 2024 09:06) (126/70 - 158/90)  BP(mean): --  RR: 17 (19 Dec 2024 09:06) (16 - 18)  SpO2: 97% (19 Dec 2024 09:06) (95% - 98%)    Parameters below as of 19 Dec 2024 09:06  Patient On (Oxygen Delivery Method): room air      CONSTITUTIONAL: NAD  EYES: PERRLA; conjunctiva and sclera clear  ENMT: Moist oral mucosa, no pharyngeal injection or exudates; normal dentition  NECK: Supple, no palpable masses; no thyromegaly  RESPIRATORY: Normal respiratory effort; lungs are clear to auscultation bilaterally; no rales, rhonchi, or wheezing  CARDIOVASCULAR: Regular rate and rhythm, normal S1 and S2, no murmur/rub/gallop; Peripheral pulses are 2+ bilaterally  ABDOMEN: Nontender to palpation, normoactive bowel sounds, no rebound/guarding; No hepatosplenomegaly  MUSCULOSKELETAL: no clubbing or cyanosis of digits; L shoulder erythema present; bandage+  NEUROLOGY: Awake and alert to person, place, and date; no focal neurological deficits   SKIN: No rashes; no palpable lesions    LABS:                        14.2   5.56  )-----------( 134      ( 19 Dec 2024 05:39 )             39.9     12-19    135  |  99  |  17  ----------------------------<  104[H]  3.9   |  21[L]  |  0.64    Ca    9.3      19 Dec 2024 05:39  Phos  3.1     12-19  Mg     1.80     12-19    TPro  6.6  /  Alb  x   /  TBili  x   /  DBili  x   /  AST  x   /  ALT  x   /  AlkPhos  x   12-18          Urinalysis Basic - ( 19 Dec 2024 05:39 )    Color: x / Appearance: x / SG: x / pH: x  Gluc: 104 mg/dL / Ketone: x  / Bili: x / Urobili: x   Blood: x / Protein: x / Nitrite: x   Leuk Esterase: x / RBC: x / WBC x   Sq Epi: x / Non Sq Epi: x / Bacteria: x        Culture - Wound Aerobic (collected 17 Dec 2024 19:43)  Source: .Other  Preliminary Report (18 Dec 2024 22:51):    No growth to date    Culture - Wound Aerobic (collected 17 Dec 2024 19:31)  Source: .Other  Preliminary Report (18 Dec 2024 21:01):    No growth to date    Culture - Wound Aerobic (collected 17 Dec 2024 19:30)  Source: .Other  Preliminary Report (18 Dec 2024 21:01):    No growth to date          Imaging and consultant notes reviewed.

## 2024-12-19 NOTE — PROGRESS NOTE ADULT - PROBLEM SELECTOR PLAN 1
Fast growing mass of L shoulder associated with hot flashes.   - CT chest: 10.9 x6.6 x6 cm lobulated soft tissue mass involving anterior L shoulder- suspicious for sacromatous/neoplastic etiology with additional 2.9cm lobulated lesion noted in R anterior chest wall, cervical lymphadenopathy  - S/p bx on 12/17 w/ Ortho, prelim results c/f lymphoma, onc recs pending final path results to determine if inpatient initiation of treatment is recommended; pt may also need bone marrow biopsy   - Obtain CT A/P w/ IV contrast for staging - pending  - Oncology & Ortho following   - Pain control

## 2024-12-19 NOTE — PROGRESS NOTE ADULT - ASSESSMENT
64M s/p L shoulder biopsy 12/17/2024. JACOBY. NAD.    Plan:  - PT/OT  - WBAT LUE  - IS  - DVT PPx: venodynes & ambulation  - Pain Control  - appreciate care per primary  - FU OR cultures: GS neg  - FU OR pathology    Ella Suarez, PGY-2  Orthopedic Surgery    [facility]: [id number]  [facility]: [id number]  [facility]:  [id number]/ [phone]

## 2024-12-19 NOTE — PROGRESS NOTE ADULT - SUBJECTIVE AND OBJECTIVE BOX
Orthopedic Surgery Progress Note     S: Patient seen and examined today. No acute events overnight. Pain is well controlled. Denies f/c, chest pain, shortness of breath, dizziness.    MEDICATIONS  (STANDING):  acetaminophen     Tablet .. 975 milliGRAM(s) Oral every 8 hours  amLODIPine   Tablet 10 milliGRAM(s) Oral daily  ascorbic acid 500 milliGRAM(s) Oral daily  atenolol  Tablet 100 milliGRAM(s) Oral daily  atorvastatin 10 milliGRAM(s) Oral at bedtime  cholecalciferol 1000 Unit(s) Oral daily  dextrose 5%. 1000 milliLiter(s) (50 mL/Hr) IV Continuous <Continuous>  dextrose 5%. 1000 milliLiter(s) (50 mL/Hr) IV Continuous <Continuous>  dextrose 5%. 1000 milliLiter(s) (100 mL/Hr) IV Continuous <Continuous>  dextrose 5%. 1000 milliLiter(s) (100 mL/Hr) IV Continuous <Continuous>  dextrose 50% Injectable 25 Gram(s) IV Push once  dextrose 50% Injectable 12.5 Gram(s) IV Push once  dextrose 50% Injectable 25 Gram(s) IV Push once  dextrose 50% Injectable 25 Gram(s) IV Push once  dextrose 50% Injectable 12.5 Gram(s) IV Push once  dextrose 50% Injectable 25 Gram(s) IV Push once  DULoxetine 60 milliGRAM(s) Oral daily  gabapentin 300 milliGRAM(s) Oral four times a day  glucagon  Injectable 1 milliGRAM(s) IntraMuscular once  glucagon  Injectable 1 milliGRAM(s) IntraMuscular once  insulin lispro (ADMELOG) corrective regimen sliding scale   SubCutaneous at bedtime  insulin lispro (ADMELOG) corrective regimen sliding scale   SubCutaneous three times a day before meals  levothyroxine 75 MICROGram(s) Oral daily  losartan 25 milliGRAM(s) Oral daily  multivitamin 1 Tablet(s) Oral daily  pantoprazole    Tablet 40 milliGRAM(s) Oral before breakfast  polyethylene glycol 3350 17 Gram(s) Oral at bedtime  senna 2 Tablet(s) Oral at bedtime  tamsulosin 0.4 milliGRAM(s) Oral at bedtime    MEDICATIONS  (PRN):  dextrose Oral Gel 15 Gram(s) Oral once PRN Blood Glucose LESS THAN 70 milliGRAM(s)/deciliter  dextrose Oral Gel 15 Gram(s) Oral once PRN Blood Glucose LESS THAN 70 milliGRAM(s)/deciliter  diphenhydrAMINE 25 milliGRAM(s) Oral every 6 hours PRN Rash and/or Itching  magnesium hydroxide Suspension 30 milliLiter(s) Oral daily PRN Constipation  ondansetron Injectable 4 milliGRAM(s) IV Push every 6 hours PRN Nausea and/or Vomiting  oxyCODONE    IR 5 milliGRAM(s) Oral every 4 hours PRN Moderate Pain (4 - 6)  oxyCODONE    IR 10 milliGRAM(s) Oral every 4 hours PRN Severe Pain (7 - 10)  sucralfate suspension 1 Gram(s) Oral Once PRN for esophageal pain  tiZANidine 4 milliGRAM(s) Oral <User Schedule> PRN Muscle Spasm  zolpidem 5 milliGRAM(s) Oral at bedtime PRN Insomnia      Vital Signs Last 24 Hrs  T(C): 36.6 (19 Dec 2024 05:05), Max: 37 (19 Dec 2024 01:42)  T(F): 97.9 (19 Dec 2024 05:05), Max: 98.6 (19 Dec 2024 01:42)  HR: 86 (19 Dec 2024 05:05) (67 - 86)  BP: 136/73 (19 Dec 2024 05:05) (126/70 - 158/90)  BP(mean): --  RR: 16 (19 Dec 2024 05:05) (16 - 18)  SpO2: 98% (19 Dec 2024 05:05) (95% - 98%)    Parameters below as of 19 Dec 2024 05:05  Patient On (Oxygen Delivery Method): room air        12-17-24 @ 07:01  -  12-18-24 @ 07:00  --------------------------------------------------------  IN: 0 mL / OUT: 1850 mL / NET: -1850 mL    12-18-24 @ 07:01  -  12-19-24 @ 06:32  --------------------------------------------------------  IN: 0 mL / OUT: 500 mL / NET: -500 mL        Physical Exam:  Gen: Alert and Oriented x3, No Acute Distress  Left upper ext:   Dressing: c/d/i with mild serosanguinous strikethrough  SILT axillary/med/rad/ulnar  +Motor AIN/PIN/Ulnar/Radial/Musc/Median,   +painless elbow/wrist ROM, mod pain w shoulder ROM  2+radial pulse, soft compartments.        LABS:                        13.3   6.13  )-----------( 132      ( 18 Dec 2024 05:32 )             37.7     12-18    134[L]  |  98  |  21  ----------------------------<  100[H]  4.0   |  23  |  0.59    Ca    8.6      18 Dec 2024 05:32    TPro  6.6  /  Alb  x   /  TBili  x   /  DBili  x   /  AST  x   /  ALT  x   /  AlkPhos  x   12-18

## 2024-12-20 LAB
% ALBUMIN: 47.2 % — SIGNIFICANT CHANGE UP
% ALPHA 1: 2.3 % — SIGNIFICANT CHANGE UP
% ALPHA 2: 16.1 % — SIGNIFICANT CHANGE UP
% BETA: 17.4 % — SIGNIFICANT CHANGE UP
% GAMMA: 16.9 % — SIGNIFICANT CHANGE UP
ALBUMIN SERPL ELPH-MCNC: 3.12 G/DL — LOW (ref 3.3–4.4)
ALBUMIN/GLOB SERPL ELPH: 0.9 RATIO — SIGNIFICANT CHANGE UP
ALPHA1 GLOB SERPL ELPH-MCNC: 0.15 G/DL — SIGNIFICANT CHANGE UP (ref 0.1–0.3)
ALPHA2 GLOB SERPL ELPH-MCNC: 1.1 G/DL — HIGH (ref 0.6–1)
ANION GAP SERPL CALC-SCNC: 12 MMOL/L — SIGNIFICANT CHANGE UP (ref 7–14)
B-GLOBULIN SERPL ELPH-MCNC: 1.15 G/DL — HIGH (ref 0.6–1.1)
BASOPHILS # BLD AUTO: 0.06 K/UL — SIGNIFICANT CHANGE UP (ref 0–0.2)
BASOPHILS NFR BLD AUTO: 1 % — SIGNIFICANT CHANGE UP (ref 0–2)
BUN SERPL-MCNC: 20 MG/DL — SIGNIFICANT CHANGE UP (ref 7–23)
CALCIUM SERPL-MCNC: 9.2 MG/DL — SIGNIFICANT CHANGE UP (ref 8.4–10.5)
CHLORIDE SERPL-SCNC: 99 MMOL/L — SIGNIFICANT CHANGE UP (ref 98–107)
CO2 SERPL-SCNC: 23 MMOL/L — SIGNIFICANT CHANGE UP (ref 22–31)
CREAT SERPL-MCNC: 0.7 MG/DL — SIGNIFICANT CHANGE UP (ref 0.5–1.3)
EGFR: 104 ML/MIN/1.73M2 — SIGNIFICANT CHANGE UP
EOSINOPHIL # BLD AUTO: 0.28 K/UL — SIGNIFICANT CHANGE UP (ref 0–0.5)
EOSINOPHIL NFR BLD AUTO: 4.8 % — SIGNIFICANT CHANGE UP (ref 0–6)
GAMMA GLOBULIN: 1.12 G/DL — SIGNIFICANT CHANGE UP (ref 0.7–1.7)
GLUCOSE SERPL-MCNC: 95 MG/DL — SIGNIFICANT CHANGE UP (ref 70–99)
HCT VFR BLD CALC: 38.9 % — LOW (ref 39–50)
HGB BLD-MCNC: 14 G/DL — SIGNIFICANT CHANGE UP (ref 13–17)
IANC: 2.29 K/UL — SIGNIFICANT CHANGE UP (ref 1.8–7.4)
IMM GRANULOCYTES NFR BLD AUTO: 0.2 % — SIGNIFICANT CHANGE UP (ref 0–0.9)
LYMPHOCYTES # BLD AUTO: 2.36 K/UL — SIGNIFICANT CHANGE UP (ref 1–3.3)
LYMPHOCYTES # BLD AUTO: 40.8 % — SIGNIFICANT CHANGE UP (ref 13–44)
MAGNESIUM SERPL-MCNC: 1.7 MG/DL — SIGNIFICANT CHANGE UP (ref 1.6–2.6)
MCHC RBC-ENTMCNC: 32.2 PG — SIGNIFICANT CHANGE UP (ref 27–34)
MCHC RBC-ENTMCNC: 36 G/DL — SIGNIFICANT CHANGE UP (ref 32–36)
MCV RBC AUTO: 89.4 FL — SIGNIFICANT CHANGE UP (ref 80–100)
MONOCYTES # BLD AUTO: 0.78 K/UL — SIGNIFICANT CHANGE UP (ref 0–0.9)
MONOCYTES NFR BLD AUTO: 13.5 % — SIGNIFICANT CHANGE UP (ref 2–14)
NEUTROPHILS # BLD AUTO: 2.29 K/UL — SIGNIFICANT CHANGE UP (ref 1.8–7.4)
NEUTROPHILS NFR BLD AUTO: 39.7 % — LOW (ref 43–77)
NRBC # BLD: 0 /100 WBCS — SIGNIFICANT CHANGE UP (ref 0–0)
NRBC # FLD: 0 K/UL — SIGNIFICANT CHANGE UP (ref 0–0)
PHOSPHATE SERPL-MCNC: 3.5 MG/DL — SIGNIFICANT CHANGE UP (ref 2.5–4.5)
PLATELET # BLD AUTO: 141 K/UL — LOW (ref 150–400)
POTASSIUM SERPL-MCNC: 4 MMOL/L — SIGNIFICANT CHANGE UP (ref 3.5–5.3)
POTASSIUM SERPL-SCNC: 4 MMOL/L — SIGNIFICANT CHANGE UP (ref 3.5–5.3)
PROT PATTERN SERPL ELPH-IMP: SIGNIFICANT CHANGE UP
PROT SERPL-MCNC: 6.6 G/DL — SIGNIFICANT CHANGE UP (ref 6–8.3)
RBC # BLD: 4.35 M/UL — SIGNIFICANT CHANGE UP (ref 4.2–5.8)
RBC # FLD: 11.9 % — SIGNIFICANT CHANGE UP (ref 10.3–14.5)
SODIUM SERPL-SCNC: 134 MMOL/L — LOW (ref 135–145)
WBC # BLD: 5.78 K/UL — SIGNIFICANT CHANGE UP (ref 3.8–10.5)
WBC # FLD AUTO: 5.78 K/UL — SIGNIFICANT CHANGE UP (ref 3.8–10.5)

## 2024-12-20 PROCEDURE — 99233 SBSQ HOSP IP/OBS HIGH 50: CPT

## 2024-12-20 RX ORDER — SENNOSIDES 8.6 MG/1
2 TABLET, FILM COATED ORAL
Refills: 0 | Status: DISCONTINUED | OUTPATIENT
Start: 2024-12-20 | End: 2024-12-31

## 2024-12-20 RX ORDER — NALOXONE HCL 0.4 MG/ML
0.1 VIAL (ML) INJECTION
Refills: 0 | Status: DISCONTINUED | OUTPATIENT
Start: 2024-12-20 | End: 2024-12-31

## 2024-12-20 RX ORDER — OXYCODONE HCL 15 MG
15 TABLET ORAL EVERY 4 HOURS
Refills: 0 | Status: DISCONTINUED | OUTPATIENT
Start: 2024-12-20 | End: 2024-12-23

## 2024-12-20 RX ORDER — OXYCODONE HCL 15 MG
10 TABLET ORAL EVERY 4 HOURS
Refills: 0 | Status: DISCONTINUED | OUTPATIENT
Start: 2024-12-20 | End: 2024-12-23

## 2024-12-20 RX ADMIN — LOSARTAN POTASSIUM 25 MILLIGRAM(S): 100 TABLET, FILM COATED ORAL at 05:23

## 2024-12-20 RX ADMIN — Medication 10 MILLIGRAM(S): at 13:10

## 2024-12-20 RX ADMIN — Medication 1000 UNIT(S): at 12:39

## 2024-12-20 RX ADMIN — Medication 10 MILLIGRAM(S): at 09:30

## 2024-12-20 RX ADMIN — Medication 5 MILLIGRAM(S): at 21:07

## 2024-12-20 RX ADMIN — ACETAMINOPHEN 975 MILLIGRAM(S): 80 SOLUTION/ DROPS ORAL at 05:23

## 2024-12-20 RX ADMIN — Medication 15 MILLIGRAM(S): at 22:43

## 2024-12-20 RX ADMIN — GABAPENTIN 300 MILLIGRAM(S): 300 CAPSULE ORAL at 18:08

## 2024-12-20 RX ADMIN — LEVOTHYROXINE SODIUM 75 MICROGRAM(S): 175 TABLET ORAL at 05:23

## 2024-12-20 RX ADMIN — GABAPENTIN 300 MILLIGRAM(S): 300 CAPSULE ORAL at 05:23

## 2024-12-20 RX ADMIN — Medication 30 MILLILITER(S): at 21:10

## 2024-12-20 RX ADMIN — Medication 10 MILLIGRAM(S): at 05:23

## 2024-12-20 RX ADMIN — Medication 100 MILLIGRAM(S): at 05:23

## 2024-12-20 RX ADMIN — DULOXETINE HYDROCHLORIDE 60 MILLIGRAM(S): 30 CAPSULE, DELAYED RELEASE ORAL at 12:39

## 2024-12-20 RX ADMIN — Medication 15 MILLIGRAM(S): at 18:12

## 2024-12-20 RX ADMIN — Medication 500 MILLIGRAM(S): at 12:39

## 2024-12-20 RX ADMIN — ATORVASTATIN CALCIUM 10 MILLIGRAM(S): 40 TABLET, FILM COATED ORAL at 21:08

## 2024-12-20 RX ADMIN — ACETAMINOPHEN 975 MILLIGRAM(S): 80 SOLUTION/ DROPS ORAL at 06:07

## 2024-12-20 RX ADMIN — Medication 15 MILLIGRAM(S): at 23:43

## 2024-12-20 RX ADMIN — Medication 10 MILLIGRAM(S): at 04:12

## 2024-12-20 RX ADMIN — Medication 15 MILLIGRAM(S): at 18:50

## 2024-12-20 RX ADMIN — GABAPENTIN 300 MILLIGRAM(S): 300 CAPSULE ORAL at 12:39

## 2024-12-20 RX ADMIN — TAMSULOSIN HYDROCHLORIDE 0.4 MILLIGRAM(S): 0.4 CAPSULE ORAL at 21:08

## 2024-12-20 RX ADMIN — Medication 17 GRAM(S): at 21:05

## 2024-12-20 RX ADMIN — ACETAMINOPHEN 975 MILLIGRAM(S): 80 SOLUTION/ DROPS ORAL at 21:07

## 2024-12-20 RX ADMIN — PANTOPRAZOLE 40 MILLIGRAM(S): 40 TABLET, DELAYED RELEASE ORAL at 05:23

## 2024-12-20 RX ADMIN — Medication 10 MILLIGRAM(S): at 08:39

## 2024-12-20 RX ADMIN — SENNOSIDES 2 TABLET(S): 8.6 TABLET, FILM COATED ORAL at 18:08

## 2024-12-20 RX ADMIN — ACETAMINOPHEN 975 MILLIGRAM(S): 80 SOLUTION/ DROPS ORAL at 22:07

## 2024-12-20 RX ADMIN — Medication 10 MILLIGRAM(S): at 14:00

## 2024-12-20 RX ADMIN — Medication 1 TABLET(S): at 12:39

## 2024-12-20 RX ADMIN — Medication 10 MILLIGRAM(S): at 00:30

## 2024-12-20 RX ADMIN — Medication 10 MILLIGRAM(S): at 05:24

## 2024-12-20 NOTE — CHART NOTE - NSCHARTNOTEFT_GEN_A_CORE
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  Spoke to ACP who shared  that patient is experiencing both in shoulder and back. But mostly shoulder as of right now. Currently he is using oxycodone 10 mg every 4 hours. Per team,  Without pain meds his pain is 7-8.  The oxy 10mg brings it down to 6-7. Only lasting for about 3 hours. Patient also being followed by chronic pain for low back pain.  Currently on oxy IR 5/10 mg q4h PRN  Analgesic Use (Scheduled and PRNs) for past 24 hours: 3pm-3pm  Used Oxy 10 mg q4h prn x5  ISTOP reviewed    Recommend following  -Increase oxycodone IR to 10 and 15 mg PRN for moderate and severe pain w/ holding parameters  -If not effective, and patient is tolerating w/ a/e, can increase to oxycodone IR 15-20 mg q4h PRN  -Bowel regimen while on opioids     In the event of worsening symptoms, please contact the Palliative Medicine team via pager (if the patient is at [facility] #[phone] or if the patient is at [facility] #[phone]) The Geriatric and Palliative Medicine service has coverage 24 hours a day/ 7 days a week to provide medical recommendations regarding symptom management needs via telephone Palliative paged for symptom management in the setting of 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  Spoke to ACP who shared  that patient is experiencing both in shoulder and back. But mostly shoulder as of right now. Currently he is using oxycodone 10 mg every 4 hours. Per team,  Without pain meds his pain is 7-8.  The oxy 10mg brings it down to 6-7. Only lasting for about 3 hours. Patient also being followed by chronic pain for low back pain.    Currently on oxy IR 5/10 mg q4h PRN for moderate, severe pain, along with tylenol 975 q8h, duloxetine 60 mg, gabapentin 300 mg four times a day    Analgesic Use (Scheduled and PRNs) for past 24 hours: 3pm-3pm>>Used Oxy 10 mg q4h prn x5  ISTOP reviewed    Recommend following  -Increase oxycodone IR to 10 and 15 mg PRN for moderate and severe pain w/ holding parameters  -If not effective, and patient is tolerating w/ a/e, can increase to oxycodone IR 15-20 mg q4h PRN  -Bowel regimen while on opioids   -Narcan     In the event of worsening symptoms, please contact the Palliative Medicine team via pager (if the patient is at [facility] #[phone] or if the patient is at [facility] #[phone]) The Geriatric and Palliative Medicine service has coverage 24 hours a day/ 7 days a week to provide medical recommendations regarding symptom management needs via telephone

## 2024-12-20 NOTE — PROGRESS NOTE ADULT - PROBLEM SELECTOR PLAN 1
Fast growing mass of L shoulder associated with hot flashes.   - CT chest: 10.9 x6.6 x6 cm lobulated soft tissue mass involving anterior L shoulder- suspicious for sacromatous/neoplastic etiology with additional 2.9cm lobulated lesion noted in R anterior chest wall, cervical lymphadenopathy  - S/p bx on 12/17 w/ Ortho, prelim results c/f lymphoma, onc recs pending final path results to determine if inpatient initiation of treatment is recommended; pt may also need bone marrow biopsy   - CT A/P w/ IV contrast done for staging - shows indeterminate subcutaneous lower chest/upper abdominal wall mass  - Oncology & Ortho following   - Pain control as listed below

## 2024-12-20 NOTE — PROGRESS NOTE ADULT - PROBLEM SELECTOR PROBLEM 1
"[facility] [address] [place]  [address]  Phone: [phone]    December 4, 2018      Leticia Tompkins  [address] [facility] [place] [facility] [address]              Dear Leticia,    The results from your recent visit showed Thanks for your time on the phone today.   As we discussed, your labs look good overall except your inflammatory markers (CRP and sed rate) are elevated. This is likely related to the joint pain you reported yesterday. The CRP and sed rate show that there is inflammation in your body, but they are not specific enough to tell us where the inflammation is located. As you are aware, there are numerous things that may cause these to be elevated. I recommend seeing a specialist in rheumatology who may help us sort this out. Please check with your insurance company for coverage benefits and let me know if you have any difficulty making an appointment to be seen.   Your blood counts are all normal. The minimally low MCHC is not clinically significant and you may disregard this abnormal flag.  Your thyroid test results are normal.   Your liver and kidney results are all normal.   Your fasting glucose was a little high again this year. We diagnose diabetes when the glucose is 126 or more on two separate blood draws. You have had an elevated glucose in the past but it is unclear if you were fasting for these blood draws. Your hemoglobin A1c, a lab that looks at the effect of elevated blood sugar over the last 3 months, has always remained in the pre-diabetes range. After we spoke on the phone today, I added an A1c on to your labs using the blood we bee yesterday. It may take a day or two more to get that A1c result.   Your LDL (\"bad cholesterol\") is flagged as high. Your HDL (\"good cholesterol\") is nice and high. According to the cholesterol management guidelines from the American Heart Association, we usually consider starting a statin to lower cholesterol when the 10 year risk of having a " "heart attack or stroke is > 5%. Fortunately, your current labs and demographics predict your risk is just a 3.2%. Thus, starting a cholesterol lowering medication (a statin) is not indicated at this time.    Please continue to eat right-sized portions of a heart healthy diet and stay as physically active as you can sustain in order to be kind to your heart and keep your risks low.   Everything else looks good!        Sincerely,     Kajal \"Yusra\" MD Rosita/Delaney Liriano CMA      Results for orders placed or performed in visit on 12/03/18   TSH ([facility])   Result Value Ref Range    TSH 1.190 0.450 - 4.500 uIU/mL    Narrative    Performed at:  01 - [facility] [address], [place], [place]  [id number]  : Yoshi Moreno MD, Phone:  [phone]   CBC with Diff/Plt ([facility])   Result Value Ref Range    WBC x10/cmm 8.2 3.8 - 11.0 x10/cmm    % Lymphocytes 26.4 20.5 - 51.1 %    % Monocytes 7.3 1.7 - 9.3 %    % Granulocytes 66.3 42.2 - 75.2 %    RBC x10/cmm 4.66 3.7 - 5.2 x10/cmm    Hemoglobin 14.3 11.8 - 15.5 g/dl    Hematocrit 44.3 35 - 46 %    MCV 95.2 80 - 100 fL    MCH 30.8 27.0 - 31.0 pg    MCHC 32.3 (A) 33.0 - 37.0 g/dL    Platelet Count 448 140 - 450 K/uL   Comp. Metabolic Panel (14) ([facility])   Result Value Ref Range    Glucose 124 (H) 65 - 99 mg/dL    Urea Nitrogen 12 8 - 27 mg/dL    Creatinine 0.68 0.57 - 1.00 mg/dL    eGFR If NonAfricn Am 95 >59 mL/min/1.73    eGFR If Africn Am 110 >59 mL/min/1.73    BUN/Creatinine Ratio 18 12 - 28    Sodium 142 134 - 144 mmol/L    Potassium 4.8 3.5 - 5.2 mmol/L    Chloride 101 96 - 106 mmol/L    Total CO2 28 20 - 29 mmol/L    Calcium 9.8 8.7 - 10.3 mg/dL    Protein Total 7.3 6.0 - 8.5 g/dL    Albumin 4.3 3.6 - 4.8 g/dL    Globulin, Total 3.0 1.5 - 4.5 g/dL    A/G Ratio 1.4 1.2 - 2.2    Bilirubin Total 0.3 0.0 - 1.2 mg/dL    Alkaline Phosphatase 58 39 - 117 IU/L    AST 16 0 - 40 IU/L    ALT 18 0 - 32 IU/L    Narrative    Performed at:  01 - [facility]  [address] [place], [place]  [id number]  : Yoshi Moreno MD, Phone:  [phone]   Lipid Panel ([facility])   Result Value Ref Range    Cholesterol 247 (H) 100 - 199 mg/dL    Triglycerides 88 0 - 149 mg/dL    HDL Cholesterol 70 >39 mg/dL    VLDL Cholesterol Vitor 18 5 - 40 mg/dL    LDL Cholesterol Calculated 159 (H) 0 - 99 mg/dL    LDL/HDL Ratio 2.3 0.0 - 3.2 ratio    Narrative    Performed at:  01 - [facility] [address], [place], [place]  [id number]  : Yoshi Moreno MD, Phone:  [phone]   C-Reactive Protein  Quant ([facility])   Result Value Ref Range    C-Reactive Protein 10.8 (H) 0.0 - 4.9 mg/L    Narrative    Performed at:  01 - [facility] [address], [place], [place]  [id number]  : Yoshi Moreno MD, Phone:  [phone]   Sed Rate Westergren ([facility])   Result Value Ref Range    Sed Rate 40 0 - 40 mm/hr    Narrative    Performed at:  01 - [facility] [address], [place], [place]  [id number]  : Yoshi Moreno MD, Phone:  [phone]       Mass of soft tissue of shoulder

## 2024-12-20 NOTE — PROGRESS NOTE ADULT - SUBJECTIVE AND OBJECTIVE BOX
Division of Hospital Medicine  Jere Boudreaux MD  Available via MS Teams    SUBJECTIVE / OVERNIGHT EVENTS: No active issues overnight; pt denies any new symptoms    MEDICATIONS  (STANDING):  acetaminophen     Tablet .. 975 milliGRAM(s) Oral every 8 hours  amLODIPine   Tablet 10 milliGRAM(s) Oral daily  ascorbic acid 500 milliGRAM(s) Oral daily  atenolol  Tablet 100 milliGRAM(s) Oral daily  atorvastatin 10 milliGRAM(s) Oral at bedtime  cholecalciferol 1000 Unit(s) Oral daily  dextrose 5%. 1000 milliLiter(s) (50 mL/Hr) IV Continuous <Continuous>  dextrose 5%. 1000 milliLiter(s) (100 mL/Hr) IV Continuous <Continuous>  dextrose 5%. 1000 milliLiter(s) (100 mL/Hr) IV Continuous <Continuous>  dextrose 5%. 1000 milliLiter(s) (50 mL/Hr) IV Continuous <Continuous>  dextrose 50% Injectable 25 Gram(s) IV Push once  dextrose 50% Injectable 12.5 Gram(s) IV Push once  dextrose 50% Injectable 25 Gram(s) IV Push once  dextrose 50% Injectable 25 Gram(s) IV Push once  dextrose 50% Injectable 12.5 Gram(s) IV Push once  dextrose 50% Injectable 25 Gram(s) IV Push once  DULoxetine 60 milliGRAM(s) Oral daily  gabapentin 300 milliGRAM(s) Oral four times a day  glucagon  Injectable 1 milliGRAM(s) IntraMuscular once  glucagon  Injectable 1 milliGRAM(s) IntraMuscular once  insulin lispro (ADMELOG) corrective regimen sliding scale   SubCutaneous three times a day before meals  insulin lispro (ADMELOG) corrective regimen sliding scale   SubCutaneous at bedtime  levothyroxine 75 MICROGram(s) Oral daily  losartan 25 milliGRAM(s) Oral daily  multivitamin 1 Tablet(s) Oral daily  pantoprazole    Tablet 40 milliGRAM(s) Oral before breakfast  polyethylene glycol 3350 17 Gram(s) Oral at bedtime  senna 2 Tablet(s) Oral two times a day  tamsulosin 0.4 milliGRAM(s) Oral at bedtime    MEDICATIONS  (PRN):  dextrose Oral Gel 15 Gram(s) Oral once PRN Blood Glucose LESS THAN 70 milliGRAM(s)/deciliter  dextrose Oral Gel 15 Gram(s) Oral once PRN Blood Glucose LESS THAN 70 milliGRAM(s)/deciliter  diphenhydrAMINE 25 milliGRAM(s) Oral every 6 hours PRN Rash and/or Itching  magnesium hydroxide Suspension 30 milliLiter(s) Oral daily PRN Constipation  naloxone Injectable 0.1 milliGRAM(s) IV Push every 3 minutes PRN For ANY of the following changes in patient status:  A. RR LESS THAN 10 breaths per minute, B. Oxygen saturation LESS THAN 90%, C. Sedation score of 6  ondansetron Injectable 4 milliGRAM(s) IV Push every 6 hours PRN Nausea and/or Vomiting  oxyCODONE    IR 10 milliGRAM(s) Oral every 4 hours PRN Moderate Pain (4 - 6)  oxyCODONE    IR 15 milliGRAM(s) Oral every 4 hours PRN Severe Pain (7 - 10)  sucralfate suspension 1 Gram(s) Oral Once PRN for esophageal pain  tiZANidine 4 milliGRAM(s) Oral <User Schedule> PRN Muscle Spasm  zolpidem 5 milliGRAM(s) Oral at bedtime PRN Insomnia      I&O's Summary    19 Dec 2024 07:01  -  20 Dec 2024 07:00  --------------------------------------------------------  IN: 0 mL / OUT: 800 mL / NET: -800 mL    20 Dec 2024 07:01  -  20 Dec 2024 19:49  --------------------------------------------------------  IN: 0 mL / OUT: 300 mL / NET: -300 mL        PHYSICAL EXAM:  Vital Signs Last 24 Hrs  T(C): 37.1 (20 Dec 2024 17:11), Max: 37.2 (20 Dec 2024 13:38)  T(F): 98.7 (20 Dec 2024 17:11), Max: 99 (20 Dec 2024 13:38)  HR: 65 (20 Dec 2024 17:11) (57 - 88)  BP: 143/81 (20 Dec 2024 17:11) (126/80 - 159/89)  BP(mean): --  RR: 18 (20 Dec 2024 17:11) (16 - 18)  SpO2: 95% (20 Dec 2024 17:11) (95% - 98%)    Parameters below as of 20 Dec 2024 17:11  Patient On (Oxygen Delivery Method): room air      CONSTITUTIONAL: NAD  EYES: PERRLA; conjunctiva and sclera clear  ENMT: Moist oral mucosa, no pharyngeal injection or exudates; normal dentition  NECK: Supple, no palpable masses; no thyromegaly  RESPIRATORY: Normal respiratory effort; lungs are clear to auscultation bilaterally; no rales, rhonchi, or wheezing  CARDIOVASCULAR: Regular rate and rhythm, normal S1 and S2, no murmur/rub/gallop; Peripheral pulses are 2+ bilaterally  ABDOMEN: Nontender to palpation, normoactive bowel sounds, no rebound/guarding; No hepatosplenomegaly  MUSCULOSKELETAL: no clubbing or cyanosis of digits; L shoulder erythema and edema+  NEUROLOGY: Awake and alert to person, place, and date; no focal neurological deficits   SKIN: No rashes; no palpable lesions    LABS:                        14.0   5.78  )-----------( 141      ( 20 Dec 2024 05:13 )             38.9     12-20    134[L]  |  99  |  20  ----------------------------<  95  4.0   |  23  |  0.70    Ca    9.2      20 Dec 2024 05:13  Phos  3.5     12-20  Mg     1.70     12-20            Urinalysis Basic - ( 20 Dec 2024 05:13 )    Color: x / Appearance: x / SG: x / pH: x  Gluc: 95 mg/dL / Ketone: x  / Bili: x / Urobili: x   Blood: x / Protein: x / Nitrite: x   Leuk Esterase: x / RBC: x / WBC x   Sq Epi: x / Non Sq Epi: x / Bacteria: x            Imaging and consultant notes reviewed.

## 2024-12-20 NOTE — PROGRESS NOTE ADULT - PROBLEM SELECTOR PLAN 1
PT/OT-WBAT to LUE   FU OR Path/OR culture  IS  DVT PPx  Multimodal Pain Control - chronic pain consult?     Sara Mejia PA-C  Team Pager: #[phone]

## 2024-12-20 NOTE — PROGRESS NOTE ADULT - SUBJECTIVE AND OBJECTIVE BOX
Patient examined at bedside, states he is having some trouble sleeping due to increased pain from his back and shoulder. No chest pain, SOB, N/V.    T(C): 36.7 (12-20-24 @ 05:03), Max: 37.1 (12-19-24 @ 13:51)  HR: 83 (12-20-24 @ 05:03) (57 - 88)  BP: 159/89 (12-20-24 @ 05:03) (126/80 - 162/85)  RR: 16 (12-20-24 @ 05:03) (16 - 18)  SpO2: 98% (12-20-24 @ 05:03) (96% - 98%)  Wt(kg): --    Exam:  Alert and Oriented, No Acute Distress  Upper Extremities: Dressing c/d/i  SILT axillary/med/rad/ulnar  +Motor AIN/PIN/Ulnar/Radial/Musc/Median,   2+radial pulse, soft compartments.                          14.2   5.56  )-----------( 134      ( 19 Dec 2024 05:39 )             39.9    12-19    135  |  99  |  17  ----------------------------<  104[H]  3.9   |  21[L]  |  0.64    Ca    9.3      19 Dec 2024 05:39  Phos  3.1     12-19  Mg     1.80     12-19

## 2024-12-20 NOTE — CHART NOTE - NSCHARTNOTEFT_GEN_A_CORE
Consulted by primary team for pain. Patient with left shoulder pain related to lymphoma per primary team. Defer management to palliative care for cancer related pain.

## 2024-12-20 NOTE — PROGRESS NOTE ADULT - PROBLEM SELECTOR PLAN 2
Hx of chronic lower back pain s/p lumbar surgery on chronic opioids   - Cont current regimen: gabapentin, duloxetine 60mg daily, tylenol, tizanidine 4mg BID PRN  - Increased oxycodone IR to 10 and 15 mg PRN for moderate and severe pain as per palliative recs  - If not effective, and patient is tolerating without side effects, can increase to oxycodone IR 15-20 mg q4h PRN  - continue bowel regimen with senna and miralax

## 2024-12-20 NOTE — CHART NOTE - NSCHARTNOTEFT_GEN_A_CORE
Reference #: [id number]    Practitioner Count: 4  Pharmacy Count: 1  Current Opioid Prescriptions: 1  Current Benzodiazepine Prescriptions: 0  Current Stimulant Prescriptions: 0      Patient Demographic Information (PDI)       PDI	First Name	Last Name	Birth Date	Gender	Street Address	[facility] Code  A	Bucky Sawant	1960	Male	[address] 	[facility]	[address]  B	Bucky Sawant	1960	Male	[address] 	[facility]	[address]    Prescription Information      PDI Filter:    PDI	My Rx	Current Rx	Drug Type	Rx Written	Rx Dispensed	Drug	Quantity	Days Supply	Prescriber Name	Prescriber CARL #	Payment Method	Dispenser  A	N	N	O	05/22/2024	05/26/2024	oxycodone hcl (ir) 5 mg tablet	180	30	Chacha Lyle NP	[id number]	Medicare	[facility] #[id number]  A	N	N	O	04/24/2024	04/27/2024	oxycodone hcl (ir) 5 mg tablet	180	30	Chacha Lyle NP	[id number]	Medicare	[facility] #[id number]  B	N	Y	O	12/05/2024	12/06/2024	oxycodone hcl (ir) 10 mg tab	90	30	Ryan Dupree	[id number]	Medicare	[id number]  B	N	Y		10/03/2024	12/05/2024	zolpidem tartrate 5 mg tablet	30	30	O'Prince Wong	[id number]	Medicare	[id number]  B	N	N	O	11/08/2024	11/08/2024	oxycodone hcl (ir) 10 mg tab	90	30	Deshawn Macias MD	[id number]	Medicare	[facility][id number]  B	N	N		10/03/2024	11/06/2024	zolpidem tartrate 5 mg tablet	30	30	O'Prince Wong	[id number]	Medicare	[id number]  B	N	N	O	10/10/2024	10/10/2024	oxycodone hcl (ir) 10 mg tab	90	30	Brown Frankel	[id number]	Medicare	[id number]  B	N	N		10/03/2024	10/05/2024	zolpidem tartrate 5 mg tablet	30	30	O'Prince Wong	[id number]	Medicare	[id number]  B	N	N	O	09/12/2024	09/12/2024	oxycodone hcl (ir) 10 mg tab	90	30	Brown Frankel	[id number]	Medicare	[id number]  B	N	N		06/26/2024	09/05/2024	zolpidem tartrate 5 mg tablet	30	30	O'Prince Wong	[id number]	Medicare	[id number]  B	N	N		06/26/2024	08/08/2024	zolpidem tartrate 5 mg tablet	30	30	O'Prince Wong	[id number]	Medicare	[id number]  B	N	N	O	07/19/2024	07/19/2024	oxycodone hcl (ir) 5 mg tablet	180	30	Jaimie Lovelace M	[id number]	Medicare	[id number]  B	N	N		06/26/2024	07/10/2024	zolpidem tartrate 5 mg tablet	30	30	O'Prince Wong	[id number]	Medicare	[id number]  B	N	N	O	06/21/2024	06/21/2024	oxycodone hcl (ir) 5 mg tablet	180	30	Chacha Lyle NP	[id number]	Medicare	[id number]  B	N	N	O	06/16/2024	06/18/2024	oxycodone hcl (ir) 5 mg tablet	9	3	Mena Prather	[id number]	Medicare	[id number]  B	N	N	O	06/16/2024	06/16/2024	oxycodone hcl (ir) 5 mg tablet	12	3	Mena Prather	[id number]	Medicare	[id number]  B	N	N	O	06/06/2024	06/06/2024	hydromorphone 4 mg tablet	6	2	Adela Weaver J,DMD	[id number]	Medicare	[id number]  B	N	N		03/26/2024	05/30/2024	zolpidem tartrate 5 mg tablet	30	30	O'Prince Wong	[id number]	Medicare	[id number]  B	N	N	O	05/09/2024	05/09/2024	hydromorphone 4 mg tablet	8	2	Adela Weaver J,DMD	[id number]	Medicare	[facility][id number]  B	N	N	O	05/08/2024	05/08/2024	hydrocodone-acetaminophen 5-325 mg tablet	6	1	Adela WeaverMARILIA,DMD	[id number]	Medicare	[id number]  B	N	N		03/26/2024	05/01/2024	zolpidem tartrate 5 mg tablet	30	30	O'MarvinPrince bloom	[id number]	Medicare	[id number]  B	N	N		03/26/2024	03/30/2024	zolpidem tartrate 5 mg tablet	30	30	[place] bloom	[id number]	Medicare	[id number]  B	N	N	O	03/27/2024	03/29/2024	oxycodone hcl (ir) 5 mg tablet	180	30	Chacha Lyle NP	[id number]	Medicare	[id number]  B	N	N		01/02/2024	02/29/2024	zolpidem tartrate 5 mg tablet	30	30	[place] bloom	[id number]	Medicare	[id number]  B	N	N	O	02/29/2024	02/29/2024	oxycodone hcl (ir) 5 mg tablet	180	30	Chacha Lyle NP	[id number]	Medicare	[id number]  B	N	N		01/02/2024	02/01/2024	zolpidem tartrate 5 mg tablet	30	30	O'MarvinPrince blomo	[id number]	Medicare	[id number]  B	N	N	O	02/01/2024	02/01/2024	oxycodone hcl (ir) 5 mg tablet	180	30	Chacha Lyle NP	[id number]	Medicare	[id number]  B	N	N	O	01/04/2024	01/04/2024	oxycodone hcl (ir) 5 mg tablet	120	30	Chacha Lyle NP	[id number]	Medicare	[id number]  B	N	N		01/02/2024	01/03/2024	zolpidem tartrate 5 mg tablet	30	30	[place]	[id number]	Medicare	[id number]

## 2024-12-21 LAB
ANION GAP SERPL CALC-SCNC: 14 MMOL/L — SIGNIFICANT CHANGE UP (ref 7–14)
BUN SERPL-MCNC: 19 MG/DL — SIGNIFICANT CHANGE UP (ref 7–23)
CALCIUM SERPL-MCNC: 9.3 MG/DL — SIGNIFICANT CHANGE UP (ref 8.4–10.5)
CHLORIDE SERPL-SCNC: 98 MMOL/L — SIGNIFICANT CHANGE UP (ref 98–107)
CO2 SERPL-SCNC: 23 MMOL/L — SIGNIFICANT CHANGE UP (ref 22–31)
CREAT SERPL-MCNC: 0.69 MG/DL — SIGNIFICANT CHANGE UP (ref 0.5–1.3)
EGFR: 104 ML/MIN/1.73M2 — SIGNIFICANT CHANGE UP
GLUCOSE SERPL-MCNC: 108 MG/DL — HIGH (ref 70–99)
HCT VFR BLD CALC: 41.2 % — SIGNIFICANT CHANGE UP (ref 39–50)
HGB BLD-MCNC: 14.4 G/DL — SIGNIFICANT CHANGE UP (ref 13–17)
MAGNESIUM SERPL-MCNC: 2 MG/DL — SIGNIFICANT CHANGE UP (ref 1.6–2.6)
MCHC RBC-ENTMCNC: 31.7 PG — SIGNIFICANT CHANGE UP (ref 27–34)
MCHC RBC-ENTMCNC: 35 G/DL — SIGNIFICANT CHANGE UP (ref 32–36)
MCV RBC AUTO: 90.7 FL — SIGNIFICANT CHANGE UP (ref 80–100)
NRBC # BLD: 0 /100 WBCS — SIGNIFICANT CHANGE UP (ref 0–0)
NRBC # FLD: 0 K/UL — SIGNIFICANT CHANGE UP (ref 0–0)
PHOSPHATE SERPL-MCNC: 3.8 MG/DL — SIGNIFICANT CHANGE UP (ref 2.5–4.5)
PLATELET # BLD AUTO: 154 K/UL — SIGNIFICANT CHANGE UP (ref 150–400)
POTASSIUM SERPL-MCNC: 4.2 MMOL/L — SIGNIFICANT CHANGE UP (ref 3.5–5.3)
POTASSIUM SERPL-SCNC: 4.2 MMOL/L — SIGNIFICANT CHANGE UP (ref 3.5–5.3)
RBC # BLD: 4.54 M/UL — SIGNIFICANT CHANGE UP (ref 4.2–5.8)
RBC # FLD: 12.3 % — SIGNIFICANT CHANGE UP (ref 10.3–14.5)
SODIUM SERPL-SCNC: 135 MMOL/L — SIGNIFICANT CHANGE UP (ref 135–145)
WBC # BLD: 6.4 K/UL — SIGNIFICANT CHANGE UP (ref 3.8–10.5)
WBC # FLD AUTO: 6.4 K/UL — SIGNIFICANT CHANGE UP (ref 3.8–10.5)

## 2024-12-21 PROCEDURE — 99233 SBSQ HOSP IP/OBS HIGH 50: CPT

## 2024-12-21 RX ORDER — ENOXAPARIN SODIUM 60 MG/.6ML
40 INJECTION INTRAVENOUS; SUBCUTANEOUS EVERY 12 HOURS
Refills: 0 | Status: DISCONTINUED | OUTPATIENT
Start: 2024-12-21 | End: 2024-12-31

## 2024-12-21 RX ADMIN — ATORVASTATIN CALCIUM 10 MILLIGRAM(S): 40 TABLET, FILM COATED ORAL at 21:05

## 2024-12-21 RX ADMIN — Medication 15 MILLIGRAM(S): at 21:03

## 2024-12-21 RX ADMIN — ACETAMINOPHEN 975 MILLIGRAM(S): 80 SOLUTION/ DROPS ORAL at 22:06

## 2024-12-21 RX ADMIN — GABAPENTIN 300 MILLIGRAM(S): 300 CAPSULE ORAL at 17:20

## 2024-12-21 RX ADMIN — Medication 15 MILLIGRAM(S): at 16:37

## 2024-12-21 RX ADMIN — Medication 15 MILLIGRAM(S): at 15:37

## 2024-12-21 RX ADMIN — Medication 500 MILLIGRAM(S): at 11:43

## 2024-12-21 RX ADMIN — ACETAMINOPHEN 975 MILLIGRAM(S): 80 SOLUTION/ DROPS ORAL at 16:37

## 2024-12-21 RX ADMIN — GABAPENTIN 300 MILLIGRAM(S): 300 CAPSULE ORAL at 05:51

## 2024-12-21 RX ADMIN — TIZANIDINE 4 MILLIGRAM(S): 4 TABLET ORAL at 21:05

## 2024-12-21 RX ADMIN — GABAPENTIN 300 MILLIGRAM(S): 300 CAPSULE ORAL at 00:38

## 2024-12-21 RX ADMIN — Medication 10 MILLIGRAM(S): at 05:02

## 2024-12-21 RX ADMIN — GABAPENTIN 300 MILLIGRAM(S): 300 CAPSULE ORAL at 11:42

## 2024-12-21 RX ADMIN — ENOXAPARIN SODIUM 40 MILLIGRAM(S): 60 INJECTION INTRAVENOUS; SUBCUTANEOUS at 17:52

## 2024-12-21 RX ADMIN — Medication 5 MILLIGRAM(S): at 21:03

## 2024-12-21 RX ADMIN — Medication 17 GRAM(S): at 21:05

## 2024-12-21 RX ADMIN — Medication 25 MILLIGRAM(S): at 04:59

## 2024-12-21 RX ADMIN — Medication 100 MILLIGRAM(S): at 05:02

## 2024-12-21 RX ADMIN — Medication 1000 UNIT(S): at 11:43

## 2024-12-21 RX ADMIN — SENNOSIDES 2 TABLET(S): 8.6 TABLET, FILM COATED ORAL at 05:02

## 2024-12-21 RX ADMIN — DULOXETINE HYDROCHLORIDE 60 MILLIGRAM(S): 30 CAPSULE, DELAYED RELEASE ORAL at 11:43

## 2024-12-21 RX ADMIN — ACETAMINOPHEN 975 MILLIGRAM(S): 80 SOLUTION/ DROPS ORAL at 21:06

## 2024-12-21 RX ADMIN — ACETAMINOPHEN 975 MILLIGRAM(S): 80 SOLUTION/ DROPS ORAL at 15:37

## 2024-12-21 RX ADMIN — Medication 15 MILLIGRAM(S): at 22:03

## 2024-12-21 RX ADMIN — Medication 1 TABLET(S): at 11:43

## 2024-12-21 RX ADMIN — Medication 15 MILLIGRAM(S): at 05:49

## 2024-12-21 RX ADMIN — SENNOSIDES 2 TABLET(S): 8.6 TABLET, FILM COATED ORAL at 17:20

## 2024-12-21 RX ADMIN — ACETAMINOPHEN 975 MILLIGRAM(S): 80 SOLUTION/ DROPS ORAL at 06:03

## 2024-12-21 RX ADMIN — TAMSULOSIN HYDROCHLORIDE 0.4 MILLIGRAM(S): 0.4 CAPSULE ORAL at 21:04

## 2024-12-21 RX ADMIN — PANTOPRAZOLE 40 MILLIGRAM(S): 40 TABLET, DELAYED RELEASE ORAL at 05:02

## 2024-12-21 RX ADMIN — LOSARTAN POTASSIUM 25 MILLIGRAM(S): 100 TABLET, FILM COATED ORAL at 05:02

## 2024-12-21 RX ADMIN — Medication 15 MILLIGRAM(S): at 04:59

## 2024-12-21 RX ADMIN — LEVOTHYROXINE SODIUM 75 MICROGRAM(S): 175 TABLET ORAL at 06:03

## 2024-12-21 RX ADMIN — Medication 15 MILLIGRAM(S): at 09:57

## 2024-12-21 RX ADMIN — Medication 15 MILLIGRAM(S): at 10:57

## 2024-12-21 RX ADMIN — ACETAMINOPHEN 975 MILLIGRAM(S): 80 SOLUTION/ DROPS ORAL at 05:03

## 2024-12-21 NOTE — PROGRESS NOTE ADULT - SUBJECTIVE AND OBJECTIVE BOX
ORTHOPEDIC PROGRESS NOTE    Overnight events: None    SUBJECTIVE: Pt seen and examined at bedside. Patient is doing well, no acute complaints this AM. Pain is controlled with medication      OBJECTIVE:  Vital Signs Last 24 Hrs  T(C): 36.9 (21 Dec 2024 05:49), Max: 37.2 (20 Dec 2024 13:38)  T(F): 98.4 (21 Dec 2024 05:49), Max: 99 (20 Dec 2024 13:38)  HR: 57 (21 Dec 2024 05:49) (57 - 88)  BP: 145/80 (21 Dec 2024 05:49) (111/68 - 148/87)  BP(mean): --  RR: 17 (21 Dec 2024 05:49) (17 - 18)  SpO2: 98% (21 Dec 2024 05:49) (95% - 98%)    Parameters below as of 21 Dec 2024 05:49  Patient On (Oxygen Delivery Method): room air          12-20-24 @ 07:01  -  12-21-24 @ 07:00  --------------------------------------------------------  IN: 0 mL / OUT: 300 mL / NET: -300 mL        Physical Examination:  GEN: NAD, resting quietly  PULM: symmetric chest rise bilaterally, no increased WOB  ABD: nondistended  EXTR:   Upper Extremities: Dressing c/d/i  SILT axillary/med/rad/ulnar  +Motor AIN/PIN/Ulnar/Radial/Musc/Median,   2+radial pulse, soft compartments.      LABS:                        14.4   6.40  )-----------( 154      ( 21 Dec 2024 06:35 )             41.2       12-20    134[L]  |  99  |  20  ----------------------------<  95  4.0   |  23  |  0.70    Ca    9.2      20 Dec 2024 05:13  Phos  3.5     12-20  Mg     1.70     12-20

## 2024-12-21 NOTE — PROGRESS NOTE ADULT - ASSESSMENT
Assessment:   64yoM s/p bx of L shoulder mass on 12/17      Plan: PT/OT-WBAT to LUE   FU OR Path/OR culture  IS  DVT PPx  Can be D/C'd from orthopedic perspective    Please have patient FU with Dr. Cruz within 2 weeks of discharge    For all questions related to patient care, please reach out to the on-call team via the pager.     Kelli Shah, PGY 3  Orthopaedic Surgery  [facility] [phone]  [facility] [phone]  [facility] [phone]

## 2024-12-21 NOTE — PROGRESS NOTE ADULT - SUBJECTIVE AND OBJECTIVE BOX
Division of Hospital Medicine  Jere Boudreaux MD  Available via MS Teams    SUBJECTIVE / OVERNIGHT EVENTS: No active issues overnight; pt denies any new symptoms, states pain is improving    MEDICATIONS  (STANDING):  acetaminophen     Tablet .. 975 milliGRAM(s) Oral every 8 hours  amLODIPine   Tablet 10 milliGRAM(s) Oral daily  ascorbic acid 500 milliGRAM(s) Oral daily  atenolol  Tablet 100 milliGRAM(s) Oral daily  atorvastatin 10 milliGRAM(s) Oral at bedtime  cholecalciferol 1000 Unit(s) Oral daily  dextrose 5%. 1000 milliLiter(s) (50 mL/Hr) IV Continuous <Continuous>  dextrose 5%. 1000 milliLiter(s) (100 mL/Hr) IV Continuous <Continuous>  dextrose 5%. 1000 milliLiter(s) (50 mL/Hr) IV Continuous <Continuous>  dextrose 5%. 1000 milliLiter(s) (100 mL/Hr) IV Continuous <Continuous>  dextrose 50% Injectable 25 Gram(s) IV Push once  dextrose 50% Injectable 12.5 Gram(s) IV Push once  dextrose 50% Injectable 25 Gram(s) IV Push once  dextrose 50% Injectable 25 Gram(s) IV Push once  dextrose 50% Injectable 12.5 Gram(s) IV Push once  dextrose 50% Injectable 25 Gram(s) IV Push once  DULoxetine 60 milliGRAM(s) Oral daily  gabapentin 300 milliGRAM(s) Oral four times a day  glucagon  Injectable 1 milliGRAM(s) IntraMuscular once  glucagon  Injectable 1 milliGRAM(s) IntraMuscular once  insulin lispro (ADMELOG) corrective regimen sliding scale   SubCutaneous at bedtime  insulin lispro (ADMELOG) corrective regimen sliding scale   SubCutaneous three times a day before meals  levothyroxine 75 MICROGram(s) Oral daily  losartan 25 milliGRAM(s) Oral daily  multivitamin 1 Tablet(s) Oral daily  pantoprazole    Tablet 40 milliGRAM(s) Oral before breakfast  polyethylene glycol 3350 17 Gram(s) Oral at bedtime  senna 2 Tablet(s) Oral two times a day  tamsulosin 0.4 milliGRAM(s) Oral at bedtime    MEDICATIONS  (PRN):  dextrose Oral Gel 15 Gram(s) Oral once PRN Blood Glucose LESS THAN 70 milliGRAM(s)/deciliter  dextrose Oral Gel 15 Gram(s) Oral once PRN Blood Glucose LESS THAN 70 milliGRAM(s)/deciliter  diphenhydrAMINE 25 milliGRAM(s) Oral every 6 hours PRN Rash and/or Itching  magnesium hydroxide Suspension 30 milliLiter(s) Oral daily PRN Constipation  naloxone Injectable 0.1 milliGRAM(s) IV Push every 3 minutes PRN For ANY of the following changes in patient status:  A. RR LESS THAN 10 breaths per minute, B. Oxygen saturation LESS THAN 90%, C. Sedation score of 6  ondansetron Injectable 4 milliGRAM(s) IV Push every 6 hours PRN Nausea and/or Vomiting  oxyCODONE    IR 10 milliGRAM(s) Oral every 4 hours PRN Moderate Pain (4 - 6)  oxyCODONE    IR 15 milliGRAM(s) Oral every 4 hours PRN Severe Pain (7 - 10)  sucralfate suspension 1 Gram(s) Oral Once PRN for esophageal pain  tiZANidine 4 milliGRAM(s) Oral <User Schedule> PRN Muscle Spasm  zolpidem 5 milliGRAM(s) Oral at bedtime PRN Insomnia      I&O's Summary    20 Dec 2024 07:01  -  21 Dec 2024 07:00  --------------------------------------------------------  IN: 0 mL / OUT: 300 mL / NET: -300 mL        PHYSICAL EXAM:  Vital Signs Last 24 Hrs  T(C): 36.8 (21 Dec 2024 09:42), Max: 37.1 (20 Dec 2024 22:16)  T(F): 98.3 (21 Dec 2024 09:42), Max: 98.7 (20 Dec 2024 22:16)  HR: 55 (21 Dec 2024 09:42) (55 - 65)  BP: 136/90 (21 Dec 2024 09:42) (111/68 - 145/80)  BP(mean): --  RR: 17 (21 Dec 2024 09:42) (17 - 18)  SpO2: 95% (21 Dec 2024 09:42) (95% - 98%)    Parameters below as of 21 Dec 2024 09:42  Patient On (Oxygen Delivery Method): room air      CONSTITUTIONAL: NAD  EYES: PERRLA; conjunctiva and sclera clear  ENMT: Moist oral mucosa, no pharyngeal injection or exudates; normal dentition  NECK: Supple, no palpable masses; no thyromegaly  RESPIRATORY: Normal respiratory effort; lungs are clear to auscultation bilaterally; no rales, rhonchi, or wheezing  CARDIOVASCULAR: Regular rate and rhythm, normal S1 and S2, no murmur/rub/gallop; Peripheral pulses are 2+ bilaterally  ABDOMEN: Nontender to palpation, normoactive bowel sounds, no rebound/guarding; No hepatosplenomegaly  MUSCULOSKELETAL: no clubbing or cyanosis of digits; L shoulder erythema and swelling+  NEUROLOGY: Awake and alert to person, place, and date; no focal neurological deficits   SKIN: No rashes; no palpable lesions    LABS:                        14.4   6.40  )-----------( 154      ( 21 Dec 2024 06:35 )             41.2     12-21    135  |  98  |  19  ----------------------------<  108[H]  4.2   |  23  |  0.69    Ca    9.3      21 Dec 2024 06:35  Phos  3.8     12-21  Mg     2.00     12-21            Urinalysis Basic - ( 21 Dec 2024 06:35 )    Color: x / Appearance: x / SG: x / pH: x  Gluc: 108 mg/dL / Ketone: x  / Bili: x / Urobili: x   Blood: x / Protein: x / Nitrite: x   Leuk Esterase: x / RBC: x / WBC x   Sq Epi: x / Non Sq Epi: x / Bacteria: x            Imaging and consultant notes reviewed.

## 2024-12-22 PROCEDURE — 99233 SBSQ HOSP IP/OBS HIGH 50: CPT

## 2024-12-22 RX ADMIN — Medication 15 MILLIGRAM(S): at 06:24

## 2024-12-22 RX ADMIN — Medication 10 MILLIGRAM(S): at 05:38

## 2024-12-22 RX ADMIN — Medication 100 MILLIGRAM(S): at 05:38

## 2024-12-22 RX ADMIN — ACETAMINOPHEN 975 MILLIGRAM(S): 80 SOLUTION/ DROPS ORAL at 05:38

## 2024-12-22 RX ADMIN — LOSARTAN POTASSIUM 25 MILLIGRAM(S): 100 TABLET, FILM COATED ORAL at 05:38

## 2024-12-22 RX ADMIN — ACETAMINOPHEN 975 MILLIGRAM(S): 80 SOLUTION/ DROPS ORAL at 14:00

## 2024-12-22 RX ADMIN — TAMSULOSIN HYDROCHLORIDE 0.4 MILLIGRAM(S): 0.4 CAPSULE ORAL at 21:45

## 2024-12-22 RX ADMIN — SENNOSIDES 2 TABLET(S): 8.6 TABLET, FILM COATED ORAL at 17:04

## 2024-12-22 RX ADMIN — Medication 1 TABLET(S): at 11:31

## 2024-12-22 RX ADMIN — Medication 15 MILLIGRAM(S): at 10:40

## 2024-12-22 RX ADMIN — Medication 15 MILLIGRAM(S): at 07:24

## 2024-12-22 RX ADMIN — ATORVASTATIN CALCIUM 10 MILLIGRAM(S): 40 TABLET, FILM COATED ORAL at 21:45

## 2024-12-22 RX ADMIN — LEVOTHYROXINE SODIUM 75 MICROGRAM(S): 175 TABLET ORAL at 06:24

## 2024-12-22 RX ADMIN — ENOXAPARIN SODIUM 40 MILLIGRAM(S): 60 INJECTION INTRAVENOUS; SUBCUTANEOUS at 05:40

## 2024-12-22 RX ADMIN — ACETAMINOPHEN 975 MILLIGRAM(S): 80 SOLUTION/ DROPS ORAL at 21:45

## 2024-12-22 RX ADMIN — ACETAMINOPHEN 975 MILLIGRAM(S): 80 SOLUTION/ DROPS ORAL at 22:13

## 2024-12-22 RX ADMIN — Medication 500 MILLIGRAM(S): at 11:31

## 2024-12-22 RX ADMIN — Medication 15 MILLIGRAM(S): at 11:34

## 2024-12-22 RX ADMIN — Medication 15 MILLIGRAM(S): at 19:00

## 2024-12-22 RX ADMIN — GABAPENTIN 300 MILLIGRAM(S): 300 CAPSULE ORAL at 23:07

## 2024-12-22 RX ADMIN — GABAPENTIN 300 MILLIGRAM(S): 300 CAPSULE ORAL at 05:36

## 2024-12-22 RX ADMIN — Medication 15 MILLIGRAM(S): at 23:07

## 2024-12-22 RX ADMIN — GABAPENTIN 300 MILLIGRAM(S): 300 CAPSULE ORAL at 11:30

## 2024-12-22 RX ADMIN — ACETAMINOPHEN 975 MILLIGRAM(S): 80 SOLUTION/ DROPS ORAL at 13:04

## 2024-12-22 RX ADMIN — Medication 15 MILLIGRAM(S): at 18:03

## 2024-12-22 RX ADMIN — Medication 25 MILLIGRAM(S): at 21:45

## 2024-12-22 RX ADMIN — Medication 17 GRAM(S): at 21:45

## 2024-12-22 RX ADMIN — ENOXAPARIN SODIUM 40 MILLIGRAM(S): 60 INJECTION INTRAVENOUS; SUBCUTANEOUS at 17:05

## 2024-12-22 RX ADMIN — SENNOSIDES 2 TABLET(S): 8.6 TABLET, FILM COATED ORAL at 05:39

## 2024-12-22 RX ADMIN — DULOXETINE HYDROCHLORIDE 60 MILLIGRAM(S): 30 CAPSULE, DELAYED RELEASE ORAL at 11:32

## 2024-12-22 RX ADMIN — Medication 1000 UNIT(S): at 11:30

## 2024-12-22 RX ADMIN — PANTOPRAZOLE 40 MILLIGRAM(S): 40 TABLET, DELAYED RELEASE ORAL at 05:39

## 2024-12-22 RX ADMIN — GABAPENTIN 300 MILLIGRAM(S): 300 CAPSULE ORAL at 17:04

## 2024-12-22 RX ADMIN — ACETAMINOPHEN 975 MILLIGRAM(S): 80 SOLUTION/ DROPS ORAL at 06:38

## 2024-12-22 NOTE — PROGRESS NOTE ADULT - PROBLEM SELECTOR PLAN 6
DVT ppx: Lovenox s/c 40 mg q12h as BMI > 40  PT/OT: No skilled needs  Dispo: Home pending final pathology results and Oncology recommendations

## 2024-12-22 NOTE — DISCHARGE NOTE PROVIDER - NSDCCPCAREPLAN_GEN_ALL_CORE_FT
PRINCIPAL DISCHARGE DIAGNOSIS  Diagnosis: Mass of soft tissue of shoulder  Assessment and Plan of Treatment: You were noted to have mass of Left  shoulder associated with hot flashes. CT chest showed 10.9 x6.6 x6 cm lobulated soft tissue mass involving anterior Left  shoulder- suspicious for sacromatous/neoplastic etiology with additional 2.9cm lobulated lesion noted in Right  anterior chest wall, cervical lymphadenopathy. You underwent  biopsy on 12/17 with  Ortho, prelim results shows  lymphoma,   - CT A/P with contrast done for staging - shows indeterminate subcutaneous lower chest/upper abdominal wall mass , You were started on R CHOP by Hematology , Your  next cycle is 3 weeks from first one.  You are scheduled for hematology follow  at [facility]  with Dr. Meier on 1/6/25        SECONDARY DISCHARGE DIAGNOSES  Diagnosis: Chronic back pain  Assessment and Plan of Treatment: Continue pain medications as directed . Follow up with Pain mangement as outpatient    Diagnosis: Hypothyroidism  Assessment and Plan of Treatment: Continue your home medications, Follow up with your PMD  as outpatient    Diagnosis: Benign essential HTN  Assessment and Plan of Treatment: Continue blood pressure medication regimen as directed. Monitor for any visual changes, headaches or dizziness.  Monitor blood pressure regularly.  Follow up with your PCP for further management for high blood pressure.      Diagnosis: Hyperlipidemia  Assessment and Plan of Treatment: Continue cholesterol control medications. Continue DASH diet. Follow up with your PCP within 1 week of discharge for further management and monitoring of lipid and cholesterol panels.      Diagnosis: Anxiety  Assessment and Plan of Treatment: Continue your home medications, Follow up with your PMD and psychiatry as outpatient

## 2024-12-22 NOTE — DISCHARGE NOTE PROVIDER - DETAILS OF MALNUTRITION DIAGNOSIS/DIAGNOSES
This patient has been assessed with a concern for Malnutrition and was treated during this hospitalization for the following Nutrition diagnosis/diagnoses:     -  12/18/2024: Morbid obesity (BMI > 40)

## 2024-12-22 NOTE — DISCHARGE NOTE PROVIDER - PROVIDER TOKENS
PROVIDER:[TOKEN:[[id number]]],PROVIDER:[TOKEN:[[id number]]],PROVIDER:[TOKEN:[[id number]]],FREE:[LAST:[primary care doctor],PHONE:[(   )    -],FAX:[(   )    -]],PROVIDER:[TOKEN:[[id number]]]

## 2024-12-22 NOTE — PROGRESS NOTE ADULT - PROBLEM SELECTOR PLAN 1
Fast growing mass of L shoulder associated with hot flashes.   - CT chest: 10.9 x6.6 x6 cm lobulated soft tissue mass involving anterior L shoulder- suspicious for sacromatous/neoplastic etiology with additional 2.9cm lobulated lesion noted in R anterior chest wall, cervical lymphadenopathy  - S/p bx on 12/17 w/ Ortho, prelim results c/f lymphoma, Oncology recs pending final path results to determine if inpatient initiation of treatment is recommended; pt may also need bone marrow biopsy; update Oncology once final path results are reported   - CT A/P w/ IV contrast done for staging - shows indeterminate subcutaneous lower chest/upper abdominal wall mass  - Pain control as listed below

## 2024-12-22 NOTE — DISCHARGE NOTE PROVIDER - NSDCFUSCHEDAPPT_GEN_ALL_CORE_FT
Eden Matthews  [facility]  GASTR[facility] [address] [facility]  Scheduled Appointment: 01/27/2025     Black Meier  [facility]  [facility]  Scheduled Appointment: 01/06/2025    Eden Matthews  [facility] [address]  Scheduled Appointment: 01/27/2025     Jeffrey Zaragoza  [facility]  RADMED [address] [facility]  Scheduled Appointment: 12/31/2024    Black Meier  [facility] includes the [facility]  [facility]  Scheduled Appointment: 01/06/2025    Eden Matthews  [facility] includes the [facility]  GASTRO [address] [facility]  Scheduled Appointment: 01/27/2025     Black Meier  [facility]  [facility]  Scheduled Appointment: 01/06/2025    Jeffrey Zaragoza  [facility]  RADMED [address] [facility]  Scheduled Appointment: 01/14/2025    Eden Matthews  [facility]  GASTRO [address] [facility]  Scheduled Appointment: 01/27/2025

## 2024-12-22 NOTE — PROGRESS NOTE ADULT - SUBJECTIVE AND OBJECTIVE BOX
Division of Hospital Medicine  Jere Boudreaux MD  Available via MS Teams    SUBJECTIVE / OVERNIGHT EVENTS: No active issues overnight; pt denies any new symptoms, states shoulder pain is mildly improving     MEDICATIONS  (STANDING):  acetaminophen     Tablet .. 975 milliGRAM(s) Oral every 8 hours  amLODIPine   Tablet 10 milliGRAM(s) Oral daily  ascorbic acid 500 milliGRAM(s) Oral daily  atenolol  Tablet 100 milliGRAM(s) Oral daily  atorvastatin 10 milliGRAM(s) Oral at bedtime  cholecalciferol 1000 Unit(s) Oral daily  dextrose 5%. 1000 milliLiter(s) (100 mL/Hr) IV Continuous <Continuous>  dextrose 5%. 1000 milliLiter(s) (50 mL/Hr) IV Continuous <Continuous>  dextrose 5%. 1000 milliLiter(s) (50 mL/Hr) IV Continuous <Continuous>  dextrose 5%. 1000 milliLiter(s) (100 mL/Hr) IV Continuous <Continuous>  dextrose 50% Injectable 25 Gram(s) IV Push once  dextrose 50% Injectable 12.5 Gram(s) IV Push once  dextrose 50% Injectable 25 Gram(s) IV Push once  dextrose 50% Injectable 25 Gram(s) IV Push once  dextrose 50% Injectable 12.5 Gram(s) IV Push once  dextrose 50% Injectable 25 Gram(s) IV Push once  DULoxetine 60 milliGRAM(s) Oral daily  enoxaparin Injectable 40 milliGRAM(s) SubCutaneous every 12 hours  gabapentin 300 milliGRAM(s) Oral four times a day  glucagon  Injectable 1 milliGRAM(s) IntraMuscular once  glucagon  Injectable 1 milliGRAM(s) IntraMuscular once  insulin lispro (ADMELOG) corrective regimen sliding scale   SubCutaneous at bedtime  insulin lispro (ADMELOG) corrective regimen sliding scale   SubCutaneous three times a day before meals  levothyroxine 75 MICROGram(s) Oral daily  losartan 25 milliGRAM(s) Oral daily  multivitamin 1 Tablet(s) Oral daily  pantoprazole    Tablet 40 milliGRAM(s) Oral before breakfast  polyethylene glycol 3350 17 Gram(s) Oral at bedtime  senna 2 Tablet(s) Oral two times a day  tamsulosin 0.4 milliGRAM(s) Oral at bedtime    MEDICATIONS  (PRN):  dextrose Oral Gel 15 Gram(s) Oral once PRN Blood Glucose LESS THAN 70 milliGRAM(s)/deciliter  dextrose Oral Gel 15 Gram(s) Oral once PRN Blood Glucose LESS THAN 70 milliGRAM(s)/deciliter  diphenhydrAMINE 25 milliGRAM(s) Oral every 6 hours PRN Rash and/or Itching  magnesium hydroxide Suspension 30 milliLiter(s) Oral daily PRN Constipation  naloxone Injectable 0.1 milliGRAM(s) IV Push every 3 minutes PRN For ANY of the following changes in patient status:  A. RR LESS THAN 10 breaths per minute, B. Oxygen saturation LESS THAN 90%, C. Sedation score of 6  ondansetron Injectable 4 milliGRAM(s) IV Push every 6 hours PRN Nausea and/or Vomiting  oxyCODONE    IR 10 milliGRAM(s) Oral every 4 hours PRN Moderate Pain (4 - 6)  oxyCODONE    IR 15 milliGRAM(s) Oral every 4 hours PRN Severe Pain (7 - 10)  sucralfate suspension 1 Gram(s) Oral Once PRN for esophageal pain  tiZANidine 4 milliGRAM(s) Oral <User Schedule> PRN Muscle Spasm  zolpidem 5 milliGRAM(s) Oral at bedtime PRN Insomnia      I&O's Summary    21 Dec 2024 07:01  -  22 Dec 2024 07:00  --------------------------------------------------------  IN: 0 mL / OUT: 0 mL / NET: 0 mL    22 Dec 2024 07:01  -  22 Dec 2024 17:20  --------------------------------------------------------  IN: 0 mL / OUT: 400 mL / NET: -400 mL        PHYSICAL EXAM:  Vital Signs Last 24 Hrs  T(C): 37.1 (22 Dec 2024 13:34), Max: 37.1 (22 Dec 2024 09:32)  T(F): 98.8 (22 Dec 2024 13:34), Max: 98.8 (22 Dec 2024 13:34)  HR: 73 (22 Dec 2024 13:34) (57 - 85)  BP: 138/73 (22 Dec 2024 13:34) (118/65 - 156/95)  BP(mean): --  RR: 18 (22 Dec 2024 13:34) (16 - 18)  SpO2: 99% (22 Dec 2024 13:34) (94% - 99%)    Parameters below as of 22 Dec 2024 13:34  Patient On (Oxygen Delivery Method): room air      CONSTITUTIONAL: NAD  EYES: PERRLA; conjunctiva and sclera clear  ENMT: Moist oral mucosa, no pharyngeal injection or exudates; normal dentition  NECK: Supple, no palpable masses; no thyromegaly  RESPIRATORY: Normal respiratory effort; lungs are clear to auscultation bilaterally; no rales, rhonchi, or wheezing  CARDIOVASCULAR: Regular rate and rhythm, normal S1 and S2, no murmur/rub/gallop; Peripheral pulses are 2+ bilaterally  ABDOMEN: Distended, Nontender to palpation, normoactive bowel sounds, no rebound/guarding; No hepatosplenomegaly  MUSCULOSKELETAL: no clubbing or cyanosis of digits; L shoulder erythema with bandage present   NEUROLOGY: Awake and alert to person, place, and date; no focal neurological deficits   SKIN: No rashes; no palpable lesions    LABS:                        14.4   6.40  )-----------( 154      ( 21 Dec 2024 06:35 )             41.2     12-21    135  |  98  |  19  ----------------------------<  108[H]  4.2   |  23  |  0.69    Ca    9.3      21 Dec 2024 06:35  Phos  3.8     12-21  Mg     2.00     12-21            Urinalysis Basic - ( 21 Dec 2024 06:35 )    Color: x / Appearance: x / SG: x / pH: x  Gluc: 108 mg/dL / Ketone: x  / Bili: x / Urobili: x   Blood: x / Protein: x / Nitrite: x   Leuk Esterase: x / RBC: x / WBC x   Sq Epi: x / Non Sq Epi: x / Bacteria: x            Imaging and consultant notes reviewed.

## 2024-12-22 NOTE — DISCHARGE NOTE PROVIDER - CARE PROVIDER_API CALL
Meka Ma  Hospice/Palliative Medicine  [address] [place] [address]  Phone: [phone]  Fax: [phone]  Follow Up Time:     Black Meeir  Medical Oncology  [address] [place] [address]  Phone: [phone]  Fax: [phone]  Follow Up Time:     Eden Matthews  Gastroenterology  [address] [place] [id number]  Phone: [phone]  Fax: [phone]  Follow Up Time:     primary care doctor,   Phone: (   )    -  Fax: (   )    -  Follow Up Time:     Kimberley Tracy  Radiation Oncology  [address] [place] [address]  Phone: [phone]  Fax: [phone]  Follow Up Time:

## 2024-12-22 NOTE — DISCHARGE NOTE PROVIDER - NSDCFUADDAPPT_GEN_ALL_CORE_FT
Follow up with pain management Dr. Neil GONZALEZ in one week    Follow up with PCP Dr. Rico in one week    Pt scheduled for hematology follow up with Dr. Meier on 1/6/25 at 10:00am- you need a referral to a radiation oncologist. You will need to continue chemotherapy in 3 weeks    Dr. Mcknight, Dr. Dena Campbell, Bella Marley NP, Sylvie Jacobs NP    [facility], [facility]  Phone: [phone]  [address] [place] [address].

## 2024-12-22 NOTE — DISCHARGE NOTE PROVIDER - CARE PROVIDERS DIRECT ADDRESSES
,DirectAddress_Unknown,[email or web address],DirectAddress_Unknown,[email or web address]

## 2024-12-22 NOTE — DISCHARGE NOTE PROVIDER - HOSPITAL COURSE
HPI:    Patient is a 64y old  Male who presents with a chief complaint of L shoulder mass and pain. Notes that he started to have L shoulder pain 8wks ago, and 6 wks ago he started to notice a lesion on his L shoulder. This has grown over time and has changed color. Pt now has a large mass and erythematous area over his L shoulder. Pt is able to range L shoulder but ROM has become more painful. Pt presented to Dr. Cruz's office today and was sent in for this mass to undergo full onc work up and L shoulder mass biopsy 12/17.    Of note, pt has hx DM, chronic back pain, and multiple abdominal surgeries. Pt has not hx of malignancy and has never received chemo or radiation.      Hospital course:  	  63 yo M with PMH hiatal hernia s/p fundoplication, prior SBO s/p multiple abdominal surgeries, T2DM, chronic back pain, and NANCY (not on CPAP) initially admitted to orthopedics for biopsy of enlarging painful L anterior shoulder mass, now s/p biopsy 12/17 with preliminary results suspicious for lymphoma. Pending final results for potential inpatient initiation of treatment, heme/onc following. Medicine consulted for transfer to medicine service.      Problem/Plan - 1:  •? Problem: Mass of soft tissue of shoulder.   •? Plan: Fast growing mass of L shoulder associated with hot flashes.   - CT chest: 10.9 x6.6 x6 cm lobulated soft tissue mass involving anterior L shoulder- suspicious for sacromatous/neoplastic etiology with additional 2.9cm lobulated lesion noted in R anterior chest wall, cervical lymphadenopathy  - S/p bx on 12/17 w/ Ortho, prelim results c/f lymphoma, Oncology recs pending final path results to determine if inpatient initiation of treatment is recommended; pt may also need bone marrow biopsy; update Oncology once final path results are reported   - CT A/P w/ IV contrast done for staging - shows indeterminate subcutaneous lower chest/upper abdominal wall mass  - Pain control as listed below.     Problem/Plan - 2:  •? Problem: Chronic back pain.   •? Plan: Hx of chronic lower back pain s/p lumbar surgery on chronic opioids   - Cont current regimen: gabapentin, duloxetine 60mg daily, tylenol, tizanidine 4mg BID PRN  - Increased oxycodone IR to 10 and 15 mg PRN for moderate and severe pain as per palliative recs  - If not effective, and patient is tolerating without side effects, can increase to oxycodone IR 15-20 mg q4h PRN  - continue bowel regimen with senna and miralax.     Problem/Plan - 3:  •? Problem: Benign essential HTN.   •? Plan: - Continue home amlodipine, atenolol, and losartan  - monitor vitals q4h.     Problem/Plan - 4:  •? Problem: Hyperlipidemia.   •? Plan: - Continue atorvastatin 10 mg at bedtime.     Problem/Plan - 5:  •? Problem: Hypothyroidism.   •? Plan: - Continue synthroid 75 mcg daily before breakfast.     Problem/Plan - 6:  •? Problem: Prophylactic measure.   •? Plan: DVT ppx: Lovenox s/c 40 mg q12h as BMI > 40  PT/OT: No skilled needs    Dispo: Home pending final pathology results and Oncology recommendations.      64M with PMH hiatal hernia s/p fundoplication, prior SBO s/p multiple abdominal surgeries, T2DM, chronic back pain, and NANCY (not on CPAP) initially admitted to orthopedics for biopsy of enlarging painful L anterior shoulder mass, now s/p biopsy 12/17 with preliminary results suspicious for lymphoma., confirmed DCBL, now undergoing oncology work-up and treatment.    Mass of soft tissue of shoulder.   : Fast growing mass of L shoulder associated with hot flashes.   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  Chronic back pain.    Hx of chronic lower back pain s/p lumbar surgery on chronic opioids   - Cont current regimen: gabapentin, duloxetine 60mg daily, tylenol, tizanidine 4mg BID PRN  -  continue OXy ER 40 mg q 12 hrs and oxycodone IR  15 and 20 mg PRN for moderate and severe pain as per palliative recs  - continue bowel regimen with senna and miralax.     Benign essential HTN.    - pt on  home amlodipine, atenolol, and losartan  pt noted to be hypotensive this morning   will monitore BP, if still ow , will hold amlodipine for 2-3 days ot until f/u with PMD   - monitor vitals q4h.     Hyperlipidemia.   ·- Continue atorvastatin 10 mg at bedtime.    : Hypothyroidism.   - Continue synthroid 75 mcg daily before breakfast.    : Anxiety.  mood stable  c/w current medications.    PT/OT: No skilled needs  Dispo: Home pending PA for oxy  Patient hemodynamically stable for discharge home  Time spent in discharge process is 36 min  Plan of care d/w pt and family  case d/w ACP.      Electronic Signatures:  Samuel Guerrero)  (Signed 31-Dec-2024 14:54)  	Authored: Progress Note, Reason for Admission, Subjective and Objective, Assessment and Plan          Dispo: Home pending final pathology results and Oncology recommendations.

## 2024-12-22 NOTE — DISCHARGE NOTE PROVIDER - NSDCMRMEDTOKEN_GEN_ALL_CORE_FT
Ambien 5 mg oral tablet: 1 tab(s) orally once a day (at bedtime), As Needed  amLODIPine 10 mg oral tablet: 1 tab(s) orally once a day, am  amoxicillin-clavulanate 875 mg-125 mg oral tablet: 1 tab(s) orally 2 times a day  amoxicillin-clavulanate 875 mg-125 mg oral tablet: 1 tab(s) orally 2 times a day  amoxicillin-clavulanate 875 mg-125 mg oral tablet: 875 milligram(s) orally 2 times a day  ascorbic acid 500 mg oral tablet: 1 tab(s) orally once a day  atenolol 100 mg oral tablet: 1 tab(s) orally once a day, am  atorvastatin 10 mg oral tablet: 1 tab(s) orally once a day (in the evening)  B Complex 50 oral tablet, extended release: 1 tab(s) orally once a day  Carafate 1 g/10 mL oral suspension: 10 milliliter(s) orally 4 times a day as needed for  esophageal pain  cholecalciferol 25 mcg (1000 intl units) oral tablet: 1 tab(s) orally once a day  Dilaudid 4 mg oral tablet: 1 tab(s) orally every 3-4 hours, As Needed for moderate to severe pain MDD:8  DULoxetine 60 mg oral delayed release capsule: 1 cap(s) orally 2 times a day  FIRST Mouthwash BLM mucous membrane suspension: 30 milliliter(s) mucous membrane 2 times a day as needed  gabapentin 300 mg oral tablet: 1 cap(s) orally 4 times a day  levothyroxine 75 mcg (0.075 mg) oral tablet: 1 tab(s) orally once a day, am  losartan 25 mg oral tablet: 1 tab(s) orally once a day, am  metFORMIN 1000 mg oral tablet: 1 tab(s) orally 2 times a day  Multiple Vitamins oral tablet: 1 tab(s) orally once a day  oxyCODONE 5 mg oral tablet: 1 tab(s) orally every 6 hours MDD: 4  pantoprazole 40 mg oral delayed release tablet: 1 tab(s) orally 2 times a day  sodium chloride 0.65% nasal spray: 1 spray(s) nasal every 2 minutes, As Needed for nasal congestion  tamsulosin 0.4 mg oral capsule: 1 cap(s) orally once a day (in the evening)  tiZANidine 4 mg oral capsule: 1 cap(s) orally 2 times a day, As Needed   Ambien 5 mg oral tablet: 1 tab(s) orally once a day (at bedtime), As Needed  amLODIPine 10 mg oral tablet: 1 tab(s) orally once a day, am  amoxicillin-clavulanate 875 mg-125 mg oral tablet: 1 tab(s) orally 2 times a day  amoxicillin-clavulanate 875 mg-125 mg oral tablet: 1 tab(s) orally 2 times a day  amoxicillin-clavulanate 875 mg-125 mg oral tablet: 875 milligram(s) orally 2 times a day  ascorbic acid 500 mg oral tablet: 1 tab(s) orally once a day  atenolol 100 mg oral tablet: 1 tab(s) orally once a day, am  atorvastatin 10 mg oral tablet: 1 tab(s) orally once a day (in the evening)  B Complex 50 oral tablet, extended release: 1 tab(s) orally once a day  Carafate 1 g/10 mL oral suspension: 10 milliliter(s) orally 4 times a day as needed for  esophageal pain  cholecalciferol 25 mcg (1000 intl units) oral tablet: 1 tab(s) orally once a day  Dilaudid 4 mg oral tablet: 1 tab(s) orally every 3-4 hours, As Needed for moderate to severe pain MDD:8  DULoxetine 60 mg oral delayed release capsule: 1 cap(s) orally 2 times a day  gabapentin 300 mg oral tablet: 1 cap(s) orally 4 times a day  levothyroxine 75 mcg (0.075 mg) oral tablet: 1 tab(s) orally once a day, am  losartan 25 mg oral tablet: 1 tab(s) orally once a day, am  metFORMIN 1000 mg oral tablet: 1 tab(s) orally 2 times a day  Multiple Vitamins oral tablet: 1 tab(s) orally once a day  oxyCODONE 5 mg oral tablet: 1 tab(s) orally every 6 hours MDD: 4  pantoprazole 40 mg oral delayed release tablet: 1 tab(s) orally once a day (before a meal)  tamsulosin 0.4 mg oral capsule: 1 cap(s) orally once a day (in the evening)  tiZANidine 4 mg oral capsule: 1 cap(s) orally 2 times a day, As Needed   allopurinol 300 mg oral tablet: 1 tab(s) orally once a day  Ambien 5 mg oral tablet: 1 tab(s) orally once a day (at bedtime), As Needed  amLODIPine 10 mg oral tablet: 1 tab(s) orally once a day, am  ascorbic acid 500 mg oral tablet: 1 tab(s) orally once a day  atenolol 100 mg oral tablet: 1 tab(s) orally once a day, am  atorvastatin 10 mg oral tablet: 1 tab(s) orally once a day (in the evening)  B Complex 50 oral tablet, extended release: 1 tab(s) orally once a day  Carafate 1 g/10 mL oral suspension: 10 milliliter(s) orally 4 times a day as needed for  esophageal pain  cholecalciferol 25 mcg (1000 intl units) oral tablet: 1 tab(s) orally once a day  DULoxetine 60 mg oral delayed release capsule: 1 cap(s) orally once a day  gabapentin 300 mg oral tablet: 1 cap(s) orally 4 times a day  levothyroxine 75 mcg (0.075 mg) oral tablet: 1 tab(s) orally once a day, am  losartan 25 mg oral tablet: 1 tab(s) orally once a day, am  metFORMIN 1000 mg oral tablet: 1 tab(s) orally 2 times a day  Multiple Vitamins oral tablet: 1 tab(s) orally once a day  Narcan 4 mg/0.1 mL nasal spray: 1 spray(s) intranasally every 2 minutes Give every 2 minutes in event of Overdose or until EMS arrives.  OLANZapine 5 mg oral tablet: 1 tab(s) orally once a day (at bedtime)  oxyCODONE 20 mg oral tablet: 1 tab(s) orally every 4 hours as needed for Severe Pain (7 - 10) MDD: 6  oxyCODONE 40 mg oral tablet, extended release: 1 tab(s) orally every 12 hours MDD: 2  pantoprazole 40 mg oral delayed release tablet: 1 tab(s) orally once a day (before a meal)  polyethylene glycol 3350 oral powder for reconstitution: 17 gram(s) orally 2 times a day  senna leaf extract oral tablet: 2 tab(s) orally 2 times a day  tamsulosin 0.4 mg oral capsule: 1 cap(s) orally once a day (in the evening)  tiZANidine 4 mg oral capsule: 1 cap(s) orally 2 times a day, As Needed

## 2024-12-23 ENCOUNTER — RESULT REVIEW (OUTPATIENT)
Age: 64
End: 2024-12-23

## 2024-12-23 ENCOUNTER — TRANSCRIPTION ENCOUNTER (OUTPATIENT)
Age: 64
End: 2024-12-23

## 2024-12-23 DIAGNOSIS — F31.9 BIPOLAR DISORDER, UNSPECIFIED: ICD-10-CM

## 2024-12-23 DIAGNOSIS — R26.2 DIFFICULTY IN WALKING, NOT ELSEWHERE CLASSIFIED: ICD-10-CM

## 2024-12-23 DIAGNOSIS — Z51.5 ENCOUNTER FOR PALLIATIVE CARE: ICD-10-CM

## 2024-12-23 DIAGNOSIS — R52 PAIN, UNSPECIFIED: ICD-10-CM

## 2024-12-23 DIAGNOSIS — Z71.89 OTHER SPECIFIED COUNSELING: ICD-10-CM

## 2024-12-23 LAB
ALBUMIN SERPL ELPH-MCNC: 4.4 G/DL — SIGNIFICANT CHANGE UP (ref 3.3–5)
ALP SERPL-CCNC: 115 U/L — SIGNIFICANT CHANGE UP (ref 40–120)
ALT FLD-CCNC: 38 U/L — SIGNIFICANT CHANGE UP (ref 4–41)
ANION GAP SERPL CALC-SCNC: 15 MMOL/L — HIGH (ref 7–14)
AST SERPL-CCNC: 58 U/L — HIGH (ref 4–40)
BILIRUB SERPL-MCNC: 0.6 MG/DL — SIGNIFICANT CHANGE UP (ref 0.2–1.2)
BUN SERPL-MCNC: 24 MG/DL — HIGH (ref 7–23)
CALCIUM SERPL-MCNC: 9.9 MG/DL — SIGNIFICANT CHANGE UP (ref 8.4–10.5)
CHLORIDE SERPL-SCNC: 98 MMOL/L — SIGNIFICANT CHANGE UP (ref 98–107)
CO2 SERPL-SCNC: 22 MMOL/L — SIGNIFICANT CHANGE UP (ref 22–31)
CREAT SERPL-MCNC: 1.01 MG/DL — SIGNIFICANT CHANGE UP (ref 0.5–1.3)
EGFR: 84 ML/MIN/1.73M2 — SIGNIFICANT CHANGE UP
GLUCOSE BLDC GLUCOMTR-MCNC: 125 MG/DL — HIGH (ref 70–99)
GLUCOSE BLDC GLUCOMTR-MCNC: 132 MG/DL — HIGH (ref 70–99)
GLUCOSE SERPL-MCNC: 142 MG/DL — HIGH (ref 70–99)
LDH SERPL L TO P-CCNC: 377 U/L — HIGH (ref 135–225)
MAGNESIUM SERPL-MCNC: 2 MG/DL — SIGNIFICANT CHANGE UP (ref 1.6–2.6)
PHOSPHATE SERPL-MCNC: 4.5 MG/DL — SIGNIFICANT CHANGE UP (ref 2.5–4.5)
POTASSIUM SERPL-MCNC: 4.4 MMOL/L — SIGNIFICANT CHANGE UP (ref 3.5–5.3)
POTASSIUM SERPL-SCNC: 4.4 MMOL/L — SIGNIFICANT CHANGE UP (ref 3.5–5.3)
PROT SERPL-MCNC: 7.6 G/DL — SIGNIFICANT CHANGE UP (ref 6–8.3)
SODIUM SERPL-SCNC: 135 MMOL/L — SIGNIFICANT CHANGE UP (ref 135–145)
URATE SERPL-MCNC: 6.7 MG/DL — SIGNIFICANT CHANGE UP (ref 3.4–8.8)

## 2024-12-23 PROCEDURE — 93306 TTE W/DOPPLER COMPLETE: CPT | Mod: 26

## 2024-12-23 PROCEDURE — 99497 ADVNCD CARE PLAN 30 MIN: CPT | Mod: 25

## 2024-12-23 PROCEDURE — 90792 PSYCH DIAG EVAL W/MED SRVCS: CPT

## 2024-12-23 PROCEDURE — 99233 SBSQ HOSP IP/OBS HIGH 50: CPT | Mod: GC

## 2024-12-23 PROCEDURE — 99232 SBSQ HOSP IP/OBS MODERATE 35: CPT

## 2024-12-23 PROCEDURE — 99223 1ST HOSP IP/OBS HIGH 75: CPT

## 2024-12-23 RX ORDER — ALLOPURINOL 100 MG/1
300 TABLET ORAL DAILY
Refills: 0 | Status: DISCONTINUED | OUTPATIENT
Start: 2024-12-23 | End: 2024-12-31

## 2024-12-23 RX ORDER — OXYCODONE HCL 15 MG
15 TABLET ORAL EVERY 4 HOURS
Refills: 0 | Status: DISCONTINUED | OUTPATIENT
Start: 2024-12-23 | End: 2024-12-26

## 2024-12-23 RX ORDER — POLYETHYLENE GLYCOL 3350 17 G/DOSE
17 POWDER (GRAM) ORAL
Refills: 0 | Status: DISCONTINUED | OUTPATIENT
Start: 2024-12-23 | End: 2024-12-31

## 2024-12-23 RX ORDER — PANTOPRAZOLE 40 MG/1
40 TABLET, DELAYED RELEASE ORAL
Refills: 0 | Status: DISCONTINUED | OUTPATIENT
Start: 2024-12-23 | End: 2024-12-31

## 2024-12-23 RX ORDER — OXYCODONE HCL 15 MG
20 TABLET ORAL EVERY 4 HOURS
Refills: 0 | Status: DISCONTINUED | OUTPATIENT
Start: 2024-12-23 | End: 2024-12-26

## 2024-12-23 RX ORDER — PREDNISONE 5 MG
100 TABLET ORAL DAILY
Refills: 0 | Status: COMPLETED | OUTPATIENT
Start: 2024-12-23 | End: 2024-12-28

## 2024-12-23 RX ORDER — OXYCODONE HCL 15 MG
20 TABLET ORAL EVERY 4 HOURS
Refills: 0 | Status: DISCONTINUED | OUTPATIENT
Start: 2024-12-23 | End: 2024-12-23

## 2024-12-23 RX ORDER — OLANZAPINE 15 MG/1
2.5 TABLET ORAL AT BEDTIME
Refills: 0 | Status: DISCONTINUED | OUTPATIENT
Start: 2024-12-23 | End: 2024-12-27

## 2024-12-23 RX ADMIN — Medication 15 MILLIGRAM(S): at 10:50

## 2024-12-23 RX ADMIN — LEVOTHYROXINE SODIUM 75 MICROGRAM(S): 175 TABLET ORAL at 05:41

## 2024-12-23 RX ADMIN — LOSARTAN POTASSIUM 25 MILLIGRAM(S): 100 TABLET, FILM COATED ORAL at 05:41

## 2024-12-23 RX ADMIN — Medication 100 MILLIGRAM(S): at 18:53

## 2024-12-23 RX ADMIN — GABAPENTIN 300 MILLIGRAM(S): 300 CAPSULE ORAL at 18:53

## 2024-12-23 RX ADMIN — Medication 20 MILLIGRAM(S): at 20:32

## 2024-12-23 RX ADMIN — Medication 15 MILLIGRAM(S): at 05:40

## 2024-12-23 RX ADMIN — ATORVASTATIN CALCIUM 10 MILLIGRAM(S): 40 TABLET, FILM COATED ORAL at 21:23

## 2024-12-23 RX ADMIN — GABAPENTIN 300 MILLIGRAM(S): 300 CAPSULE ORAL at 05:41

## 2024-12-23 RX ADMIN — ACETAMINOPHEN 975 MILLIGRAM(S): 80 SOLUTION/ DROPS ORAL at 05:40

## 2024-12-23 RX ADMIN — OLANZAPINE 2.5 MILLIGRAM(S): 15 TABLET ORAL at 21:22

## 2024-12-23 RX ADMIN — Medication 10 MILLIGRAM(S): at 05:41

## 2024-12-23 RX ADMIN — Medication 1 TABLET(S): at 12:23

## 2024-12-23 RX ADMIN — ONDANSETRON 4 MILLIGRAM(S): 4 TABLET ORAL at 09:54

## 2024-12-23 RX ADMIN — GABAPENTIN 300 MILLIGRAM(S): 300 CAPSULE ORAL at 12:29

## 2024-12-23 RX ADMIN — Medication 15 MILLIGRAM(S): at 00:40

## 2024-12-23 RX ADMIN — SENNOSIDES 2 TABLET(S): 8.6 TABLET, FILM COATED ORAL at 18:55

## 2024-12-23 RX ADMIN — ALLOPURINOL 300 MILLIGRAM(S): 100 TABLET ORAL at 19:18

## 2024-12-23 RX ADMIN — Medication 500 MILLIGRAM(S): at 12:22

## 2024-12-23 RX ADMIN — ENOXAPARIN SODIUM 40 MILLIGRAM(S): 60 INJECTION INTRAVENOUS; SUBCUTANEOUS at 18:56

## 2024-12-23 RX ADMIN — Medication 17 GRAM(S): at 18:55

## 2024-12-23 RX ADMIN — ENOXAPARIN SODIUM 40 MILLIGRAM(S): 60 INJECTION INTRAVENOUS; SUBCUTANEOUS at 05:40

## 2024-12-23 RX ADMIN — ACETAMINOPHEN 975 MILLIGRAM(S): 80 SOLUTION/ DROPS ORAL at 22:27

## 2024-12-23 RX ADMIN — ACETAMINOPHEN 975 MILLIGRAM(S): 80 SOLUTION/ DROPS ORAL at 21:23

## 2024-12-23 RX ADMIN — Medication 15 MILLIGRAM(S): at 06:41

## 2024-12-23 RX ADMIN — ACETAMINOPHEN 975 MILLIGRAM(S): 80 SOLUTION/ DROPS ORAL at 15:29

## 2024-12-23 RX ADMIN — Medication 20 MILLIGRAM(S): at 16:27

## 2024-12-23 RX ADMIN — Medication 100 MILLIGRAM(S): at 05:40

## 2024-12-23 RX ADMIN — Medication 15 MILLIGRAM(S): at 09:53

## 2024-12-23 RX ADMIN — TAMSULOSIN HYDROCHLORIDE 0.4 MILLIGRAM(S): 0.4 CAPSULE ORAL at 21:22

## 2024-12-23 RX ADMIN — Medication 20 MILLIGRAM(S): at 15:27

## 2024-12-23 RX ADMIN — Medication 25 MILLIGRAM(S): at 23:05

## 2024-12-23 RX ADMIN — Medication 1000 UNIT(S): at 12:22

## 2024-12-23 RX ADMIN — SENNOSIDES 2 TABLET(S): 8.6 TABLET, FILM COATED ORAL at 05:41

## 2024-12-23 RX ADMIN — DULOXETINE HYDROCHLORIDE 60 MILLIGRAM(S): 30 CAPSULE, DELAYED RELEASE ORAL at 12:21

## 2024-12-23 RX ADMIN — Medication 5 MILLIGRAM(S): at 00:13

## 2024-12-23 RX ADMIN — Medication 20 MILLIGRAM(S): at 21:27

## 2024-12-23 RX ADMIN — ACETAMINOPHEN 975 MILLIGRAM(S): 80 SOLUTION/ DROPS ORAL at 16:30

## 2024-12-23 RX ADMIN — ACETAMINOPHEN 975 MILLIGRAM(S): 80 SOLUTION/ DROPS ORAL at 06:01

## 2024-12-23 RX ADMIN — PANTOPRAZOLE 40 MILLIGRAM(S): 40 TABLET, DELAYED RELEASE ORAL at 05:41

## 2024-12-23 NOTE — PROGRESS NOTE ADULT - PROBLEM SELECTOR PLAN 1
Fast growing mass of L shoulder associated with hot flashes.   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

## 2024-12-23 NOTE — PROGRESS NOTE ADULT - SUBJECTIVE AND OBJECTIVE BOX
INTERVAL HPI/OVERNIGHT EVENTS:  Patient S&E at bedside. No o/n events,     REVIEW OF SYSTEMS  General: No fevers, no chills, no fatigue, no weight loss  Skin: No rash  ENMT: No sore throat, no dysphagia, no mouth sores	  Respiratory and Thorax: No dyspnea, no cough, no wheezing  Cardiovascular: No chest pain, no palpitations  Gastrointestinal:	No N/V/D, no abdominal pain  Genitourinary: No dysuria  Musculoskeletal:	No joint pain, no muscle pain  Neurological:	No dizziness, no neuropathy  Psychiatric: No depression or anxiety  Hematology/Lymphatics: No easy bleeding or bruising, no swollen nodes noticed.       VITAL SIGNS:  T(F): 98.1 (12-23-24 @ 09:27)  HR: 56 (12-23-24 @ 09:27)  BP: 130/70 (12-23-24 @ 09:27)  RR: 18 (12-23-24 @ 09:27)  SpO2: 94% (12-23-24 @ 09:27)  Wt(kg): --    PHYSICAL EXAM:    Constitutional: NAD  Eyes: EOMI, sclera non-icteric  Neck: supple, no masses, no JVD  Respiratory: CTA b/l, good air entry b/l  Cardiovascular: RRR, no M/R/G  Gastrointestinal: soft, NTND, no masses palpable, + BS, no hepatosplenomegaly  Extremities: no c/c/e  Neurological: AAOx3      MEDICATIONS  (STANDING):  acetaminophen     Tablet .. 975 milliGRAM(s) Oral every 8 hours  amLODIPine   Tablet 10 milliGRAM(s) Oral daily  ascorbic acid 500 milliGRAM(s) Oral daily  atenolol  Tablet 100 milliGRAM(s) Oral daily  atorvastatin 10 milliGRAM(s) Oral at bedtime  cholecalciferol 1000 Unit(s) Oral daily  dextrose 5%. 1000 milliLiter(s) (100 mL/Hr) IV Continuous <Continuous>  dextrose 5%. 1000 milliLiter(s) (50 mL/Hr) IV Continuous <Continuous>  dextrose 5%. 1000 milliLiter(s) (50 mL/Hr) IV Continuous <Continuous>  dextrose 5%. 1000 milliLiter(s) (100 mL/Hr) IV Continuous <Continuous>  dextrose 50% Injectable 25 Gram(s) IV Push once  dextrose 50% Injectable 12.5 Gram(s) IV Push once  dextrose 50% Injectable 25 Gram(s) IV Push once  dextrose 50% Injectable 25 Gram(s) IV Push once  dextrose 50% Injectable 12.5 Gram(s) IV Push once  dextrose 50% Injectable 25 Gram(s) IV Push once  DULoxetine 60 milliGRAM(s) Oral daily  enoxaparin Injectable 40 milliGRAM(s) SubCutaneous every 12 hours  gabapentin 300 milliGRAM(s) Oral four times a day  glucagon  Injectable 1 milliGRAM(s) IntraMuscular once  glucagon  Injectable 1 milliGRAM(s) IntraMuscular once  insulin lispro (ADMELOG) corrective regimen sliding scale   SubCutaneous at bedtime  insulin lispro (ADMELOG) corrective regimen sliding scale   SubCutaneous three times a day before meals  levothyroxine 75 MICROGram(s) Oral daily  losartan 25 milliGRAM(s) Oral daily  multivitamin 1 Tablet(s) Oral daily  pantoprazole    Tablet 40 milliGRAM(s) Oral before breakfast  polyethylene glycol 3350 17 Gram(s) Oral two times a day  senna 2 Tablet(s) Oral two times a day  tamsulosin 0.4 milliGRAM(s) Oral at bedtime    MEDICATIONS  (PRN):  dextrose Oral Gel 15 Gram(s) Oral once PRN Blood Glucose LESS THAN 70 milliGRAM(s)/deciliter  dextrose Oral Gel 15 Gram(s) Oral once PRN Blood Glucose LESS THAN 70 milliGRAM(s)/deciliter  diphenhydrAMINE 25 milliGRAM(s) Oral every 6 hours PRN Rash and/or Itching  magnesium hydroxide Suspension 30 milliLiter(s) Oral daily PRN Constipation  naloxone Injectable 0.1 milliGRAM(s) IV Push every 3 minutes PRN For ANY of the following changes in patient status:  A. RR LESS THAN 10 breaths per minute, B. Oxygen saturation LESS THAN 90%, C. Sedation score of 6  ondansetron Injectable 4 milliGRAM(s) IV Push every 6 hours PRN Nausea and/or Vomiting  oxyCODONE    IR 20 milliGRAM(s) Oral every 4 hours PRN Moderate Pain (4 - 6)  oxyCODONE    IR 15 milliGRAM(s) Oral every 4 hours PRN Moderate Pain (4 - 6)  sucralfate suspension 1 Gram(s) Oral Once PRN for esophageal pain  tiZANidine 4 milliGRAM(s) Oral <User Schedule> PRN Muscle Spasm  zolpidem 5 milliGRAM(s) Oral at bedtime PRN Insomnia      Allergies    No Known Allergies    Intolerances    IV Contrast (Nausea)      LABS:                RADIOLOGY & ADDITIONAL TESTS:  Studies reviewed.   INTERVAL HPI/OVERNIGHT EVENTS:  Patient S&E at bedside. No o/n events, pt states he has ongoing shoulder pain.     REVIEW OF SYSTEMS  General: No fevers, no chills, no fatigue, no weight loss  Skin: No rash  ENMT: No sore throat, no dysphagia, no mouth sores	  Respiratory and Thorax: No dyspnea, no cough, no wheezing  Cardiovascular: No chest pain, no palpitations  Gastrointestinal:	No N/V/D, no abdominal pain  Genitourinary: No dysuria  Musculoskeletal:	No joint pain, no muscle pain  Neurological:	No dizziness, no neuropathy  Psychiatric: No depression or anxiety  Hematology/Lymphatics: +ecchymotic and painful shoulder mass      VITAL SIGNS:  T(F): 98.1 (12-23-24 @ 09:27)  HR: 56 (12-23-24 @ 09:27)  BP: 130/70 (12-23-24 @ 09:27)  RR: 18 (12-23-24 @ 09:27)  SpO2: 94% (12-23-24 @ 09:27)  Wt(kg): --    PHYSICAL EXAM:    Constitutional: NAD  Eyes: EOMI, sclera non-icteric  Chest: palpable   Neck: supple, no masses, no JVD  Respiratory: CTA b/l, good air entry b/l  Cardiovascular: RRR, no M/R/G  Gastrointestinal: soft, NTND, no masses palpable, + BS, no hepatosplenomegaly  Extremities: Ecchymotic, swollen R shoulder mass ~12 cm and increased in size  Neurological: AAOx3      MEDICATIONS  (STANDING):  acetaminophen     Tablet .. 975 milliGRAM(s) Oral every 8 hours  amLODIPine   Tablet 10 milliGRAM(s) Oral daily  ascorbic acid 500 milliGRAM(s) Oral daily  atenolol  Tablet 100 milliGRAM(s) Oral daily  atorvastatin 10 milliGRAM(s) Oral at bedtime  cholecalciferol 1000 Unit(s) Oral daily  dextrose 5%. 1000 milliLiter(s) (100 mL/Hr) IV Continuous <Continuous>  dextrose 5%. 1000 milliLiter(s) (50 mL/Hr) IV Continuous <Continuous>  dextrose 5%. 1000 milliLiter(s) (50 mL/Hr) IV Continuous <Continuous>  dextrose 5%. 1000 milliLiter(s) (100 mL/Hr) IV Continuous <Continuous>  dextrose 50% Injectable 25 Gram(s) IV Push once  dextrose 50% Injectable 12.5 Gram(s) IV Push once  dextrose 50% Injectable 25 Gram(s) IV Push once  dextrose 50% Injectable 25 Gram(s) IV Push once  dextrose 50% Injectable 12.5 Gram(s) IV Push once  dextrose 50% Injectable 25 Gram(s) IV Push once  DULoxetine 60 milliGRAM(s) Oral daily  enoxaparin Injectable 40 milliGRAM(s) SubCutaneous every 12 hours  gabapentin 300 milliGRAM(s) Oral four times a day  glucagon  Injectable 1 milliGRAM(s) IntraMuscular once  glucagon  Injectable 1 milliGRAM(s) IntraMuscular once  insulin lispro (ADMELOG) corrective regimen sliding scale   SubCutaneous at bedtime  insulin lispro (ADMELOG) corrective regimen sliding scale   SubCutaneous three times a day before meals  levothyroxine 75 MICROGram(s) Oral daily  losartan 25 milliGRAM(s) Oral daily  multivitamin 1 Tablet(s) Oral daily  pantoprazole    Tablet 40 milliGRAM(s) Oral before breakfast  polyethylene glycol 3350 17 Gram(s) Oral two times a day  senna 2 Tablet(s) Oral two times a day  tamsulosin 0.4 milliGRAM(s) Oral at bedtime    MEDICATIONS  (PRN):  dextrose Oral Gel 15 Gram(s) Oral once PRN Blood Glucose LESS THAN 70 milliGRAM(s)/deciliter  dextrose Oral Gel 15 Gram(s) Oral once PRN Blood Glucose LESS THAN 70 milliGRAM(s)/deciliter  diphenhydrAMINE 25 milliGRAM(s) Oral every 6 hours PRN Rash and/or Itching  magnesium hydroxide Suspension 30 milliLiter(s) Oral daily PRN Constipation  naloxone Injectable 0.1 milliGRAM(s) IV Push every 3 minutes PRN For ANY of the following changes in patient status:  A. RR LESS THAN 10 breaths per minute, B. Oxygen saturation LESS THAN 90%, C. Sedation score of 6  ondansetron Injectable 4 milliGRAM(s) IV Push every 6 hours PRN Nausea and/or Vomiting  oxyCODONE    IR 20 milliGRAM(s) Oral every 4 hours PRN Moderate Pain (4 - 6)  oxyCODONE    IR 15 milliGRAM(s) Oral every 4 hours PRN Moderate Pain (4 - 6)  sucralfate suspension 1 Gram(s) Oral Once PRN for esophageal pain  tiZANidine 4 milliGRAM(s) Oral <User Schedule> PRN Muscle Spasm  zolpidem 5 milliGRAM(s) Oral at bedtime PRN Insomnia      Allergies    No Known Allergies    Intolerances    IV Contrast (Nausea)      LABS:                RADIOLOGY & ADDITIONAL TESTS:  Studies reviewed.   INTERVAL HPI/OVERNIGHT EVENTS:  Patient S&E at bedside. No o/n events, pt states he has ongoing shoulder pain.     REVIEW OF SYSTEMS  General: No fevers, no chills, no fatigue, no weight loss  Skin: No rash  ENMT: No sore throat, no dysphagia, no mouth sores	  Respiratory and Thorax: No dyspnea, no cough, no wheezing  Cardiovascular: No chest pain, no palpitations  Gastrointestinal:	No N/V/D, no abdominal pain  Genitourinary: No dysuria  Musculoskeletal:	No joint pain, no muscle pain  Neurological:	No dizziness, no neuropathy  Psychiatric: No depression or anxiety  Hematology/Lymphatics: +ecchymotic and painful shoulder mass      VITAL SIGNS:  T(F): 98.1 (12-23-24 @ 09:27)  HR: 56 (12-23-24 @ 09:27)  BP: 130/70 (12-23-24 @ 09:27)  RR: 18 (12-23-24 @ 09:27)  SpO2: 94% (12-23-24 @ 09:27)  Wt(kg): --    PHYSICAL EXAM:    Constitutional: NAD  Eyes: EOMI, sclera non-icteric  Chest: palpable   Neck: supple, no masses, no JVD  Respiratory: CTA b/l, good air entry b/l  Cardiovascular: RRR, no M/R/G  Gastrointestinal: soft, NTND, no masses palpable, + BS, no hepatosplenomegaly  Extremities: Ecchymotic, swollen R shoulder mass ~15 cm and increased in size  Neurological: AAOx3      MEDICATIONS  (STANDING):  acetaminophen     Tablet .. 975 milliGRAM(s) Oral every 8 hours  amLODIPine   Tablet 10 milliGRAM(s) Oral daily  ascorbic acid 500 milliGRAM(s) Oral daily  atenolol  Tablet 100 milliGRAM(s) Oral daily  atorvastatin 10 milliGRAM(s) Oral at bedtime  cholecalciferol 1000 Unit(s) Oral daily  dextrose 5%. 1000 milliLiter(s) (100 mL/Hr) IV Continuous <Continuous>  dextrose 5%. 1000 milliLiter(s) (50 mL/Hr) IV Continuous <Continuous>  dextrose 5%. 1000 milliLiter(s) (50 mL/Hr) IV Continuous <Continuous>  dextrose 5%. 1000 milliLiter(s) (100 mL/Hr) IV Continuous <Continuous>  dextrose 50% Injectable 25 Gram(s) IV Push once  dextrose 50% Injectable 12.5 Gram(s) IV Push once  dextrose 50% Injectable 25 Gram(s) IV Push once  dextrose 50% Injectable 25 Gram(s) IV Push once  dextrose 50% Injectable 12.5 Gram(s) IV Push once  dextrose 50% Injectable 25 Gram(s) IV Push once  DULoxetine 60 milliGRAM(s) Oral daily  enoxaparin Injectable 40 milliGRAM(s) SubCutaneous every 12 hours  gabapentin 300 milliGRAM(s) Oral four times a day  glucagon  Injectable 1 milliGRAM(s) IntraMuscular once  glucagon  Injectable 1 milliGRAM(s) IntraMuscular once  insulin lispro (ADMELOG) corrective regimen sliding scale   SubCutaneous at bedtime  insulin lispro (ADMELOG) corrective regimen sliding scale   SubCutaneous three times a day before meals  levothyroxine 75 MICROGram(s) Oral daily  losartan 25 milliGRAM(s) Oral daily  multivitamin 1 Tablet(s) Oral daily  pantoprazole    Tablet 40 milliGRAM(s) Oral before breakfast  polyethylene glycol 3350 17 Gram(s) Oral two times a day  senna 2 Tablet(s) Oral two times a day  tamsulosin 0.4 milliGRAM(s) Oral at bedtime    MEDICATIONS  (PRN):  dextrose Oral Gel 15 Gram(s) Oral once PRN Blood Glucose LESS THAN 70 milliGRAM(s)/deciliter  dextrose Oral Gel 15 Gram(s) Oral once PRN Blood Glucose LESS THAN 70 milliGRAM(s)/deciliter  diphenhydrAMINE 25 milliGRAM(s) Oral every 6 hours PRN Rash and/or Itching  magnesium hydroxide Suspension 30 milliLiter(s) Oral daily PRN Constipation  naloxone Injectable 0.1 milliGRAM(s) IV Push every 3 minutes PRN For ANY of the following changes in patient status:  A. RR LESS THAN 10 breaths per minute, B. Oxygen saturation LESS THAN 90%, C. Sedation score of 6  ondansetron Injectable 4 milliGRAM(s) IV Push every 6 hours PRN Nausea and/or Vomiting  oxyCODONE    IR 20 milliGRAM(s) Oral every 4 hours PRN Moderate Pain (4 - 6)  oxyCODONE    IR 15 milliGRAM(s) Oral every 4 hours PRN Moderate Pain (4 - 6)  sucralfate suspension 1 Gram(s) Oral Once PRN for esophageal pain  tiZANidine 4 milliGRAM(s) Oral <User Schedule> PRN Muscle Spasm  zolpidem 5 milliGRAM(s) Oral at bedtime PRN Insomnia      Allergies    No Known Allergies    Intolerances  IV Contrast (Nausea)    12/23/24 - CMP reviewed; Ca 9.9, Creat 1.01

## 2024-12-23 NOTE — CONSULT NOTE ADULT - ASSESSMENT
NULL 64y old  Male who presents with a chief complaint of L shoulder mass and pain. Notes that he started to have L shoulder pain 8wks ago, and 6 wks ago he started to notice a lesion on his L shoulder. This has grown over time and has changed color. Pt now has a large mass and erythematous area over his L shoulder. Pt is able to range L shoulder but ROM has become more painful. Pt presented to Dr. Cruz's office today and was sent in for this mass to undergo full onc work up and L shoulder mass biopsy tomorrow 12/17.    Of note, pt has hx DM, chronic back pain, and multiple abdominal surgeries. Pt has not hx of malignancy and has never received chemo or radiation. 64 YOM w/ pre-DM, chronic back pain, lumbar spinal fusion surgery, multiple abdominal surgeries, bipolar disorder, p/w L shoulder mass and pain. Now s/p Bx w/ ortho, w/ preliminary path c/f lymphoma. Awaiting final pathology and heme/onc recs. GaP team consulted for sxs.

## 2024-12-23 NOTE — CONSULT NOTE ADULT - PROBLEM SELECTOR RECOMMENDATION 3
Pt reports being unable to walk since Lumbar fusion, multiple complications from multiple surgeries. He walked w/ walker and cane. Currently does not work, is on disability. Previously worked for [email or web address]. Pt revealed to team that he was previously dx w/ bipolar disorder and was following w/ a psychiatrist but was "weaned" off of medications. He shared that his brother also had bipolar and is now  s/p suicide attempt. Pt himself does not reveal any suicidal ideations but has pressured speech, appears anxious and does admit that his chronic illnesses, surgeries, and complications have taken a toll on his mental health especially recently. He is agreeable to speaking w/ psychiatry/ mental health provider.     - recommend inpatient psychiatry consultation

## 2024-12-23 NOTE — CONSULT NOTE ADULT - PROBLEM SELECTOR RECOMMENDATION 2
s/p Bx w/ ortho on 12/17  suspected lymphoma from frozen section  CT chest 12/16 showing the 10.9x6.6x6cm lobulated soft tissue mass on shoulder, sarcomatous/neoplastic etiology, also with 2.9cm lobulated lesion on R anterior chest wall. Multiple enlarged cervical LNs. s/p Bx w/ ortho on 12/17  suspected lymphoma from frozen section  CT chest 12/16 showing the 10.9x6.6x6cm lobulated soft tissue mass on shoulder, sarcomatous/neoplastic etiology, also with 2.9cm lobulated lesion on R anterior chest wall. Multiple enlarged cervical LNs.    - follow up heme/onc recs and official pathology

## 2024-12-23 NOTE — CONSULT NOTE ADULT - PROBLEM SELECTOR RECOMMENDATION 5
Thank you for allowing us to participate in your patient's care. We will continue to follow with you. In the event of worsening symptoms, please contact the Palliative Medicine team via pager (if the patient is at [facility] #[phone] or if the patient is at [facility] #[phone]) The Geriatric and Palliative Medicine service has coverage 24 hours a day/ 7 days a week to provide medical recommendations regarding symptom management needs via telephone.     Kalyani Sanchez M.D.  Geriatrics and Palliative Care Fellow  Available via TEAMS Pt has capacity for complex medical decision making   Surrogate: Wife Sheri [phone]  Code status: Full   GOC: would like to receive all medical information regarding shoulder mass, and will decide which options are best for him. Pt has capacity for complex medical decision making   Surrogate: Wife Sheri [phone]  Code status: Full   GOC: would like to receive all medical information regarding possible plan for shoulder mass, will decide which options are best for him.

## 2024-12-23 NOTE — DISCHARGE NOTE NURSING/CASE MANAGEMENT/SOCIAL WORK - NSDCPNINST_GEN_ALL_CORE
Call MD for follow up appointment. Notify MD for any signs/symptoms of infection; fever greater than 100.4, redness, swelling, nausea/vomiting, or pain unrelieved with pain medication.  Call MD for follow up appointment. Notify MD for any signs/symptoms of infection; fever greater than 100.4, redness, swelling, nausea/vomiting, or pain unrelieved with pain medication. Continue to follow a consistent carbohydrate diet and follow up with Endocrinologist for continued management of your diabetes.

## 2024-12-23 NOTE — PROGRESS NOTE ADULT - ATTENDING COMMENTS
------------------------------------------------------------------------------------------------  Patient seen and examined on rounds with Heme/Onc [place] Dr. Reardon and Dr. Cotton  Briefly, patient presented for surgical biopsy with Dr. Geremias Cruz (Ortho-Onc) and underwent biopsy of left shoulder mass in the OR.  Final pathology c/w DLBCL; staging CT scans with cervical LAD and indeterminate anterior chest wall lesion  - plan to initiate treatement with R-CHOP; Given bulky nature of his disease, will initiate steroids today, start Rituxan on 12/24/24 and plan for Cytoxan/Adriamycin/Vincristine on 12/27/24 with growth factor support  - additional pathology stains (c-myc and bcl2/bcl6) pending; these stains are for prognostic purposes and will not change therapy plan  - patient at very high risk of tumor lysis given bulky nature of his disease  - as part of consolidation after completion of chemotherapy, would consider additional radiation to the left shoulder  - case will be presented at Heme Malignancy conference on 12/24/24 ------------------------------------------------------------------------------------------------  Patient seen and examined on rounds with Heme/Onc [place] Dr. Reardon and Dr. Cotton  Briefly, patient presented for surgical biopsy with Dr. Geremias Cruz (Ortho-Onc) and underwent biopsy of left shoulder mass in the OR.  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; non-double expressor (cMyc +, BCL2 -), Cyclin D1 neg, Ki67 of 80%    - staging CT scans with cervical LAD and indeterminate anterior chest wall lesion; will attempt to get PET/CT done  - plan to initiate treatement with R-CHOP; Given bulky nature of his disease, will initiate steroids today, start Rituxan on 12/24/24 and plan for Cytoxan/Adriamycin/Vincristine on 12/27/24 with growth factor support  - patient at very high risk of tumor lysis given bulky nature of his disease  - as part of consolidation after completion of chemotherapy, would consider additional radiation to the left shoulder  - case will be presented at [facility] Malignancy conference on 12/24/24

## 2024-12-23 NOTE — BH CONSULTATION LIAISON ASSESSMENT NOTE - RISK ASSESSMENT
risk for harm low, no si/hi, patient future oriented and help seeking has support and dependant dog (a pug named Mugsy).

## 2024-12-23 NOTE — CONSULT NOTE ADULT - PROBLEM SELECTOR RECOMMENDATION 4
Pt has capacity for complex medical decision making   Surrogate: Wife Sheri   Code status: Full   GOC: would like to pursue receive all medical information regarding shoulder mass, and will decide which options are best for him. Pt reports being unable to walk since Lumbar fusion, multiple complications from multiple surgeries. He walked w/ walker and cane. Both are present at bedside.  Currently does not work, is on disability. Previously worked for [employer].    - PT eval per primary team Pt reports being unable to walk since Lumbar fusion, multiple complications from multiple surgeries. He walked w/ walker and cane. Both are present at bedside.  Currently does not work, on disability. Previously worked for [employer].    - PT eval per primary team

## 2024-12-23 NOTE — BH CONSULTATION LIAISON ASSESSMENT NOTE - HPI (INCLUDE ILLNESS QUALITY, SEVERITY, DURATION, TIMING, CONTEXT, MODIFYING FACTORS, ASSOCIATED SIGNS AND SYMPTOMS)
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 Psych c/s for possible gonzalo in context of unopposed SNRI.    Patient calm but verbose, talkative, able to be redirected with effort. Patient acknowledges that this is not his normal manner of speaking and that he may be undergoing some features of hypomania currently, had not been on meds for a few years and has not seen psychiatrist recently, was started on cymbalta for pain which he is taking and would like to stay on. Denies suicidality/intent or plan and denies homicidality/intent or plan. Denies AH/VH, delusions or paranoia. Denies decreased need for sleep, increased energy, irritability euphoria or grandiosity, however does have some mood congruent racing thoughts and hyperverbality which he has insight into. Never tried zyprexa would hit targets of sleep, gonzalo ppx, and nausea ppx which patient is amenable to, r/b/i discussed at length.

## 2024-12-23 NOTE — BH CONSULTATION LIAISON ASSESSMENT NOTE - SUMMARY
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 Psych c/s for possible gonzalo in context of unopposed SNRI.    Patient exhibiting symptoms of hypomania though not meeting full criterion, suspect this is multifactorial: stress from new dx + cymbalta usage for pain control. OK to begin zyprexa as this may help with mood stabilization and gonzalo ppx. R/B/I discussed.     Recs:  - C/w Cymbalta  60mg QD  - C/w Gabapentin 400mg QID PO  - Begin Zyprexa 2.5mg qHS standing for mood/insomnia  - NO need for CO  - No psych c/i to discharge

## 2024-12-23 NOTE — BH CONSULTATION LIAISON ASSESSMENT NOTE - CURRENT MEDICATION
MEDICATIONS  (STANDING):  acetaminophen     Tablet .. 975 milliGRAM(s) Oral every 8 hours  amLODIPine   Tablet 10 milliGRAM(s) Oral daily  ascorbic acid 500 milliGRAM(s) Oral daily  atenolol  Tablet 100 milliGRAM(s) Oral daily  atorvastatin 10 milliGRAM(s) Oral at bedtime  cholecalciferol 1000 Unit(s) Oral daily  dextrose 5%. 1000 milliLiter(s) (100 mL/Hr) IV Continuous <Continuous>  dextrose 5%. 1000 milliLiter(s) (50 mL/Hr) IV Continuous <Continuous>  dextrose 5%. 1000 milliLiter(s) (50 mL/Hr) IV Continuous <Continuous>  dextrose 5%. 1000 milliLiter(s) (100 mL/Hr) IV Continuous <Continuous>  dextrose 50% Injectable 25 Gram(s) IV Push once  dextrose 50% Injectable 12.5 Gram(s) IV Push once  dextrose 50% Injectable 25 Gram(s) IV Push once  dextrose 50% Injectable 25 Gram(s) IV Push once  dextrose 50% Injectable 12.5 Gram(s) IV Push once  dextrose 50% Injectable 25 Gram(s) IV Push once  DULoxetine 60 milliGRAM(s) Oral daily  enoxaparin Injectable 40 milliGRAM(s) SubCutaneous every 12 hours  gabapentin 300 milliGRAM(s) Oral four times a day  glucagon  Injectable 1 milliGRAM(s) IntraMuscular once  glucagon  Injectable 1 milliGRAM(s) IntraMuscular once  insulin lispro (ADMELOG) corrective regimen sliding scale   SubCutaneous at bedtime  insulin lispro (ADMELOG) corrective regimen sliding scale   SubCutaneous three times a day before meals  levothyroxine 75 MICROGram(s) Oral daily  losartan 25 milliGRAM(s) Oral daily  multivitamin 1 Tablet(s) Oral daily  pantoprazole    Tablet 40 milliGRAM(s) Oral before breakfast  polyethylene glycol 3350 17 Gram(s) Oral two times a day  predniSONE   Tablet 100 milliGRAM(s) Oral daily  senna 2 Tablet(s) Oral two times a day  tamsulosin 0.4 milliGRAM(s) Oral at bedtime    MEDICATIONS  (PRN):  dextrose Oral Gel 15 Gram(s) Oral once PRN Blood Glucose LESS THAN 70 milliGRAM(s)/deciliter  dextrose Oral Gel 15 Gram(s) Oral once PRN Blood Glucose LESS THAN 70 milliGRAM(s)/deciliter  diphenhydrAMINE 25 milliGRAM(s) Oral every 6 hours PRN Rash and/or Itching  magnesium hydroxide Suspension 30 milliLiter(s) Oral daily PRN Constipation  naloxone Injectable 0.1 milliGRAM(s) IV Push every 3 minutes PRN For ANY of the following changes in patient status:  A. RR LESS THAN 10 breaths per minute, B. Oxygen saturation LESS THAN 90%, C. Sedation score of 6  ondansetron Injectable 4 milliGRAM(s) IV Push every 6 hours PRN Nausea and/or Vomiting  oxyCODONE    IR 20 milliGRAM(s) Oral every 4 hours PRN Moderate Pain (4 - 6)  oxyCODONE    IR 15 milliGRAM(s) Oral every 4 hours PRN Moderate Pain (4 - 6)  sucralfate suspension 1 Gram(s) Oral Once PRN for esophageal pain  tiZANidine 4 milliGRAM(s) Oral <User Schedule> PRN Muscle Spasm  zolpidem 5 milliGRAM(s) Oral at bedtime PRN Insomnia

## 2024-12-23 NOTE — BH CONSULTATION LIAISON ASSESSMENT NOTE - NSBHCONSULTFOLLOWAFTERCARE_PSY_A_CORE FT
[facility]  [address] [place] [address], [facility], First Floor, [place] [address]  [phone]  [email or web address] Intake: [phone]

## 2024-12-23 NOTE — CONSULT NOTE ADULT - CONVERSATION DETAILS
Referral for complex decision making and symptom management in setting of potential new malignancy. Introduced role of GaP team to patient who was amenable to speaking to our team. He shared that his extensive medical history and surgical history and current hospital course. He states that the pathology is still pending but "it's gotta be cancer". Discussed what his goal would be for initiation of DMT. He states that he needs to hear all of the information before he can decide what he wants to do. He shared that his wife and his parents are aware of new concerns for cancer. His wife, Sheri is his HCP he states, though no paperwork seen in his chart. If no HCP is done, his wife is his legal surrogate decision maker. He shared that he has no children but has a 7 year old pug named Lupe who provides him with a lot of support. He is amenable to continued symptom management with palliative care while inpatient but also states that he has a chronic pain doctor close to where he lives in [place] that he would like to continue to see.

## 2024-12-23 NOTE — DISCHARGE NOTE NURSING/CASE MANAGEMENT/SOCIAL WORK - FINANCIAL ASSISTANCE
[facility] provides services at a reduced cost to those who are determined to be eligible through [facility]’s financial assistance program. Information regarding [facility]’s financial assistance program can be found by going to [email or web address] or by calling [phone].

## 2024-12-23 NOTE — PROGRESS NOTE ADULT - ASSESSMENT
64M with h/o hiatal hernia s/p fundoplication, NANCY, DM2 admitted for biopsy of large and painful L shoulder mass. Hematology consulted for new B-cell Lymphoma.     #New follicular B cell Lymphoma  -expanding mass over past 6 weeks accompanied by "hot flashes"  -, uric acid 5.3  -s/p biopsy with OR 12/17, flow with b cell lymphoma: Monotypic B-cells (18% of cells), positive for kappa, bright CD19, CD20, CD10, partial CD38; negative CD5, , CD23. The findings are consistent with CD10 positive B-cell lymphoma. CD10 positivity supports follicular   center cell lineage. Correlation with histology is necessary.   - CT chest 12/16 showing the 10.9x6.6x6cm lobulated soft tissue mass on shoulder, sarcomatous/neoplastic etiology, also with 2.9cm lobulated lesion on R anterior chest wall. Multiple enlarged cervical LNs.   - 12/19 CT a/p Cirrhotic liver morphology. Indeterminate subcutaneous lower chest/upper abdominal wall mass, new since 6/16/2023.  - ECOG ~2; limited mobility due to back issues    Recommend:  - Will discuss diagnosis and plans for treatment with patient  - Hematology will follow along  - Please contact us if patient is planned for discharge as we will help coordinate referral to [facility] for follow-up.     Recommendations are preliminary. To be seen and discussed with attending.     Kendal Reardon MD PGY-4  Hematology/Oncology Fellow  Available on MS TEAMS  Pagers: NAN [phone]; [facility] [phone]  64M with h/o hiatal hernia s/p fundoplication, NANCY, DM2 admitted for biopsy of large and painful L shoulder mass. Hematology consulted for new DLBCL.     #New DLBCL  -expanding mass over past 6 weeks accompanied by "hot flashes"  -, uric acid 5.3  -s/p biopsy with OR 12/17, path and flow consistent  with diffuse large b cell lymphoma: Monotypic B-cells (18% of cells), positive for kappa, bright CD19, CD20, CD10, partial CD38; negative CD5, , CD23. The findings are consistent with CD10 positive B-cell lymphoma. CD10 positivity supports follicular   center cell lineage. Correlation with histology is necessary.   - CT chest 12/16 showing the 10.9x6.6x6cm lobulated soft tissue mass on shoulder, sarcomatous/neoplastic etiology, also with 2.9cm lobulated lesion on R anterior chest wall. Multiple enlarged cervical LNs.   - 12/19 CT a/p Cirrhotic liver morphology. Indeterminate subcutaneous lower chest/upper abdominal wall mass, new since 6/16/2023.  - ECOG ~2; limited mobility due to back issues    Recommend:  - Plan will be to initiate inpatient R-CHOP, (steroids today, rituxan on 12/24 and CHOP on 12/27). Will also discuss case at tumor board 12/24  - please start prednisone 100mg qd, PPI, and allopurinol (renally dose)  - Please send G6PD, acute hepatitis panel (including hep B core ab)  - Please trend TLS labs q8hr: LDH, CMP with Phos, uric acid, fibrinogen  - Please order TTE  - Pt will need PET-CT arranged (inpatient if possible)  - Please contact us if patient is planned for discharge as we will help coordinate referral to [facility] for follow-up.     Seen and discussed with attending Dr Werner,    Kendal Reardon MD PGY-4  Hematology/Oncology Fellow  Available on MS TEAMS  Pagers: NAN [phone]; [facility] [phone]  64M with h/o hiatal hernia s/p fundoplication, NANCY, DM2 admitted for biopsy of large and painful L shoulder mass. Hematology consulted for new DLBCL.     #New DLBCL  -expanding mass over past 6 weeks accompanied by "hot flashes"  -, uric acid 5.3  -s/p biopsy with OR 12/17, path and flow consistent  with diffuse large b cell lymphoma: Monotypic B-cells (18% of cells), positive for kappa, bright CD19, CD20, CD10, partial CD38; negative CD5, , CD23. The findings are consistent with CD10 positive B-cell lymphoma. CD10 positivity supports follicular   center cell lineage. Correlation with histology is necessary.   - CT chest 12/16 showing the 10.9x6.6x6cm lobulated soft tissue mass on shoulder, sarcomatous/neoplastic etiology, also with 2.9cm lobulated lesion on R anterior chest wall. Multiple enlarged cervical LNs.   - 12/19 CT a/p Cirrhotic liver morphology. Indeterminate subcutaneous lower chest/upper abdominal wall mass, new since 6/16/2023.  - ECOG ~2; limited mobility due to back issues    Recommend:  - Plan will be to initiate inpatient R-CHOP, (steroids today, rituxan on 12/24 and CHOP on 12/27). Will also discuss case at tumor board 12/24  - please start prednisone 100mg qd, PPI, and allopurinol (renally dose)  - Please send G6PD, acute hepatitis panel (including hep B core ab)  - Please trend TLS labs q8hr: LDH, CMP with Phos, uric acid, fibrinogen  - Please order TTE  - Pt will need PET-CT arranged (inpatient if possible)  - please consult [facility] for radiation to large shoulder mass  - Please contact us if patient is planned for discharge as we will help coordinate referral to [facility] for follow-up.     Seen and discussed with attending Dr Werner,    Kendal Reardon MD PGY-4  Hematology/Oncology Fellow  Available on MS TEAMS  Pagers: NAN [phone]; [facility] [phone]  64M with h/o hiatal hernia s/p fundoplication, NANCY, DM2 admitted for biopsy of large and painful L shoulder mass. Hematology consulted for new DLBCL.     #New DLBCL  -expanding mass over past 6 weeks accompanied by "hot flashes"  -, uric acid 5.3  -s/p biopsy with OR 12/17, path and flow consistent  with diffuse large b cell lymphoma: Monotypic B-cells (18% of cells), positive for kappa, bright CD19, CD20, CD10, partial CD38; negative CD5, , CD23. The findings are consistent with CD10 positive B-cell lymphoma. CD10 positivity supports follicular center cell lineage. Correlation with histology is necessary.   - CT chest 12/16 showing the 10.9x6.6x6cm lobulated soft tissue mass on shoulder, sarcomatous/neoplastic etiology, also with 2.9cm lobulated lesion on R anterior chest wall. Multiple enlarged cervical LNs.   - 12/19 CT a/p Cirrhotic liver morphology. Indeterminate subcutaneous lower chest/upper abdominal wall mass, new since 6/16/2023.  - ECOG ~2; limited mobility due to back issues    Recommend:  - Plan will be to initiate inpatient R-CHOP, (steroids today, rituxan on 12/24 and CHOP on 12/27). Will also discuss case at tumor board 12/24  - please start prednisone 100mg qd, PPI, and allopurinol (renally dose)  - Please send G6PD, acute hepatitis panel (including hep B core ab)  - Please trend TLS labs q8hr: LDH, CMP with Phos, uric acid,   - Please order TTE  - Pt will need PET-CT arranged (inpatient if possible)  - please consult [facility] for radiation to large shoulder mass  - Please contact us if patient is planned for discharge as we will help coordinate referral to [facility] for follow-up.     Seen and discussed with attending Dr Werner,    Kendal Reardon MD PGY-4  Hematology/Oncology Fellow  Available on MS TEAMS  Pagers: NAN [phone]; [facility] [phone]

## 2024-12-23 NOTE — CONSULT NOTE ADULT - TIME BILLING
Preparing to see the patient including review of tests and other providers' notes, confirming history with patient/family member, performing medical examination and evaluation, counseling and educating the patient/family/caregiver, ordering medications, tests and procedures, communicating with other health care professionals, documenting clinical information in the EMR, independently interpreting results and communicating results to the patient/family/caregiver, care coordination
Time spent for extensive review of the physical chart, electronic medical record, and documentation to obtain collateral information including but not limited to:  [x ] Inpatient records (ED, H&P, primary team, and consultants if applicable, care coordination)  [x ] Inpatient values/results (biomarkers, immunoassays, imaging, and microbiology results)  [x ] Current or proposed treatment plans  [x  ] Discussion with the primary team  [x ] Discussion with the patient, surrogate decision maker, or family  [x] 16 mins spent discussing goc     Time spent: >91 min

## 2024-12-23 NOTE — CONSULT NOTE ADULT - NSCONSULTADDITIONALINFOA_GEN_ALL_CORE
Primary team to prescribe discharge medications to cover patient’s needs between the date of discharge and the date of appointment with either the patient's PMD, the primary oncologist, or a palliative care provider.  Ideally medications are to be prescribed to [facility] pharmacy, so the patient can leave the hospital with those medications.  For pain being treated as a part of cancer care, hospice or other end-of-life care, or pain being treated as part of palliative care prescribing opioids beyond the 7-day and less than 28 days post-discharge window IS ACCEPTABLE as per [place] Department of Health (https://www.health.[place].gov/professionals/narcotic/laws_and_regulations/).  Primary team to  make sure that any pre-approvals are done before discharge.

## 2024-12-23 NOTE — CONSULT NOTE ADULT - PROBLEM SELECTOR RECOMMENDATION 9
- Etiology: acute on chronic pain likely 2/2 suspected new malignancy of left shoulder   - Home regimen: oxycodone 5mg q4hrs prn, I-stop Reviewed   - Hospital regimen: oxycodone 10-15mg for moderate to severe pain q4hrs prn, tizanidine, gabapentin, duloxetine   - Pain currently   [ ] improved   [x ] uncontrolled      Recommend:   - Start oxycodone 15-20mg for moderate to severe pain q4hrs prn  - c/w  tizanidine, gabapentin, duloxetine   - Bowel regimen, goal BM every 2 to 3 days to prevent opioid induced constipation, hold for loose stool - Etiology: ho chronic back pain, but currently w/ acute pain likely 2/2 suspected new malignancy of left shoulder   - Home regimen: oxycodone 5mg q4hrs prn, I-stop Reviewed #: [id number]  - Hospital regimen: oxycodone 10-15mg for moderate to severe pain q4hrs prn, tizanidine, gabapentin, duloxetine   - Pain currently   [ ] improved   [x ] uncontrolled      Recommend:   - Start oxycodone 15-20mg for moderate to severe pain q4hrs prn  - c/w tizanidine 4mg bid prn, gabapentin 300mg qid, duloxetine 60mg qd   - Bowel regimen, goal BM every 2 to 3 days to prevent opioid induced constipation, hold for loose stool  - narcan prn   - Follows w/ pain management Dr. Trejo - Etiology: ho chronic back pain, but currently w/ acute pain likely 2/2 suspected new malignancy of left shoulder   - Home regimen: oxycodone 5mg q4hrs prn, I-stop Reviewed #: [id number]  - Hospital regimen: oxycodone 10-15mg for moderate to severe pain q4hrs prn, tizanidine, gabapentin, duloxetine   - Pain currently   [ ] improved   [x ] uncontrolled      Recommend:   - Increase oxycodone 15-20mg for moderate to severe pain q4hrs prn  - c/w tizanidine 4mg bid prn, gabapentin 300mg qid, duloxetine 60mg qd   - Bowel regimen, goal BM every 2 to 3 days to prevent opioid induced constipation, hold for loose stool  - narcan prn   - Follows w/ pain management Dr. Trejo out [facility]

## 2024-12-23 NOTE — CONSULT NOTE ADULT - PROBLEM SELECTOR RECOMMENDATION 6
Thank you for allowing us to participate in your patient's care. We will continue to follow with you. In the event of worsening symptoms, please contact the Palliative Medicine team via pager (if the patient is at [facility] #[phone] or if the patient is at [facility] #[phone]) The Geriatric and Palliative Medicine service has coverage 24 hours a day/ 7 days a week to provide medical recommendations regarding symptom management needs via telephone.     Kalyani Sanchez M.D.  Geriatrics and Palliative Care Fellow  Available via TEAMS

## 2024-12-23 NOTE — BH CONSULTATION LIAISON ASSESSMENT NOTE - NSBHCHARTREVIEWVS_PSY_A_CORE FT
Vital Signs Last 24 Hrs  T(C): 36.7 (23 Dec 2024 09:27), Max: 36.7 (22 Dec 2024 21:59)  T(F): 98.1 (23 Dec 2024 09:27), Max: 98.1 (23 Dec 2024 09:27)  HR: 56 (23 Dec 2024 09:27) (56 - 63)  BP: 130/70 (23 Dec 2024 09:27) (130/70 - 150/78)  BP(mean): --  RR: 18 (23 Dec 2024 09:27) (18 - 19)  SpO2: 94% (23 Dec 2024 09:27) (94% - 99%)    Parameters below as of 23 Dec 2024 09:27  Patient On (Oxygen Delivery Method): room air

## 2024-12-23 NOTE — DISCHARGE NOTE NURSING/CASE MANAGEMENT/SOCIAL WORK - PATIENT PORTAL LINK FT
You can access the FollowMyHealth Patient Portal offered by [facility] by registering at the following website: [email or web address]. By joining [facility]’s FollowMyHealth portal, you will also be able to view your health information using other applications (apps) compatible with our system.

## 2024-12-23 NOTE — CONSULT NOTE ADULT - SUBJECTIVE AND OBJECTIVE BOX
Date of [id number] @ 11:28  HPI:  Orthopaedic Surgery Consult Note    Patient is a 64y old  Male who presents with a chief complaint of L shoulder mass and pain. Notes that he started to have L shoulder pain 8wks ago, and 6 wks ago he started to notice a lesion on his L shoulder. This has grown over time and has changed color. Pt now has a large mass and erythematous area over his L shoulder. Pt is able to range L shoulder but ROM has become more painful. Pt presented to Dr. Cruz's office today and was sent in for this mass to undergo full onc work up and L shoulder mass biopsy tomorrow 12/17.    Of note, pt has hx DM, chronic back pain, and multiple abdominal surgeries. Pt has not hx of malignancy and has never received chemo or radiation.      [] No significant past history as reviewed with the patient and family    MEDICATIONS  (STANDING):  amLODIPine   Tablet 10 milliGRAM(s) Oral daily  ascorbic acid 500 milliGRAM(s) Oral daily  atenolol  Tablet 100 milliGRAM(s) Oral daily  atorvastatin 10 milliGRAM(s) Oral at bedtime  chlorhexidine 2% Cloths 1 Application(s) Topical every 12 hours  dextrose 5%. 1000 milliLiter(s) (50 mL/Hr) IV Continuous <Continuous>  dextrose 5%. 1000 milliLiter(s) (100 mL/Hr) IV Continuous <Continuous>  dextrose 50% Injectable 25 Gram(s) IV Push once  dextrose 50% Injectable 12.5 Gram(s) IV Push once  dextrose 50% Injectable 25 Gram(s) IV Push once  diphenhydrAMINE Injectable 50 milliGRAM(s) IV Push once  DULoxetine 60 milliGRAM(s) Oral two times a day  famotidine Injectable 20 milliGRAM(s) IV Push once  gabapentin 300 milliGRAM(s) Oral four times a day  glucagon  Injectable 1 milliGRAM(s) IntraMuscular once  insulin lispro (ADMELOG) corrective regimen sliding scale   SubCutaneous three times a day before meals  insulin lispro (ADMELOG) corrective regimen sliding scale   SubCutaneous at bedtime  levothyroxine 75 MICROGram(s) Oral daily  losartan 25 milliGRAM(s) Oral daily  metoclopramide Injectable 10 milliGRAM(s) IV Push Once  multivitamin 1 Tablet(s) Oral daily  pantoprazole    Tablet 40 milliGRAM(s) Oral before breakfast  povidone iodine 10% Nasal Swab 1 Application(s) Both Nostrils once  senna 2 Tablet(s) Oral at bedtime  sodium chloride 0.9%. 1000 milliLiter(s) (100 mL/Hr) IV Continuous <Continuous>  tamsulosin 0.4 milliGRAM(s) Oral at bedtime    MEDICATIONS  (PRN):  acetaminophen     Tablet .. 975 milliGRAM(s) Oral every 8 hours PRN Mild Pain (1 - 3)  dextrose Oral Gel 15 Gram(s) Oral once PRN Blood Glucose LESS THAN 70 milliGRAM(s)/deciliter  oxyCODONE    IR 5 milliGRAM(s) Oral every 4 hours PRN Moderate Pain (4 - 6)  oxyCODONE    IR 10 milliGRAM(s) Oral every 4 hours PRN Severe Pain (7 - 10)  sucralfate suspension 1 Gram(s) Oral Once PRN for esophageal pain  tiZANidine 4 milliGRAM(s) Oral <User Schedule> PRN Muscle Spasm  zolpidem 5 milliGRAM(s) Oral at bedtime PRN Insomnia    Allergies    No Known Allergies    Intolerances    IV Contrast (Nausea)        IMAGING STUDIES:     (16 Dec 2024 18:01)      PERTINENT PM/SXH:   HTN (hypertension)    GERD (gastroesophageal reflux disease)    IBS (irritable bowel syndrome)    SBO (small bowel obstruction)    Gastro-esophageal reflux disease with esophagitis    Obese    Diabetes    H/O neuropathy    Hypothyroidism    History of chronic pain    Unsteady gait    H/O hiatal hernia    H/O small bowel obstruction    History of herniated intervertebral disc    H/O spinal stenosis    Anxiety and depression    NANCY (obstructive sleep apnea)      History of lumbar fusion    H/O exploratory laparotomy    H/O hernia repair    History of appendectomy    S/P cholecystectomy    S/P small bowel resection      FAMILY HISTORY:  FH: heart disease (Father)    FH: stomach cancer (Mother)    FH: type 2 diabetes (Mother)    Family history of TIAs (Mother)      Family Hx substance abuse [ ]yes [x ]no    ITEMS NOT CHECKED ARE NOT PRESENT    SOCIAL HISTORY:   Significant other/partner[x ] - wife Sheri Children[ ]  Religious/Spirituality:  Substance hx:  [ ]   Tobacco hx:  [ ]   Alcohol hx: [ ]   Home Opioid hx:  [ ] I-Stop Reference No: #: [id number]  Living Situation: [x ]Home  [ ]Long term care  [ ]Rehab [ ]Other    ADVANCE DIRECTIVES:    DNR/MOLST  [ ]  Living Will  [ ]   DECISION MAKER(s):  [ ] Health Care Proxy(s)  [ x] Surrogate(s)  [ ] Guardian         -   Name(s): Phone Number(s): Roxi Wade     BASELINE (I)ADL(s) (prior to admission):  [place]: [ ]Total  [x ] Moderate [ ]Dependent    Allergies    No Known Allergies    Intolerances    IV Contrast (Nausea)  MEDICATIONS  (STANDING):  acetaminophen     Tablet .. 975 milliGRAM(s) Oral every 8 hours  amLODIPine   Tablet 10 milliGRAM(s) Oral daily  ascorbic acid 500 milliGRAM(s) Oral daily  atenolol  Tablet 100 milliGRAM(s) Oral daily  atorvastatin 10 milliGRAM(s) Oral at bedtime  cholecalciferol 1000 Unit(s) Oral daily  dextrose 5%. 1000 milliLiter(s) (50 mL/Hr) IV Continuous <Continuous>  dextrose 5%. 1000 milliLiter(s) (50 mL/Hr) IV Continuous <Continuous>  dextrose 5%. 1000 milliLiter(s) (100 mL/Hr) IV Continuous <Continuous>  dextrose 5%. 1000 milliLiter(s) (100 mL/Hr) IV Continuous <Continuous>  dextrose 50% Injectable 25 Gram(s) IV Push once  dextrose 50% Injectable 12.5 Gram(s) IV Push once  dextrose 50% Injectable 25 Gram(s) IV Push once  dextrose 50% Injectable 25 Gram(s) IV Push once  dextrose 50% Injectable 12.5 Gram(s) IV Push once  dextrose 50% Injectable 25 Gram(s) IV Push once  DULoxetine 60 milliGRAM(s) Oral daily  enoxaparin Injectable 40 milliGRAM(s) SubCutaneous every 12 hours  gabapentin 300 milliGRAM(s) Oral four times a day  glucagon  Injectable 1 milliGRAM(s) IntraMuscular once  glucagon  Injectable 1 milliGRAM(s) IntraMuscular once  insulin lispro (ADMELOG) corrective regimen sliding scale   SubCutaneous three times a day before meals  insulin lispro (ADMELOG) corrective regimen sliding scale   SubCutaneous at bedtime  levothyroxine 75 MICROGram(s) Oral daily  losartan 25 milliGRAM(s) Oral daily  multivitamin 1 Tablet(s) Oral daily  pantoprazole    Tablet 40 milliGRAM(s) Oral before breakfast  polyethylene glycol 3350 17 Gram(s) Oral two times a day  senna 2 Tablet(s) Oral two times a day  tamsulosin 0.4 milliGRAM(s) Oral at bedtime    MEDICATIONS  (PRN):  dextrose Oral Gel 15 Gram(s) Oral once PRN Blood Glucose LESS THAN 70 milliGRAM(s)/deciliter  dextrose Oral Gel 15 Gram(s) Oral once PRN Blood Glucose LESS THAN 70 milliGRAM(s)/deciliter  diphenhydrAMINE 25 milliGRAM(s) Oral every 6 hours PRN Rash and/or Itching  magnesium hydroxide Suspension 30 milliLiter(s) Oral daily PRN Constipation  naloxone Injectable 0.1 milliGRAM(s) IV Push every 3 minutes PRN For ANY of the following changes in patient status:  A. RR LESS THAN 10 breaths per minute, B. Oxygen saturation LESS THAN 90%, C. Sedation score of 6  ondansetron Injectable 4 milliGRAM(s) IV Push every 6 hours PRN Nausea and/or Vomiting  oxyCODONE    IR 20 milliGRAM(s) Oral every 4 hours PRN Moderate Pain (4 - 6)  oxyCODONE    IR 15 milliGRAM(s) Oral every 4 hours PRN Moderate Pain (4 - 6)  sucralfate suspension 1 Gram(s) Oral Once PRN for esophageal pain  tiZANidine 4 milliGRAM(s) Oral <User Schedule> PRN Muscle Spasm  zolpidem 5 milliGRAM(s) Oral at bedtime PRN Insomnia    PRESENT SYMPTOMS: [ ]Unable to self-report  [ ] CPOT [ ] PAINADs [ ] RDOS  Source if other than patient:  [ ]Family   [ ]Team     Pain: [ x]yes [ ]no  QOL impact - unable to move   Location -                  left shoulder   Aggravating factors - movement   Quality - burning and sharp   Radiation - none   Timing- constant but intermittent   Severity (0-10 scale): 9/10   Minimal acceptable level/pain goal (0-10 scale): did not specify     CPOT:    https://www.sccm.org/getattachment/[email or web address]/Critical-Care-Pain-Observation-Tool-(CPOT)    PAIN AD Score:   http://geriatrictoolkit.[facility]/cog/painad.pdf (press ctrl +  left click to view)    Dyspnea:                           [ ]Mild [ ]Moderate [ ]Severe    RDOS:  0 to 2  minimal or no respiratory distress   3  mild distress  4 to 6 moderate distress  >7 severe distress  [email or web address]_Distress_Observation_Scale.pdf (Ctrl +  left click to view)     Anxiety:                             [ ]Mild [ ]Moderate [ ]Severe  Fatigue:                             [ ]Mild [ ]Moderate [ ]Severe  Nausea:                             [ ]Mild [ ]Moderate [ ]Severe  Loss of appetite:              [ ]Mild [ ]Moderate [ ]Severe  Constipation:                    [ ]Mild [ ]Moderate [ ]Severe    PCSSQ[Palliative Care Spiritual Screening Question]   Severity (0-10): deferred  Score of 4 or > indicate consideration of Chaplaincy referral.  Chaplaincy Referral: [ ] yes [ ] refused [ ] following [ ] Deferred     Caregiver [place]? : [ ] yes [ ] no [ x] Deferred [ ] Declined             Social work referral [ ] Patient & Family Centered Care Referral [ ]     Anticipatory Grief present?:  [ ] yes [ ] no  [ x] Deferred                  Social work referral [ ] Chaplaincy Referral[ ]    Other Symptoms:  [ ]All other review of systems negative     Palliative Performance Status Version 2:         %    http://npcrc.org/files/news/palliative_performance_scale_ppsv2.pdf    PHYSICAL EXAM:  Vital Signs Last 24 Hrs  T(C): 36.7 (23 Dec 2024 09:27), Max: 37.1 (22 Dec 2024 13:34)  T(F): 98.1 (23 Dec 2024 09:27), Max: 98.8 (22 Dec 2024 13:34)  HR: 56 (23 Dec 2024 09:27) (56 - 73)  BP: 130/70 (23 Dec 2024 09:27) (130/70 - 150/78)  BP(mean): --  RR: 18 (23 Dec 2024 09:27) (18 - 19)  SpO2: 94% (23 Dec 2024 09:27) (94% - 99%)    Parameters below as of 23 Dec 2024 09:27  Patient On (Oxygen Delivery Method): room air     I&O's Summary    22 Dec 2024 07:01  -  23 Dec 2024 07:00  --------------------------------------------------------  IN: 0 mL / OUT: 600 mL / NET: -600 mL      GENERAL: [ ]Cachexia    [x ]Alert  [x ]Oriented x4   [ ]Lethargic  [ ]Unarousable  [ ]Verbal  [ ]Non-Verbal  Behavioral:   [x ] Anxiety  [ ] Delirium [ ] Agitation [ ] Other  HEENT:  [ ]Normal   [ ]Dry mouth   [ ]ET Tube/Trach  [ ]Oral lesions  PULMONARY:   [ x]Clear [ ]Tachypnea  [ ]Audible excessive secretions   [ ]Rhonchi        [ ]Right [ ]Left [ ]Bilateral  [ ]Crackles        [ ]Right [ ]Left [ ]Bilateral  [ ]Wheezing     [ ]Right [ ]Left [ ]Bilateral  [ ]Diminished breath sounds [ ]right [ ]left [ ]bilateral  CARDIOVASCULAR:    [ x]Regular [ ]Irregular [ ]Tachy  [ ]Kartik [ ]Murmur [ ]Other  GASTROINTESTINAL:  [x ]Soft  [ ]Distended   [ ]+BS  [ x]Non tender [ ]Tender  [ ]Other [ ]PEG [ ]OGT/ NGT  Last BM:  GENITOURINARY:  [x ]Normal [ ] Incontinent   [ ]Oliguria/Anuria   [ ]Nuñez  MUSCULOSKELETAL:   [ ]Normal   [ x]Weakness  [ ]Bed/Wheelchair bound [ ]Edema  NEUROLOGIC:   [x ]No focal deficits  [ ]Cognitive impairment  [ ]Dysphagia [ ]Dysarthria [ ]Paresis [ ]Other   SKIN:   [ ]Normal  [ ]Rash  [x ]Other - large purple bruise like raised lesion on left shoulder s/p Bx   [ ]Pressure ulcer(s)       Present on admission [ ]y [ ]n    CRITICAL CARE:  [ ] Shock Present  [ ]Septic [ ]Cardiogenic [ ]Neurologic [ ]Hypovolemic  [ ]  Vasopressors [ ]  Inotropes   [ ]Respiratory failure present [ ]Mechanical ventilation [ ]Non-invasive ventilatory support [ ]High flow    [ ]Acute  [ ]Chronic [ ]Hypoxic  [ ]Hypercarbic [ ]Other  [ ]Other organ failure     LABS:  Basic Metabolic Panel w/Mg & Inorg Phos (12.21.24 @ 06:35)   eGFR: 104: The estimated glomerular filtration rate (eGFR) calculation is based on   the 2021 CKD-EPI creatinine equation, which is validated in male and   female population 18 years of age and older (N Engl J Med 2021;   385:4703-3471). mL/min/1.73m2  Sodium: 135 mmol/L  Potassium: 4.2 mmol/L  Chloride: 98 mmol/L  Carbon Dioxide: 23 mmol/L  Anion Gap: 14 mmol/L  Blood Urea Nitrogen: 19 mg/dL  Creatinine: 0.69 mg/dL  Glucose: 108 mg/dL  Calcium: 9.3 mg/dL  Magnesium: 2.00 mg/dL  Phosphorus: 3.8 mg/dL  Complete Blood Count in AM (12.21.24 @ 06:35)   Nucleated RBC: 0 /100 WBCs  WBC Count: 6.40 K/uL  RBC Count: 4.54 M/uL  Hemoglobin: 14.4 g/dL  Hematocrit: 41.2 %  Mean Cell Volume: 90.7 fL  Mean Cell Hemoglobin: 31.7 pg  Mean Cell Hemoglobin Conc: 35.0 g/dL  Red Cell Distrib Width: 12.3 %  Platelet Count - Automated: 154 K/uL  Nucleated RBC #: 0.00 K/uL  Comprehensive Metabolic Panel (12.16.24 @ 17:36)   Sodium: 136 mmol/L  Potassium: 4.6: SPECIMEN MILDLY HEMOLYZED mmol/L  Chloride: 98 mmol/L  Carbon Dioxide: 25 mmol/L  Anion Gap: 13 mmol/L  Blood Urea Nitrogen: 18 mg/dL  Creatinine: 0.61 mg/dL  Glucose: 129 mg/dL  Calcium: 10.0 mg/dL  Protein Total: 7.8: SPECIMEN MILDLY HEMOLYZED g/dL  Albumin: 4.3 g/dL  Bilirubin Total: 0.4 mg/dL  Alkaline Phosphatase: 135 U/L  Aspartate Aminotransferase (AST/SGOT): 68: SPECIMEN MILDLY HEMOLYZED U/L  Alanine Aminotransferase (ALT/SGPT): 49: SPECIMEN MILDLY HEMOLYZED U/L  eGFR: 108: The estimated glomerular filtration rate (eGFR) calculation is based on   the 2021 CKD-EPI creatinine equation, which is validated in male and   female population 18 years of age and older (N Engl J Med 2021;   385:0752-2639). mL/min/1.73m2  Activated Partial Thromboplastin Time in AM (12.16.24 @ 17:36)   Activated Partial Thromboplastin Time: 32.5: The recommended therapeutic heparin range (full dose) is 58-99 seconds.   INR: 1.08    RADIOLOGY & ADDITIONAL STUDIES:  < from: Xray Chest 2 Views PA/Lat (12.16.24 @ 17:21) >  IMPRESSION:  Clear lungs.    < end of copied text >      < from: CT Chest w/wo IV Cont (12.16.24 @ 18:56) >  IMPRESSION:    Lobulated soft tissue mass involving the anterior left shoulder, as   described. Sarcomatous/neoplastic etiology is the primary consideration.   An additional lesion is noted in the right anterior chest wall.    < end of copied text >    < from: CT Abdomen and Pelvis w/ IV Cont (12.19.24 @ 12:38) >  IMPRESSION:  Cirrhotic liver morphology.    Indeterminate subcutaneous lower chest/upper abdominal wall mass, new   since 6/16/2023.    < end of copied text >    PROTEIN CALORIE MALNUTRITION PRESENT: [ ]mild [ ]moderate [ ]severe [ ]underweight [ ]morbid obesity  https://www.andeal.org/vault/2440/web/files/ONC/Table_Clinical%20Characteristics%20to%20Document%20Malnutrition-White%20JV%20et%20al%202012.pdf    Height (cm): 172.7 (12-17-24 @ 10:58)  Weight (kg): 122.5 (12-17-24 @ 11:05), 125.6 (06-16-24 @ 15:14)  BMI (kg/m2): 41.1 (12-17-24 @ 11:05), 42.1 (12-17-24 @ 10:58)    [ ]PPSV2 < or = to 30% [ ]significant weight loss  [ ]poor nutritional intake  [ ]anasarca[ ]Artificial Nutrition      Other REFERRALS:  [ ]Hospice  [ ]Child Life  [ ]Social Work  [ ]Case management [ ]Holistic Therapy     Goals of Care Document:  Date of [id number] @ 11:28  HPI:  Orthopaedic Surgery Consult Note    Patient is a 64y old  Male who presents with a chief complaint of L shoulder mass and pain. Notes that he started to have L shoulder pain 8wks ago, and 6 wks ago he started to notice a lesion on his L shoulder. This has grown over time and has changed color. Pt now has a large mass and erythematous area over his L shoulder. Pt is able to range L shoulder but ROM has become more painful. Pt presented to Dr. Cruz's office today and was sent in for this mass to undergo full onc work up and L shoulder mass biopsy tomorrow 12/17.    Of note, pt has hx DM, chronic back pain, and multiple abdominal surgeries. Pt has not hx of malignancy and has never received chemo or radiation.  [] No significant past history as reviewed with the patient and family    MEDICATIONS  (STANDING):  amLODIPine   Tablet 10 milliGRAM(s) Oral daily  ascorbic acid 500 milliGRAM(s) Oral daily  atenolol  Tablet 100 milliGRAM(s) Oral daily  atorvastatin 10 milliGRAM(s) Oral at bedtime  chlorhexidine 2% Cloths 1 Application(s) Topical every 12 hours  dextrose 5%. 1000 milliLiter(s) (50 mL/Hr) IV Continuous <Continuous>  dextrose 5%. 1000 milliLiter(s) (100 mL/Hr) IV Continuous <Continuous>  dextrose 50% Injectable 25 Gram(s) IV Push once  dextrose 50% Injectable 12.5 Gram(s) IV Push once  dextrose 50% Injectable 25 Gram(s) IV Push once  diphenhydrAMINE Injectable 50 milliGRAM(s) IV Push once  DULoxetine 60 milliGRAM(s) Oral two times a day  famotidine Injectable 20 milliGRAM(s) IV Push once  gabapentin 300 milliGRAM(s) Oral four times a day  glucagon  Injectable 1 milliGRAM(s) IntraMuscular once  insulin lispro (ADMELOG) corrective regimen sliding scale   SubCutaneous three times a day before meals  insulin lispro (ADMELOG) corrective regimen sliding scale   SubCutaneous at bedtime  levothyroxine 75 MICROGram(s) Oral daily  losartan 25 milliGRAM(s) Oral daily  metoclopramide Injectable 10 milliGRAM(s) IV Push Once  multivitamin 1 Tablet(s) Oral daily  pantoprazole    Tablet 40 milliGRAM(s) Oral before breakfast  povidone iodine 10% Nasal Swab 1 Application(s) Both Nostrils once  senna 2 Tablet(s) Oral at bedtime  sodium chloride 0.9%. 1000 milliLiter(s) (100 mL/Hr) IV Continuous <Continuous>  tamsulosin 0.4 milliGRAM(s) Oral at bedtime    MEDICATIONS  (PRN):  acetaminophen     Tablet .. 975 milliGRAM(s) Oral every 8 hours PRN Mild Pain (1 - 3)  dextrose Oral Gel 15 Gram(s) Oral once PRN Blood Glucose LESS THAN 70 milliGRAM(s)/deciliter  oxyCODONE    IR 5 milliGRAM(s) Oral every 4 hours PRN Moderate Pain (4 - 6)  oxyCODONE    IR 10 milliGRAM(s) Oral every 4 hours PRN Severe Pain (7 - 10)  sucralfate suspension 1 Gram(s) Oral Once PRN for esophageal pain  tiZANidine 4 milliGRAM(s) Oral <User Schedule> PRN Muscle Spasm  zolpidem 5 milliGRAM(s) Oral at bedtime PRN Insomnia    Allergies  No Known Allergies  Intolerances  IV Contrast (Nausea)    IMAGING STUDIES:   (16 Dec 2024 18:01)  PERTINENT PM/SXH:   HTN (hypertension)  GERD (gastroesophageal reflux disease)  IBS (irritable bowel syndrome)  SBO (small bowel obstruction)  Gastro-esophageal reflux disease with esophagitis  Obese  Diabets  H/O neuropathy  Hypothyroidism  History of chronic pain  Unsteady gait  H/O hiatal hernia  H/O small bowel obstruction  History of herniated intervertebral disc  H/O spinal stenosis  Anxiety and depression  NANCY (obstructive sleep apnea)  History of lumbar fusion  H/O exploratory laparotomy  H/O hernia repair  History of appendectomy  S/P cholecystectomy  S/P small bowel resection      FAMILY HISTORY:  FH: heart disease (Father)  FH: stomach cancer (Mother)  FH: type 2 diabetes (Mother)  Family history of TIAs (Mother)    Family Hx substance abuse [ ]yes [x ]no    ITEMS NOT CHECKED ARE NOT PRESENT    SOCIAL HISTORY:   Significant other/partner[x ] - wife Sheri Children[ ]  Restorationism/Spirituality:  Substance hx:  [ ]   Tobacco hx:  [ ]   Alcohol hx: [ ]   Home Opioid hx:  [ ] I-Stop Reference No: #: [id number]  Living Situation: [x ]Home  [ ]Long term care  [ ]Rehab [ ]Other    ADVANCE DIRECTIVES:    DNR/MOLST  [ ]  Living Will  [ ]   DECISION MAKER(s):  [ ] Health Care Proxy(s)  [ x] Surrogate(s)  [ ] Guardian         -   Name(s): Phone Number(s): Deidre Wade [phone]    BASELINE (I)ADL(s) (prior to admission):  [place]: [ ]Total  [x ] Moderate [ ]Dependent    Allergies  No Known Allergies  Intolerances    IV Contrast (Nausea)  MEDICATIONS  (STANDING):  acetaminophen     Tablet .. 975 milliGRAM(s) Oral every 8 hours  amLODIPine   Tablet 10 milliGRAM(s) Oral daily  ascorbic acid 500 milliGRAM(s) Oral daily  atenolol  Tablet 100 milliGRAM(s) Oral daily  atorvastatin 10 milliGRAM(s) Oral at bedtime  cholecalciferol 1000 Unit(s) Oral daily  dextrose 5%. 1000 milliLiter(s) (50 mL/Hr) IV Continuous <Continuous>  dextrose 5%. 1000 milliLiter(s) (50 mL/Hr) IV Continuous <Continuous>  dextrose 5%. 1000 milliLiter(s) (100 mL/Hr) IV Continuous <Continuous>  dextrose 5%. 1000 milliLiter(s) (100 mL/Hr) IV Continuous <Continuous>  dextrose 50% Injectable 25 Gram(s) IV Push once  dextrose 50% Injectable 12.5 Gram(s) IV Push once  dextrose 50% Injectable 25 Gram(s) IV Push once  dextrose 50% Injectable 25 Gram(s) IV Push once  dextrose 50% Injectable 12.5 Gram(s) IV Push once  dextrose 50% Injectable 25 Gram(s) IV Push once  DULoxetine 60 milliGRAM(s) Oral daily  enoxaparin Injectable 40 milliGRAM(s) SubCutaneous every 12 hours  gabapentin 300 milliGRAM(s) Oral four times a day  glucagon  Injectable 1 milliGRAM(s) IntraMuscular once  glucagon  Injectable 1 milliGRAM(s) IntraMuscular once  insulin lispro (ADMELOG) corrective regimen sliding scale   SubCutaneous three times a day before meals  insulin lispro (ADMELOG) corrective regimen sliding scale   SubCutaneous at bedtime  levothyroxine 75 MICROGram(s) Oral daily  losartan 25 milliGRAM(s) Oral daily  multivitamin 1 Tablet(s) Oral daily  pantoprazole    Tablet 40 milliGRAM(s) Oral before breakfast  polyethylene glycol 3350 17 Gram(s) Oral two times a day  senna 2 Tablet(s) Oral two times a day  tamsulosin 0.4 milliGRAM(s) Oral at bedtime    MEDICATIONS  (PRN):  dextrose Oral Gel 15 Gram(s) Oral once PRN Blood Glucose LESS THAN 70 milliGRAM(s)/deciliter  dextrose Oral Gel 15 Gram(s) Oral once PRN Blood Glucose LESS THAN 70 milliGRAM(s)/deciliter  diphenhydrAMINE 25 milliGRAM(s) Oral every 6 hours PRN Rash and/or Itching  magnesium hydroxide Suspension 30 milliLiter(s) Oral daily PRN Constipation  naloxone Injectable 0.1 milliGRAM(s) IV Push every 3 minutes PRN For ANY of the following changes in patient status:  A. RR LESS THAN 10 breaths per minute, B. Oxygen saturation LESS THAN 90%, C. Sedation score of 6  ondansetron Injectable 4 milliGRAM(s) IV Push every 6 hours PRN Nausea and/or Vomiting  oxyCODONE    IR 20 milliGRAM(s) Oral every 4 hours PRN Moderate Pain (4 - 6)  oxyCODONE    IR 15 milliGRAM(s) Oral every 4 hours PRN Moderate Pain (4 - 6)  sucralfate suspension 1 Gram(s) Oral Once PRN for esophageal pain  tiZANidine 4 milliGRAM(s) Oral <User Schedule> PRN Muscle Spasm  zolpidem 5 milliGRAM(s) Oral at bedtime PRN Insomnia    PRESENT SYMPTOMS: [ ]Unable to self-report  [ ] CPOT [ ] PAINADs [ ] RDOS  Source if other than patient:  [ ]Family   [ ]Team     Pain: [ x]yes [ ]no  QOL impact - unable to move   Location -      left shoulder   Aggravating factors - movement   Quality - burning and sharp   Radiation - none   Timing- constant but intermittent   Severity (0-10 scale): 9/10   Minimal acceptable level/pain goal (0-10 scale): did not specify     CPOT:    https://www.sccm.org/getattachment/[email or web address]-2u8p-0a5g-9012m3028l6n/Critical-Care-Pain-Observation-Tool-(CPOT)    PAIN AD Score:   http://geriatrictoolkit.[email or web address]/cog/painad.pdf (press ctrl +  left click to view)    Dyspnea:                           [ ]Mild [ ]Moderate [ ]Severe    RDOS:  0 to 2  minimal or no respiratory distress   3  mild distress  4 to 6 moderate distress  >7 severe distress  [email or web address]_Distress_Observation_Scale.pdf (Ctrl +  left click to view)     Anxiety:                             [ ]Mild [ ]Moderate [ ]Severe  Fatigue:                             [ ]Mild [ ]Moderate [ ]Severe  Nausea:                             [ ]Mild [ ]Moderate [ ]Severe  Loss of appetite:              [ ]Mild [ ]Moderate [ ]Severe  Constipation:                    [ ]Mild [ ]Moderate [ ]Severe    PCSSQ[Palliative Care Spiritual Screening Question]   Severity (0-10): deferred  Score of 4 or > indicate consideration of Chaplaincy referral.  Chaplaincy Referral: [ ] yes [ ] refused [ ] following [ ] Deferred     Caregiver [place]? : [ ] yes [ ] no [ x] Deferred [ ] Declined             Social work referral [ ] Patient & Family Centered Care Referral [ ]     Anticipatory Grief present?:  [ ] yes [ ] no  [ x] Deferred                  Social work referral [ ] Chaplaincy Referral[ ]    Other Symptoms:  [ ]All other review of systems negative     Palliative Performance Status Version 2:         %    http://npcrc.org/files/news/palliative_performance_scale_[email or web address].pdf    PHYSICAL EXAM:  Vital Signs Last 24 Hrs  T(C): 36.7 (23 Dec 2024 09:27), Max: 37.1 (22 Dec 2024 13:34)  T(F): 98.1 (23 Dec 2024 09:27), Max: 98.8 (22 Dec 2024 13:34)  HR: 56 (23 Dec 2024 09:27) (56 - 73)  BP: 130/70 (23 Dec 2024 09:27) (130/70 - 150/78)  BP(mean): --  RR: 18 (23 Dec 2024 09:27) (18 - 19)  SpO2: 94% (23 Dec 2024 09:27) (94% - 99%)    Parameters below as of 23 Dec 2024 09:27  Patient On (Oxygen Delivery Method): room air     I&O's Summary    22 Dec 2024 07:01  -  23 Dec 2024 07:00  --------------------------------------------------------  IN: 0 mL / OUT: 600 mL / NET: -600 mL      GENERAL: [ ]Cachexia    [x ]Alert  [x ]Oriented x4   [ ]Lethargic  [ ]Unarousable  [ ]Verbal  [ ]Non-Verbal  Behavioral:   [x ] Anxiety  [ ] Delirium [ ] Agitation [ x] Other - pressured speech   HEENT:  [ ]Normal   [ ]Dry mouth   [ ]ET Tube/Trach  [ ]Oral lesions  PULMONARY:   [ x]Clear [ ]Tachypnea  [ ]Audible excessive secretions   [ ]Rhonchi        [ ]Right [ ]Left [ ]Bilateral  [ ]Crackles        [ ]Right [ ]Left [ ]Bilateral  [ ]Wheezing     [ ]Right [ ]Left [ ]Bilateral  [ ]Diminished breath sounds [ ]right [ ]left [ ]bilateral  CARDIOVASCULAR:    [ x]Regular [ ]Irregular [ ]Tachy  [ ]Kartik [ ]Murmur [ ]Other  GASTROINTESTINAL:  [x ]Soft  [ ]Distended   [ ]+BS  [ x]Non tender [ ]Tender  [ ]Other [ ]PEG [ ]OGT/ NGT  Last BM:  GENITOURINARY:  [x ]Normal [ ] Incontinent   [ ]Oliguria/Anuria   [ ]Nuñez  MUSCULOSKELETAL:   [ ]Normal   [ x]Weakness  [ ]Bed/Wheelchair bound [ ]Edema  NEUROLOGIC:   [x ]No focal deficits  [ ]Cognitive impairment  [ ]Dysphagia [ ]Dysarthria [ ]Paresis [ ]Other   SKIN:   [ ]Normal  [ ]Rash  [x ]Other - large purple bruise like raised lesion on left shoulder s/p Bx   [ ]Pressure ulcer(s)       Present on admission [ ]y [ ]n    CRITICAL CARE:  [ ] Shock Present  [ ]Septic [ ]Cardiogenic [ ]Neurologic [ ]Hypovolemic  [ ]  Vasopressors [ ]  Inotropes   [ ]Respiratory failure present [ ]Mechanical ventilation [ ]Non-invasive ventilatory support [ ]High flow    [ ]Acute  [ ]Chronic [ ]Hypoxic  [ ]Hypercarbic [ ]Other  [ ]Other organ failure     LABS:  Basic Metabolic Panel w/Mg & Inorg Phos (12.21.24 @ 06:35)   eGFR: 104: The estimated glomerular filtration rate (eGFR) calculation is based on   the 2021 CKD-EPI creatinine equation, which is validated in male and   female population 18 years of age and older (N Engl J Med 2021;   385:8402-2534). mL/min/1.73m2  Sodium: 135 mmol/L  Potassium: 4.2 mmol/L  Chloride: 98 mmol/L  Carbon Dioxide: 23 mmol/L  Anion Gap: 14 mmol/L  Blood Urea Nitrogen: 19 mg/dL  Creatinine: 0.69 mg/dL  Glucose: 108 mg/dL  Calcium: 9.3 mg/dL  Magnesium: 2.00 mg/dL  Phosphorus: 3.8 mg/dL  Complete Blood Count in AM (12.21.24 @ 06:35)   Nucleated RBC: 0 /100 WBCs  WBC Count: 6.40 K/uL  RBC Count: 4.54 M/uL  Hemoglobin: 14.4 g/dL  Hematocrit: 41.2 %  Mean Cell Volume: 90.7 fL  Mean Cell Hemoglobin: 31.7 pg  Mean Cell Hemoglobin Conc: 35.0 g/dL  Red Cell Distrib Width: 12.3 %  Platelet Count - Automated: 154 K/uL  Nucleated RBC #: 0.00 K/uL  Comprehensive Metabolic Panel (12.16.24 @ 17:36)   Sodium: 136 mmol/L  Potassium: 4.6: SPECIMEN MILDLY HEMOLYZED mmol/L  Chloride: 98 mmol/L  Carbon Dioxide: 25 mmol/L  Anion Gap: 13 mmol/L  Blood Urea Nitrogen: 18 mg/dL  Creatinine: 0.61 mg/dL  Glucose: 129 mg/dL  Calcium: 10.0 mg/dL  Protein Total: 7.8: SPECIMEN MILDLY HEMOLYZED g/dL  Albumin: 4.3 g/dL  Bilirubin Total: 0.4 mg/dL  Alkaline Phosphatase: 135 U/L  Aspartate Aminotransferase (AST/SGOT): 68: SPECIMEN MILDLY HEMOLYZED U/L  Alanine Aminotransferase (ALT/SGPT): 49: SPECIMEN MILDLY HEMOLYZED U/L  eGFR: 108: The estimated glomerular filtration rate (eGFR) calculation is based on   the 2021 CKD-EPI creatinine equation, which is validated in male and   female population 18 years of age and older (N Engl J Med 2021;   385:0763-6572). mL/min/1.73m2  Activated Partial Thromboplastin Time in AM (12.16.24 @ 17:36)   Activated Partial Thromboplastin Time: 32.5: The recommended therapeutic heparin range (full dose) is 58-99 seconds.   INR: 1.08    RADIOLOGY & ADDITIONAL STUDIES:  < from: Xray Chest 2 Views PA/Lat (12.16.24 @ 17:21) >  IMPRESSION:  Clear lungs.    < end of copied text >      < from: CT Chest w/wo IV Cont (12.16.24 @ 18:56) >  IMPRESSION:    Lobulated soft tissue mass involving the anterior left shoulder, as   described. Sarcomatous/neoplastic etiology is the primary consideration.   An additional lesion is noted in the right anterior chest wall.    < end of copied text >    < from: CT Abdomen and Pelvis w/ IV Cont (12.19.24 @ 12:38) >  IMPRESSION:  Cirrhotic liver morphology.    Indeterminate subcutaneous lower chest/upper abdominal wall mass, new   since 6/16/2023.    < end of copied text >    PROTEIN CALORIE MALNUTRITION PRESENT: [ ]mild [ ]moderate [ ]severe [ ]underweight [ ]morbid obesity  https://www.andeal.org/vault/2440/web/files/ONC/Table_Clinical%20Characteristics%20to%20Document%20Malnutrition-White%20JV%20et%20al%202012.pdf    Height (cm): 172.7 (12-17-24 @ 10:58)  Weight (kg): 122.5 (12-17-24 @ 11:05), 125.6 (06-16-24 @ 15:14)  BMI (kg/m2): 41.1 (12-17-24 @ 11:05), 42.1 (12-17-24 @ 10:58)    [ ]PPSV2 < or = to 30% [ ]significant weight loss  [ ]poor nutritional intake  [ ]anasarca[ ]Artificial Nutrition      Other REFERRALS:  [ ]Hospice  [ ]Child Life  [ ]Social Work  [ ]Case management [ ]Holistic Therapy     Goals of Care Document:  Date of [id number] @ 11:28  HPI:  Orthopaedic Surgery Consult Note    Patient is a 64y old  Male who presents with a chief complaint of L shoulder mass and pain. Notes that he started to have L shoulder pain 8wks ago, and 6 wks ago he started to notice a lesion on his L shoulder. This has grown over time and has changed color. Pt now has a large mass and erythematous area over his L shoulder. Pt is able to range L shoulder but ROM has become more painful. Pt presented to Dr. Cruz's office today and was sent in for this mass to undergo full onc work up and L shoulder mass biopsy tomorrow 12/17.    Of note, pt has hx DM, chronic back pain, and multiple abdominal surgeries. Pt has not hx of malignancy and has never received chemo or radiation.  [] No significant past history as reviewed with the patient and family    MEDICATIONS  (STANDING):  amLODIPine   Tablet 10 milliGRAM(s) Oral daily  ascorbic acid 500 milliGRAM(s) Oral daily  atenolol  Tablet 100 milliGRAM(s) Oral daily  atorvastatin 10 milliGRAM(s) Oral at bedtime  chlorhexidine 2% Cloths 1 Application(s) Topical every 12 hours  dextrose 5%. 1000 milliLiter(s) (50 mL/Hr) IV Continuous <Continuous>  dextrose 5%. 1000 milliLiter(s) (100 mL/Hr) IV Continuous <Continuous>  dextrose 50% Injectable 25 Gram(s) IV Push once  dextrose 50% Injectable 12.5 Gram(s) IV Push once  dextrose 50% Injectable 25 Gram(s) IV Push once  diphenhydrAMINE Injectable 50 milliGRAM(s) IV Push once  DULoxetine 60 milliGRAM(s) Oral two times a day  famotidine Injectable 20 milliGRAM(s) IV Push once  gabapentin 300 milliGRAM(s) Oral four times a day  glucagon  Injectable 1 milliGRAM(s) IntraMuscular once  insulin lispro (ADMELOG) corrective regimen sliding scale   SubCutaneous three times a day before meals  insulin lispro (ADMELOG) corrective regimen sliding scale   SubCutaneous at bedtime  levothyroxine 75 MICROGram(s) Oral daily  losartan 25 milliGRAM(s) Oral daily  metoclopramide Injectable 10 milliGRAM(s) IV Push Once  multivitamin 1 Tablet(s) Oral daily  pantoprazole    Tablet 40 milliGRAM(s) Oral before breakfast  povidone iodine 10% Nasal Swab 1 Application(s) Both Nostrils once  senna 2 Tablet(s) Oral at bedtime  sodium chloride 0.9%. 1000 milliLiter(s) (100 mL/Hr) IV Continuous <Continuous>  tamsulosin 0.4 milliGRAM(s) Oral at bedtime    MEDICATIONS  (PRN):  acetaminophen     Tablet .. 975 milliGRAM(s) Oral every 8 hours PRN Mild Pain (1 - 3)  dextrose Oral Gel 15 Gram(s) Oral once PRN Blood Glucose LESS THAN 70 milliGRAM(s)/deciliter  oxyCODONE    IR 5 milliGRAM(s) Oral every 4 hours PRN Moderate Pain (4 - 6)  oxyCODONE    IR 10 milliGRAM(s) Oral every 4 hours PRN Severe Pain (7 - 10)  sucralfate suspension 1 Gram(s) Oral Once PRN for esophageal pain  tiZANidine 4 milliGRAM(s) Oral <User Schedule> PRN Muscle Spasm  zolpidem 5 milliGRAM(s) Oral at bedtime PRN Insomnia    Allergies  No Known Allergies  Intolerances  IV Contrast (Nausea)    IMAGING STUDIES:   (16 Dec 2024 18:01)  PERTINENT PM/SXH:   HTN (hypertension)  GERD (gastroesophageal reflux disease)  IBS (irritable bowel syndrome)  SBO (small bowel obstruction)  Gastro-esophageal reflux disease with esophagitis  Obese  Diabets  H/O neuropathy  Hypothyroidism  History of chronic pain  Unsteady gait  H/O hiatal hernia  H/O small bowel obstruction  History of herniated intervertebral disc  H/O spinal stenosis  Anxiety and depression  NANCY (obstructive sleep apnea)  History of lumbar fusion  H/O exploratory laparotomy  H/O hernia repair  History of appendectomy  S/P cholecystectomy  S/P small bowel resection      FAMILY HISTORY:  FH: heart disease (Father)  FH: stomach cancer (Mother)  FH: type 2 diabetes (Mother)  Family history of TIAs (Mother)    Family Hx substance abuse [ ]yes [x ]no    ITEMS NOT CHECKED ARE NOT PRESENT    SOCIAL HISTORY: Patient worked for [employer].   Significant other/partner[x ] - wife Sheri Children[ ]  Bahai/Spirituality:  Substance hx:  [ ]   Tobacco hx:  [ ]   Alcohol hx: [ ]   Home Opioid hx:  [ ] I-Stop Reference No: #: [id number]  Living Situation: [x ]Home  [ ]Long term care  [ ]Rehab [ ]Other    ADVANCE DIRECTIVES:    DNR/MOLST  [ ]  Living Will  [ ]   DECISION MAKER(s):  [ ] Health Care Proxy(s)  [ x] Surrogate(s)  [ ] Guardian         -   Name(s): Phone Number(s): Deidre Wade [phone]    BASELINE (I)ADL(s) (prior to admission):  [place]: [ ]Total  [x ] Moderate [ ]Dependent    Allergies  No Known Allergies  Intolerances    IV Contrast (Nausea)  MEDICATIONS  (STANDING):  acetaminophen     Tablet .. 975 milliGRAM(s) Oral every 8 hours  amLODIPine   Tablet 10 milliGRAM(s) Oral daily  ascorbic acid 500 milliGRAM(s) Oral daily  atenolol  Tablet 100 milliGRAM(s) Oral daily  atorvastatin 10 milliGRAM(s) Oral at bedtime  cholecalciferol 1000 Unit(s) Oral daily  dextrose 5%. 1000 milliLiter(s) (50 mL/Hr) IV Continuous <Continuous>  dextrose 5%. 1000 milliLiter(s) (50 mL/Hr) IV Continuous <Continuous>  dextrose 5%. 1000 milliLiter(s) (100 mL/Hr) IV Continuous <Continuous>  dextrose 5%. 1000 milliLiter(s) (100 mL/Hr) IV Continuous <Continuous>  dextrose 50% Injectable 25 Gram(s) IV Push once  dextrose 50% Injectable 12.5 Gram(s) IV Push once  dextrose 50% Injectable 25 Gram(s) IV Push once  dextrose 50% Injectable 25 Gram(s) IV Push once  dextrose 50% Injectable 12.5 Gram(s) IV Push once  dextrose 50% Injectable 25 Gram(s) IV Push once  DULoxetine 60 milliGRAM(s) Oral daily  enoxaparin Injectable 40 milliGRAM(s) SubCutaneous every 12 hours  gabapentin 300 milliGRAM(s) Oral four times a day  glucagon  Injectable 1 milliGRAM(s) IntraMuscular once  glucagon  Injectable 1 milliGRAM(s) IntraMuscular once  insulin lispro (ADMELOG) corrective regimen sliding scale   SubCutaneous three times a day before meals  insulin lispro (ADMELOG) corrective regimen sliding scale   SubCutaneous at bedtime  levothyroxine 75 MICROGram(s) Oral daily  losartan 25 milliGRAM(s) Oral daily  multivitamin 1 Tablet(s) Oral daily  pantoprazole    Tablet 40 milliGRAM(s) Oral before breakfast  polyethylene glycol 3350 17 Gram(s) Oral two times a day  senna 2 Tablet(s) Oral two times a day  tamsulosin 0.4 milliGRAM(s) Oral at bedtime    MEDICATIONS  (PRN):  dextrose Oral Gel 15 Gram(s) Oral once PRN Blood Glucose LESS THAN 70 milliGRAM(s)/deciliter  dextrose Oral Gel 15 Gram(s) Oral once PRN Blood Glucose LESS THAN 70 milliGRAM(s)/deciliter  diphenhydrAMINE 25 milliGRAM(s) Oral every 6 hours PRN Rash and/or Itching  magnesium hydroxide Suspension 30 milliLiter(s) Oral daily PRN Constipation  naloxone Injectable 0.1 milliGRAM(s) IV Push every 3 minutes PRN For ANY of the following changes in patient status:  A. RR LESS THAN 10 breaths per minute, B. Oxygen saturation LESS THAN 90%, C. Sedation score of 6  ondansetron Injectable 4 milliGRAM(s) IV Push every 6 hours PRN Nausea and/or Vomiting  oxyCODONE    IR 20 milliGRAM(s) Oral every 4 hours PRN Moderate Pain (4 - 6)  oxyCODONE    IR 15 milliGRAM(s) Oral every 4 hours PRN Moderate Pain (4 - 6)  sucralfate suspension 1 Gram(s) Oral Once PRN for esophageal pain  tiZANidine 4 milliGRAM(s) Oral <User Schedule> PRN Muscle Spasm  zolpidem 5 milliGRAM(s) Oral at bedtime PRN Insomnia    PRESENT SYMPTOMS: [ ]Unable to self-report  [ ] CPOT [ ] PAINADs [ ] RDOS  Source if other than patient:  [ ]Family   [ ]Team     Pain: [ x]yes [ ]no  QOL impact - unable to move   Location -      left shoulder   Aggravating factors - movement   Quality - burning and sharp   Radiation - none   Timing- constant but intermittent   Severity (0-10 scale): 9/10   Minimal acceptable level/pain goal (0-10 scale): did not specify     CPOT:    https://www.sccm.org/getattachment/kgs40k22-8c5a-4z3g-0k4u-0623p7238x1r/Critical-Care-Pain-Observation-Tool-(CPOT)    PAIN AD Score:   [email or web address]/[email or web address] (press ctrl +  left click to view)    Dyspnea:                           [ ]Mild [ ]Moderate [ ]Severe    RDOS:  0 to 2  minimal or no respiratory distress   3  mild distress  4 to 6 moderate distress  >7 severe distress  https://homecareinformation.net/handouts/hen/Respiratory_Distress_Observation_Scale.pdf (Ctrl +  left click to view)     Anxiety:                             [ ]Mild [ ]Moderate [ ]Severe  Fatigue:                             [ ]Mild [ ]Moderate [ ]Severe  Nausea:                             [ ]Mild [ ]Moderate [ ]Severe  Loss of appetite:              [ ]Mild [ ]Moderate [ ]Severe  Constipation:                    [ ]Mild [ ]Moderate [ ]Severe    Other Symptoms:  [ x]All other review of systems negative     PCSSQ[Palliative Care Spiritual Screening Question]   Severity (0-10): deferred  Score of 4 or > indicate consideration of Chaplaincy referral.  Chaplaincy Referral: [ ] yes [ ] refused [ ] following [ ] Deferred     Caregiver [place]? : [ ] yes [ ] no [ x] Deferred [ ] Declined             Social work referral [ ] Patient & Family Centered Care Referral [ ]     Anticipatory Grief present?:  [ ] yes [ ] no  [ x] Deferred                  Social work referral [ ] Chaplaincy Referral[ ]      Palliative Performance Status Version 2:         %    http://npcrc.org/files/news/palliative_performance_scale_ppsv2.pdf    PHYSICAL EXAM:  Vital Signs Last 24 Hrs  T(C): 36.7 (23 Dec 2024 09:27), Max: 37.1 (22 Dec 2024 13:34)  T(F): 98.1 (23 Dec 2024 09:27), Max: 98.8 (22 Dec 2024 13:34)  HR: 56 (23 Dec 2024 09:27) (56 - 73)  BP: 130/70 (23 Dec 2024 09:27) (130/70 - 150/78)  BP(mean): --  RR: 18 (23 Dec 2024 09:27) (18 - 19)  SpO2: 94% (23 Dec 2024 09:27) (94% - 99%)    Parameters below as of 23 Dec 2024 09:27  Patient On (Oxygen Delivery Method): room air     I&O's Summary    22 Dec 2024 07:01  -  23 Dec 2024 07:00  --------------------------------------------------------  IN: 0 mL / OUT: 600 mL / NET: -600 mL      GENERAL: [ ]Cachexia    [x ]Alert  [x ]Oriented x4   [ ]Lethargic  [ ]Unarousable  [ x]Verbal  [ ]Non-Verbal  Behavioral:   [x ] Anxiety  [ ] Delirium [ ] Agitation [ x] Other - pressured speech and very restless during encounter, pacing the room   HEENT:  [ ]Normal   [ ]Dry mouth   [ ]ET Tube/Trach  [ ]Oral lesions  PULMONARY:   [ x]Clear [ ]Tachypnea  [ ]Audible excessive secretions   [ ]Rhonchi        [ ]Right [ ]Left [ ]Bilateral  [ ]Crackles        [ ]Right [ ]Left [ ]Bilateral  [ ]Wheezing     [ ]Right [ ]Left [ ]Bilateral  [ ]Diminished breath sounds [ ]right [ ]left [ ]bilateral  CARDIOVASCULAR:    [ x]Regular [ ]Irregular [ ]Tachy  [ ]Kartik [ ]Murmur [ ]Other  GASTROINTESTINAL:  [x ]Soft  [ ]Distended   [ ]+BS  [ x]Non tender [ ]Tender  [ ]Other [ ]PEG [ ]OGT/ NGT  Last BM:  GENITOURINARY:  [x ]Normal [ ] Incontinent   [ ]Oliguria/Anuria   [ ]Nuñez  MUSCULOSKELETAL:   [ ]Normal   [ x]Weakness  [ ]Bed/Wheelchair bound [ ]Edema  NEUROLOGIC:   [x ]No focal deficits  [ ]Cognitive impairment  [ ]Dysphagia [ ]Dysarthria [ ]Paresis [ ]Other   SKIN:   [ ]Normal  [ ]Rash  [x ]Other - large purple bruise like raised lesion on left shoulder s/p Bx   [ ]Pressure ulcer(s)       Present on admission [ ]y [ ]n    CRITICAL CARE:  [ ] Shock Present  [ ]Septic [ ]Cardiogenic [ ]Neurologic [ ]Hypovolemic  [ ]  Vasopressors [ ]  Inotropes   [ ]Respiratory failure present [ ]Mechanical ventilation [ ]Non-invasive ventilatory support [ ]High flow    [ ]Acute  [ ]Chronic [ ]Hypoxic  [ ]Hypercarbic [ ]Other  [ ]Other organ failure     LABS:  Basic Metabolic Panel w/Mg & Inorg Phos (12.21.24 @ 06:35)   eGFR: 104: The estimated glomerular filtration rate (eGFR) calculation is based on   the 2021 CKD-EPI creatinine equation, which is validated in male and   female population 18 years of age and older (N Engl J Med 2021;   385:9550-4973). mL/min/1.73m2  Sodium: 135 mmol/L  Potassium: 4.2 mmol/L  Chloride: 98 mmol/L  Carbon Dioxide: 23 mmol/L  Anion Gap: 14 mmol/L  Blood Urea Nitrogen: 19 mg/dL  Creatinine: 0.69 mg/dL  Glucose: 108 mg/dL  Calcium: 9.3 mg/dL  Magnesium: 2.00 mg/dL  Phosphorus: 3.8 mg/dL  Complete Blood Count in AM (12.21.24 @ 06:35)   Nucleated RBC: 0 /100 WBCs  WBC Count: 6.40 K/uL  RBC Count: 4.54 M/uL  Hemoglobin: 14.4 g/dL  Hematocrit: 41.2 %  Mean Cell Volume: 90.7 fL  Mean Cell Hemoglobin: 31.7 pg  Mean Cell Hemoglobin Conc: 35.0 g/dL  Red Cell Distrib Width: 12.3 %  Platelet Count - Automated: 154 K/uL  Nucleated RBC #: 0.00 K/uL  Comprehensive Metabolic Panel (12.16.24 @ 17:36)   Sodium: 136 mmol/L  Potassium: 4.6: SPECIMEN MILDLY HEMOLYZED mmol/L  Chloride: 98 mmol/L  Carbon Dioxide: 25 mmol/L  Anion Gap: 13 mmol/L  Blood Urea Nitrogen: 18 mg/dL  Creatinine: 0.61 mg/dL  Glucose: 129 mg/dL  Calcium: 10.0 mg/dL  Protein Total: 7.8: SPECIMEN MILDLY HEMOLYZED g/dL  Albumin: 4.3 g/dL  Bilirubin Total: 0.4 mg/dL  Alkaline Phosphatase: 135 U/L  Aspartate Aminotransferase (AST/SGOT): 68: SPECIMEN MILDLY HEMOLYZED U/L  Alanine Aminotransferase (ALT/SGPT): 49: SPECIMEN MILDLY HEMOLYZED U/L  eGFR: 108: The estimated glomerular filtration rate (eGFR) calculation is based on   the 2021 CKD-EPI creatinine equation, which is validated in male and   female population 18 years of age and older (N Engl J Med 2021;   385:1058-0417). mL/min/1.73m2  Activated Partial Thromboplastin Time in AM (12.16.24 @ 17:36)   Activated Partial Thromboplastin Time: 32.5: The recommended therapeutic heparin range (full dose) is 58-99 seconds.   INR: 1.08    RADIOLOGY & ADDITIONAL STUDIES:  < from: Xray Chest 2 Views PA/Lat (12.16.24 @ 17:21) >  IMPRESSION:  Clear lungs.    < end of copied text >      < from: CT Chest w/wo IV Cont (12.16.24 @ 18:56) >  IMPRESSION:    Lobulated soft tissue mass involving the anterior left shoulder, as   described. Sarcomatous/neoplastic etiology is the primary consideration.   An additional lesion is noted in the right anterior chest wall.    < end of copied text >    < from: CT Abdomen and Pelvis w/ IV Cont (12.19.24 @ 12:38) >  IMPRESSION:  Cirrhotic liver morphology.    Indeterminate subcutaneous lower chest/upper abdominal wall mass, new   since 6/16/2023.    < end of copied text >    PROTEIN CALORIE MALNUTRITION PRESENT: [ ]mild [ ]moderate [ ]severe [ ]underweight [ ]morbid obesity  https://www.andeal.org/vault/2440/web/files/ONC/Table_Clinical%20Characteristics%20to%20Document%20Malnutrition-White%20JV%20et%20al%202012.pdf    Height (cm): 172.7 (12-17-24 @ 10:58)  Weight (kg): 122.5 (12-17-24 @ 11:05), 125.6 (06-16-24 @ 15:14)  BMI (kg/m2): 41.1 (12-17-24 @ 11:05), 42.1 (12-17-24 @ 10:58)    [ ]PPSV2 < or = to 30% [ ]significant weight loss  [ ]poor nutritional intake  [ ]anasarca[ ]Artificial Nutrition      Other REFERRALS:  [ ]Hospice  [ ]Child Life  [ ]Social Work  [ ]Case management [ ]Holistic Therapy

## 2024-12-23 NOTE — PROGRESS NOTE ADULT - SUBJECTIVE AND OBJECTIVE BOX
Medicine Progress Note    Patient is a 64y old  Male who presents with a chief complaint of L shoulder mass (23 Dec 2024 11:27)      SUBJECTIVE / OVERNIGHT EVENTS:  pathology is back, B cell lymphoma     ADDITIONAL REVIEW OF SYSTEMS: pain shoulder     MEDICATIONS  (STANDING):  acetaminophen     Tablet .. 975 milliGRAM(s) Oral every 8 hours  amLODIPine   Tablet 10 milliGRAM(s) Oral daily  ascorbic acid 500 milliGRAM(s) Oral daily  atenolol  Tablet 100 milliGRAM(s) Oral daily  atorvastatin 10 milliGRAM(s) Oral at bedtime  cholecalciferol 1000 Unit(s) Oral daily  dextrose 5%. 1000 milliLiter(s) (100 mL/Hr) IV Continuous <Continuous>  dextrose 5%. 1000 milliLiter(s) (50 mL/Hr) IV Continuous <Continuous>  dextrose 5%. 1000 milliLiter(s) (50 mL/Hr) IV Continuous <Continuous>  dextrose 5%. 1000 milliLiter(s) (100 mL/Hr) IV Continuous <Continuous>  dextrose 50% Injectable 25 Gram(s) IV Push once  dextrose 50% Injectable 12.5 Gram(s) IV Push once  dextrose 50% Injectable 25 Gram(s) IV Push once  dextrose 50% Injectable 25 Gram(s) IV Push once  dextrose 50% Injectable 12.5 Gram(s) IV Push once  dextrose 50% Injectable 25 Gram(s) IV Push once  DULoxetine 60 milliGRAM(s) Oral daily  enoxaparin Injectable 40 milliGRAM(s) SubCutaneous every 12 hours  gabapentin 300 milliGRAM(s) Oral four times a day  glucagon  Injectable 1 milliGRAM(s) IntraMuscular once  glucagon  Injectable 1 milliGRAM(s) IntraMuscular once  insulin lispro (ADMELOG) corrective regimen sliding scale   SubCutaneous at bedtime  insulin lispro (ADMELOG) corrective regimen sliding scale   SubCutaneous three times a day before meals  levothyroxine 75 MICROGram(s) Oral daily  losartan 25 milliGRAM(s) Oral daily  multivitamin 1 Tablet(s) Oral daily  pantoprazole    Tablet 40 milliGRAM(s) Oral before breakfast  polyethylene glycol 3350 17 Gram(s) Oral two times a day  senna 2 Tablet(s) Oral two times a day  tamsulosin 0.4 milliGRAM(s) Oral at bedtime    MEDICATIONS  (PRN):  dextrose Oral Gel 15 Gram(s) Oral once PRN Blood Glucose LESS THAN 70 milliGRAM(s)/deciliter  dextrose Oral Gel 15 Gram(s) Oral once PRN Blood Glucose LESS THAN 70 milliGRAM(s)/deciliter  diphenhydrAMINE 25 milliGRAM(s) Oral every 6 hours PRN Rash and/or Itching  magnesium hydroxide Suspension 30 milliLiter(s) Oral daily PRN Constipation  naloxone Injectable 0.1 milliGRAM(s) IV Push every 3 minutes PRN For ANY of the following changes in patient status:  A. RR LESS THAN 10 breaths per minute, B. Oxygen saturation LESS THAN 90%, C. Sedation score of 6  ondansetron Injectable 4 milliGRAM(s) IV Push every 6 hours PRN Nausea and/or Vomiting  oxyCODONE    IR 20 milliGRAM(s) Oral every 4 hours PRN Moderate Pain (4 - 6)  oxyCODONE    IR 15 milliGRAM(s) Oral every 4 hours PRN Moderate Pain (4 - 6)  sucralfate suspension 1 Gram(s) Oral Once PRN for esophageal pain  tiZANidine 4 milliGRAM(s) Oral <User Schedule> PRN Muscle Spasm  zolpidem 5 milliGRAM(s) Oral at bedtime PRN Insomnia    CAPILLARY BLOOD GLUCOSE      POCT Blood Glucose.: 101 mg/dL (23 Dec 2024 07:18)  POCT Blood Glucose.: 101 mg/dL (22 Dec 2024 21:39)  POCT Blood Glucose.: 132 mg/dL (22 Dec 2024 16:27)    I&O's Summary    22 Dec 2024 07:01  -  23 Dec 2024 07:00  --------------------------------------------------------  IN: 0 mL / OUT: 600 mL / NET: -600 mL        PHYSICAL EXAM:  Vital Signs Last 24 Hrs  T(C): 36.7 (23 Dec 2024 09:27), Max: 36.7 (22 Dec 2024 21:59)  T(F): 98.1 (23 Dec 2024 09:27), Max: 98.1 (23 Dec 2024 09:27)  HR: 56 (23 Dec 2024 09:27) (56 - 63)  BP: 130/70 (23 Dec 2024 09:27) (130/70 - 150/78)  BP(mean): --  RR: 18 (23 Dec 2024 09:27) (18 - 19)  SpO2: 94% (23 Dec 2024 09:27) (94% - 99%)    Parameters below as of 23 Dec 2024 09:27  Patient On (Oxygen Delivery Method): room air      CONSTITUTIONAL: NAD,  ENMT: Moist oral mucosa, no pharyngeal injection or exudates;   RESPIRATORY: Normal respiratory effort; lungs are clear to auscultation bilaterally  CARDIOVASCULAR: Regular rate and rhythm, normal S1 and S2; No lower extremity edema; lt shoulder  pain, wound , ecchymosis   ABDOMEN: Nontender to palpation, normoactive bowel sounds, no rebound/guarding;   PSYCH: A+O to person, place, and time; affect appropriate  NEUROLOGY: CN 2-12 are intact and symmetric; no gross sensory deficits   SKIN: No rashes;     LABS:                        RADIOLOGY & ADDITIONAL TESTS:  Imaging from Last 24 Hours:    Electrocardiogram/QTc Interval:    COORDINATION OF CARE:  Care Discussed with Consultants/Other Providers: ACP

## 2024-12-24 DIAGNOSIS — G89.3 NEOPLASM RELATED PAIN (ACUTE) (CHRONIC): ICD-10-CM

## 2024-12-24 DIAGNOSIS — C83.30 DIFFUSE LARGE B-CELL LYMPHOMA, UNSPECIFIED SITE: ICD-10-CM

## 2024-12-24 LAB
ADD ON TEST-SPECIMEN IN LAB: SIGNIFICANT CHANGE UP
ALBUMIN SERPL ELPH-MCNC: 4 G/DL — SIGNIFICANT CHANGE UP (ref 3.3–5)
ALBUMIN SERPL ELPH-MCNC: 4.3 G/DL — SIGNIFICANT CHANGE UP (ref 3.3–5)
ALP SERPL-CCNC: 106 U/L — SIGNIFICANT CHANGE UP (ref 40–120)
ALP SERPL-CCNC: 118 U/L — SIGNIFICANT CHANGE UP (ref 40–120)
ALT FLD-CCNC: 40 U/L — SIGNIFICANT CHANGE UP (ref 4–41)
ALT FLD-CCNC: 46 U/L — HIGH (ref 4–41)
ANION GAP SERPL CALC-SCNC: 14 MMOL/L — SIGNIFICANT CHANGE UP (ref 7–14)
ANION GAP SERPL CALC-SCNC: 14 MMOL/L — SIGNIFICANT CHANGE UP (ref 7–14)
ANION GAP SERPL CALC-SCNC: 15 MMOL/L — HIGH (ref 7–14)
AST SERPL-CCNC: 48 U/L — HIGH (ref 4–40)
AST SERPL-CCNC: 69 U/L — HIGH (ref 4–40)
BILIRUB SERPL-MCNC: 0.4 MG/DL — SIGNIFICANT CHANGE UP (ref 0.2–1.2)
BILIRUB SERPL-MCNC: 0.5 MG/DL — SIGNIFICANT CHANGE UP (ref 0.2–1.2)
BUN SERPL-MCNC: 28 MG/DL — HIGH (ref 7–23)
CALCIUM SERPL-MCNC: 10.1 MG/DL — SIGNIFICANT CHANGE UP (ref 8.4–10.5)
CALCIUM SERPL-MCNC: 9.7 MG/DL — SIGNIFICANT CHANGE UP (ref 8.4–10.5)
CALCIUM SERPL-MCNC: 9.7 MG/DL — SIGNIFICANT CHANGE UP (ref 8.4–10.5)
CHLORIDE SERPL-SCNC: 97 MMOL/L — LOW (ref 98–107)
CHLORIDE SERPL-SCNC: 98 MMOL/L — SIGNIFICANT CHANGE UP (ref 98–107)
CHLORIDE SERPL-SCNC: 99 MMOL/L — SIGNIFICANT CHANGE UP (ref 98–107)
CO2 SERPL-SCNC: 20 MMOL/L — LOW (ref 22–31)
CO2 SERPL-SCNC: 21 MMOL/L — LOW (ref 22–31)
CO2 SERPL-SCNC: 21 MMOL/L — LOW (ref 22–31)
CREAT SERPL-MCNC: 0.67 MG/DL — SIGNIFICANT CHANGE UP (ref 0.5–1.3)
CREAT SERPL-MCNC: 0.7 MG/DL — SIGNIFICANT CHANGE UP (ref 0.5–1.3)
CREAT SERPL-MCNC: 0.76 MG/DL — SIGNIFICANT CHANGE UP (ref 0.5–1.3)
EGFR: 101 ML/MIN/1.73M2 — SIGNIFICANT CHANGE UP
EGFR: 104 ML/MIN/1.73M2 — SIGNIFICANT CHANGE UP
EGFR: 105 ML/MIN/1.73M2 — SIGNIFICANT CHANGE UP
GLUCOSE BLDC GLUCOMTR-MCNC: 151 MG/DL — HIGH (ref 70–99)
GLUCOSE BLDC GLUCOMTR-MCNC: 194 MG/DL — HIGH (ref 70–99)
GLUCOSE BLDC GLUCOMTR-MCNC: 207 MG/DL — HIGH (ref 70–99)
GLUCOSE BLDC GLUCOMTR-MCNC: 216 MG/DL — HIGH (ref 70–99)
GLUCOSE SERPL-MCNC: 179 MG/DL — HIGH (ref 70–99)
GLUCOSE SERPL-MCNC: 183 MG/DL — HIGH (ref 70–99)
GLUCOSE SERPL-MCNC: 214 MG/DL — HIGH (ref 70–99)
HAV IGM SER-ACNC: SIGNIFICANT CHANGE UP
HBV CORE AB SER-ACNC: SIGNIFICANT CHANGE UP
HBV CORE IGM SER-ACNC: SIGNIFICANT CHANGE UP
HBV SURFACE AG SER-ACNC: SIGNIFICANT CHANGE UP
HCT VFR BLD CALC: 42.3 % — SIGNIFICANT CHANGE UP (ref 39–50)
HCV AB S/CO SERPL IA: 0.22 S/CO — SIGNIFICANT CHANGE UP (ref 0–0.99)
HCV AB SERPL-IMP: SIGNIFICANT CHANGE UP
HGB BLD-MCNC: 15.4 G/DL — SIGNIFICANT CHANGE UP (ref 13–17)
LDH SERPL L TO P-CCNC: 321 U/L — HIGH (ref 135–225)
LDH SERPL L TO P-CCNC: 426 U/L — HIGH (ref 135–225)
MAGNESIUM SERPL-MCNC: 2 MG/DL — SIGNIFICANT CHANGE UP (ref 1.6–2.6)
MCHC RBC-ENTMCNC: 32.4 PG — SIGNIFICANT CHANGE UP (ref 27–34)
MCHC RBC-ENTMCNC: 36.4 G/DL — HIGH (ref 32–36)
MCV RBC AUTO: 89.1 FL — SIGNIFICANT CHANGE UP (ref 80–100)
NRBC # BLD: 0 /100 WBCS — SIGNIFICANT CHANGE UP (ref 0–0)
NRBC # FLD: 0 K/UL — SIGNIFICANT CHANGE UP (ref 0–0)
PHOSPHATE SERPL-MCNC: 2.5 MG/DL — SIGNIFICANT CHANGE UP (ref 2.5–4.5)
PHOSPHATE SERPL-MCNC: 2.6 MG/DL — SIGNIFICANT CHANGE UP (ref 2.5–4.5)
PHOSPHATE SERPL-MCNC: 3 MG/DL — SIGNIFICANT CHANGE UP (ref 2.5–4.5)
PHOSPHATE SERPL-MCNC: 3.5 MG/DL — SIGNIFICANT CHANGE UP (ref 2.5–4.5)
PLATELET # BLD AUTO: 167 K/UL — SIGNIFICANT CHANGE UP (ref 150–400)
POTASSIUM SERPL-MCNC: 4.5 MMOL/L — SIGNIFICANT CHANGE UP (ref 3.5–5.3)
POTASSIUM SERPL-MCNC: 4.6 MMOL/L — SIGNIFICANT CHANGE UP (ref 3.5–5.3)
POTASSIUM SERPL-MCNC: 4.6 MMOL/L — SIGNIFICANT CHANGE UP (ref 3.5–5.3)
POTASSIUM SERPL-SCNC: 4.5 MMOL/L — SIGNIFICANT CHANGE UP (ref 3.5–5.3)
POTASSIUM SERPL-SCNC: 4.6 MMOL/L — SIGNIFICANT CHANGE UP (ref 3.5–5.3)
POTASSIUM SERPL-SCNC: 4.6 MMOL/L — SIGNIFICANT CHANGE UP (ref 3.5–5.3)
PROT SERPL-MCNC: 7.4 G/DL — SIGNIFICANT CHANGE UP (ref 6–8.3)
PROT SERPL-MCNC: 8 G/DL — SIGNIFICANT CHANGE UP (ref 6–8.3)
RBC # BLD: 4.75 M/UL — SIGNIFICANT CHANGE UP (ref 4.2–5.8)
RBC # FLD: 11.9 % — SIGNIFICANT CHANGE UP (ref 10.3–14.5)
SODIUM SERPL-SCNC: 132 MMOL/L — LOW (ref 135–145)
SODIUM SERPL-SCNC: 133 MMOL/L — LOW (ref 135–145)
SODIUM SERPL-SCNC: 134 MMOL/L — LOW (ref 135–145)
URATE SERPL-MCNC: 3.6 MG/DL — SIGNIFICANT CHANGE UP (ref 3.4–8.8)
URATE SERPL-MCNC: 3.8 MG/DL — SIGNIFICANT CHANGE UP (ref 3.4–8.8)
URATE SERPL-MCNC: 5.1 MG/DL — SIGNIFICANT CHANGE UP (ref 3.4–8.8)
WBC # BLD: 5.37 K/UL — SIGNIFICANT CHANGE UP (ref 3.8–10.5)
WBC # FLD AUTO: 5.37 K/UL — SIGNIFICANT CHANGE UP (ref 3.8–10.5)

## 2024-12-24 PROCEDURE — 99233 SBSQ HOSP IP/OBS HIGH 50: CPT | Mod: 25

## 2024-12-24 PROCEDURE — 99233 SBSQ HOSP IP/OBS HIGH 50: CPT | Mod: GC

## 2024-12-24 PROCEDURE — 99232 SBSQ HOSP IP/OBS MODERATE 35: CPT

## 2024-12-24 RX ORDER — DEXAMETHASONE SODIUM PHOSPHATE 4 MG/ML
12 VIAL (ML) INJECTION ONCE
Refills: 0 | Status: COMPLETED | OUTPATIENT
Start: 2024-12-24 | End: 2024-12-24

## 2024-12-24 RX ORDER — OXYCODONE HCL 15 MG
15 TABLET ORAL EVERY 12 HOURS
Refills: 0 | Status: DISCONTINUED | OUTPATIENT
Start: 2024-12-24 | End: 2024-12-26

## 2024-12-24 RX ORDER — SODIUM CHLORIDE 9 MG/ML
1000 INJECTION, SOLUTION INTRAMUSCULAR; INTRAVENOUS; SUBCUTANEOUS
Refills: 0 | Status: DISCONTINUED | OUTPATIENT
Start: 2024-12-24 | End: 2024-12-28

## 2024-12-24 RX ORDER — DIPHENHYDRAMINE HCL 25 MG
25 TABLET ORAL ONCE
Refills: 0 | Status: COMPLETED | OUTPATIENT
Start: 2024-12-24 | End: 2024-12-24

## 2024-12-24 RX ORDER — HYDROCORTISONE 100 MG/60ML
100 ENEMA RECTAL ONCE
Refills: 0 | Status: DISCONTINUED | OUTPATIENT
Start: 2024-12-24 | End: 2024-12-31

## 2024-12-24 RX ORDER — MEPERIDINE HYDROCHLORIDE 50 MG/ML
25 INJECTION, SOLUTION INTRAMUSCULAR; INTRAVENOUS; SUBCUTANEOUS ONCE
Refills: 0 | Status: DISCONTINUED | OUTPATIENT
Start: 2024-12-24 | End: 2024-12-24

## 2024-12-24 RX ORDER — ACETAMINOPHEN 80 MG/.8ML
650 SOLUTION/ DROPS ORAL ONCE
Refills: 0 | Status: COMPLETED | OUTPATIENT
Start: 2024-12-24 | End: 2024-12-24

## 2024-12-24 RX ORDER — RITUXIMAB 10 MG/ML
866 INJECTION, SOLUTION INTRAVENOUS ONCE
Refills: 0 | Status: COMPLETED | OUTPATIENT
Start: 2024-12-24 | End: 2024-12-24

## 2024-12-24 RX ADMIN — GABAPENTIN 300 MILLIGRAM(S): 300 CAPSULE ORAL at 00:02

## 2024-12-24 RX ADMIN — SENNOSIDES 2 TABLET(S): 8.6 TABLET, FILM COATED ORAL at 17:11

## 2024-12-24 RX ADMIN — Medication 500 MILLIGRAM(S): at 11:20

## 2024-12-24 RX ADMIN — Medication 1: at 16:29

## 2024-12-24 RX ADMIN — ACETAMINOPHEN 975 MILLIGRAM(S): 80 SOLUTION/ DROPS ORAL at 05:22

## 2024-12-24 RX ADMIN — ACETAMINOPHEN 975 MILLIGRAM(S): 80 SOLUTION/ DROPS ORAL at 05:25

## 2024-12-24 RX ADMIN — TAMSULOSIN HYDROCHLORIDE 0.4 MILLIGRAM(S): 0.4 CAPSULE ORAL at 21:20

## 2024-12-24 RX ADMIN — ACETAMINOPHEN 975 MILLIGRAM(S): 80 SOLUTION/ DROPS ORAL at 21:19

## 2024-12-24 RX ADMIN — Medication 1: at 07:27

## 2024-12-24 RX ADMIN — Medication 20 MILLIGRAM(S): at 20:45

## 2024-12-24 RX ADMIN — LOSARTAN POTASSIUM 25 MILLIGRAM(S): 100 TABLET, FILM COATED ORAL at 05:26

## 2024-12-24 RX ADMIN — SENNOSIDES 2 TABLET(S): 8.6 TABLET, FILM COATED ORAL at 05:26

## 2024-12-24 RX ADMIN — ENOXAPARIN SODIUM 40 MILLIGRAM(S): 60 INJECTION INTRAVENOUS; SUBCUTANEOUS at 17:11

## 2024-12-24 RX ADMIN — ACETAMINOPHEN 975 MILLIGRAM(S): 80 SOLUTION/ DROPS ORAL at 22:21

## 2024-12-24 RX ADMIN — Medication 100 MILLIGRAM(S): at 05:48

## 2024-12-24 RX ADMIN — Medication 15 MILLIGRAM(S): at 11:19

## 2024-12-24 RX ADMIN — Medication 20 MILLIGRAM(S): at 09:43

## 2024-12-24 RX ADMIN — Medication 20 MILLIGRAM(S): at 06:22

## 2024-12-24 RX ADMIN — Medication 17 GRAM(S): at 05:26

## 2024-12-24 RX ADMIN — Medication 1000 UNIT(S): at 11:20

## 2024-12-24 RX ADMIN — GABAPENTIN 300 MILLIGRAM(S): 300 CAPSULE ORAL at 05:26

## 2024-12-24 RX ADMIN — ENOXAPARIN SODIUM 40 MILLIGRAM(S): 60 INJECTION INTRAVENOUS; SUBCUTANEOUS at 05:26

## 2024-12-24 RX ADMIN — Medication 20 MILLIGRAM(S): at 05:25

## 2024-12-24 RX ADMIN — Medication 10 MILLIGRAM(S): at 05:26

## 2024-12-24 RX ADMIN — Medication 5 MILLIGRAM(S): at 00:02

## 2024-12-24 RX ADMIN — ALLOPURINOL 300 MILLIGRAM(S): 100 TABLET ORAL at 11:19

## 2024-12-24 RX ADMIN — LEVOTHYROXINE SODIUM 75 MICROGRAM(S): 175 TABLET ORAL at 05:27

## 2024-12-24 RX ADMIN — Medication 17 GRAM(S): at 17:11

## 2024-12-24 RX ADMIN — Medication 15 MILLIGRAM(S): at 12:19

## 2024-12-24 RX ADMIN — DULOXETINE HYDROCHLORIDE 60 MILLIGRAM(S): 30 CAPSULE, DELAYED RELEASE ORAL at 11:19

## 2024-12-24 RX ADMIN — OLANZAPINE 2.5 MILLIGRAM(S): 15 TABLET ORAL at 21:19

## 2024-12-24 RX ADMIN — Medication 1 TABLET(S): at 11:20

## 2024-12-24 RX ADMIN — PANTOPRAZOLE 40 MILLIGRAM(S): 40 TABLET, DELAYED RELEASE ORAL at 05:48

## 2024-12-24 RX ADMIN — Medication 20 MILLIGRAM(S): at 15:58

## 2024-12-24 RX ADMIN — GABAPENTIN 300 MILLIGRAM(S): 300 CAPSULE ORAL at 17:11

## 2024-12-24 RX ADMIN — Medication 106 MILLIGRAM(S): at 11:21

## 2024-12-24 RX ADMIN — Medication 20 MILLIGRAM(S): at 10:43

## 2024-12-24 RX ADMIN — Medication 100 MILLIGRAM(S): at 05:27

## 2024-12-24 RX ADMIN — Medication 25 MILLIGRAM(S): at 11:21

## 2024-12-24 RX ADMIN — Medication 20 MILLIGRAM(S): at 19:50

## 2024-12-24 RX ADMIN — Medication 20 MILLIGRAM(S): at 14:58

## 2024-12-24 RX ADMIN — GABAPENTIN 300 MILLIGRAM(S): 300 CAPSULE ORAL at 12:30

## 2024-12-24 RX ADMIN — Medication 15 MILLIGRAM(S): at 22:24

## 2024-12-24 RX ADMIN — Medication 5 MILLIGRAM(S): at 21:29

## 2024-12-24 RX ADMIN — Medication 2: at 11:32

## 2024-12-24 RX ADMIN — RITUXIMAB 866 MILLIGRAM(S): 10 INJECTION, SOLUTION INTRAVENOUS at 12:24

## 2024-12-24 RX ADMIN — GABAPENTIN 300 MILLIGRAM(S): 300 CAPSULE ORAL at 23:50

## 2024-12-24 RX ADMIN — Medication 20 MILLIGRAM(S): at 23:51

## 2024-12-24 RX ADMIN — ACETAMINOPHEN 650 MILLIGRAM(S): 80 SOLUTION/ DROPS ORAL at 12:00

## 2024-12-24 RX ADMIN — ACETAMINOPHEN 650 MILLIGRAM(S): 80 SOLUTION/ DROPS ORAL at 11:21

## 2024-12-24 RX ADMIN — ATORVASTATIN CALCIUM 10 MILLIGRAM(S): 40 TABLET, FILM COATED ORAL at 21:20

## 2024-12-24 NOTE — PROGRESS NOTE ADULT - PROBLEM SELECTOR PLAN 2
expanding mass on Lt shoulder, s/p Bx w/ ortho on 12/17  path and flow consistent w/ DLBCL   CT chest 12/16 showing the 10.9x6.6x6cm lobulated soft tissue mass on shoulder, sarcomatous/neoplastic etiology, also with 2.9cm lobulated lesion on R anterior chest wall. Multiple enlarged cervical LNs.  TTE 12/23/24: EF > 75%    - appreciate inpatient oncology recommendations:   - plan for inpatient chemo, R-CHOP, (steroids on since 12/23, rituxan on 12/24 and CHOP on 12/27).   - case to be presented at tumor board 12/24  - c/w prednisone 100mg qd to complete 5 days, w/ ppx PPI, allopurinol (renally dose)  - obtain G6PD, acute hepatitis panel (including hep B core ab), and trend TLS labs q8hr: LDH, CMP with Phos, uric acid   - possible inpatient PET-CT 12/27  - [facility] consult for radiation to large shoulder mass

## 2024-12-24 NOTE — PROGRESS NOTE ADULT - PROBLEM SELECTOR PLAN 3
expanding mass on Lt shoulder, s/p Bx w/ ortho on 12/17  path and flow consistent w/ DLBCL   CT chest 12/16 showing the 10.9x6.6x6cm lobulated soft tissue mass on shoulder, sarcomatous/neoplastic etiology, also with 2.9cm lobulated lesion on R anterior chest wall. Multiple enlarged cervical LNs.  TTE 12/23/24: EF > 75%    - appreciate inpatient oncology recommendations:   - plan for inpatient chemo, R-CHOP, (steroids on since 12/23, rituxan on 12/24 and CHOP on 12/27).   - case to be presented at tumor board 12/24  - c/w prednisone 100mg qd to complete 5 days, w/ ppx PPI, allopurinol (renally dose)  - obtain G6PD, acute hepatitis panel (including hep B core ab), and trend TLS labs q8hr: LDH, CMP with Phos, uric acid   - possible inpatient PET-CT 12/27  - [facility] consult for radiation to large shoulder mass Pt revealed to team during first encounter that he has a ho bipolar TII disorder, not currently under the care of psychiatrist, and on unopposed SNRI.     - appreciate inpatient psychiatry recommendations   - c/w zyprexa 2.5mg qHS

## 2024-12-24 NOTE — PROGRESS NOTE ADULT - SUBJECTIVE AND OBJECTIVE BOX
INTERVAL HPI/OVERNIGHT EVENTS:  O/N: no acute events  This morning: Patient was seen and examined at bedside. Feeling well overall    VITAL SIGNS:  T(F): 97.7 (12-24-24 @ 09:23)  HR: 67 (12-24-24 @ 09:23)  BP: 133/75 (12-24-24 @ 09:23)  RR: 18 (12-24-24 @ 09:23)  SpO2: 97% (12-24-24 @ 09:23)  Wt(kg): --    PHYSICAL EXAM:    Constitutional: NAD, sitting comfortably in chair OOB  HEENT: PERRL, EOMI, sclera non-icteric  Respiratory: CTA b/l, good air entry b/l, no wheezing, no rhonchi, no rales, without accessory muscle use and no intercostal retractions  Cardiovascular: (+) systolic murmur best heard at L and R sternal borders  Gastrointestinal: abdomen soft, obese, NTND; R mass palpated at manubrium/below R nipple  Extremities: Warm, well perfused  Neurological: alert and oriented, no focal deficits noted      MEDICATIONS  (STANDING):  acetaminophen     Tablet .. 650 milliGRAM(s) Oral once  acetaminophen     Tablet .. 975 milliGRAM(s) Oral every 8 hours  allopurinol 300 milliGRAM(s) Oral daily  amLODIPine   Tablet 10 milliGRAM(s) Oral daily  ascorbic acid 500 milliGRAM(s) Oral daily  atenolol  Tablet 100 milliGRAM(s) Oral daily  atorvastatin 10 milliGRAM(s) Oral at bedtime  cholecalciferol 1000 Unit(s) Oral daily  dexAMETHasone  IVPB 12 milliGRAM(s) IV Intermittent once  dextrose 5%. 1000 milliLiter(s) (100 mL/Hr) IV Continuous <Continuous>  dextrose 5%. 1000 milliLiter(s) (50 mL/Hr) IV Continuous <Continuous>  dextrose 5%. 1000 milliLiter(s) (50 mL/Hr) IV Continuous <Continuous>  dextrose 5%. 1000 milliLiter(s) (100 mL/Hr) IV Continuous <Continuous>  dextrose 50% Injectable 25 Gram(s) IV Push once  dextrose 50% Injectable 12.5 Gram(s) IV Push once  dextrose 50% Injectable 25 Gram(s) IV Push once  dextrose 50% Injectable 25 Gram(s) IV Push once  dextrose 50% Injectable 12.5 Gram(s) IV Push once  dextrose 50% Injectable 25 Gram(s) IV Push once  diphenhydrAMINE Injectable 25 milliGRAM(s) IV Push once  DULoxetine 60 milliGRAM(s) Oral daily  enoxaparin Injectable 40 milliGRAM(s) SubCutaneous every 12 hours  gabapentin 300 milliGRAM(s) Oral four times a day  glucagon  Injectable 1 milliGRAM(s) IntraMuscular once  glucagon  Injectable 1 milliGRAM(s) IntraMuscular once  insulin lispro (ADMELOG) corrective regimen sliding scale   SubCutaneous at bedtime  insulin lispro (ADMELOG) corrective regimen sliding scale   SubCutaneous three times a day before meals  levothyroxine 75 MICROGram(s) Oral daily  losartan 25 milliGRAM(s) Oral daily  multivitamin 1 Tablet(s) Oral daily  OLANZapine 2.5 milliGRAM(s) Oral at bedtime  oxyCODONE  ER Tablet 15 milliGRAM(s) Oral every 12 hours  pantoprazole    Tablet 40 milliGRAM(s) Oral before breakfast  polyethylene glycol 3350 17 Gram(s) Oral two times a day  predniSONE   Tablet 100 milliGRAM(s) Oral daily  riTUXimab-pvvr (RUXIENCE) IVPB (eMAR) 866 milliGRAM(s) IV Intermittent once  senna 2 Tablet(s) Oral two times a day  tamsulosin 0.4 milliGRAM(s) Oral at bedtime    MEDICATIONS  (PRN):  dextrose Oral Gel 15 Gram(s) Oral once PRN Blood Glucose LESS THAN 70 milliGRAM(s)/deciliter  dextrose Oral Gel 15 Gram(s) Oral once PRN Blood Glucose LESS THAN 70 milliGRAM(s)/deciliter  diphenhydrAMINE 25 milliGRAM(s) Oral every 6 hours PRN Rash and/or Itching  hydrocortisone sodium succinate Injectable 100 milliGRAM(s) IV Push once PRN allergic reaction  magnesium hydroxide Suspension 30 milliLiter(s) Oral daily PRN Constipation  meperidine     Injectable 25 milliGRAM(s) IV Push once PRN rigors  naloxone Injectable 0.1 milliGRAM(s) IV Push every 3 minutes PRN For ANY of the following changes in patient status:  A. RR LESS THAN 10 breaths per minute, B. Oxygen saturation LESS THAN 90%, C. Sedation score of 6  ondansetron Injectable 4 milliGRAM(s) IV Push every 6 hours PRN Nausea and/or Vomiting  oxyCODONE    IR 20 milliGRAM(s) Oral every 4 hours PRN Severe Pain (7 - 10)  oxyCODONE    IR 15 milliGRAM(s) Oral every 4 hours PRN Moderate Pain (4 - 6)  sucralfate suspension 1 Gram(s) Oral Once PRN for esophageal pain  tiZANidine 4 milliGRAM(s) Oral <User Schedule> PRN Muscle Spasm  zolpidem 5 milliGRAM(s) Oral at bedtime PRN Insomnia      Allergies    No Known Allergies    Intolerances    IV Contrast (Nausea)      LABS:                        15.4   5.37  )-----------( 167      ( 24 Dec 2024 05:37 )             42.3     12-24    132[L]  |  97[L]  |  28[H]  ----------------------------<  179[H]  4.6   |  20[L]  |  0.70    Ca    10.1      24 Dec 2024 05:37  Phos  3.5     12-24  Mg     2.00     12-24    TPro  7.6  /  Alb  4.4  /  TBili  0.6  /  DBili  x   /  AST  58[H]  /  ALT  38  /  AlkPhos  115  12-23      Urinalysis Basic - ( 24 Dec 2024 05:37 )    Color: x / Appearance: x / SG: x / pH: x  Gluc: 179 mg/dL / Ketone: x  / Bili: x / Urobili: x   Blood: x / Protein: x / Nitrite: x   Leuk Esterase: x / RBC: x / WBC x   Sq Epi: x / Non Sq Epi: x / Bacteria: x              RADIOLOGY & ADDITIONAL TESTS:  Reviewed INTERVAL HPI/OVERNIGHT EVENTS:  O/N: no acute events  This morning: Patient was seen and examined at bedside. Feeling well overall- L shoulder pain is improved.    VITAL SIGNS:  T(F): 97.7 (12-24-24 @ 09:23)  HR: 67 (12-24-24 @ 09:23)  BP: 133/75 (12-24-24 @ 09:23)  RR: 18 (12-24-24 @ 09:23)  SpO2: 97% (12-24-24 @ 09:23)  Wt(kg): --    PHYSICAL EXAM:    Constitutional: NAD, sitting comfortably in chair OOB  HEENT: PERRL, EOMI, sclera non-icteric  Respiratory: CTA b/l, good air entry b/l, no wheezing, no rhonchi, no rales, without accessory muscle use and no intercostal retractions  Cardiovascular: (+) systolic murmur best heard at L and R sternal borders  Gastrointestinal: abdomen soft, obese, NTND; R mass palpated at manubrium/below R nipple  Extremities: Warm, well perfused  MSK: improved L shoulder erythema/skin tightness  Neurological: alert and oriented, no focal deficits noted      MEDICATIONS  (STANDING):  acetaminophen     Tablet .. 650 milliGRAM(s) Oral once  acetaminophen     Tablet .. 975 milliGRAM(s) Oral every 8 hours  allopurinol 300 milliGRAM(s) Oral daily  amLODIPine   Tablet 10 milliGRAM(s) Oral daily  ascorbic acid 500 milliGRAM(s) Oral daily  atenolol  Tablet 100 milliGRAM(s) Oral daily  atorvastatin 10 milliGRAM(s) Oral at bedtime  cholecalciferol 1000 Unit(s) Oral daily  dexAMETHasone  IVPB 12 milliGRAM(s) IV Intermittent once  dextrose 5%. 1000 milliLiter(s) (100 mL/Hr) IV Continuous <Continuous>  dextrose 5%. 1000 milliLiter(s) (50 mL/Hr) IV Continuous <Continuous>  dextrose 5%. 1000 milliLiter(s) (50 mL/Hr) IV Continuous <Continuous>  dextrose 5%. 1000 milliLiter(s) (100 mL/Hr) IV Continuous <Continuous>  dextrose 50% Injectable 25 Gram(s) IV Push once  dextrose 50% Injectable 12.5 Gram(s) IV Push once  dextrose 50% Injectable 25 Gram(s) IV Push once  dextrose 50% Injectable 25 Gram(s) IV Push once  dextrose 50% Injectable 12.5 Gram(s) IV Push once  dextrose 50% Injectable 25 Gram(s) IV Push once  diphenhydrAMINE Injectable 25 milliGRAM(s) IV Push once  DULoxetine 60 milliGRAM(s) Oral daily  enoxaparin Injectable 40 milliGRAM(s) SubCutaneous every 12 hours  gabapentin 300 milliGRAM(s) Oral four times a day  glucagon  Injectable 1 milliGRAM(s) IntraMuscular once  glucagon  Injectable 1 milliGRAM(s) IntraMuscular once  insulin lispro (ADMELOG) corrective regimen sliding scale   SubCutaneous at bedtime  insulin lispro (ADMELOG) corrective regimen sliding scale   SubCutaneous three times a day before meals  levothyroxine 75 MICROGram(s) Oral daily  losartan 25 milliGRAM(s) Oral daily  multivitamin 1 Tablet(s) Oral daily  OLANZapine 2.5 milliGRAM(s) Oral at bedtime  oxyCODONE  ER Tablet 15 milliGRAM(s) Oral every 12 hours  pantoprazole    Tablet 40 milliGRAM(s) Oral before breakfast  polyethylene glycol 3350 17 Gram(s) Oral two times a day  predniSONE   Tablet 100 milliGRAM(s) Oral daily  riTUXimab-pvvr (RUXIENCE) IVPB (eMAR) 866 milliGRAM(s) IV Intermittent once  senna 2 Tablet(s) Oral two times a day  tamsulosin 0.4 milliGRAM(s) Oral at bedtime    MEDICATIONS  (PRN):  dextrose Oral Gel 15 Gram(s) Oral once PRN Blood Glucose LESS THAN 70 milliGRAM(s)/deciliter  dextrose Oral Gel 15 Gram(s) Oral once PRN Blood Glucose LESS THAN 70 milliGRAM(s)/deciliter  diphenhydrAMINE 25 milliGRAM(s) Oral every 6 hours PRN Rash and/or Itching  hydrocortisone sodium succinate Injectable 100 milliGRAM(s) IV Push once PRN allergic reaction  magnesium hydroxide Suspension 30 milliLiter(s) Oral daily PRN Constipation  meperidine     Injectable 25 milliGRAM(s) IV Push once PRN rigors  naloxone Injectable 0.1 milliGRAM(s) IV Push every 3 minutes PRN For ANY of the following changes in patient status:  A. RR LESS THAN 10 breaths per minute, B. Oxygen saturation LESS THAN 90%, C. Sedation score of 6  ondansetron Injectable 4 milliGRAM(s) IV Push every 6 hours PRN Nausea and/or Vomiting  oxyCODONE    IR 20 milliGRAM(s) Oral every 4 hours PRN Severe Pain (7 - 10)  oxyCODONE    IR 15 milliGRAM(s) Oral every 4 hours PRN Moderate Pain (4 - 6)  sucralfate suspension 1 Gram(s) Oral Once PRN for esophageal pain  tiZANidine 4 milliGRAM(s) Oral <User Schedule> PRN Muscle Spasm  zolpidem 5 milliGRAM(s) Oral at bedtime PRN Insomnia      Allergies    No Known Allergies    Intolerances    IV Contrast (Nausea)      LABS:                        15.4   5.37  )-----------( 167      ( 24 Dec 2024 05:37 )             42.3     12-24    132[L]  |  97[L]  |  28[H]  ----------------------------<  179[H]  4.6   |  20[L]  |  0.70    Ca    10.1      24 Dec 2024 05:37  Phos  3.5     12-24  Mg     2.00     12-24    TPro  7.6  /  Alb  4.4  /  TBili  0.6  /  DBili  x   /  AST  58[H]  /  ALT  38  /  AlkPhos  115  12-23      Urinalysis Basic - ( 24 Dec 2024 05:37 )    Color: x / Appearance: x / SG: x / pH: x  Gluc: 179 mg/dL / Ketone: x  / Bili: x / Urobili: x   Blood: x / Protein: x / Nitrite: x   Leuk Esterase: x / RBC: x / WBC x   Sq Epi: x / Non Sq Epi: x / Bacteria: x              RADIOLOGY & ADDITIONAL TESTS:  Reviewed INTERVAL HPI/OVERNIGHT EVENTS:  O/N: no acute events  This morning: Patient was seen and examined at bedside. Feeling well overall- L shoulder pain is improved after initiation of prednisone    VITAL SIGNS:  T(F): 97.7 (12-24-24 @ 09:23)  HR: 67 (12-24-24 @ 09:23)  BP: 133/75 (12-24-24 @ 09:23)  RR: 18 (12-24-24 @ 09:23)  SpO2: 97% (12-24-24 @ 09:23)  Wt(kg): --    PHYSICAL EXAM:    Constitutional: NAD, sitting comfortably in chair OOB  HEENT: PERRL, EOMI, sclera non-icteric  Respiratory: CTA b/l, good air entry b/l, no wheezing, no rhonchi, no rales, without accessory muscle use and no intercostal retractions  Cardiovascular: (+) systolic murmur best heard at L and R sternal borders  Gastrointestinal: abdomen soft, obese, NTND; R mass palpated at manubrium/below R nipple  Extremities: Warm, well perfused  MSK: improved L shoulder erythema/skin tightness; lesion is softer  Neurological: alert and oriented, no focal deficits noted      MEDICATIONS  (STANDING):  acetaminophen     Tablet .. 650 milliGRAM(s) Oral once  acetaminophen     Tablet .. 975 milliGRAM(s) Oral every 8 hours  allopurinol 300 milliGRAM(s) Oral daily  amLODIPine   Tablet 10 milliGRAM(s) Oral daily  ascorbic acid 500 milliGRAM(s) Oral daily  atenolol  Tablet 100 milliGRAM(s) Oral daily  atorvastatin 10 milliGRAM(s) Oral at bedtime  cholecalciferol 1000 Unit(s) Oral daily  dexAMETHasone  IVPB 12 milliGRAM(s) IV Intermittent once  dextrose 5%. 1000 milliLiter(s) (100 mL/Hr) IV Continuous <Continuous>  dextrose 5%. 1000 milliLiter(s) (50 mL/Hr) IV Continuous <Continuous>  dextrose 5%. 1000 milliLiter(s) (50 mL/Hr) IV Continuous <Continuous>  dextrose 5%. 1000 milliLiter(s) (100 mL/Hr) IV Continuous <Continuous>  dextrose 50% Injectable 25 Gram(s) IV Push once  dextrose 50% Injectable 12.5 Gram(s) IV Push once  dextrose 50% Injectable 25 Gram(s) IV Push once  dextrose 50% Injectable 25 Gram(s) IV Push once  dextrose 50% Injectable 12.5 Gram(s) IV Push once  dextrose 50% Injectable 25 Gram(s) IV Push once  diphenhydrAMINE Injectable 25 milliGRAM(s) IV Push once  DULoxetine 60 milliGRAM(s) Oral daily  enoxaparin Injectable 40 milliGRAM(s) SubCutaneous every 12 hours  gabapentin 300 milliGRAM(s) Oral four times a day  glucagon  Injectable 1 milliGRAM(s) IntraMuscular once  glucagon  Injectable 1 milliGRAM(s) IntraMuscular once  insulin lispro (ADMELOG) corrective regimen sliding scale   SubCutaneous at bedtime  insulin lispro (ADMELOG) corrective regimen sliding scale   SubCutaneous three times a day before meals  levothyroxine 75 MICROGram(s) Oral daily  losartan 25 milliGRAM(s) Oral daily  multivitamin 1 Tablet(s) Oral daily  OLANZapine 2.5 milliGRAM(s) Oral at bedtime  oxyCODONE  ER Tablet 15 milliGRAM(s) Oral every 12 hours  pantoprazole    Tablet 40 milliGRAM(s) Oral before breakfast  polyethylene glycol 3350 17 Gram(s) Oral two times a day  predniSONE   Tablet 100 milliGRAM(s) Oral daily  riTUXimab-pvvr (RUXIENCE) IVPB (eMAR) 866 milliGRAM(s) IV Intermittent once  senna 2 Tablet(s) Oral two times a day  tamsulosin 0.4 milliGRAM(s) Oral at bedtime    MEDICATIONS  (PRN):  dextrose Oral Gel 15 Gram(s) Oral once PRN Blood Glucose LESS THAN 70 milliGRAM(s)/deciliter  dextrose Oral Gel 15 Gram(s) Oral once PRN Blood Glucose LESS THAN 70 milliGRAM(s)/deciliter  diphenhydrAMINE 25 milliGRAM(s) Oral every 6 hours PRN Rash and/or Itching  hydrocortisone sodium succinate Injectable 100 milliGRAM(s) IV Push once PRN allergic reaction  magnesium hydroxide Suspension 30 milliLiter(s) Oral daily PRN Constipation  meperidine     Injectable 25 milliGRAM(s) IV Push once PRN rigors  naloxone Injectable 0.1 milliGRAM(s) IV Push every 3 minutes PRN For ANY of the following changes in patient status:  A. RR LESS THAN 10 breaths per minute, B. Oxygen saturation LESS THAN 90%, C. Sedation score of 6  ondansetron Injectable 4 milliGRAM(s) IV Push every 6 hours PRN Nausea and/or Vomiting  oxyCODONE    IR 20 milliGRAM(s) Oral every 4 hours PRN Severe Pain (7 - 10)  oxyCODONE    IR 15 milliGRAM(s) Oral every 4 hours PRN Moderate Pain (4 - 6)  sucralfate suspension 1 Gram(s) Oral Once PRN for esophageal pain  tiZANidine 4 milliGRAM(s) Oral <User Schedule> PRN Muscle Spasm  zolpidem 5 milliGRAM(s) Oral at bedtime PRN Insomnia      Allergies    No Known Allergies    Intolerances    IV Contrast (Nausea)      LABS:                        15.4   5.37  )-----------( 167      ( 24 Dec 2024 05:37 )             42.3     12-24    132[L]  |  97[L]  |  28[H]  ----------------------------<  179[H]  4.6   |  20[L]  |  0.70    Ca    10.1      24 Dec 2024 05:37  Phos  3.5     12-24  Mg     2.00     12-24    TPro  7.6  /  Alb  4.4  /  TBili  0.6  /  DBili  x   /  AST  58[H]  /  ALT  38  /  AlkPhos  115  12-23    uric acid 3.8      RADIOLOGY & ADDITIONAL TESTS:  Reviewed

## 2024-12-24 NOTE — PROGRESS NOTE ADULT - SUBJECTIVE AND OBJECTIVE BOX
[facility] Geriatrics and Palliative Care  Kalyani Sanchez MD, Palliative Care Fellow   Contact Info: Page [phone] for [facility] consults (including Nights/Weekends), message on Microsoft Teams (Kalyani Sanchez), or leave VM at Palliative Office [phone] (non-urgent)   Date of [id number] @ 08:46    SUBJECTIVE AND OBJECTIVE:    Indication for Geriatrics and Palliative Care Services/INTERVAL HPI: sxs/GOC     OVERNIGHT EVENTS:     DNR on chart:  Allergies    No Known Allergies    Intolerances    IV Contrast (Nausea)  MEDICATIONS  (STANDING):  acetaminophen     Tablet .. 650 milliGRAM(s) Oral once  acetaminophen     Tablet .. 975 milliGRAM(s) Oral every 8 hours  allopurinol 300 milliGRAM(s) Oral daily  amLODIPine   Tablet 10 milliGRAM(s) Oral daily  ascorbic acid 500 milliGRAM(s) Oral daily  atenolol  Tablet 100 milliGRAM(s) Oral daily  atorvastatin 10 milliGRAM(s) Oral at bedtime  cholecalciferol 1000 Unit(s) Oral daily  dexAMETHasone  IVPB 12 milliGRAM(s) IV Intermittent once  dextrose 5%. 1000 milliLiter(s) (100 mL/Hr) IV Continuous <Continuous>  dextrose 5%. 1000 milliLiter(s) (50 mL/Hr) IV Continuous <Continuous>  dextrose 5%. 1000 milliLiter(s) (50 mL/Hr) IV Continuous <Continuous>  dextrose 5%. 1000 milliLiter(s) (100 mL/Hr) IV Continuous <Continuous>  dextrose 50% Injectable 25 Gram(s) IV Push once  dextrose 50% Injectable 12.5 Gram(s) IV Push once  dextrose 50% Injectable 25 Gram(s) IV Push once  dextrose 50% Injectable 25 Gram(s) IV Push once  dextrose 50% Injectable 12.5 Gram(s) IV Push once  dextrose 50% Injectable 25 Gram(s) IV Push once  diphenhydrAMINE Injectable 25 milliGRAM(s) IV Push once  DULoxetine 60 milliGRAM(s) Oral daily  enoxaparin Injectable 40 milliGRAM(s) SubCutaneous every 12 hours  gabapentin 300 milliGRAM(s) Oral four times a day  glucagon  Injectable 1 milliGRAM(s) IntraMuscular once  glucagon  Injectable 1 milliGRAM(s) IntraMuscular once  insulin lispro (ADMELOG) corrective regimen sliding scale   SubCutaneous at bedtime  insulin lispro (ADMELOG) corrective regimen sliding scale   SubCutaneous three times a day before meals  levothyroxine 75 MICROGram(s) Oral daily  losartan 25 milliGRAM(s) Oral daily  multivitamin 1 Tablet(s) Oral daily  OLANZapine 2.5 milliGRAM(s) Oral at bedtime  pantoprazole    Tablet 40 milliGRAM(s) Oral before breakfast  polyethylene glycol 3350 17 Gram(s) Oral two times a day  predniSONE   Tablet 100 milliGRAM(s) Oral daily  riTUXimab-pvvr (RUXIENCE) IVPB (eMAR) 866 milliGRAM(s) IV Intermittent once  senna 2 Tablet(s) Oral two times a day  tamsulosin 0.4 milliGRAM(s) Oral at bedtime    MEDICATIONS  (PRN):  dextrose Oral Gel 15 Gram(s) Oral once PRN Blood Glucose LESS THAN 70 milliGRAM(s)/deciliter  dextrose Oral Gel 15 Gram(s) Oral once PRN Blood Glucose LESS THAN 70 milliGRAM(s)/deciliter  diphenhydrAMINE 25 milliGRAM(s) Oral every 6 hours PRN Rash and/or Itching  hydrocortisone sodium succinate Injectable 100 milliGRAM(s) IV Push once PRN allergic reaction  magnesium hydroxide Suspension 30 milliLiter(s) Oral daily PRN Constipation  meperidine     Injectable 25 milliGRAM(s) IV Push once PRN rigors  naloxone Injectable 0.1 milliGRAM(s) IV Push every 3 minutes PRN For ANY of the following changes in patient status:  A. RR LESS THAN 10 breaths per minute, B. Oxygen saturation LESS THAN 90%, C. Sedation score of 6  ondansetron Injectable 4 milliGRAM(s) IV Push every 6 hours PRN Nausea and/or Vomiting  oxyCODONE    IR 20 milliGRAM(s) Oral every 4 hours PRN Severe Pain (7 - 10)  oxyCODONE    IR 15 milliGRAM(s) Oral every 4 hours PRN Moderate Pain (4 - 6)  sucralfate suspension 1 Gram(s) Oral Once PRN for esophageal pain  tiZANidine 4 milliGRAM(s) Oral <User Schedule> PRN Muscle Spasm  zolpidem 5 milliGRAM(s) Oral at bedtime PRN Insomnia      ITEMS UNCHECKED ARE NOT PRESENT    PRESENT SYMPTOMS: [ ]Unable to self-report - see [ ] CPOT [ ] PAINADS [ ] RDOS  Source if other than patient:  [ ]Family   [ ]Team     Pain:  [ ]yes [ ]no  QOL impact -   Location -                    Aggravating factors -  Quality -  Radiation -  Timing-  Severity (0-10 scale):  Minimal acceptable level/ pain goal (0-10 scale):     CPOT:    https://www.[facility].org/getattachment/[email or web address]/Critical-Care-Pain-Observation-Tool-(CPOT)    Dyspnea:                           [ ]Mild [ ]Moderate [ ]Severe  Anxiety:                             [ ]Mild [ ]Moderate [ ]Severe  Fatigue:                             [ ]Mild [ ]Moderate [ ]Severe  Nausea:                             [ ]Mild [ ]Moderate [ ]Severe  Loss of appetite:              [ ]Mild [ ]Moderate [ ]Severe  Constipation:                    [ ]Mild [ ]Moderate [ ]Severe  Other Symptoms:  [ ]All other review of systems negative     PCSSQ[Palliative Care Spiritual Screening Question]   Severity (0-10):  Score of 4 or > indicate consideration of Chaplaincy referral.  Chaplaincy Referral: [ ] yes [ ] refused [ ] following [ ] deferred    Caregiver [place]? : [ ] yes [ ] no [ ] Deferred [ ] Declined             Social work referral [ ] Patient & Family Centered Care Referral [ ]  Anticipatory Grief present?:  [ ] yes [ ] no  [ ] Deferred                  Social work referral [ ] Patient & Family Centered Care Referral [ ]      PHYSICAL EXAM:  Vital Signs Last 24 Hrs  T(C): 36.3 (24 Dec 2024 05:41), Max: 37.1 (23 Dec 2024 17:46)  T(F): 97.3 (24 Dec 2024 05:41), Max: 98.7 (23 Dec 2024 17:46)  HR: 79 (24 Dec 2024 05:41) (56 - 79)  BP: 142/87 (24 Dec 2024 05:41) (130/70 - 142/87)  BP(mean): --  RR: 16 (24 Dec 2024 05:41) (16 - 18)  SpO2: 95% (24 Dec 2024 05:41) (94% - 98%)    Parameters below as of 24 Dec 2024 05:41  Patient On (Oxygen Delivery Method): room air     I&O's Summary       General: well nourished, alert, NAD  HENT: normocephalic, face relaxed  Eyes: no icterus, conjunctiva clear  Chest: symmetric excursion, no accessory muscle use  Heart: peripheral pulse 2+ and regular  Lungs: no dyspnea with speech  Abdomen: soft, NT, ND  Ext: no cyanosis, clubbing, visible veins, ulcers, wounds  Neuro: alert, coherent speech, grossly non-focal  Psych: calm, rational, linear thought process  Skin: ]Normal  [ ]Rash  [ ]Other  [ ]Pressure ulcer(s) [ ]y [ ]n present on admission    CRITICAL CARE:  [ ]Shock Present  [ ]Septic [ ]Cardiogenic [ ]Neurologic [ ]Hypovolemic  [ ]Vasopressors [ ]Inotropes  [ ]Respiratory failure present [ ]Mechanical Ventilation [ ]Non-invasive ventilatory support [ ]High-Flow   [ ]Acute  [ ]Chronic [ ]Hypoxic  [ ]Hypercarbic [ ]Other  [ ]Other organ failure     LABS:                        15.4   5.37  )-----------( 167      ( 24 Dec 2024 05:37 )             42.3   12-24    132[L]  |  97[L]  |  28[H]  ----------------------------<  179[H]  4.6   |  20[L]  |  0.70    Ca    10.1      24 Dec 2024 05:37  Phos  3.5     12-24  Mg     2.00     12-24    TPro  7.6  /  Alb  4.4  /  TBili  0.6  /  DBili  x   /  AST  58[H]  /  ALT  38  /  AlkPhos  115  12-23      Urinalysis Basic - ( 24 Dec 2024 05:37 )    Color: x / Appearance: x / SG: x / pH: x  Gluc: 179 mg/dL / Ketone: x  / Bili: x / Urobili: x   Blood: x / Protein: x / Nitrite: x   Leuk Esterase: x / RBC: x / WBC x   Sq Epi: x / Non Sq Epi: x / Bacteria: x      RADIOLOGY & ADDITIONAL STUDIES:    Protein Calorie Malnutrition Present: [ ]mild [ ]moderate [ ]severe [ ]underweight [ ]morbid obesity  https://www.andeal.org/vault/1490/web/files/ONC/Table_Clinical%20Characteristics%20to%20Document%20Malnutrition-White%20JV%20et%20al%202012.pdf    Height (cm): 172.7 (12-17-24 @ 10:58)  Weight (kg): 121.2 (12-23-24 @ 09:27), 125.6 (06-16-24 @ 15:14)  BMI (kg/m2): 40.6 (12-23-24 @ 09:27), 42.1 (12-17-24 @ 10:58)    [ ]PPSV2 < or = 30%  [ ]significant weight loss [ ]poor nutritional intake [ ]anasarca[ ]Artificial Nutrition    Other REFERRALS:  [ ]Hospice  [ ]Child Life  [ ]Social Work  [ ]Case management [ ]Holistic Therapy     Goals of Care Document: Date of [id number] @ 08:46    SUBJECTIVE AND OBJECTIVE: Pt seen and examined this AM. Reports pain is improved. He remains hyper-verbal and fidgety but aware of what is happening and his plan of care. Confirms he has consented to inpatient chemotherapy and PET scan, possible radiation. He reports his pain is improved w/ higher dose oxycodone.     Indication for Geriatrics and Palliative Care Services/INTERVAL HPI: sxs/GOC     OVERNIGHT EVENTS: 24hr PRN use reviewed 9AM to 9AM:oxycodone 15mg x1, and 20mg x3. = 112.5 OME     DNR on chart:  Allergies    No Known Allergies    Intolerances    IV Contrast (Nausea)  MEDICATIONS  (STANDING):  acetaminophen     Tablet .. 650 milliGRAM(s) Oral once  acetaminophen     Tablet .. 975 milliGRAM(s) Oral every 8 hours  allopurinol 300 milliGRAM(s) Oral daily  amLODIPine   Tablet 10 milliGRAM(s) Oral daily  ascorbic acid 500 milliGRAM(s) Oral daily  atenolol  Tablet 100 milliGRAM(s) Oral daily  atorvastatin 10 milliGRAM(s) Oral at bedtime  cholecalciferol 1000 Unit(s) Oral daily  dexAMETHasone  IVPB 12 milliGRAM(s) IV Intermittent once  dextrose 5%. 1000 milliLiter(s) (100 mL/Hr) IV Continuous <Continuous>  dextrose 5%. 1000 milliLiter(s) (50 mL/Hr) IV Continuous <Continuous>  dextrose 5%. 1000 milliLiter(s) (50 mL/Hr) IV Continuous <Continuous>  dextrose 5%. 1000 milliLiter(s) (100 mL/Hr) IV Continuous <Continuous>  dextrose 50% Injectable 25 Gram(s) IV Push once  dextrose 50% Injectable 12.5 Gram(s) IV Push once  dextrose 50% Injectable 25 Gram(s) IV Push once  dextrose 50% Injectable 25 Gram(s) IV Push once  dextrose 50% Injectable 12.5 Gram(s) IV Push once  dextrose 50% Injectable 25 Gram(s) IV Push once  diphenhydrAMINE Injectable 25 milliGRAM(s) IV Push once  DULoxetine 60 milliGRAM(s) Oral daily  enoxaparin Injectable 40 milliGRAM(s) SubCutaneous every 12 hours  gabapentin 300 milliGRAM(s) Oral four times a day  glucagon  Injectable 1 milliGRAM(s) IntraMuscular once  glucagon  Injectable 1 milliGRAM(s) IntraMuscular once  insulin lispro (ADMELOG) corrective regimen sliding scale   SubCutaneous at bedtime  insulin lispro (ADMELOG) corrective regimen sliding scale   SubCutaneous three times a day before meals  levothyroxine 75 MICROGram(s) Oral daily  losartan 25 milliGRAM(s) Oral daily  multivitamin 1 Tablet(s) Oral daily  OLANZapine 2.5 milliGRAM(s) Oral at bedtime  pantoprazole    Tablet 40 milliGRAM(s) Oral before breakfast  polyethylene glycol 3350 17 Gram(s) Oral two times a day  predniSONE   Tablet 100 milliGRAM(s) Oral daily  riTUXimab-pvvr (RUXIENCE) IVPB (eMAR) 866 milliGRAM(s) IV Intermittent once  senna 2 Tablet(s) Oral two times a day  tamsulosin 0.4 milliGRAM(s) Oral at bedtime    MEDICATIONS  (PRN):  dextrose Oral Gel 15 Gram(s) Oral once PRN Blood Glucose LESS THAN 70 milliGRAM(s)/deciliter  dextrose Oral Gel 15 Gram(s) Oral once PRN Blood Glucose LESS THAN 70 milliGRAM(s)/deciliter  diphenhydrAMINE 25 milliGRAM(s) Oral every 6 hours PRN Rash and/or Itching  hydrocortisone sodium succinate Injectable 100 milliGRAM(s) IV Push once PRN allergic reaction  magnesium hydroxide Suspension 30 milliLiter(s) Oral daily PRN Constipation  meperidine     Injectable 25 milliGRAM(s) IV Push once PRN rigors  naloxone Injectable 0.1 milliGRAM(s) IV Push every 3 minutes PRN For ANY of the following changes in patient status:  A. RR LESS THAN 10 breaths per minute, B. Oxygen saturation LESS THAN 90%, C. Sedation score of 6  ondansetron Injectable 4 milliGRAM(s) IV Push every 6 hours PRN Nausea and/or Vomiting  oxyCODONE    IR 20 milliGRAM(s) Oral every 4 hours PRN Severe Pain (7 - 10)  oxyCODONE    IR 15 milliGRAM(s) Oral every 4 hours PRN Moderate Pain (4 - 6)  sucralfate suspension 1 Gram(s) Oral Once PRN for esophageal pain  tiZANidine 4 milliGRAM(s) Oral <User Schedule> PRN Muscle Spasm  zolpidem 5 milliGRAM(s) Oral at bedtime PRN Insomnia      ITEMS UNCHECKED ARE NOT PRESENT    PRESENT SYMPTOMS: [ ]Unable to self-report - see [ ] CPOT [ ] PAINADS [ ] RDOS  Source if other than patient:  [ ]Family   [ ]Team     Pain: [ x]yes [ ]no  QOL impact - unable to move   Location -      left shoulder   Aggravating factors - movement   Quality - burning and sharp   Radiation - none   Timing- constant but intermittent   Severity (0-10 scale): 9/10   Minimal acceptable level/pain goal (0-10 scale): did not specify       CPOT:    https://www.[facility].org/getattachment/[email or web address]/Critical-Care-Pain-Observation-Tool-(CPOT)    Dyspnea:                           [ ]Mild [ ]Moderate [ ]Severe  Anxiety:                             [ ]Mild [ ]Moderate [ ]Severe  Fatigue:                             [ ]Mild [ ]Moderate [ ]Severe  Nausea:                             [ ]Mild [ ]Moderate [ ]Severe  Loss of appetite:              [ ]Mild [ ]Moderate [ ]Severe  Constipation:                    [ ]Mild [ ]Moderate [ ]Severe  Other Symptoms:  [ ]All other review of systems negative     PCSSQ[Palliative Care Spiritual Screening Question]   Severity (0-10):  Score of 4 or > indicate consideration of Chaplaincy referral.  Chaplaincy Referral: [ ] yes [ ] refused [ ] following [ ] deferred    Caregiver [place]? : [ ] yes [ ] no [ ] Deferred [ ] Declined             Social work referral [ ] Patient & Family Centered Care Referral [ ]  Anticipatory Grief present?:  [ ] yes [ ] no  [ ] Deferred                  Social work referral [ ] Patient & Family Centered Care Referral [ ]      PHYSICAL EXAM:  Vital Signs Last 24 Hrs  T(C): 36.3 (24 Dec 2024 05:41), Max: 37.1 (23 Dec 2024 17:46)  T(F): 97.3 (24 Dec 2024 05:41), Max: 98.7 (23 Dec 2024 17:46)  HR: 79 (24 Dec 2024 05:41) (56 - 79)  BP: 142/87 (24 Dec 2024 05:41) (130/70 - 142/87)  BP(mean): --  RR: 16 (24 Dec 2024 05:41) (16 - 18)  SpO2: 95% (24 Dec 2024 05:41) (94% - 98%)    Parameters below as of 24 Dec 2024 05:41  Patient On (Oxygen Delivery Method): room air     I&O's Summary       General: obsese, alert, NAD  HENT: normocephalic, face relaxed  Eyes: no icterus, conjunctiva clear  Chest: symmetric excursion, no accessory muscle use  Heart: peripheral pulse 2+ and regular  Lungs: no dyspnea with speech  Abdomen: soft, NT, ND  Ext: no cyanosis, clubbing, visible veins, ulcers, wounds  Neuro: alert, grossly non-focal  Psych: hyperverbal anxious fidgety, redirectable w effort   Skin: ]Normal  [ ]Rash [x ]Other - large purple bruise like raised lesion on left shoulder s/p Bx   [ ]Pressure ulcer(s) [ ]y [ ]n present on admission    CRITICAL CARE:  [ ]Shock Present  [ ]Septic [ ]Cardiogenic [ ]Neurologic [ ]Hypovolemic  [ ]Vasopressors [ ]Inotropes  [ ]Respiratory failure present [ ]Mechanical Ventilation [ ]Non-invasive ventilatory support [ ]High-Flow   [ ]Acute  [ ]Chronic [ ]Hypoxic  [ ]Hypercarbic [ ]Other  [ ]Other organ failure     LABS:                        15.4   5.37  )-----------( 167      ( 24 Dec 2024 05:37 )             42.3   12-24    132[L]  |  97[L]  |  28[H]  ----------------------------<  179[H]  4.6   |  20[L]  |  0.70    Ca    10.1      24 Dec 2024 05:37  Phos  3.5     12-24  Mg     2.00     12-24    TPro  7.6  /  Alb  4.4  /  TBili  0.6  /  DBili  x   /  AST  58[H]  /  ALT  38  /  AlkPhos  115  12-23      Urinalysis Basic - ( 24 Dec 2024 05:37 )    Color: x / Appearance: x / SG: x / pH: x  Gluc: 179 mg/dL / Ketone: x  / Bili: x / Urobili: x   Blood: x / Protein: x / Nitrite: x   Leuk Esterase: x / RBC: x / WBC x   Sq Epi: x / Non Sq Epi: x / Bacteria: x      RADIOLOGY & ADDITIONAL STUDIES: None new.      Protein Calorie Malnutrition Present: [ ]mild [ ]moderate [ ]severe [ ]underweight [ ]morbid obesity  https://www.andeal.org/vault/6380/web/files/ONC/Table_Clinical%20Characteristics%20to%20Document%20Malnutrition-White%20JV%20et%20al%202012.pdf    Height (cm): 172.7 (12-17-24 @ 10:58)  Weight (kg): 121.2 (12-23-24 @ 09:27), 125.6 (06-16-24 @ 15:14)  BMI (kg/m2): 40.6 (12-23-24 @ 09:27), 42.1 (12-17-24 @ 10:58)    [ ]PPSV2 < or = 30%  [ ]significant weight loss [ ]poor nutritional intake [ ]anasarca[ ]Artificial Nutrition    Other REFERRALS:  [ ]Hospice  [ ]Child Life  [ ]Social Work  [ ]Case management [ ]Holistic Therapy     Goals of Care Document: Date of [id number] @ 08:46    SUBJECTIVE AND OBJECTIVE: Pt seen and examined this AM. Reports pain is improved. He remains hyper-verbal and fidgety but aware of what is happening and his plan of care. Confirms he has consented to inpatient chemotherapy and PET scan, possible radiation. He reports his pain is improved w/ higher dose oxycodone.     Indication for Geriatrics and Palliative Care Services/INTERVAL HPI: sxs/GOC     OVERNIGHT EVENTS: 24hr PRN use reviewed 9AM to 9AM:oxycodone 15mg x1, and 20mg x3. = 112.5 OME     DNR on chart:  Allergies    No Known Allergies    Intolerances    IV Contrast (Nausea)  MEDICATIONS  (STANDING):  acetaminophen     Tablet .. 650 milliGRAM(s) Oral once  acetaminophen     Tablet .. 975 milliGRAM(s) Oral every 8 hours  allopurinol 300 milliGRAM(s) Oral daily  amLODIPine   Tablet 10 milliGRAM(s) Oral daily  ascorbic acid 500 milliGRAM(s) Oral daily  atenolol  Tablet 100 milliGRAM(s) Oral daily  atorvastatin 10 milliGRAM(s) Oral at bedtime  cholecalciferol 1000 Unit(s) Oral daily  dexAMETHasone  IVPB 12 milliGRAM(s) IV Intermittent once  dextrose 5%. 1000 milliLiter(s) (100 mL/Hr) IV Continuous <Continuous>  dextrose 5%. 1000 milliLiter(s) (50 mL/Hr) IV Continuous <Continuous>  dextrose 5%. 1000 milliLiter(s) (50 mL/Hr) IV Continuous <Continuous>  dextrose 5%. 1000 milliLiter(s) (100 mL/Hr) IV Continuous <Continuous>  dextrose 50% Injectable 25 Gram(s) IV Push once  dextrose 50% Injectable 12.5 Gram(s) IV Push once  dextrose 50% Injectable 25 Gram(s) IV Push once  dextrose 50% Injectable 25 Gram(s) IV Push once  dextrose 50% Injectable 12.5 Gram(s) IV Push once  dextrose 50% Injectable 25 Gram(s) IV Push once  diphenhydrAMINE Injectable 25 milliGRAM(s) IV Push once  DULoxetine 60 milliGRAM(s) Oral daily  enoxaparin Injectable 40 milliGRAM(s) SubCutaneous every 12 hours  gabapentin 300 milliGRAM(s) Oral four times a day  glucagon  Injectable 1 milliGRAM(s) IntraMuscular once  glucagon  Injectable 1 milliGRAM(s) IntraMuscular once  insulin lispro (ADMELOG) corrective regimen sliding scale   SubCutaneous at bedtime  insulin lispro (ADMELOG) corrective regimen sliding scale   SubCutaneous three times a day before meals  levothyroxine 75 MICROGram(s) Oral daily  losartan 25 milliGRAM(s) Oral daily  multivitamin 1 Tablet(s) Oral daily  OLANZapine 2.5 milliGRAM(s) Oral at bedtime  pantoprazole    Tablet 40 milliGRAM(s) Oral before breakfast  polyethylene glycol 3350 17 Gram(s) Oral two times a day  predniSONE   Tablet 100 milliGRAM(s) Oral daily  riTUXimab-pvvr (RUXIENCE) IVPB (eMAR) 866 milliGRAM(s) IV Intermittent once  senna 2 Tablet(s) Oral two times a day  tamsulosin 0.4 milliGRAM(s) Oral at bedtime    MEDICATIONS  (PRN):  dextrose Oral Gel 15 Gram(s) Oral once PRN Blood Glucose LESS THAN 70 milliGRAM(s)/deciliter  dextrose Oral Gel 15 Gram(s) Oral once PRN Blood Glucose LESS THAN 70 milliGRAM(s)/deciliter  diphenhydrAMINE 25 milliGRAM(s) Oral every 6 hours PRN Rash and/or Itching  hydrocortisone sodium succinate Injectable 100 milliGRAM(s) IV Push once PRN allergic reaction  magnesium hydroxide Suspension 30 milliLiter(s) Oral daily PRN Constipation  meperidine     Injectable 25 milliGRAM(s) IV Push once PRN rigors  naloxone Injectable 0.1 milliGRAM(s) IV Push every 3 minutes PRN For ANY of the following changes in patient status:  A. RR LESS THAN 10 breaths per minute, B. Oxygen saturation LESS THAN 90%, C. Sedation score of 6  ondansetron Injectable 4 milliGRAM(s) IV Push every 6 hours PRN Nausea and/or Vomiting  oxyCODONE    IR 20 milliGRAM(s) Oral every 4 hours PRN Severe Pain (7 - 10)  oxyCODONE    IR 15 milliGRAM(s) Oral every 4 hours PRN Moderate Pain (4 - 6)  sucralfate suspension 1 Gram(s) Oral Once PRN for esophageal pain  tiZANidine 4 milliGRAM(s) Oral <User Schedule> PRN Muscle Spasm  zolpidem 5 milliGRAM(s) Oral at bedtime PRN Insomnia      ITEMS UNCHECKED ARE NOT PRESENT    PRESENT SYMPTOMS: [ ]Unable to self-report - see [ ] CPOT [ ] PAINADS [ ] RDOS  Source if other than patient:  [ ]Family   [ ]Team     Pain: [ x]yes [ ]no  QOL impact - unable to move   Location -      left shoulder   Aggravating factors - movement   Quality - burning and sharp   Radiation - none   Timing- constant but intermittent   Severity (0-10 scale): 9/10   Minimal acceptable level/pain goal (0-10 scale): did not specify       CPOT:    https://www.[facility].org/getattachment/[email or web address]/Critical-Care-Pain-Observation-Tool-(CPOT)    Dyspnea:                           [ ]Mild [ ]Moderate [ ]Severe  Anxiety:                             [ ]Mild [ ]Moderate [ ]Severe  Fatigue:                             [ ]Mild [ ]Moderate [ ]Severe  Nausea:                             [ ]Mild [ ]Moderate [ ]Severe  Loss of appetite:              [ ]Mild [ ]Moderate [ ]Severe  Constipation:                    [ ]Mild [ ]Moderate [ ]Severe  Other Symptoms:  [x ]All other review of systems negative     PCSSQ[Palliative Care Spiritual Screening Question]   Severity (0-10):  Score of 4 or > indicate consideration of Chaplaincy referral.  Chaplaincy Referral: [ ] yes [ ] refused [ ] following [x ] deferred    Caregiver [place]? : [ ] yes [ ] no [x ] Deferred [ ] Declined             Social work referral [ ] Patient & Family Centered Care Referral [ ]  Anticipatory Grief present?:  [ ] yes [ ] no  [x ] Deferred                  Social work referral [ ] Patient & Family Centered Care Referral [ ]      PHYSICAL EXAM:  Vital Signs Last 24 Hrs  T(C): 36.3 (24 Dec 2024 05:41), Max: 37.1 (23 Dec 2024 17:46)  T(F): 97.3 (24 Dec 2024 05:41), Max: 98.7 (23 Dec 2024 17:46)  HR: 79 (24 Dec 2024 05:41) (56 - 79)  BP: 142/87 (24 Dec 2024 05:41) (130/70 - 142/87)  BP(mean): --  RR: 16 (24 Dec 2024 05:41) (16 - 18)  SpO2: 95% (24 Dec 2024 05:41) (94% - 98%)    Parameters below as of 24 Dec 2024 05:41  Patient On (Oxygen Delivery Method): room air     I&O's Summary       General: obsese, alert, NAD  HENT: normocephalic, face relaxed  Eyes: no icterus, conjunctiva clear  Chest: symmetric excursion, no accessory muscle use  Heart: peripheral pulse 2+ and regular  Lungs: no dyspnea with speech  Abdomen: soft, NT, ND  Ext: no cyanosis, clubbing, visible veins, ulcers, wounds  Neuro: alert, grossly non-focal  Psych: hyperverbal anxious fidgety, redirectable w effort   Skin: ]Normal  [ ]Rash [x ]Other - large purple bruise like raised lesion on left shoulder s/p Bx   [ ]Pressure ulcer(s) [ ]y [ ]n present on admission    CRITICAL CARE:  [ ]Shock Present  [ ]Septic [ ]Cardiogenic [ ]Neurologic [ ]Hypovolemic  [ ]Vasopressors [ ]Inotropes  [ ]Respiratory failure present [ ]Mechanical Ventilation [ ]Non-invasive ventilatory support [ ]High-Flow   [ ]Acute  [ ]Chronic [ ]Hypoxic  [ ]Hypercarbic [ ]Other  [ ]Other organ failure     LABS:                        15.4   5.37  )-----------( 167      ( 24 Dec 2024 05:37 )             42.3   12-24    132[L]  |  97[L]  |  28[H]  ----------------------------<  179[H]  4.6   |  20[L]  |  0.70    Ca    10.1      24 Dec 2024 05:37  Phos  3.5     12-24  Mg     2.00     12-24    TPro  7.6  /  Alb  4.4  /  TBili  0.6  /  DBili  x   /  AST  58[H]  /  ALT  38  /  AlkPhos  115  12-23      Urinalysis Basic - ( 24 Dec 2024 05:37 )    Color: x / Appearance: x / SG: x / pH: x  Gluc: 179 mg/dL / Ketone: x  / Bili: x / Urobili: x   Blood: x / Protein: x / Nitrite: x   Leuk Esterase: x / RBC: x / WBC x   Sq Epi: x / Non Sq Epi: x / Bacteria: x      RADIOLOGY & ADDITIONAL STUDIES: None new.      Protein Calorie Malnutrition Present: [ ]mild [ ]moderate [ ]severe [ ]underweight [ ]morbid obesity  https://www.andeal.org/vault/2440/web/files/ONC/Table_Clinical%20Characteristics%20to%20Document%20Malnutrition-White%20JV%20et%20al%202012.pdf    Height (cm): 172.7 (12-17-24 @ 10:58)  Weight (kg): 121.2 (12-23-24 @ 09:27), 125.6 (06-16-24 @ 15:14)  BMI (kg/m2): 40.6 (12-23-24 @ 09:27), 42.1 (12-17-24 @ 10:58)    [ ]PPSV2 < or = 30%  [ ]significant weight loss [ ]poor nutritional intake [ ]anasarca[ ]Artificial Nutrition    Other REFERRALS:  [ ]Hospice  [ ]Child Life  [ ]Social Work  [ ]Case management [ ]Holistic Therapy

## 2024-12-24 NOTE — PROGRESS NOTE ADULT - PROBLEM SELECTOR PLAN 5
Pt reports being unable to walk since Lumbar fusion, multiple complications from multiple surgeries. He walked w/ walker and cane. Both are present at bedside.  Currently does not work, on disability. Previously worked for [employer]    - PT jesus albertoal per primary team. Pt has capacity for complex medical decision making   Surrogate: Wife Sheri [phone]  Code status: Full   GOC: c/w CDT

## 2024-12-24 NOTE — PROGRESS NOTE ADULT - ASSESSMENT
64 YOM w/ pre-DM, chronic back pain, lumbar spinal fusion surgery, multiple abdominal surgeries, bipolar disorder, p/w L shoulder mass and pain. Now s/p Bx w/ ortho, w/ preliminary path c/f lymphoma. Awaiting final pathology and heme/onc recs. GaP team consulted for sxs.

## 2024-12-24 NOTE — PROGRESS NOTE ADULT - ASSESSMENT
64M with h/o hiatal hernia s/p fundoplication, NANCY, DM2 admitted for biopsy of large and painful L shoulder mass. Hematology consulted for new DLBCL.     #New DLBCL  -expanding mass over past 6 weeks accompanied by "hot flashes"  -, uric acid 5.3  -s/p biopsy with OR 12/17, path and flow consistent  with diffuse large b cell lymphoma: Monotypic B-cells (18% of cells), positive for kappa, bright CD19, CD20, CD10, partial CD38; negative CD5, , CD23. The findings are consistent with CD10 positive B-cell lymphoma. CD10 positivity supports follicular center cell lineage. Correlation with histology is necessary.   - CT chest 12/16 showing the 10.9x6.6x6cm lobulated soft tissue mass on shoulder, sarcomatous/neoplastic etiology, also with 2.9cm lobulated lesion on R anterior chest wall. Multiple enlarged cervical LNs.   - 12/19 CT a/p Cirrhotic liver morphology. Indeterminate subcutaneous lower chest/upper abdominal wall mass, new since 6/16/2023.  - ECOG ~2; limited mobility due to back issues  - TTE 12/23/24: EF > 75%    Recommend:  - Plan will be to initiate inpatient R-CHOP, (steroids on since 12/23, rituxan on 12/24 and CHOP on 12/27). Will also discuss case at tumor board 12/24  - please continue prednisone 100mg qd to complete 5 days, PPI  - continue allopurinol (renally dose)  - f/u G6PD, acute hepatitis panel (including hep B core ab)  - Please trend TLS labs q8hr: LDH, CMP with Phos, uric acid for now  - Pt will need PET-CT arranged (inpatient if possible on 12/27)  - please consult [facility] for radiation to large shoulder mass  - Please contact us if patient is planned for discharge as we will help coordinate referral to [facility] for follow-up.     Recommendations preliminary until attending attestation   ***************************************************************  Kole Cotton MD  Hematology/Oncology Fellow, PGY5  MS TEAMS  After 5pm or on weekends please contact  to page on-call fellow   ***************************************************************    64M with h/o hiatal hernia s/p fundoplication, NANCY, DM2 admitted for biopsy of large and painful L shoulder mass. Hematology consulted for new DLBCL.     #New DLBCL  -expanding mass over past 6 weeks accompanied by "hot flashes"  -, uric acid 5.3  -s/p biopsy with OR 12/17, path and flow consistent  with diffuse large b cell lymphoma: Monotypic B-cells (18% of cells), positive for kappa, bright CD19, CD20, CD10, partial CD38; negative CD5, , CD23. The findings are consistent with CD10 positive B-cell lymphoma. CD10 positivity supports follicular center cell lineage. Correlation with histology is necessary.   - CT chest 12/16 showing the 10.9x6.6x6cm lobulated soft tissue mass on shoulder, sarcomatous/neoplastic etiology, also with 2.9cm lobulated lesion on R anterior chest wall. Multiple enlarged cervical LNs.   - 12/19 CT a/p Cirrhotic liver morphology. Indeterminate subcutaneous lower chest/upper abdominal wall mass, new since 6/16/2023.  - ECOG ~2; limited mobility due to back issues  - TTE 12/23/24: EF > 75%  - hepatitis panel negative    Recommend:  - Plan will be to initiate inpatient R-CHOP, (steroids on since 12/23, rituxan on 12/24 and CHOP on 12/27).   - please continue prednisone 100mg qd to complete 5 days, PPI  - continue allopurinol (renally dose)  - f/u G6PD (collected 12/23)  - Please trend TLS labs q8hr: LDH, CMP with Phos, uric acid for now - high risk TLS given disease burden  - Pt scheduled for PET-CT on 12/27 - please confirm nursing staff on unit coordinating transportation  - please consult [facility] for radiation to large shoulder mass  - Please contact us if patient is planned for discharge as we will help coordinate referral to [facility] for follow-up (insurance verification sent 12/24)    Recommendations preliminary until attending attestation   ***************************************************************  Kole Cotton MD  Hematology/Oncology Fellow, PGY5  MS TEAMS  After 5pm or on weekends please contact  to page on-call fellow   ***************************************************************

## 2024-12-24 NOTE — PROGRESS NOTE ADULT - PROBLEM SELECTOR PLAN 6
Pt has capacity for complex medical decision making   Surrogate: Wife Sheri [phone]  Code status: Full   GOC: c/w CDT Thank you for allowing us to participate in your patient's care. We will continue to follow with you. In the event of worsening symptoms, please contact the Palliative Medicine team via pager (if the patient is at [facility] #[phone] or if the patient is at [facility] #[phone]) The Geriatric and Palliative Medicine service has coverage 24 hours a day/ 7 days a week to provide medical recommendations regarding symptom management needs via telephone.     Kalyani Sanchez M.D.  Geriatrics and Palliative Care Fellow  Available via TEAMS.

## 2024-12-24 NOTE — PROGRESS NOTE ADULT - ATTENDING COMMENTS
64M with PMH hiatal hernia s/p fundoplication, prior SBO s/p multiple abdominal surgeries, T2DM, chronic back pain, and NANCY (not on CPAP) initially admitted to orthopedics for biopsy of enlarging painful L anterior shoulder mass, now s/p biopsy 12/17 with preliminary results suspicious for lymphoma. GaP team consulted for symptom management in setting of new malignancy.     > Pain: Start oxycontin 15mg bid, continue oxycodone IR 15-20mg q4h prn mod-severe pain   > Tizanidine prn, gabapentin 300mg qid, cymbalta 60mg qd (all started prior to Palliative consult)   > Patient has chronic pain doctor- Dr. Trejo, whom he'd like to continue to see as outpatient     > Appreciate heme/onc recs- DLBCL confirmed from path   > Plan is to start inpt chemo. Patient is agreeable to starting DMT.   > Plan for PET scan on 12/27     > Appreciate  recs- patient with hx of bipolar disorder. started on zyprexa     > GOC: GOC note was written prior to pathology returning. Patient was undecided on starting treatment but it does appear that plan is for initiation of RCHOP per heme/onc note. Pt's wife, Sheri, is the surrogate decision maker .

## 2024-12-24 NOTE — PROGRESS NOTE ADULT - PROBLEM SELECTOR PLAN 4
Pt revealed to team during first encounter that he has a ho bipolar TII disorder, not currently under the care of psychiatrist, and on unopposed SNRI.     - appreciate inpatient psychiatry recommendations   - c/w zyprexa 2.5mg qHS Pt reports being unable to walk since Lumbar fusion, multiple complications from multiple surgeries. He walked w/ walker and cane. Both are present at bedside.  Currently does not work, on disability. Previously worked for [email or web address].    - PT jesus albertoal per primary team.

## 2024-12-24 NOTE — PROGRESS NOTE ADULT - PROBLEM SELECTOR PLAN 1
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   - Home regimen: oxycodone 5mg q4hrs prn, I-stop Reviewed #: [id number]  - Hospital regimen: oxycodone 10-15mg for moderate to severe pain q4hrs prn, tizanidine, gabapentin, duloxetine   - Pain currently   [x ] improved   [ ] uncontrolled    - 24hr PRN use reviewed 9AM to 9AM:oxycodone 15mg x1, and 20mg x3. = 112.5 OME  - Pt agreeable to starting long acting oxycodone, reports he has been on long acting medication in the past.     Recommend:   - start oxycodone 15mg ER q12hrs   - c/w oxycodone 15-20mg for moderate to severe pain q4hrs prn  - narcan prn  - c/w tizanidine 4mg bid prn, gabapentin 300mg qid, duloxetine 60mg qd   - Bowel regimen, goal BM every 2 to 3 days to prevent opioid induced constipation, hold for loose stool  - Follows w/ pain management Dr. Trejo out [facility]. 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   - Home regimen: oxycodone 5mg q4hrs prn, I-stop Reviewed #: [id number]  - Hospital regimen: oxycodone 10-15mg for moderate to severe pain q4hrs prn, tizanidine, gabapentin, duloxetine   - Pain currently   [x ] improved   [ ] uncontrolled    - 24hr PRN use reviewed 9AM to 9AM:oxycodone 15mg x1, and 20mg x3. = 112.5 OME  - Pt agreeable to starting long acting oxycodone, reports he has been on long acting medication in the past.     Recommend:   - start oxycodone 15mg ER q12hrs (12/24)   - c/w oxycodone 15-20mg for moderate to severe pain q4hrs prn  - narcan prn  - c/w tizanidine 4mg bid prn, gabapentin 300mg qid, duloxetine 60mg qd   - Bowel regimen, goal BM every 2 to 3 days to prevent opioid induced constipation, hold for loose stool  - Follows w/ pain management Dr. Trejo out [facility].

## 2024-12-24 NOTE — PROGRESS NOTE ADULT - ASSESSMENT
64M with PMH hiatal hernia s/p fundoplication, prior SBO s/p multiple abdominal surgeries, T2DM, chronic back pain, and NANCY (not on CPAP) initially admitted to orthopedics for biopsy of enlarging painful L anterior shoulder mass, now s/p biopsy 12/17 with preliminary results suspicious for lymphoma., confirmed DCBL, now undergoing oncology work-up and treatment.

## 2024-12-24 NOTE — PROGRESS NOTE ADULT - SUBJECTIVE AND OBJECTIVE BOX
Medicine Progress Note    Patient is a 64y old  Male who presents with a chief complaint of L shoulder mass (24 Dec 2024 11:09)      SUBJECTIVE / OVERNIGHT EVENTS: patient is clinically improving after stating steroids     ADDITIONAL REVIEW OF SYSTEMS: negative     MEDICATIONS  (STANDING):  acetaminophen     Tablet .. 975 milliGRAM(s) Oral every 8 hours  allopurinol 300 milliGRAM(s) Oral daily  amLODIPine   Tablet 10 milliGRAM(s) Oral daily  ascorbic acid 500 milliGRAM(s) Oral daily  atenolol  Tablet 100 milliGRAM(s) Oral daily  atorvastatin 10 milliGRAM(s) Oral at bedtime  cholecalciferol 1000 Unit(s) Oral daily  dextrose 5%. 1000 milliLiter(s) (50 mL/Hr) IV Continuous <Continuous>  dextrose 5%. 1000 milliLiter(s) (100 mL/Hr) IV Continuous <Continuous>  dextrose 5%. 1000 milliLiter(s) (50 mL/Hr) IV Continuous <Continuous>  dextrose 5%. 1000 milliLiter(s) (100 mL/Hr) IV Continuous <Continuous>  dextrose 50% Injectable 25 Gram(s) IV Push once  dextrose 50% Injectable 12.5 Gram(s) IV Push once  dextrose 50% Injectable 25 Gram(s) IV Push once  dextrose 50% Injectable 25 Gram(s) IV Push once  dextrose 50% Injectable 12.5 Gram(s) IV Push once  dextrose 50% Injectable 25 Gram(s) IV Push once  DULoxetine 60 milliGRAM(s) Oral daily  enoxaparin Injectable 40 milliGRAM(s) SubCutaneous every 12 hours  gabapentin 300 milliGRAM(s) Oral four times a day  glucagon  Injectable 1 milliGRAM(s) IntraMuscular once  glucagon  Injectable 1 milliGRAM(s) IntraMuscular once  insulin lispro (ADMELOG) corrective regimen sliding scale   SubCutaneous at bedtime  insulin lispro (ADMELOG) corrective regimen sliding scale   SubCutaneous three times a day before meals  levothyroxine 75 MICROGram(s) Oral daily  losartan 25 milliGRAM(s) Oral daily  multivitamin 1 Tablet(s) Oral daily  OLANZapine 2.5 milliGRAM(s) Oral at bedtime  oxyCODONE  ER Tablet 15 milliGRAM(s) Oral every 12 hours  pantoprazole    Tablet 40 milliGRAM(s) Oral before breakfast  polyethylene glycol 3350 17 Gram(s) Oral two times a day  predniSONE   Tablet 100 milliGRAM(s) Oral daily  senna 2 Tablet(s) Oral two times a day  sodium chloride 0.9%. 1000 milliLiter(s) (75 mL/Hr) IV Continuous <Continuous>  tamsulosin 0.4 milliGRAM(s) Oral at bedtime    MEDICATIONS  (PRN):  dextrose Oral Gel 15 Gram(s) Oral once PRN Blood Glucose LESS THAN 70 milliGRAM(s)/deciliter  dextrose Oral Gel 15 Gram(s) Oral once PRN Blood Glucose LESS THAN 70 milliGRAM(s)/deciliter  diphenhydrAMINE 25 milliGRAM(s) Oral every 6 hours PRN Rash and/or Itching  hydrocortisone sodium succinate Injectable 100 milliGRAM(s) IV Push once PRN allergic reaction  magnesium hydroxide Suspension 30 milliLiter(s) Oral daily PRN Constipation  meperidine     Injectable 25 milliGRAM(s) IV Push once PRN rigors  naloxone Injectable 0.1 milliGRAM(s) IV Push every 3 minutes PRN For ANY of the following changes in patient status:  A. RR LESS THAN 10 breaths per minute, B. Oxygen saturation LESS THAN 90%, C. Sedation score of 6  ondansetron Injectable 4 milliGRAM(s) IV Push every 6 hours PRN Nausea and/or Vomiting  oxyCODONE    IR 20 milliGRAM(s) Oral every 4 hours PRN Severe Pain (7 - 10)  oxyCODONE    IR 15 milliGRAM(s) Oral every 4 hours PRN Moderate Pain (4 - 6)  sucralfate suspension 1 Gram(s) Oral Once PRN for esophageal pain  tiZANidine 4 milliGRAM(s) Oral <User Schedule> PRN Muscle Spasm  zolpidem 5 milliGRAM(s) Oral at bedtime PRN Insomnia    CAPILLARY BLOOD GLUCOSE      POCT Blood Glucose.: 216 mg/dL (24 Dec 2024 11:24)  POCT Blood Glucose.: 151 mg/dL (24 Dec 2024 07:16)  POCT Blood Glucose.: 125 mg/dL (23 Dec 2024 21:45)  POCT Blood Glucose.: 132 mg/dL (23 Dec 2024 16:29)    I&O's Summary      PHYSICAL EXAM:  Vital Signs Last 24 Hrs  T(C): 36.6 (24 Dec 2024 14:59), Max: 37.1 (23 Dec 2024 17:46)  T(F): 97.9 (24 Dec 2024 14:59), Max: 98.7 (23 Dec 2024 17:46)  HR: 81 (24 Dec 2024 14:59) (66 - 92)  BP: 146/70 (24 Dec 2024 14:59) (126/70 - 146/70)  BP(mean): --  RR: 18 (24 Dec 2024 14:59) (16 - 18)  SpO2: 95% (24 Dec 2024 14:59) (95% - 99%)    Parameters below as of 24 Dec 2024 14:59  Patient On (Oxygen Delivery Method): room air      CONSTITUTIONAL: NAD,   ENMT: Moist oral mucosa, no pharyngeal injection or exudates;   RESPIRATORY: Normal respiratory effort; lungs are clear to auscultation bilaterally  CARDIOVASCULAR: Regular rate and rhythm, normal S1 and S2, ; No lower extremity edema; LT shoulder edema, wound redness   ABDOMEN: Nontender to palpation, normoactive bowel sounds, no rebound/guarding;   PSYCH: A+O to person, place, and time; affect appropriate  NEUROLOGY: CN 2-12 are intact and symmetric; no gross sensory deficits   SKIN: as above     LABS:                        15.4   5.37  )-----------( 167      ( 24 Dec 2024 05:37 )             42.3     12-24    132[L]  |  97[L]  |  28[H]  ----------------------------<  179[H]  4.6   |  20[L]  |  0.70    Ca    10.1      24 Dec 2024 05:37  Phos  3.5     12-24  Mg     2.00     12-24    TPro  7.6  /  Alb  4.4  /  TBili  0.6  /  DBili  x   /  AST  58[H]  /  ALT  38  /  AlkPhos  115  12-23          Urinalysis Basic - ( 24 Dec 2024 05:37 )    Color: x / Appearance: x / SG: x / pH: x  Gluc: 179 mg/dL / Ketone: x  / Bili: x / Urobili: x   Blood: x / Protein: x / Nitrite: x   Leuk Esterase: x / RBC: x / WBC x   Sq Epi: x / Non Sq Epi: x / Bacteria: x            RADIOLOGY & ADDITIONAL TESTS:  Imaging from Last 24 Hours:    Electrocardiogram/QTc Interval:    COORDINATION OF CARE:  Care Discussed with Consultants/Other Providers: ACP

## 2024-12-24 NOTE — PROGRESS NOTE ADULT - ATTENDING COMMENTS
-----------------------------------------------------------------------------------  Patient seen and examined on rounds on 12/24/24 with Heme/Onc [place] Dr. Reardon and Dr. Cotton  Briefly, patient presented for surgical biopsy with Dr. Geremias Cruz (Ortho-Onc) and underwent biopsy of left shoulder mass in the OR.  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; non-double expressor (cMyc +, BCL2 -), Cyclin D1 neg, Ki67 of 80%    - staging CT scans with cervical LAD and indeterminate anterior chest wall lesion; PET/CT requested and scheduled for 12/27/24 (will be after rituxan)  - initiated on R-CHOP; Given bulky nature of his disease started on Steroids on 12/23/24; plan for Rituxan today  12/24/24 and plan for Cytoxan/Adriamycin/Vincristine on 12/27/24 with growth factor support (Fulphila) on 12/28/24  - patient at very high risk of tumor lysis given bulky nature of his disease; TLS labs being monitored every 8 hours  - as part of consolidation after completion of chemotherapy, would consider additional radiation to the left shoulder; Rad Onc consult requested.  - appreciate behavioral health following patient as gonzalo/hypomania can certainly become acutely worse with steroid exposure.

## 2024-12-24 NOTE — PROGRESS NOTE ADULT - PROBLEM SELECTOR PLAN 6
DVT ppx: Lovenox s/c 40 mg q12h as BMI > 40  PT/OT: No skilled needs  Dispo: Home pending Oncology recommendations

## 2024-12-24 NOTE — PROGRESS NOTE ADULT - PROBLEM SELECTOR PLAN 1
Fast growing mass of L shoulder associated with hot flashes.   - CT chest: 10.9 x6.6 x6 cm lobulated soft tissue mass involving anterior L shoulder- suspicious for sacromatous/neoplastic etiology with additional 2.9cm lobulated lesion noted in R anterior chest wall, cervical lymphadenopathy  - S/p bx on 12/17 w/ Ortho, prelim results c/f lymphoma, Oncology recs pending final path results to determine if inpatient initiation of treatment is recommended; pt may also need bone marrow biopsy; update Oncology once final path results are reported, as reported b cell lymphoma, will get oncology follow up appreciated, JINBL, f/w recs  Consult rad oncology, will discuss in tumor board 12/24    - CT A/P w/ IV contrast done for staging - shows indeterminate subcutaneous lower chest/upper abdominal wall mass  - Pain control as listed below

## 2024-12-25 LAB
ALBUMIN SERPL ELPH-MCNC: 3.6 G/DL — SIGNIFICANT CHANGE UP (ref 3.3–5)
ALBUMIN SERPL ELPH-MCNC: 3.8 G/DL — SIGNIFICANT CHANGE UP (ref 3.3–5)
ALBUMIN SERPL ELPH-MCNC: 4.2 G/DL — SIGNIFICANT CHANGE UP (ref 3.3–5)
ALP SERPL-CCNC: 105 U/L — SIGNIFICANT CHANGE UP (ref 40–120)
ALP SERPL-CCNC: 85 U/L — SIGNIFICANT CHANGE UP (ref 40–120)
ALP SERPL-CCNC: 91 U/L — SIGNIFICANT CHANGE UP (ref 40–120)
ALT FLD-CCNC: 33 U/L — SIGNIFICANT CHANGE UP (ref 4–41)
ALT FLD-CCNC: 34 U/L — SIGNIFICANT CHANGE UP (ref 4–41)
ALT FLD-CCNC: 39 U/L — SIGNIFICANT CHANGE UP (ref 4–41)
ANION GAP SERPL CALC-SCNC: 11 MMOL/L — SIGNIFICANT CHANGE UP (ref 7–14)
ANION GAP SERPL CALC-SCNC: 11 MMOL/L — SIGNIFICANT CHANGE UP (ref 7–14)
ANION GAP SERPL CALC-SCNC: 12 MMOL/L — SIGNIFICANT CHANGE UP (ref 7–14)
AST SERPL-CCNC: 40 U/L — SIGNIFICANT CHANGE UP (ref 4–40)
AST SERPL-CCNC: 42 U/L — HIGH (ref 4–40)
AST SERPL-CCNC: 48 U/L — HIGH (ref 4–40)
BILIRUB SERPL-MCNC: 0.3 MG/DL — SIGNIFICANT CHANGE UP (ref 0.2–1.2)
BILIRUB SERPL-MCNC: 0.4 MG/DL — SIGNIFICANT CHANGE UP (ref 0.2–1.2)
BILIRUB SERPL-MCNC: 0.4 MG/DL — SIGNIFICANT CHANGE UP (ref 0.2–1.2)
BUN SERPL-MCNC: 28 MG/DL — HIGH (ref 7–23)
BUN SERPL-MCNC: 29 MG/DL — HIGH (ref 7–23)
BUN SERPL-MCNC: 31 MG/DL — HIGH (ref 7–23)
CALCIUM SERPL-MCNC: 9.3 MG/DL — SIGNIFICANT CHANGE UP (ref 8.4–10.5)
CALCIUM SERPL-MCNC: 9.4 MG/DL — SIGNIFICANT CHANGE UP (ref 8.4–10.5)
CALCIUM SERPL-MCNC: 9.5 MG/DL — SIGNIFICANT CHANGE UP (ref 8.4–10.5)
CHLORIDE SERPL-SCNC: 100 MMOL/L — SIGNIFICANT CHANGE UP (ref 98–107)
CHLORIDE SERPL-SCNC: 100 MMOL/L — SIGNIFICANT CHANGE UP (ref 98–107)
CHLORIDE SERPL-SCNC: 101 MMOL/L — SIGNIFICANT CHANGE UP (ref 98–107)
CO2 SERPL-SCNC: 22 MMOL/L — SIGNIFICANT CHANGE UP (ref 22–31)
CO2 SERPL-SCNC: 22 MMOL/L — SIGNIFICANT CHANGE UP (ref 22–31)
CO2 SERPL-SCNC: 23 MMOL/L — SIGNIFICANT CHANGE UP (ref 22–31)
CREAT SERPL-MCNC: 0.64 MG/DL — SIGNIFICANT CHANGE UP (ref 0.5–1.3)
CREAT SERPL-MCNC: 0.72 MG/DL — SIGNIFICANT CHANGE UP (ref 0.5–1.3)
CREAT SERPL-MCNC: 0.87 MG/DL — SIGNIFICANT CHANGE UP (ref 0.5–1.3)
EGFR: 103 ML/MIN/1.73M2 — SIGNIFICANT CHANGE UP
EGFR: 106 ML/MIN/1.73M2 — SIGNIFICANT CHANGE UP
EGFR: 97 ML/MIN/1.73M2 — SIGNIFICANT CHANGE UP
GLUCOSE BLDC GLUCOMTR-MCNC: 146 MG/DL — HIGH (ref 70–99)
GLUCOSE BLDC GLUCOMTR-MCNC: 150 MG/DL — HIGH (ref 70–99)
GLUCOSE BLDC GLUCOMTR-MCNC: 155 MG/DL — HIGH (ref 70–99)
GLUCOSE BLDC GLUCOMTR-MCNC: 188 MG/DL — HIGH (ref 70–99)
GLUCOSE SERPL-MCNC: 147 MG/DL — HIGH (ref 70–99)
GLUCOSE SERPL-MCNC: 158 MG/DL — HIGH (ref 70–99)
GLUCOSE SERPL-MCNC: 240 MG/DL — HIGH (ref 70–99)
HCT VFR BLD CALC: 40.4 % — SIGNIFICANT CHANGE UP (ref 39–50)
HGB BLD-MCNC: 13 G/DL — SIGNIFICANT CHANGE UP (ref 13–17)
LDH SERPL L TO P-CCNC: 287 U/L — HIGH (ref 135–225)
LDH SERPL L TO P-CCNC: 297 U/L — HIGH (ref 135–225)
LDH SERPL L TO P-CCNC: 312 U/L — HIGH (ref 135–225)
MAGNESIUM SERPL-MCNC: 2 MG/DL — SIGNIFICANT CHANGE UP (ref 1.6–2.6)
MCHC RBC-ENTMCNC: 31.1 PG — SIGNIFICANT CHANGE UP (ref 27–34)
MCHC RBC-ENTMCNC: 32.2 G/DL — SIGNIFICANT CHANGE UP (ref 32–36)
MCV RBC AUTO: 96.7 FL — SIGNIFICANT CHANGE UP (ref 80–100)
NRBC # BLD: 0 /100 WBCS — SIGNIFICANT CHANGE UP (ref 0–0)
NRBC # FLD: 0 K/UL — SIGNIFICANT CHANGE UP (ref 0–0)
PHOSPHATE SERPL-MCNC: 2.7 MG/DL — SIGNIFICANT CHANGE UP (ref 2.5–4.5)
PHOSPHATE SERPL-MCNC: 2.9 MG/DL — SIGNIFICANT CHANGE UP (ref 2.5–4.5)
PHOSPHATE SERPL-MCNC: 2.9 MG/DL — SIGNIFICANT CHANGE UP (ref 2.5–4.5)
PLATELET # BLD AUTO: 119 K/UL — LOW (ref 150–400)
POTASSIUM SERPL-MCNC: 4.4 MMOL/L — SIGNIFICANT CHANGE UP (ref 3.5–5.3)
POTASSIUM SERPL-MCNC: 4.5 MMOL/L — SIGNIFICANT CHANGE UP (ref 3.5–5.3)
POTASSIUM SERPL-MCNC: 4.6 MMOL/L — SIGNIFICANT CHANGE UP (ref 3.5–5.3)
POTASSIUM SERPL-SCNC: 4.4 MMOL/L — SIGNIFICANT CHANGE UP (ref 3.5–5.3)
POTASSIUM SERPL-SCNC: 4.5 MMOL/L — SIGNIFICANT CHANGE UP (ref 3.5–5.3)
POTASSIUM SERPL-SCNC: 4.6 MMOL/L — SIGNIFICANT CHANGE UP (ref 3.5–5.3)
PROT SERPL-MCNC: 6.7 G/DL — SIGNIFICANT CHANGE UP (ref 6–8.3)
PROT SERPL-MCNC: 6.7 G/DL — SIGNIFICANT CHANGE UP (ref 6–8.3)
PROT SERPL-MCNC: 7.7 G/DL — SIGNIFICANT CHANGE UP (ref 6–8.3)
RBC # BLD: 4.18 M/UL — LOW (ref 4.2–5.8)
RBC # FLD: 12.9 % — SIGNIFICANT CHANGE UP (ref 10.3–14.5)
SODIUM SERPL-SCNC: 134 MMOL/L — LOW (ref 135–145)
URATE SERPL-MCNC: 3.1 MG/DL — LOW (ref 3.4–8.8)
URATE SERPL-MCNC: 3.2 MG/DL — LOW (ref 3.4–8.8)
URATE SERPL-MCNC: 3.9 MG/DL — SIGNIFICANT CHANGE UP (ref 3.4–8.8)
WBC # BLD: 5.8 K/UL — SIGNIFICANT CHANGE UP (ref 3.8–10.5)
WBC # FLD AUTO: 5.8 K/UL — SIGNIFICANT CHANGE UP (ref 3.8–10.5)

## 2024-12-25 PROCEDURE — 99232 SBSQ HOSP IP/OBS MODERATE 35: CPT

## 2024-12-25 RX ADMIN — Medication 5 MILLIGRAM(S): at 23:43

## 2024-12-25 RX ADMIN — Medication 15 MILLIGRAM(S): at 00:00

## 2024-12-25 RX ADMIN — ALLOPURINOL 300 MILLIGRAM(S): 100 TABLET ORAL at 11:52

## 2024-12-25 RX ADMIN — ATORVASTATIN CALCIUM 10 MILLIGRAM(S): 40 TABLET, FILM COATED ORAL at 22:08

## 2024-12-25 RX ADMIN — Medication 25 MILLIGRAM(S): at 02:35

## 2024-12-25 RX ADMIN — LEVOTHYROXINE SODIUM 75 MICROGRAM(S): 175 TABLET ORAL at 06:23

## 2024-12-25 RX ADMIN — Medication 20 MILLIGRAM(S): at 19:39

## 2024-12-25 RX ADMIN — Medication 20 MILLIGRAM(S): at 20:35

## 2024-12-25 RX ADMIN — Medication 20 MILLIGRAM(S): at 07:12

## 2024-12-25 RX ADMIN — Medication 20 MILLIGRAM(S): at 06:32

## 2024-12-25 RX ADMIN — SODIUM CHLORIDE 75 MILLILITER(S): 9 INJECTION, SOLUTION INTRAMUSCULAR; INTRAVENOUS; SUBCUTANEOUS at 09:48

## 2024-12-25 RX ADMIN — DULOXETINE HYDROCHLORIDE 60 MILLIGRAM(S): 30 CAPSULE, DELAYED RELEASE ORAL at 11:52

## 2024-12-25 RX ADMIN — Medication 1: at 16:53

## 2024-12-25 RX ADMIN — TAMSULOSIN HYDROCHLORIDE 0.4 MILLIGRAM(S): 0.4 CAPSULE ORAL at 22:07

## 2024-12-25 RX ADMIN — ACETAMINOPHEN 975 MILLIGRAM(S): 80 SOLUTION/ DROPS ORAL at 23:08

## 2024-12-25 RX ADMIN — Medication 1 TABLET(S): at 11:51

## 2024-12-25 RX ADMIN — LOSARTAN POTASSIUM 25 MILLIGRAM(S): 100 TABLET, FILM COATED ORAL at 06:22

## 2024-12-25 RX ADMIN — Medication 1000 UNIT(S): at 11:52

## 2024-12-25 RX ADMIN — GABAPENTIN 300 MILLIGRAM(S): 300 CAPSULE ORAL at 11:55

## 2024-12-25 RX ADMIN — Medication 17 GRAM(S): at 06:21

## 2024-12-25 RX ADMIN — GABAPENTIN 300 MILLIGRAM(S): 300 CAPSULE ORAL at 17:55

## 2024-12-25 RX ADMIN — OLANZAPINE 2.5 MILLIGRAM(S): 15 TABLET ORAL at 22:48

## 2024-12-25 RX ADMIN — Medication 500 MILLIGRAM(S): at 11:52

## 2024-12-25 RX ADMIN — ACETAMINOPHEN 975 MILLIGRAM(S): 80 SOLUTION/ DROPS ORAL at 06:21

## 2024-12-25 RX ADMIN — Medication 10 MILLIGRAM(S): at 06:23

## 2024-12-25 RX ADMIN — SODIUM CHLORIDE 75 MILLILITER(S): 9 INJECTION, SOLUTION INTRAMUSCULAR; INTRAVENOUS; SUBCUTANEOUS at 06:21

## 2024-12-25 RX ADMIN — Medication 100 MILLIGRAM(S): at 06:23

## 2024-12-25 RX ADMIN — Medication 15 MILLIGRAM(S): at 22:08

## 2024-12-25 RX ADMIN — Medication 15 MILLIGRAM(S): at 09:48

## 2024-12-25 RX ADMIN — Medication 1: at 11:55

## 2024-12-25 RX ADMIN — Medication 20 MILLIGRAM(S): at 00:45

## 2024-12-25 RX ADMIN — Medication 17 GRAM(S): at 17:54

## 2024-12-25 RX ADMIN — ENOXAPARIN SODIUM 40 MILLIGRAM(S): 60 INJECTION INTRAVENOUS; SUBCUTANEOUS at 17:54

## 2024-12-25 RX ADMIN — SENNOSIDES 2 TABLET(S): 8.6 TABLET, FILM COATED ORAL at 06:22

## 2024-12-25 RX ADMIN — ACETAMINOPHEN 975 MILLIGRAM(S): 80 SOLUTION/ DROPS ORAL at 07:11

## 2024-12-25 RX ADMIN — TIZANIDINE 4 MILLIGRAM(S): 4 TABLET ORAL at 18:40

## 2024-12-25 RX ADMIN — Medication 20 MILLIGRAM(S): at 14:01

## 2024-12-25 RX ADMIN — Medication 100 MILLIGRAM(S): at 06:24

## 2024-12-25 RX ADMIN — ENOXAPARIN SODIUM 40 MILLIGRAM(S): 60 INJECTION INTRAVENOUS; SUBCUTANEOUS at 06:24

## 2024-12-25 RX ADMIN — Medication 15 MILLIGRAM(S): at 10:48

## 2024-12-25 RX ADMIN — GABAPENTIN 300 MILLIGRAM(S): 300 CAPSULE ORAL at 23:43

## 2024-12-25 RX ADMIN — ACETAMINOPHEN 975 MILLIGRAM(S): 80 SOLUTION/ DROPS ORAL at 22:08

## 2024-12-25 RX ADMIN — Medication 20 MILLIGRAM(S): at 13:01

## 2024-12-25 RX ADMIN — Medication 15 MILLIGRAM(S): at 23:08

## 2024-12-25 RX ADMIN — GABAPENTIN 300 MILLIGRAM(S): 300 CAPSULE ORAL at 06:32

## 2024-12-25 NOTE — PROGRESS NOTE ADULT - SUBJECTIVE AND OBJECTIVE BOX
No Vaccines Administered.     Patient is a 64y old  Male who presents with a chief complaint of L shoulder mass (24 Dec 2024 15:43)      SUBJECTIVE / OVERNIGHT EVENTS: No overnight event. Pt has some fatigue and headache he relates to rituximab but tolerating it.    MEDICATIONS  (STANDING):  acetaminophen     Tablet .. 975 milliGRAM(s) Oral every 8 hours  allopurinol 300 milliGRAM(s) Oral daily  amLODIPine   Tablet 10 milliGRAM(s) Oral daily  ascorbic acid 500 milliGRAM(s) Oral daily  atenolol  Tablet 100 milliGRAM(s) Oral daily  atorvastatin 10 milliGRAM(s) Oral at bedtime  cholecalciferol 1000 Unit(s) Oral daily  dextrose 5%. 1000 milliLiter(s) (50 mL/Hr) IV Continuous <Continuous>  dextrose 5%. 1000 milliLiter(s) (50 mL/Hr) IV Continuous <Continuous>  dextrose 5%. 1000 milliLiter(s) (100 mL/Hr) IV Continuous <Continuous>  dextrose 5%. 1000 milliLiter(s) (100 mL/Hr) IV Continuous <Continuous>  dextrose 50% Injectable 12.5 Gram(s) IV Push once  dextrose 50% Injectable 25 Gram(s) IV Push once  dextrose 50% Injectable 25 Gram(s) IV Push once  dextrose 50% Injectable 12.5 Gram(s) IV Push once  dextrose 50% Injectable 25 Gram(s) IV Push once  dextrose 50% Injectable 25 Gram(s) IV Push once  DULoxetine 60 milliGRAM(s) Oral daily  enoxaparin Injectable 40 milliGRAM(s) SubCutaneous every 12 hours  gabapentin 300 milliGRAM(s) Oral four times a day  glucagon  Injectable 1 milliGRAM(s) IntraMuscular once  glucagon  Injectable 1 milliGRAM(s) IntraMuscular once  insulin lispro (ADMELOG) corrective regimen sliding scale   SubCutaneous at bedtime  insulin lispro (ADMELOG) corrective regimen sliding scale   SubCutaneous three times a day before meals  levothyroxine 75 MICROGram(s) Oral daily  losartan 25 milliGRAM(s) Oral daily  multivitamin 1 Tablet(s) Oral daily  OLANZapine 2.5 milliGRAM(s) Oral at bedtime  oxyCODONE  ER Tablet 15 milliGRAM(s) Oral every 12 hours  pantoprazole    Tablet 40 milliGRAM(s) Oral before breakfast  polyethylene glycol 3350 17 Gram(s) Oral two times a day  predniSONE   Tablet 100 milliGRAM(s) Oral daily  senna 2 Tablet(s) Oral two times a day  sodium chloride 0.9%. 1000 milliLiter(s) (75 mL/Hr) IV Continuous <Continuous>  tamsulosin 0.4 milliGRAM(s) Oral at bedtime    MEDICATIONS  (PRN):  dextrose Oral Gel 15 Gram(s) Oral once PRN Blood Glucose LESS THAN 70 milliGRAM(s)/deciliter  dextrose Oral Gel 15 Gram(s) Oral once PRN Blood Glucose LESS THAN 70 milliGRAM(s)/deciliter  diphenhydrAMINE 25 milliGRAM(s) Oral every 6 hours PRN Rash and/or Itching  hydrocortisone sodium succinate Injectable 100 milliGRAM(s) IV Push once PRN allergic reaction  magnesium hydroxide Suspension 30 milliLiter(s) Oral daily PRN Constipation  meperidine     Injectable 25 milliGRAM(s) IV Push once PRN rigors  naloxone Injectable 0.1 milliGRAM(s) IV Push every 3 minutes PRN For ANY of the following changes in patient status:  A. RR LESS THAN 10 breaths per minute, B. Oxygen saturation LESS THAN 90%, C. Sedation score of 6  ondansetron Injectable 4 milliGRAM(s) IV Push every 6 hours PRN Nausea and/or Vomiting  oxyCODONE    IR 15 milliGRAM(s) Oral every 4 hours PRN Moderate Pain (4 - 6)  oxyCODONE    IR 20 milliGRAM(s) Oral every 4 hours PRN Severe Pain (7 - 10)  sucralfate suspension 1 Gram(s) Oral Once PRN for esophageal pain  tiZANidine 4 milliGRAM(s) Oral <User Schedule> PRN Muscle Spasm  zolpidem 5 milliGRAM(s) Oral at bedtime PRN Insomnia      CAPILLARY BLOOD GLUCOSE      POCT Blood Glucose.: 155 mg/dL (25 Dec 2024 11:11)  POCT Blood Glucose.: 146 mg/dL (25 Dec 2024 07:14)  POCT Blood Glucose.: 207 mg/dL (24 Dec 2024 21:14)  POCT Blood Glucose.: 194 mg/dL (24 Dec 2024 16:23)    I&O's Summary      PHYSICAL EXAM:  Vital Signs Last 24 Hrs  T(C): 36.6 (25 Dec 2024 13:04), Max: 37.2 (24 Dec 2024 17:32)  T(F): 97.8 (25 Dec 2024 13:04), Max: 98.9 (24 Dec 2024 17:32)  HR: 61 (25 Dec 2024 13:04) (61 - 98)  BP: 147/74 (25 Dec 2024 13:04) (123/61 - 148/80)  BP(mean): --  RR: 17 (25 Dec 2024 13:04) (17 - 19)  SpO2: 98% (25 Dec 2024 13:04) (95% - 99%)    Parameters below as of 25 Dec 2024 13:04  Patient On (Oxygen Delivery Method): room air      CONSTITUTIONAL: NAD, well-developed, well-groomed  EYES: PERRLA; conjunctiva and sclera clear  ENMT: Moist oral mucosa, no pharyngeal injection or exudates; normal dentition  RESPIRATORY: Normal respiratory effort; lungs are clear to auscultation bilaterally  CARDIOVASCULAR: Regular rate and rhythm, normal S1 and S2, no murmur/rub/gallop; No lower extremity edema; Peripheral pulses are 2+ bilaterally  ABDOMEN: Nontender to palpation, normoactive bowel sounds, no rebound/guarding; No hepatosplenomegaly  PSYCH: A+O to person, place, and time; affect appropriate  NEUROLOGY: CN 2-12 are intact and symmetric; no gross sensory deficits       LABS:                        15.4   5.37  )-----------( 167      ( 24 Dec 2024 05:37 )             42.3     12-25    134[L]  |  101  |  29[H]  ----------------------------<  158[H]  4.5   |  22  |  0.64    Ca    9.3      25 Dec 2024 06:35  Phos  2.9     12-25  Mg     2.00     12-24    TPro  6.7  /  Alb  3.8  /  TBili  0.4  /  DBili  x   /  AST  42[H]  /  ALT  33  /  AlkPhos  85  12-25          Urinalysis Basic - ( 25 Dec 2024 06:35 )    Color: x / Appearance: x / SG: x / pH: x  Gluc: 158 mg/dL / Ketone: x  / Bili: x / Urobili: x   Blood: x / Protein: x / Nitrite: x   Leuk Esterase: x / RBC: x / WBC x   Sq Epi: x / Non Sq Epi: x / Bacteria: x          RADIOLOGY & ADDITIONAL TESTS:  Results Reviewed:   Imaging Personally Reviewed:  Electrocardiogram Personally Reviewed:    COORDINATION OF CARE:  Care Discussed with Consultants/Other Providers [Y/N]:  Prior or Outpatient Records Reviewed [Y/N]:

## 2024-12-26 DIAGNOSIS — R73.9 HYPERGLYCEMIA, UNSPECIFIED: ICD-10-CM

## 2024-12-26 DIAGNOSIS — F41.9 ANXIETY DISORDER, UNSPECIFIED: ICD-10-CM

## 2024-12-26 DIAGNOSIS — E11.9 TYPE 2 DIABETES MELLITUS WITHOUT COMPLICATIONS: ICD-10-CM

## 2024-12-26 DIAGNOSIS — I10 ESSENTIAL (PRIMARY) HYPERTENSION: ICD-10-CM

## 2024-12-26 LAB
ALBUMIN SERPL ELPH-MCNC: 3.4 G/DL — SIGNIFICANT CHANGE UP (ref 3.3–5)
ALBUMIN SERPL ELPH-MCNC: 4.1 G/DL — SIGNIFICANT CHANGE UP (ref 3.3–5)
ALP SERPL-CCNC: 74 U/L — SIGNIFICANT CHANGE UP (ref 40–120)
ALP SERPL-CCNC: 89 U/L — SIGNIFICANT CHANGE UP (ref 40–120)
ALT FLD-CCNC: 30 U/L — SIGNIFICANT CHANGE UP (ref 4–41)
ALT FLD-CCNC: 40 U/L — SIGNIFICANT CHANGE UP (ref 4–41)
ANION GAP SERPL CALC-SCNC: 10 MMOL/L — SIGNIFICANT CHANGE UP (ref 7–14)
ANION GAP SERPL CALC-SCNC: 11 MMOL/L — SIGNIFICANT CHANGE UP (ref 7–14)
AST SERPL-CCNC: 36 U/L — SIGNIFICANT CHANGE UP (ref 4–40)
AST SERPL-CCNC: 43 U/L — HIGH (ref 4–40)
BILIRUB SERPL-MCNC: 0.3 MG/DL — SIGNIFICANT CHANGE UP (ref 0.2–1.2)
BILIRUB SERPL-MCNC: 0.4 MG/DL — SIGNIFICANT CHANGE UP (ref 0.2–1.2)
BUN SERPL-MCNC: 26 MG/DL — HIGH (ref 7–23)
BUN SERPL-MCNC: 29 MG/DL — HIGH (ref 7–23)
CALCIUM SERPL-MCNC: 9 MG/DL — SIGNIFICANT CHANGE UP (ref 8.4–10.5)
CALCIUM SERPL-MCNC: 9.4 MG/DL — SIGNIFICANT CHANGE UP (ref 8.4–10.5)
CHLORIDE SERPL-SCNC: 101 MMOL/L — SIGNIFICANT CHANGE UP (ref 98–107)
CHLORIDE SERPL-SCNC: 99 MMOL/L — SIGNIFICANT CHANGE UP (ref 98–107)
CHROM ANALY INTERPHASE BLD FISH-IMP: SIGNIFICANT CHANGE UP
CO2 SERPL-SCNC: 23 MMOL/L — SIGNIFICANT CHANGE UP (ref 22–31)
CO2 SERPL-SCNC: 26 MMOL/L — SIGNIFICANT CHANGE UP (ref 22–31)
CREAT SERPL-MCNC: 0.69 MG/DL — SIGNIFICANT CHANGE UP (ref 0.5–1.3)
CREAT SERPL-MCNC: 0.7 MG/DL — SIGNIFICANT CHANGE UP (ref 0.5–1.3)
EGFR: 104 ML/MIN/1.73M2 — SIGNIFICANT CHANGE UP
EGFR: 104 ML/MIN/1.73M2 — SIGNIFICANT CHANGE UP
G6PD RBC-CCNC: 14.4 U/G HGB — SIGNIFICANT CHANGE UP (ref 7–20.5)
GLUCOSE BLDC GLUCOMTR-MCNC: 124 MG/DL — HIGH (ref 70–99)
GLUCOSE BLDC GLUCOMTR-MCNC: 126 MG/DL — HIGH (ref 70–99)
GLUCOSE BLDC GLUCOMTR-MCNC: 127 MG/DL — HIGH (ref 70–99)
GLUCOSE BLDC GLUCOMTR-MCNC: 183 MG/DL — HIGH (ref 70–99)
GLUCOSE BLDC GLUCOMTR-MCNC: 191 MG/DL — HIGH (ref 70–99)
GLUCOSE SERPL-MCNC: 131 MG/DL — HIGH (ref 70–99)
GLUCOSE SERPL-MCNC: 177 MG/DL — HIGH (ref 70–99)
HCT VFR BLD CALC: 35.8 % — LOW (ref 39–50)
HGB BLD-MCNC: 12.6 G/DL — LOW (ref 13–17)
LDH SERPL L TO P-CCNC: 225 U/L — SIGNIFICANT CHANGE UP (ref 135–225)
LDH SERPL L TO P-CCNC: 265 U/L — HIGH (ref 135–225)
MCHC RBC-ENTMCNC: 31.9 PG — SIGNIFICANT CHANGE UP (ref 27–34)
MCHC RBC-ENTMCNC: 35.2 G/DL — SIGNIFICANT CHANGE UP (ref 32–36)
MCV RBC AUTO: 90.6 FL — SIGNIFICANT CHANGE UP (ref 80–100)
NRBC # BLD: 0 /100 WBCS — SIGNIFICANT CHANGE UP (ref 0–0)
NRBC # FLD: 0 K/UL — SIGNIFICANT CHANGE UP (ref 0–0)
PHOSPHATE SERPL-MCNC: 2.4 MG/DL — LOW (ref 2.5–4.5)
PHOSPHATE SERPL-MCNC: 2.8 MG/DL — SIGNIFICANT CHANGE UP (ref 2.5–4.5)
PLATELET # BLD AUTO: 121 K/UL — LOW (ref 150–400)
POTASSIUM SERPL-MCNC: 4.3 MMOL/L — SIGNIFICANT CHANGE UP (ref 3.5–5.3)
POTASSIUM SERPL-MCNC: 4.4 MMOL/L — SIGNIFICANT CHANGE UP (ref 3.5–5.3)
POTASSIUM SERPL-SCNC: 4.3 MMOL/L — SIGNIFICANT CHANGE UP (ref 3.5–5.3)
POTASSIUM SERPL-SCNC: 4.4 MMOL/L — SIGNIFICANT CHANGE UP (ref 3.5–5.3)
PROT SERPL-MCNC: 5.9 G/DL — LOW (ref 6–8.3)
PROT SERPL-MCNC: 7.3 G/DL — SIGNIFICANT CHANGE UP (ref 6–8.3)
RBC # BLD: 3.95 M/UL — LOW (ref 4.2–5.8)
RBC # FLD: 12.4 % — SIGNIFICANT CHANGE UP (ref 10.3–14.5)
SODIUM SERPL-SCNC: 134 MMOL/L — LOW (ref 135–145)
SODIUM SERPL-SCNC: 136 MMOL/L — SIGNIFICANT CHANGE UP (ref 135–145)
URATE SERPL-MCNC: 3 MG/DL — LOW (ref 3.4–8.8)
URATE SERPL-MCNC: 3.4 MG/DL — SIGNIFICANT CHANGE UP (ref 3.4–8.8)
URATE SERPL-MCNC: 3.5 MG/DL — SIGNIFICANT CHANGE UP (ref 3.4–8.8)
WBC # BLD: 4.69 K/UL — SIGNIFICANT CHANGE UP (ref 3.8–10.5)
WBC # FLD AUTO: 4.69 K/UL — SIGNIFICANT CHANGE UP (ref 3.8–10.5)

## 2024-12-26 PROCEDURE — 99233 SBSQ HOSP IP/OBS HIGH 50: CPT | Mod: 25

## 2024-12-26 PROCEDURE — 99232 SBSQ HOSP IP/OBS MODERATE 35: CPT

## 2024-12-26 PROCEDURE — 73562 X-RAY EXAM OF KNEE 3: CPT | Mod: 26,LT

## 2024-12-26 PROCEDURE — 99222 1ST HOSP IP/OBS MODERATE 55: CPT

## 2024-12-26 RX ORDER — OXYCODONE HCL 15 MG
15 TABLET ORAL EVERY 4 HOURS
Refills: 0 | Status: DISCONTINUED | OUTPATIENT
Start: 2024-12-26 | End: 2024-12-31

## 2024-12-26 RX ORDER — OXYCODONE HCL 15 MG
40 TABLET ORAL EVERY 12 HOURS
Refills: 0 | Status: DISCONTINUED | OUTPATIENT
Start: 2024-12-26 | End: 2024-12-31

## 2024-12-26 RX ORDER — ZOLPIDEM TARTRATE 5 MG
5 TABLET ORAL AT BEDTIME
Refills: 0 | Status: DISCONTINUED | OUTPATIENT
Start: 2024-12-26 | End: 2024-12-31

## 2024-12-26 RX ORDER — INSULIN GLARGINE-YFGN 100 [IU]/ML
2 INJECTION, SOLUTION SUBCUTANEOUS AT BEDTIME
Refills: 0 | Status: DISCONTINUED | OUTPATIENT
Start: 2024-12-26 | End: 2024-12-26

## 2024-12-26 RX ORDER — LORAZEPAM 1 MG/1
0.5 TABLET ORAL EVERY 4 HOURS
Refills: 0 | Status: DISCONTINUED | OUTPATIENT
Start: 2024-12-26 | End: 2024-12-31

## 2024-12-26 RX ORDER — INSULIN GLARGINE-YFGN 100 [IU]/ML
2 INJECTION, SOLUTION SUBCUTANEOUS AT BEDTIME
Refills: 0 | Status: COMPLETED | OUTPATIENT
Start: 2024-12-26 | End: 2024-12-26

## 2024-12-26 RX ORDER — OXYCODONE HCL 15 MG
20 TABLET ORAL EVERY 4 HOURS
Refills: 0 | Status: DISCONTINUED | OUTPATIENT
Start: 2024-12-26 | End: 2024-12-31

## 2024-12-26 RX ORDER — PEGFILGRASTIM-APGF 6 MG/.6ML
6 INJECTION, SOLUTION SUBCUTANEOUS ONCE
Refills: 0 | Status: COMPLETED | OUTPATIENT
Start: 2024-12-28 | End: 2024-12-28

## 2024-12-26 RX ORDER — ALPRAZOLAM 0.25 MG/1
0.25 TABLET ORAL ONCE
Refills: 0 | Status: DISCONTINUED | OUTPATIENT
Start: 2024-12-26 | End: 2024-12-26

## 2024-12-26 RX ADMIN — GABAPENTIN 300 MILLIGRAM(S): 300 CAPSULE ORAL at 12:29

## 2024-12-26 RX ADMIN — Medication 20 MILLIGRAM(S): at 15:34

## 2024-12-26 RX ADMIN — Medication 20 MILLIGRAM(S): at 10:22

## 2024-12-26 RX ADMIN — LEVOTHYROXINE SODIUM 75 MICROGRAM(S): 175 TABLET ORAL at 05:14

## 2024-12-26 RX ADMIN — Medication 20 MILLIGRAM(S): at 14:34

## 2024-12-26 RX ADMIN — Medication 17 GRAM(S): at 18:50

## 2024-12-26 RX ADMIN — OLANZAPINE 2.5 MILLIGRAM(S): 15 TABLET ORAL at 23:08

## 2024-12-26 RX ADMIN — ACETAMINOPHEN 975 MILLIGRAM(S): 80 SOLUTION/ DROPS ORAL at 23:45

## 2024-12-26 RX ADMIN — Medication 20 MILLIGRAM(S): at 11:22

## 2024-12-26 RX ADMIN — ALLOPURINOL 300 MILLIGRAM(S): 100 TABLET ORAL at 12:28

## 2024-12-26 RX ADMIN — PANTOPRAZOLE 40 MILLIGRAM(S): 40 TABLET, DELAYED RELEASE ORAL at 05:14

## 2024-12-26 RX ADMIN — DULOXETINE HYDROCHLORIDE 60 MILLIGRAM(S): 30 CAPSULE, DELAYED RELEASE ORAL at 12:28

## 2024-12-26 RX ADMIN — Medication 100 MILLIGRAM(S): at 05:12

## 2024-12-26 RX ADMIN — GABAPENTIN 300 MILLIGRAM(S): 300 CAPSULE ORAL at 05:18

## 2024-12-26 RX ADMIN — Medication 20 MILLIGRAM(S): at 01:30

## 2024-12-26 RX ADMIN — Medication 20 MILLIGRAM(S): at 18:49

## 2024-12-26 RX ADMIN — ENOXAPARIN SODIUM 40 MILLIGRAM(S): 60 INJECTION INTRAVENOUS; SUBCUTANEOUS at 18:50

## 2024-12-26 RX ADMIN — INSULIN GLARGINE-YFGN 2 UNIT(S): 100 INJECTION, SOLUTION SUBCUTANEOUS at 22:45

## 2024-12-26 RX ADMIN — Medication 500 MILLIGRAM(S): at 12:29

## 2024-12-26 RX ADMIN — TAMSULOSIN HYDROCHLORIDE 0.4 MILLIGRAM(S): 0.4 CAPSULE ORAL at 22:45

## 2024-12-26 RX ADMIN — ATORVASTATIN CALCIUM 10 MILLIGRAM(S): 40 TABLET, FILM COATED ORAL at 22:45

## 2024-12-26 RX ADMIN — LORAZEPAM 0.5 MILLIGRAM(S): 1 TABLET ORAL at 20:52

## 2024-12-26 RX ADMIN — ACETAMINOPHEN 975 MILLIGRAM(S): 80 SOLUTION/ DROPS ORAL at 05:14

## 2024-12-26 RX ADMIN — Medication 1 TABLET(S): at 12:28

## 2024-12-26 RX ADMIN — ACETAMINOPHEN 975 MILLIGRAM(S): 80 SOLUTION/ DROPS ORAL at 22:45

## 2024-12-26 RX ADMIN — Medication 17 GRAM(S): at 05:13

## 2024-12-26 RX ADMIN — Medication 20 MILLIGRAM(S): at 02:30

## 2024-12-26 RX ADMIN — Medication 1: at 16:37

## 2024-12-26 RX ADMIN — Medication 20 MILLIGRAM(S): at 06:07

## 2024-12-26 RX ADMIN — GABAPENTIN 300 MILLIGRAM(S): 300 CAPSULE ORAL at 18:50

## 2024-12-26 RX ADMIN — Medication 1: at 12:28

## 2024-12-26 RX ADMIN — ALPRAZOLAM 0.25 MILLIGRAM(S): 0.25 TABLET ORAL at 12:10

## 2024-12-26 RX ADMIN — ACETAMINOPHEN 975 MILLIGRAM(S): 80 SOLUTION/ DROPS ORAL at 06:14

## 2024-12-26 RX ADMIN — Medication 20 MILLIGRAM(S): at 07:07

## 2024-12-26 RX ADMIN — Medication 1000 UNIT(S): at 12:28

## 2024-12-26 RX ADMIN — ENOXAPARIN SODIUM 40 MILLIGRAM(S): 60 INJECTION INTRAVENOUS; SUBCUTANEOUS at 05:15

## 2024-12-26 RX ADMIN — Medication 5 MILLIGRAM(S): at 22:45

## 2024-12-26 RX ADMIN — SODIUM CHLORIDE 75 MILLILITER(S): 9 INJECTION, SOLUTION INTRAMUSCULAR; INTRAVENOUS; SUBCUTANEOUS at 05:18

## 2024-12-26 NOTE — PROGRESS NOTE ADULT - PROBLEM SELECTOR PLAN 3
Pt revealed to team during first encounter that he has a ho bipolar TII disorder, not currently under the care of psychiatrist, and on unopposed SNRI.     - appreciate inpatient psychiatry recommendations   - c/w zyprexa 2.5mg qHS Pt revealed to team during first encounter that he has a ho bipolar TII disorder, not currently under the care of psychiatrist, and on unopposed SNRI.   Mood seems to be exacerbated by triggering event ( fire alarm) possible steroids contributing to dysphoric mood.     - appreciate inpatient psychiatry recommendations   - c/w zyprexa 2.5mg qHS  - ativan 0.5 mg PO q4hrs prn Pt revealed to team during first encounter that he has a ho bipolar TII disorder, not currently under the care of psychiatrist, and on unopposed SNRI.   Mood seems to be exacerbated by triggering event ( fire alarm) possible steroids contributing to dysphoric mood.     - appreciate inpatient psychiatry recommendations   - c/w zyprexa 2.5mg qHS  - ativan 0.5 mg PO q4hrs prn anxiety/agitation/restlessness (discussed with Dr. Paris)

## 2024-12-26 NOTE — PROGRESS NOTE ADULT - PROBLEM SELECTOR PLAN 1
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 = 112.5 OME  - Pt agreeable to starting long acting oxycodone, reports he has been on long acting medication in the past.     Recommend:   - start oxycodone 15mg ER q12hrs (12/24)   - c/w oxycodone 15-20mg for moderate to severe pain q4hrs prn  - narcan prn  - c/w tizanidine 4mg bid prn, gabapentin 300mg qid, duloxetine 60mg qd   - Bowel regimen, goal BM every 2 to 3 days to prevent opioid induced constipation, hold for loose stool  - Follows w/ pain management Dr. Trejo out [facility]. 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     Recommend:   - increase oxycodone ER to 40mg from 15mg q12hrs (12/26)   - c/w oxycodone 15-20mg for moderate to severe pain q4hrs prn  - narcan prn  - c/w tizanidine 4mg bid prn, gabapentin 300mg qid, duloxetine 60mg qd   - Bowel regimen, goal BM every 2 to 3 days to prevent opioid induced constipation, hold for loose stool  - Follows w/ pain management Dr. Trejo out [facility].

## 2024-12-26 NOTE — PROGRESS NOTE ADULT - PROBLEM SELECTOR PLAN 6
Thank you for allowing us to participate in your patient's care. We will continue to follow with you. In the event of worsening symptoms, please contact the Palliative Medicine team via pager (if the patient is at [facility] #[phone] or if the patient is at [facility] #[phone]) The Geriatric and Palliative Medicine service has coverage 24 hours a day/ 7 days a week to provide medical recommendations regarding symptom management needs via telephone.     Kalyani Sanchez M.D.  Geriatrics and Palliative Care Fellow  Available via TEAMS.

## 2024-12-26 NOTE — PROGRESS NOTE ADULT - ATTENDING COMMENTS
64M with PMH hiatal hernia s/p fundoplication, prior SBO s/p multiple abdominal surgeries, T2DM, chronic back pain, and NANCY (not on CPAP) initially admitted to orthopedics for biopsy of enlarging painful L anterior shoulder mass, now s/p biopsy 12/17 with preliminary results suspicious for lymphoma. GaP team consulted for symptom management in setting of new malignancy.     > Pain: Increase Oxycontin to 40mg bid, continue oxycodone IR 15-20mg q4h prn mod-severe pain   > Tizanidine prn, gabapentin 300mg qid, cymbalta 60mg qd (all started prior to Palliative consult)   > Patient has chronic pain doctor- Dr. 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 Patient is agreeable to starting DMT.   > Plan for PET scan on 12/27   > Pending rad/onc consult     > Appreciate BH recs- patient with hx of bipolar disorder. started on zyprexa. Added ativan PO 0.5mg prn anxiety.     > GOC: Pt's wife, Sheri, is the surrogate decision maker. Goal is to pursue DMT.

## 2024-12-26 NOTE — PROGRESS NOTE ADULT - PROBLEM SELECTOR PLAN 6
will ask Psych follow up   the nursing team tried to explain patient the fire drill procedure, patient very scared, crying

## 2024-12-26 NOTE — PROGRESS NOTE ADULT - PROBLEM SELECTOR PLAN 4
Pt reports being unable to walk since Lumbar fusion, multiple complications from multiple surgeries. He walked w/ walker and cane. Both are present at bedside.  Currently does not work, on disability. Previously worked for [employer].    - PT jesus albertoal per primary team. Pt reports being unable to walk since Lumbar fusion, multiple complications from multiple surgeries. He walked w/ walker and cane. Both are present at bedside.  Currently does not work, on disability. Previously worked for [employer].    - PT eval per primary team 12/18 - no skilled PT needs

## 2024-12-26 NOTE — CHART NOTE - NSCHARTNOTEFT_GEN_A_CORE
NUTRITION FOLLOW UP NOTE    Pt seen for follow up- LOS    SOURCE: [x] Patient [x] Medical record [x] RN/PCA     Medical Course: per chart review, 64-year-old Male with PMH hiatal hernia s/p fundoplication, prior SBO s/p multiple abdominal surgeries, T2DM, chronic back pain, and NANCY (not on CPAP) initially admitted to orthopedics for biopsy of enlarging painful L anterior shoulder mass, now s/p biopsy 12/17 with preliminary results suspicious for lymphoma., confirmed DCBL, now undergoing oncology work-up and treatment.    Diet Prescription: Diet, NPO after Midnight:      NPO Start Date: 26-Dec-2024,   NPO Start Time: 23:59  Except Medications (12-26-24 @ 13:35)  Diet, Regular:   Pet Scan (PETSCAN)  Supplement Feeding Modality:  Oral  Ensure Plus High Protein Cans or Servings Per Day:  1       Frequency:  Daily (12-26-24 @ 10:20)      Nutrition Course: Met patient at bedside. Patient alert, A&O x4. Patient reports improved appetite in hospital. Per RN flowsheets % intake. Per diet order, patient should receives Ensure Plus High Protein shake 1x daily. However, observed patient receives Ensure Max in fact. Patient reports good acceptance of Ensure Max. Patient will be NPO after midnight today (12/26). Therefore, d/c Ensure Plus High Protein and recommend Ensure Max 1x daily (150 liliya, 30 gm pro) when resume PO diet as medically feasible, made MD aware to update diet order. Patient denies any in house nausea, vomiting, diarrhea, or constipation. Last BM noted on 12/26  per patient. Bowel regimen is in placed. No reported chewing/swallowing issues. IV fluids noted for hydration. Recommend d/c regular diet and adding consistent carbohydrate diet (no snacks) when resume PO diet as medically feasible. Encourage PO intake and honor food preferences as able. RD to remain available for further nutritional interventions as indicated       Food Allergy/Intolerance: NKFA  -    Pertinent Medications: MEDICATIONS  (STANDING):  acetaminophen     Tablet .. 975 milliGRAM(s) Oral every 8 hours  allopurinol 300 milliGRAM(s) Oral daily  amLODIPine   Tablet 10 milliGRAM(s) Oral daily  ascorbic acid 500 milliGRAM(s) Oral daily  atenolol  Tablet 100 milliGRAM(s) Oral daily  atorvastatin 10 milliGRAM(s) Oral at bedtime  cholecalciferol 1000 Unit(s) Oral daily  dextrose 5%. 1000 milliLiter(s) (100 mL/Hr) IV Continuous <Continuous>  dextrose 5%. 1000 milliLiter(s) (50 mL/Hr) IV Continuous <Continuous>  dextrose 5%. 1000 milliLiter(s) (50 mL/Hr) IV Continuous <Continuous>  dextrose 5%. 1000 milliLiter(s) (100 mL/Hr) IV Continuous <Continuous>  dextrose 50% Injectable 25 Gram(s) IV Push once  dextrose 50% Injectable 12.5 Gram(s) IV Push once  dextrose 50% Injectable 25 Gram(s) IV Push once  dextrose 50% Injectable 25 Gram(s) IV Push once  dextrose 50% Injectable 12.5 Gram(s) IV Push once  dextrose 50% Injectable 25 Gram(s) IV Push once  DULoxetine 60 milliGRAM(s) Oral daily  enoxaparin Injectable 40 milliGRAM(s) SubCutaneous every 12 hours  gabapentin 300 milliGRAM(s) Oral four times a day  glucagon  Injectable 1 milliGRAM(s) IntraMuscular once  glucagon  Injectable 1 milliGRAM(s) IntraMuscular once  insulin lispro (ADMELOG) corrective regimen sliding scale   SubCutaneous at bedtime  insulin lispro (ADMELOG) corrective regimen sliding scale   SubCutaneous three times a day before meals  levothyroxine 75 MICROGram(s) Oral daily  losartan 25 milliGRAM(s) Oral daily  multivitamin 1 Tablet(s) Oral daily  OLANZapine 2.5 milliGRAM(s) Oral at bedtime  oxyCODONE  ER Tablet 40 milliGRAM(s) Oral every 12 hours  pantoprazole    Tablet 40 milliGRAM(s) Oral before breakfast  polyethylene glycol 3350 17 Gram(s) Oral two times a day  predniSONE   Tablet 100 milliGRAM(s) Oral daily  senna 2 Tablet(s) Oral two times a day  sodium chloride 0.9%. 1000 milliLiter(s) (75 mL/Hr) IV Continuous <Continuous>  tamsulosin 0.4 milliGRAM(s) Oral at bedtime    MEDICATIONS  (PRN):  dextrose Oral Gel 15 Gram(s) Oral once PRN Blood Glucose LESS THAN 70 milliGRAM(s)/deciliter  dextrose Oral Gel 15 Gram(s) Oral once PRN Blood Glucose LESS THAN 70 milliGRAM(s)/deciliter  diphenhydrAMINE 25 milliGRAM(s) Oral every 6 hours PRN Rash and/or Itching  hydrocortisone sodium succinate Injectable 100 milliGRAM(s) IV Push once PRN allergic reaction  LORazepam     Tablet 0.5 milliGRAM(s) Oral every 4 hours PRN anxiety/agitation  magnesium hydroxide Suspension 30 milliLiter(s) Oral daily PRN Constipation  meperidine     Injectable 25 milliGRAM(s) IV Push once PRN rigors  naloxone Injectable 0.1 milliGRAM(s) IV Push every 3 minutes PRN For ANY of the following changes in patient status:  A. RR LESS THAN 10 breaths per minute, B. Oxygen saturation LESS THAN 90%, C. Sedation score of 6  ondansetron Injectable 4 milliGRAM(s) IV Push every 6 hours PRN Nausea and/or Vomiting  oxyCODONE    IR 15 milliGRAM(s) Oral every 4 hours PRN Moderate Pain (4 - 6)  oxyCODONE    IR 20 milliGRAM(s) Oral every 4 hours PRN Severe Pain (7 - 10)  sucralfate suspension 1 Gram(s) Oral Once PRN for esophageal pain  tiZANidine 4 milliGRAM(s) Oral <User Schedule> PRN Muscle Spasm  zolpidem 5 milliGRAM(s) Oral at bedtime PRN Insomnia    [x] Relevant notes on medications: insulin and multivitamin noted    Pertinent Labs: 12-26 Na134 mmol/L[L] Glu 131 mg/dL[H] K+ 4.3 mmol/L Cr  0.69 mg/dL BUN 29 mg/dL[H] 12-26 Phos 2.8 mg/dL 12-26 Alb 3.4 g/dL     A1C with Estimated Average Glucose Result: 6.1 % (12-16-24 @ 17:36)      POCT Blood Glucose.: 191 mg/dL (26 Dec 2024 11:30)  POCT Blood Glucose.: 126 mg/dL (26 Dec 2024 07:13)  POCT Blood Glucose.: 150 mg/dL (25 Dec 2024 21:10)  POCT Blood Glucose.: 188 mg/dL (25 Dec 2024 16:30)    [x] Relevant notes on labs:  Labs reviewed, A1c 6.1% indicating fair control, fingersticks between 126-191mg/dL.     Weight: Weight (kg): 121.2 (12-23 @ 09:27); HIE: 122.5kg(12/17);125.6kg(6/16); 122.5kg(3/9).  Weight Assessment: no new weight assessment  Height: 68in / 172.7cm  IBW: 154lbs / 69.8kg +/-10%  BMI: 40.6kg/m^2    Physical Assessment, per flowsheets:  Edema: 1+ L shoulder per RN flowsheets 12/24  Pressure Injury: No pressure injuries/DTI noted per RN flowsheets.     Estimated Needs:   [X] No change since previous assessment, based on Ideal Body Weight: 154lbs / 69.8kg    1954-2233kcal daily @ 28-32kcal/kg,  83.76-97.72gm protein daily @ 1.2-1.4gm/kg       Previous Nutrition Diagnosis:   [ x] Increased Nutrient Needs [x ] Overweight/Obesity   Nutrition Diagnosis is [ x] ongoing    New Nutrition Diagnosis: [x ] not applicable     Education:  [ x] Provided to patient all nutrition questions answered during visit       Interventions:   1) Patient will be NPO after midnight today (12/26). Therefore, d/c Ensure Plus High Protein and recommend Ensure Max 1x daily (150 liliya, 30 gm pro) when resume PO diet as medically feasible,   2) Recommend d/c regular diet and adding consistent carbohydrate diet (no snacks) when resume PO diet as medically feasible.   3) Obtain daily weights   4) Encourage PO intake and honor food preferences       Monitor & Evaluate:  PO intake, tolerance to diet/supplement, nutrition related lab values, weight trends, BMs/GI distress, hydration status, skin integrity.    Jonh Carlisle, MS, RDN, CDN    Also available on [employer] Teams

## 2024-12-26 NOTE — CONSULT NOTE ADULT - ASSESSMENT
64M with hiatal hernia s/p fundoplication, prior SBO s/p multiple abdominal surgeries, T2DM, chronic back pain, hypothyroidism, NANCY (not on CPAP) initially admitted to orthopedics for biopsy of enlarging painful L anterior shoulder mass, now s/p biopsy 12/17. Pathology confirmed DCBL, now undergoing oncology work-up and treatment.     Consulted for: Steroid induced hyperglycemia    #Steroid induced hyperglycemia  #T2DM controlled  He reports he was diagnosed with preDM 20 years ago, has been on metformin 1000mg BID since 6 years ago. He reports his A1c has always been well controlled below 6.5.  He likely has T2DM that is well controlled on metformin.  A1C with Estimated Average Glucose Result: 6.1 % (12-16-24)  He doesn't often check FS because glycemic control has normally been very good  Managed by PCP Dr. Rico  Family hx: Father had T2DM  Complications: Denies CAD, CVA. No known microvascular complications  Steroids: Prednisone 100mg daily 12/23-, duration 5 days    Glucose has been mostly well controlled below 200 on prednisone 100mg daily  Patient has PET scan tomorrow at 9:15AM and must have glucose <200. no insulin 6 hours prior to PET scan.  Patient will be NPO starting at midnight.    Inpatient plan:  - For tonight, can give Lantus 2 units at bedtime x 1 dose, to ensure glucose level below 200 for PET scan tomorrow  - Continue Low dose admelog correction scale TIDAC, Low dose admelog correction scale QHS until midnight, then discontinue until after PET scan.  - CC diet    Discharge plan:  - Discharge regimen: continue home metformin 1000mg BID  - patient to follow up with PCP Dr. Rico  - Routine ophthalmology and podiatry follow up    #Hypothyroidism  He reports hx of hypothyroidism, has been on levothyroxine 75mcg daily stable dose for 5 years. He reports TFTs have been normal on this dose.  - Continue home levothyroxine 75mcg daily. Please administer in the AM on an empty stomach, 1 hour before food or other medications, and 4 hours before any PPI, calcium, or iron supplements.     #HTN  - goal <130/80  - check urine albumin/cr ratio outpatient  - management by primary team    #HLD  - check lipid panel outpatient      Discussed with primary team     Serge Dave MD  Endocrine Fellow  Can be reached via teams. For follow up questions, discharge recommendations, or new consults, please call answering service at [phone] (weekdays); [phone] (nights/weekends).

## 2024-12-26 NOTE — PROGRESS NOTE ADULT - PROBLEM SELECTOR PLAN 1
Fast growing mass of L shoulder associated with hot flashes.   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

## 2024-12-26 NOTE — PROGRESS NOTE ADULT - ASSESSMENT
64 YOM w/ pre-DM, chronic back pain, lumbar spinal fusion surgery, multiple abdominal surgeries, bipolar disorder, p/w L shoulder mass and pain. Now s/p Bx w/ ortho, w/ preliminary path c/f lymphoma. Awaiting final pathology and heme/onc recs. GaP team consulted for sxs.  64 YOM w/ pre-DM, chronic back pain, lumbar spinal fusion surgery, multiple abdominal surgeries, bipolar disorder, p/w L shoulder mass and pain. 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 GaP team consulted for sxs.

## 2024-12-26 NOTE — PROGRESS NOTE ADULT - SUBJECTIVE AND OBJECTIVE BOX
Medicine Progress Note    Patient is a 64y old  Male who presents with a chief complaint of L shoulder mass (26 Dec 2024 15:24)      SUBJECTIVE / OVERNIGHT EVENTS: patient crying, anxious today, an alarm went off yesterday ?     ADDITIONAL REVIEW OF SYSTEMS: negative     MEDICATIONS  (STANDING):  acetaminophen     Tablet .. 975 milliGRAM(s) Oral every 8 hours  allopurinol 300 milliGRAM(s) Oral daily  amLODIPine   Tablet 10 milliGRAM(s) Oral daily  ascorbic acid 500 milliGRAM(s) Oral daily  atenolol  Tablet 100 milliGRAM(s) Oral daily  atorvastatin 10 milliGRAM(s) Oral at bedtime  cholecalciferol 1000 Unit(s) Oral daily  dextrose 5%. 1000 milliLiter(s) (100 mL/Hr) IV Continuous <Continuous>  dextrose 5%. 1000 milliLiter(s) (50 mL/Hr) IV Continuous <Continuous>  dextrose 5%. 1000 milliLiter(s) (50 mL/Hr) IV Continuous <Continuous>  dextrose 5%. 1000 milliLiter(s) (100 mL/Hr) IV Continuous <Continuous>  dextrose 50% Injectable 25 Gram(s) IV Push once  dextrose 50% Injectable 12.5 Gram(s) IV Push once  dextrose 50% Injectable 25 Gram(s) IV Push once  dextrose 50% Injectable 25 Gram(s) IV Push once  dextrose 50% Injectable 12.5 Gram(s) IV Push once  dextrose 50% Injectable 25 Gram(s) IV Push once  DULoxetine 60 milliGRAM(s) Oral daily  enoxaparin Injectable 40 milliGRAM(s) SubCutaneous every 12 hours  gabapentin 300 milliGRAM(s) Oral four times a day  glucagon  Injectable 1 milliGRAM(s) IntraMuscular once  glucagon  Injectable 1 milliGRAM(s) IntraMuscular once  insulin lispro (ADMELOG) corrective regimen sliding scale   SubCutaneous at bedtime  insulin lispro (ADMELOG) corrective regimen sliding scale   SubCutaneous three times a day before meals  levothyroxine 75 MICROGram(s) Oral daily  losartan 25 milliGRAM(s) Oral daily  multivitamin 1 Tablet(s) Oral daily  OLANZapine 2.5 milliGRAM(s) Oral at bedtime  oxyCODONE  ER Tablet 40 milliGRAM(s) Oral every 12 hours  pantoprazole    Tablet 40 milliGRAM(s) Oral before breakfast  polyethylene glycol 3350 17 Gram(s) Oral two times a day  predniSONE   Tablet 100 milliGRAM(s) Oral daily  senna 2 Tablet(s) Oral two times a day  sodium chloride 0.9%. 1000 milliLiter(s) (75 mL/Hr) IV Continuous <Continuous>  tamsulosin 0.4 milliGRAM(s) Oral at bedtime    MEDICATIONS  (PRN):  dextrose Oral Gel 15 Gram(s) Oral once PRN Blood Glucose LESS THAN 70 milliGRAM(s)/deciliter  dextrose Oral Gel 15 Gram(s) Oral once PRN Blood Glucose LESS THAN 70 milliGRAM(s)/deciliter  diphenhydrAMINE 25 milliGRAM(s) Oral every 6 hours PRN Rash and/or Itching  hydrocortisone sodium succinate Injectable 100 milliGRAM(s) IV Push once PRN allergic reaction  LORazepam     Tablet 0.5 milliGRAM(s) Oral every 4 hours PRN anxiety/agitation  magnesium hydroxide Suspension 30 milliLiter(s) Oral daily PRN Constipation  meperidine     Injectable 25 milliGRAM(s) IV Push once PRN rigors  naloxone Injectable 0.1 milliGRAM(s) IV Push every 3 minutes PRN For ANY of the following changes in patient status:  A. RR LESS THAN 10 breaths per minute, B. Oxygen saturation LESS THAN 90%, C. Sedation score of 6  ondansetron Injectable 4 milliGRAM(s) IV Push every 6 hours PRN Nausea and/or Vomiting  oxyCODONE    IR 15 milliGRAM(s) Oral every 4 hours PRN Moderate Pain (4 - 6)  oxyCODONE    IR 20 milliGRAM(s) Oral every 4 hours PRN Severe Pain (7 - 10)  sucralfate suspension 1 Gram(s) Oral Once PRN for esophageal pain  tiZANidine 4 milliGRAM(s) Oral <User Schedule> PRN Muscle Spasm  zolpidem 5 milliGRAM(s) Oral at bedtime PRN Insomnia    CAPILLARY BLOOD GLUCOSE      POCT Blood Glucose.: 191 mg/dL (26 Dec 2024 11:30)  POCT Blood Glucose.: 126 mg/dL (26 Dec 2024 07:13)  POCT Blood Glucose.: 150 mg/dL (25 Dec 2024 21:10)  POCT Blood Glucose.: 188 mg/dL (25 Dec 2024 16:30)    I&O's Summary      PHYSICAL EXAM:  Vital Signs Last 24 Hrs  T(C): 36.9 (26 Dec 2024 13:42), Max: 36.9 (26 Dec 2024 13:42)  T(F): 98.5 (26 Dec 2024 13:42), Max: 98.5 (26 Dec 2024 13:42)  HR: 85 (26 Dec 2024 13:42) (51 - 90)  BP: 154/78 (26 Dec 2024 13:42) (102/59 - 154/78)  BP(mean): --  RR: 18 (26 Dec 2024 13:42) (16 - 18)  SpO2: 97% (26 Dec 2024 13:42) (95% - 98%)    Parameters below as of 26 Dec 2024 13:42  Patient On (Oxygen Delivery Method): room air      CONSTITUTIONAL: NAD,   ENMT: Moist oral mucosa, no pharyngeal injection or exudates;  RESPIRATORY: Normal respiratory effort; lungs are clear to auscultation bilaterally  CARDIOVASCULAR: Regular rate and rhythm, normal S1 and S2; No lower extremity edema; Left shoulder lesion, slight edema   ABDOMEN: Nontender to palpation, normoactive bowel sounds, no rebound/guarding;   PSYCH: A+O to person, place, and time; affect anxious   NEUROLOGY: CN 2-12 are intact and symmetric; no gross sensory deficits   SKIN: as above     LABS:                        12.6   4.69  )-----------( 121      ( 26 Dec 2024 05:50 )             35.8     12-26    134[L]  |  101  |  29[H]  ----------------------------<  131[H]  4.3   |  23  |  0.69    Ca    9.0      26 Dec 2024 05:50  Phos  2.8     12-26  Mg     2.00     12-25    TPro  5.9[L]  /  Alb  3.4  /  TBili  0.3  /  DBili  x   /  AST  36  /  ALT  30  /  AlkPhos  74  12-26          Urinalysis Basic - ( 26 Dec 2024 05:50 )    Color: x / Appearance: x / SG: x / pH: x  Gluc: 131 mg/dL / Ketone: x  / Bili: x / Urobili: x   Blood: x / Protein: x / Nitrite: x   Leuk Esterase: x / RBC: x / WBC x   Sq Epi: x / Non Sq Epi: x / Bacteria: x            RADIOLOGY & ADDITIONAL TESTS:  Imaging from Last 24 Hours:    Electrocardiogram/QTc Interval:    COORDINATION OF CARE:  Care Discussed with Consultants/Other Providers: ACP

## 2024-12-26 NOTE — PROGRESS NOTE ADULT - PROBLEM SELECTOR PLAN 2
expanding mass on Lt shoulder, s/p Bx w/ ortho on 12/17  path and flow consistent w/ DLBCL   CT chest 12/16 showing the 10.9x6.6x6cm lobulated soft tissue mass on shoulder, sarcomatous/neoplastic etiology, also with 2.9cm lobulated lesion on R anterior chest wall. Multiple enlarged cervical LNs.  TTE 12/23/24: EF > 75%    - appreciate inpatient oncology recommendations:   - plan for inpatient chemo, R-CHOP, (steroids on since 12/23, rituxan on 12/24 and CHOP on 12/27).   - case to be presented at tumor board 12/24  - c/w prednisone 100mg qd to complete 5 days, w/ ppx PPI, allopurinol (renally dose)  - obtain G6PD, acute hepatitis panel (including hep B core ab), and trend TLS labs q8hr: LDH, CMP with Phos, uric acid   - possible inpatient PET-CT 12/27  - [facility] consult for radiation to large shoulder mass expanding mass on Lt shoulder, s/p Bx w/ ortho on 12/17  path and flow consistent w/ DLBCL   CT chest 12/16 showing the 10.9x6.6x6cm lobulated soft tissue mass on shoulder, sarcomatous/neoplastic etiology, also with 2.9cm lobulated lesion on R anterior chest wall. Multiple enlarged cervical LNs.  TTE 12/23/24: EF > 75%    - appreciate inpatient oncology recommendations:   - plan for inpatient chemo, R-CHOP, (steroids on since 12/23, rituxan on 12/24 and CHOP on 12/27).   - case presented at tumor board 12/24  - c/w prednisone 100mg qd to complete 5 days, w/ ppx PPI, allopurinol (renally dose)  - high risk for TLS given disease burden   - possible inpatient PET-CT 12/27  - [facility] consult for radiation to large shoulder mass  - follow up at [facility]

## 2024-12-26 NOTE — PROGRESS NOTE ADULT - SUBJECTIVE AND OBJECTIVE BOX
Date of [id number]     SUBJECTIVE AND OBJECTIVE: Pt seen and examined this AM. Reports pain is improved. He remains hyper-verbal and fidgety but aware of what is happening and his plan of care. Confirms he has consented to inpatient chemotherapy and PET scan, possible radiation. He reports his pain is improved w/ higher dose oxycodone.     Indication for Geriatrics and Palliative Care Services/INTERVAL HPI: sxs/GOC     OVERNIGHT EVENTS: 24hr PRN use reviewed 9AM to 9AM:oxycodone 15mg x1, and 20mg x3. = 112.5 OME     DNR on chart:  Allergies    No Known Allergies    Intolerances    IV Contrast (Nausea)  MEDICATIONS  (STANDING):  acetaminophen     Tablet .. 650 milliGRAM(s) Oral once  acetaminophen     Tablet .. 975 milliGRAM(s) Oral every 8 hours  allopurinol 300 milliGRAM(s) Oral daily  amLODIPine   Tablet 10 milliGRAM(s) Oral daily  ascorbic acid 500 milliGRAM(s) Oral daily  atenolol  Tablet 100 milliGRAM(s) Oral daily  atorvastatin 10 milliGRAM(s) Oral at bedtime  cholecalciferol 1000 Unit(s) Oral daily  dexAMETHasone  IVPB 12 milliGRAM(s) IV Intermittent once  dextrose 5%. 1000 milliLiter(s) (100 mL/Hr) IV Continuous <Continuous>  dextrose 5%. 1000 milliLiter(s) (50 mL/Hr) IV Continuous <Continuous>  dextrose 5%. 1000 milliLiter(s) (50 mL/Hr) IV Continuous <Continuous>  dextrose 5%. 1000 milliLiter(s) (100 mL/Hr) IV Continuous <Continuous>  dextrose 50% Injectable 25 Gram(s) IV Push once  dextrose 50% Injectable 12.5 Gram(s) IV Push once  dextrose 50% Injectable 25 Gram(s) IV Push once  dextrose 50% Injectable 25 Gram(s) IV Push once  dextrose 50% Injectable 12.5 Gram(s) IV Push once  dextrose 50% Injectable 25 Gram(s) IV Push once  diphenhydrAMINE Injectable 25 milliGRAM(s) IV Push once  DULoxetine 60 milliGRAM(s) Oral daily  enoxaparin Injectable 40 milliGRAM(s) SubCutaneous every 12 hours  gabapentin 300 milliGRAM(s) Oral four times a day  glucagon  Injectable 1 milliGRAM(s) IntraMuscular once  glucagon  Injectable 1 milliGRAM(s) IntraMuscular once  insulin lispro (ADMELOG) corrective regimen sliding scale   SubCutaneous at bedtime  insulin lispro (ADMELOG) corrective regimen sliding scale   SubCutaneous three times a day before meals  levothyroxine 75 MICROGram(s) Oral daily  losartan 25 milliGRAM(s) Oral daily  multivitamin 1 Tablet(s) Oral daily  OLANZapine 2.5 milliGRAM(s) Oral at bedtime  pantoprazole    Tablet 40 milliGRAM(s) Oral before breakfast  polyethylene glycol 3350 17 Gram(s) Oral two times a day  predniSONE   Tablet 100 milliGRAM(s) Oral daily  riTUXimab-pvvr (RUXIENCE) IVPB (eMAR) 866 milliGRAM(s) IV Intermittent once  senna 2 Tablet(s) Oral two times a day  tamsulosin 0.4 milliGRAM(s) Oral at bedtime    MEDICATIONS  (PRN):  dextrose Oral Gel 15 Gram(s) Oral once PRN Blood Glucose LESS THAN 70 milliGRAM(s)/deciliter  dextrose Oral Gel 15 Gram(s) Oral once PRN Blood Glucose LESS THAN 70 milliGRAM(s)/deciliter  diphenhydrAMINE 25 milliGRAM(s) Oral every 6 hours PRN Rash and/or Itching  hydrocortisone sodium succinate Injectable 100 milliGRAM(s) IV Push once PRN allergic reaction  magnesium hydroxide Suspension 30 milliLiter(s) Oral daily PRN Constipation  meperidine     Injectable 25 milliGRAM(s) IV Push once PRN rigors  naloxone Injectable 0.1 milliGRAM(s) IV Push every 3 minutes PRN For ANY of the following changes in patient status:  A. RR LESS THAN 10 breaths per minute, B. Oxygen saturation LESS THAN 90%, C. Sedation score of 6  ondansetron Injectable 4 milliGRAM(s) IV Push every 6 hours PRN Nausea and/or Vomiting  oxyCODONE    IR 20 milliGRAM(s) Oral every 4 hours PRN Severe Pain (7 - 10)  oxyCODONE    IR 15 milliGRAM(s) Oral every 4 hours PRN Moderate Pain (4 - 6)  sucralfate suspension 1 Gram(s) Oral Once PRN for esophageal pain  tiZANidine 4 milliGRAM(s) Oral <User Schedule> PRN Muscle Spasm  zolpidem 5 milliGRAM(s) Oral at bedtime PRN Insomnia      ITEMS UNCHECKED ARE NOT PRESENT    PRESENT SYMPTOMS: [ ]Unable to self-report - see [ ] CPOT [ ] PAINADS [ ] RDOS  Source if other than patient:  [ ]Family   [ ]Team     Pain: [ x]yes [ ]no  QOL impact - unable to move   Location -      left shoulder   Aggravating factors - movement   Quality - burning and sharp   Radiation - none   Timing- constant but intermittent   Severity (0-10 scale): 9/10   Minimal acceptable level/pain goal (0-10 scale): did not specify       CPOT:    https://www.[facility].org/getattachment/[email or web address]/Critical-Care-Pain-Observation-Tool-(CPOT)    Dyspnea:                           [ ]Mild [ ]Moderate [ ]Severe  Anxiety:                             [ ]Mild [ ]Moderate [ ]Severe  Fatigue:                             [ ]Mild [ ]Moderate [ ]Severe  Nausea:                             [ ]Mild [ ]Moderate [ ]Severe  Loss of appetite:              [ ]Mild [ ]Moderate [ ]Severe  Constipation:                    [ ]Mild [ ]Moderate [ ]Severe  Other Symptoms:  [x ]All other review of systems negative     PCSSQ[Palliative Care Spiritual Screening Question]   Severity (0-10):  Score of 4 or > indicate consideration of Chaplaincy referral.  Chaplaincy Referral: [ ] yes [ ] refused [ ] following [x ] deferred    Caregiver [place]? : [ ] yes [ ] no [x ] Deferred [ ] Declined             Social work referral [ ] Patient & Family Centered Care Referral [ ]  Anticipatory Grief present?:  [ ] yes [ ] no  [x ] Deferred                  Social work referral [ ] Patient & Family Centered Care Referral [ ]      PHYSICAL EXAM:  Vital Signs Last 24 Hrs  T(C): 36.3 (24 Dec 2024 05:41), Max: 37.1 (23 Dec 2024 17:46)  T(F): 97.3 (24 Dec 2024 05:41), Max: 98.7 (23 Dec 2024 17:46)  HR: 79 (24 Dec 2024 05:41) (56 - 79)  BP: 142/87 (24 Dec 2024 05:41) (130/70 - 142/87)  BP(mean): --  RR: 16 (24 Dec 2024 05:41) (16 - 18)  SpO2: 95% (24 Dec 2024 05:41) (94% - 98%)    Parameters below as of 24 Dec 2024 05:41  Patient On (Oxygen Delivery Method): room air     I&O's Summary       General: obsese, alert, NAD  HENT: normocephalic, face relaxed  Eyes: no icterus, conjunctiva clear  Chest: symmetric excursion, no accessory muscle use  Heart: peripheral pulse 2+ and regular  Lungs: no dyspnea with speech  Abdomen: soft, NT, ND  Ext: no cyanosis, clubbing, visible veins, ulcers, wounds  Neuro: alert, grossly non-focal  Psych: hyperverbal anxious fidgety, redirectable w effort   Skin: ]Normal  [ ]Rash [x ]Other - large purple bruise like raised lesion on left shoulder s/p Bx   [ ]Pressure ulcer(s) [ ]y [ ]n present on admission    CRITICAL CARE:  [ ]Shock Present  [ ]Septic [ ]Cardiogenic [ ]Neurologic [ ]Hypovolemic  [ ]Vasopressors [ ]Inotropes  [ ]Respiratory failure present [ ]Mechanical Ventilation [ ]Non-invasive ventilatory support [ ]High-Flow   [ ]Acute  [ ]Chronic [ ]Hypoxic  [ ]Hypercarbic [ ]Other  [ ]Other organ failure     LABS:                        15.4   5.37  )-----------( 167      ( 24 Dec 2024 05:37 )             42.3   12-24    132[L]  |  97[L]  |  28[H]  ----------------------------<  179[H]  4.6   |  20[L]  |  0.70    Ca    10.1      24 Dec 2024 05:37  Phos  3.5     12-24  Mg     2.00     12-24    TPro  7.6  /  Alb  4.4  /  TBili  0.6  /  DBili  x   /  AST  58[H]  /  ALT  38  /  AlkPhos  115  12-23      Urinalysis Basic - ( 24 Dec 2024 05:37 )    Color: x / Appearance: x / SG: x / pH: x  Gluc: 179 mg/dL / Ketone: x  / Bili: x / Urobili: x   Blood: x / Protein: x / Nitrite: x   Leuk Esterase: x / RBC: x / WBC x   Sq Epi: x / Non Sq Epi: x / Bacteria: x      RADIOLOGY & ADDITIONAL STUDIES: None new.      Protein Calorie Malnutrition Present: [ ]mild [ ]moderate [ ]severe [ ]underweight [ ]morbid obesity  https://www.andeal.org/vault/9110/web/files/ONC/Table_Clinical%20Characteristics%20to%20Document%20Malnutrition-White%20JV%20et%20al%202012.pdf    Height (cm): 172.7 (12-17-24 @ 10:58)  Weight (kg): 121.2 (12-23-24 @ 09:27), 125.6 (06-16-24 @ 15:14)  BMI (kg/m2): 40.6 (12-23-24 @ 09:27), 42.1 (12-17-24 @ 10:58)    [ ]PPSV2 < or = 30%  [ ]significant weight loss [ ]poor nutritional intake [ ]anasarca[ ]Artificial Nutrition    Other REFERRALS:  [ ]Hospice  [ ]Child Life  [ ]Social Work  [ ]Case management [ ]Holistic Therapy    Date of [id number]     SUBJECTIVE AND OBJECTIVE: Pt seen and examined this AM. Pt very anxious/ agitated after fire alarm went off in his room, and his door was closed. He is very tearful, remains hyperverbal, fidgety. Reports he is aware he is an anxious episode, and needs to calm down but cannot. He expressed concerns regarding his plan of care, and having many teams/providers following but not knowing their names as well as not knowing what to expect of each day, if he will be receiving chemo or getting a PET scan.     Indication for Geriatrics and Palliative Care Services/INTERVAL HPI: sxs/GOC     OVERNIGHT EVENTS: 24hr PRN use reviewed 9AM to 9AM: oxycodone 20mg x4, the day prior 30 mg x5.     DNR on chart:  Allergies    No Known Allergies    Intolerances    MEDICATIONS  (STANDING):  acetaminophen     Tablet .. 975 milliGRAM(s) Oral every 8 hours  allopurinol 300 milliGRAM(s) Oral daily  amLODIPine   Tablet 10 milliGRAM(s) Oral daily  ascorbic acid 500 milliGRAM(s) Oral daily  atenolol  Tablet 100 milliGRAM(s) Oral daily  atorvastatin 10 milliGRAM(s) Oral at bedtime  cholecalciferol 1000 Unit(s) Oral daily  dextrose 5%. 1000 milliLiter(s) (100 mL/Hr) IV Continuous <Continuous>  dextrose 5%. 1000 milliLiter(s) (50 mL/Hr) IV Continuous <Continuous>  dextrose 5%. 1000 milliLiter(s) (50 mL/Hr) IV Continuous <Continuous>  dextrose 5%. 1000 milliLiter(s) (100 mL/Hr) IV Continuous <Continuous>  dextrose 50% Injectable 25 Gram(s) IV Push once  dextrose 50% Injectable 12.5 Gram(s) IV Push once  dextrose 50% Injectable 25 Gram(s) IV Push once  dextrose 50% Injectable 25 Gram(s) IV Push once  dextrose 50% Injectable 12.5 Gram(s) IV Push once  dextrose 50% Injectable 25 Gram(s) IV Push once  DULoxetine 60 milliGRAM(s) Oral daily  enoxaparin Injectable 40 milliGRAM(s) SubCutaneous every 12 hours  gabapentin 300 milliGRAM(s) Oral four times a day  glucagon  Injectable 1 milliGRAM(s) IntraMuscular once  glucagon  Injectable 1 milliGRAM(s) IntraMuscular once  insulin lispro (ADMELOG) corrective regimen sliding scale   SubCutaneous at bedtime  insulin lispro (ADMELOG) corrective regimen sliding scale   SubCutaneous three times a day before meals  levothyroxine 75 MICROGram(s) Oral daily  losartan 25 milliGRAM(s) Oral daily  multivitamin 1 Tablet(s) Oral daily  OLANZapine 2.5 milliGRAM(s) Oral at bedtime  oxyCODONE  ER Tablet 40 milliGRAM(s) Oral every 12 hours  pantoprazole    Tablet 40 milliGRAM(s) Oral before breakfast  polyethylene glycol 3350 17 Gram(s) Oral two times a day  predniSONE   Tablet 100 milliGRAM(s) Oral daily  senna 2 Tablet(s) Oral two times a day  sodium chloride 0.9%. 1000 milliLiter(s) (75 mL/Hr) IV Continuous <Continuous>  tamsulosin 0.4 milliGRAM(s) Oral at bedtime    MEDICATIONS  (PRN):  dextrose Oral Gel 15 Gram(s) Oral once PRN Blood Glucose LESS THAN 70 milliGRAM(s)/deciliter  dextrose Oral Gel 15 Gram(s) Oral once PRN Blood Glucose LESS THAN 70 milliGRAM(s)/deciliter  diphenhydrAMINE 25 milliGRAM(s) Oral every 6 hours PRN Rash and/or Itching  hydrocortisone sodium succinate Injectable 100 milliGRAM(s) IV Push once PRN allergic reaction  LORazepam     Tablet 0.5 milliGRAM(s) Oral every 4 hours PRN anxiety/agitation  magnesium hydroxide Suspension 30 milliLiter(s) Oral daily PRN Constipation  meperidine     Injectable 25 milliGRAM(s) IV Push once PRN rigors  naloxone Injectable 0.1 milliGRAM(s) IV Push every 3 minutes PRN For ANY of the following changes in patient status:  A. RR LESS THAN 10 breaths per minute, B. Oxygen saturation LESS THAN 90%, C. Sedation score of 6  ondansetron Injectable 4 milliGRAM(s) IV Push every 6 hours PRN Nausea and/or Vomiting  oxyCODONE    IR 20 milliGRAM(s) Oral every 4 hours PRN Severe Pain (7 - 10)  oxyCODONE    IR 15 milliGRAM(s) Oral every 4 hours PRN Moderate Pain (4 - 6)  sucralfate suspension 1 Gram(s) Oral Once PRN for esophageal pain  tiZANidine 4 milliGRAM(s) Oral <User Schedule> PRN Muscle Spasm  zolpidem 5 milliGRAM(s) Oral at bedtime PRN Insomnia    ITEMS UNCHECKED ARE NOT PRESENT    PRESENT SYMPTOMS: [ ]Unable to self-report - see [ ] CPOT [ ] PAINADS [ ] RDOS  Source if other than patient:  [ ]Family   [ ]Team     Pain: [ x]yes [ ]no  QOL impact - unable to move   Location -      left shoulder   Aggravating factors - movement   Quality - burning and sharp   Radiation - none   Timing- constant but intermittent   Severity (0-10 scale): 9/10   Minimal acceptable level/pain goal (0-10 scale): did not specify       CPOT:    https://www.[email or web address]/[email or web address]/Critical-Care-Pain-Observation-Tool-(CPOT)    Dyspnea:                           [ ]Mild [ ]Moderate [ ]Severe  Anxiety:                             [ ]Mild [ ]Moderate [ ]Severe  Fatigue:                             [ ]Mild [ ]Moderate [ ]Severe  Nausea:                             [ ]Mild [ ]Moderate [ ]Severe  Loss of appetite:              [ ]Mild [ ]Moderate [ ]Severe  Constipation:                    [ ]Mild [ ]Moderate [ ]Severe  Other Symptoms:  [x ]All other review of systems negative     PCSSQ[Palliative Care Spiritual Screening Question]   Severity (0-10):  Score of 4 or > indicate consideration of Chaplaincy referral.  Chaplaincy Referral: [ ] yes [ ] refused [ ] following [x ] deferred    Caregiver [place]? : [ ] yes [ ] no [x ] Deferred [ ] Declined             Social work referral [ ] Patient & Family Centered Care Referral [ ]  Anticipatory Grief present?:  [ ] yes [ ] no  [x ] Deferred                  Social work referral [ ] Patient & Family Centered Care Referral [ ]      PHYSICAL EXAM:  Vital Signs Last 24 Hrs  T(C): 36.9 (26 Dec 2024 13:42), Max: 36.9 (26 Dec 2024 13:42)  T(F): 98.5 (26 Dec 2024 13:42), Max: 98.5 (26 Dec 2024 13:42)  HR: 85 (26 Dec 2024 13:42) (51 - 90)  BP: 154/78 (26 Dec 2024 13:42) (102/59 - 154/78)  BP(mean): --  RR: 18 (26 Dec 2024 13:42) (16 - 18)  SpO2: 97% (26 Dec 2024 13:42) (95% - 98%)    Parameters below as of 26 Dec 2024 13:42  Patient On (Oxygen Delivery Method): room air    General: obese alert, NAD  HENT: normocephalic, face relaxed  Eyes: no icterus, conjunctiva clear  Chest: symmetric excursion, no accessory muscle use  Heart: peripheral pulse 2+ and regular  Lungs: no dyspnea with speech  Abdomen: soft, NT, ND  Ext: no cyanosis, clubbing, visible veins, ulcers, wounds  Neuro: alert, grossly non-focal  Psych: hyperverbal anxious fidgety, redirectable w significant effort, very tearful - overall worse than prior exams   Skin: ]Normal  [ ]Rash [x ]Other - large purple bruise like raised lesion on left shoulder s/p Bx   [ ]Pressure ulcer(s) [ ]y [ ]n present on admission    CRITICAL CARE:  [ ]Shock Present  [ ]Septic [ ]Cardiogenic [ ]Neurologic [ ]Hypovolemic  [ ]Vasopressors [ ]Inotropes  [ ]Respiratory failure present [ ]Mechanical Ventilation [ ]Non-invasive ventilatory support [ ]High-Flow   [ ]Acute  [ ]Chronic [ ]Hypoxic  [ ]Hypercarbic [ ]Other  [ ]Other organ failure       LABS:                          12.6   4.69  )-----------( 121      ( 26 Dec 2024 05:50 )             35.8     12-26    134[L]  |  101  |  29[H]  ----------------------------<  131[H]  4.3   |  23  |  0.69    Ca    9.0      26 Dec 2024 05:50  Phos  2.8     12-26  Mg     2.00     12-25    TPro  5.9[L]  /  Alb  3.4  /  TBili  0.3  /  DBili  x   /  AST  36  /  ALT  30  /  AlkPhos  74  12-26    LIVER FUNCTIONS - ( 26 Dec 2024 05:50 )  Alb: 3.4 g/dL / Pro: 5.9 g/dL / ALK PHOS: 74 U/L / ALT: 30 U/L / AST: 36 U/L / GGT: x             Urinalysis Basic - ( 26 Dec 2024 05:50 )    Color: x / Appearance: x / SG: x / pH: x  Gluc: 131 mg/dL / Ketone: x  / Bili: x / Urobili: x   Blood: x / Protein: x / Nitrite: x   Leuk Esterase: x / RBC: x / WBC x   Sq Epi: x / Non Sq Epi: x / Bacteria: x    RADIOLOGY & ADDITIONAL STUDIES: None new.      Protein Calorie Malnutrition Present: [ ]mild [ ]moderate [ ]severe [ ]underweight [ ]morbid obesity  https://www.andeal.org/vault/2440/web/files/ONC/Table_Clinical%20Characteristics%20to%20Document%20Malnutrition-White%20JV%20et%20al%995590.pdf    Height (cm): 172.7 (12-17-24 @ 10:58)  Weight (kg): 121.2 (12-23-24 @ 09:27), 125.6 (06-16-24 @ 15:14)  BMI (kg/m2): 40.6 (12-23-24 @ 09:27), 42.1 (12-17-24 @ 10:58)    [ ]PPSV2 < or = 30%  [ ]significant weight loss [ ]poor nutritional intake [ ]anasarca[ ]Artificial Nutrition    Other REFERRALS:  [ ]Hospice  [ ]Child Life  [ ]Social Work  [ ]Case management [ ]Holistic Therapy

## 2024-12-26 NOTE — CONSULT NOTE ADULT - SUBJECTIVE AND OBJECTIVE BOX
HPI:  Patient is a 64y old  Male who presents with a chief complaint of L shoulder mass and pain. Notes that he started to have L shoulder pain 8wks ago, and 6 wks ago he started to notice a lesion on his L shoulder. This has grown over time and has changed color. Pt now has a large mass and erythematous area over his L shoulder. Pt is able to range L shoulder but ROM has become more painful. Pt presented to Dr. Cruz's office today and was sent in for this mass to undergo full onc work up and L shoulder mass biopsy tomorrow 12/17.    Of note, pt has hx DM, chronic back pain, and multiple abdominal surgeries. Pt has not hx of malignancy and has never received chemo or radiation.    Consulted for: Steroid induced hyperglycemia    Endocrine history:  Patient denies hx of T2DM. He reports he was diagnosed with preDM 20 years ago, has been on metformin 1000mg BID since   PAST MEDICAL & SURGICAL HISTORY:  HTN (hypertension)      IBS (irritable bowel syndrome)      SBO (small bowel obstruction)      Gastro-esophageal reflux disease with esophagitis      Obese      Diabetes      H/O neuropathy      Hypothyroidism      History of chronic pain      Unsteady gait      H/O hiatal hernia      H/O small bowel obstruction      History of herniated intervertebral disc      H/O spinal stenosis      Anxiety and depression      NANCY (obstructive sleep apnea)      History of lumbar fusion      H/O exploratory laparotomy      H/O hernia repair  abdominal      History of appendectomy      S/P cholecystectomy      S/P small bowel resection          FAMILY HISTORY:  FH: heart disease (Father)    FH: stomach cancer (Mother)    FH: type 2 diabetes (Mother)    Family history of TIAs (Mother)        Social History:    Outpatient Medications:    MEDICATIONS  (STANDING):  acetaminophen     Tablet .. 975 milliGRAM(s) Oral every 8 hours  allopurinol 300 milliGRAM(s) Oral daily  amLODIPine   Tablet 10 milliGRAM(s) Oral daily  ascorbic acid 500 milliGRAM(s) Oral daily  atenolol  Tablet 100 milliGRAM(s) Oral daily  atorvastatin 10 milliGRAM(s) Oral at bedtime  cholecalciferol 1000 Unit(s) Oral daily  dextrose 5%. 1000 milliLiter(s) (100 mL/Hr) IV Continuous <Continuous>  dextrose 5%. 1000 milliLiter(s) (50 mL/Hr) IV Continuous <Continuous>  dextrose 5%. 1000 milliLiter(s) (50 mL/Hr) IV Continuous <Continuous>  dextrose 5%. 1000 milliLiter(s) (100 mL/Hr) IV Continuous <Continuous>  dextrose 50% Injectable 25 Gram(s) IV Push once  dextrose 50% Injectable 12.5 Gram(s) IV Push once  dextrose 50% Injectable 25 Gram(s) IV Push once  dextrose 50% Injectable 25 Gram(s) IV Push once  dextrose 50% Injectable 12.5 Gram(s) IV Push once  dextrose 50% Injectable 25 Gram(s) IV Push once  DULoxetine 60 milliGRAM(s) Oral daily  enoxaparin Injectable 40 milliGRAM(s) SubCutaneous every 12 hours  gabapentin 300 milliGRAM(s) Oral four times a day  glucagon  Injectable 1 milliGRAM(s) IntraMuscular once  glucagon  Injectable 1 milliGRAM(s) IntraMuscular once  insulin lispro (ADMELOG) corrective regimen sliding scale   SubCutaneous at bedtime  insulin lispro (ADMELOG) corrective regimen sliding scale   SubCutaneous three times a day before meals  levothyroxine 75 MICROGram(s) Oral daily  losartan 25 milliGRAM(s) Oral daily  multivitamin 1 Tablet(s) Oral daily  OLANZapine 2.5 milliGRAM(s) Oral at bedtime  oxyCODONE  ER Tablet 40 milliGRAM(s) Oral every 12 hours  pantoprazole    Tablet 40 milliGRAM(s) Oral before breakfast  polyethylene glycol 3350 17 Gram(s) Oral two times a day  predniSONE   Tablet 100 milliGRAM(s) Oral daily  senna 2 Tablet(s) Oral two times a day  sodium chloride 0.9%. 1000 milliLiter(s) (75 mL/Hr) IV Continuous <Continuous>  tamsulosin 0.4 milliGRAM(s) Oral at bedtime    MEDICATIONS  (PRN):  dextrose Oral Gel 15 Gram(s) Oral once PRN Blood Glucose LESS THAN 70 milliGRAM(s)/deciliter  dextrose Oral Gel 15 Gram(s) Oral once PRN Blood Glucose LESS THAN 70 milliGRAM(s)/deciliter  diphenhydrAMINE 25 milliGRAM(s) Oral every 6 hours PRN Rash and/or Itching  hydrocortisone sodium succinate Injectable 100 milliGRAM(s) IV Push once PRN allergic reaction  LORazepam     Tablet 0.5 milliGRAM(s) Oral every 4 hours PRN anxiety/agitation  magnesium hydroxide Suspension 30 milliLiter(s) Oral daily PRN Constipation  meperidine     Injectable 25 milliGRAM(s) IV Push once PRN rigors  naloxone Injectable 0.1 milliGRAM(s) IV Push every 3 minutes PRN For ANY of the following changes in patient status:  A. RR LESS THAN 10 breaths per minute, B. Oxygen saturation LESS THAN 90%, C. Sedation score of 6  ondansetron Injectable 4 milliGRAM(s) IV Push every 6 hours PRN Nausea and/or Vomiting  oxyCODONE    IR 15 milliGRAM(s) Oral every 4 hours PRN Moderate Pain (4 - 6)  oxyCODONE    IR 20 milliGRAM(s) Oral every 4 hours PRN Severe Pain (7 - 10)  sucralfate suspension 1 Gram(s) Oral Once PRN for esophageal pain  tiZANidine 4 milliGRAM(s) Oral <User Schedule> PRN Muscle Spasm  zolpidem 5 milliGRAM(s) Oral at bedtime PRN Insomnia      Allergies    No Known Allergies    Intolerances    IV Contrast (Nausea)    Review of Systems:  Constitutional: No fever  Eyes: No blurry vision  Neuro: No tremors  HEENT: No pain  Cardiovascular: No chest pain, palpitations  Respiratory: No SOB, no cough  GI: No nausea, vomiting, abdominal pain  : No dysuria  Skin: no rash  Psych: no depression  Endocrine: no polyuria, polydipsia  Hem/lymph: no swelling  Osteoporosis: no fractures    ALL OTHER SYSTEMS REVIEWED AND NEGATIVE    UNABLE TO OBTAIN    PHYSICAL EXAM:  VITALS: T(C): 36.9 (12-26-24 @ 13:42)  T(F): 98.5 (12-26-24 @ 13:42), Max: 98.5 (12-26-24 @ 13:42)  HR: 85 (12-26-24 @ 13:42) (51 - 90)  BP: 154/78 (12-26-24 @ 13:42) (102/59 - 154/78)  RR:  (16 - 18)  SpO2:  (95% - 98%)  Wt(kg): --  GENERAL: NAD, well-groomed, well-developed  EYES: No proptosis, no lid lag, anicteric  HEENT:  Atraumatic, Normocephalic, moist mucous membranes  THYROID: Normal size, no palpable nodules  RESPIRATORY: Clear to auscultation bilaterally; No rales, rhonchi, wheezing  CARDIOVASCULAR: Regular rate and rhythm; No murmurs; no peripheral edema  GI: Soft, nontender, non distended, normal bowel sounds  SKIN: Dry, intact, No rashes or lesions  MUSCULOSKELETAL: Full range of motion, normal strength  NEURO: sensation intact, extraocular movements intact, no tremor  PSYCH: Alert and oriented x 3, normal affect, normal mood  CUSHING'S SIGNS: no striae      CAPILLARY BLOOD GLUCOSE      POCT Blood Glucose.: 191 mg/dL (26 Dec 2024 11:30)  POCT Blood Glucose.: 126 mg/dL (26 Dec 2024 07:13)  POCT Blood Glucose.: 150 mg/dL (25 Dec 2024 21:10)  POCT Blood Glucose.: 188 mg/dL (25 Dec 2024 16:30)                            12.6   4.69  )-----------( 121      ( 26 Dec 2024 05:50 )             35.8       12-26    134[L]  |  101  |  29[H]  ----------------------------<  131[H]  4.3   |  23  |  0.69    eGFR: 104    Ca    9.0      12-26  Mg     2.00     12-25  Phos  2.8     12-26    TPro  5.9[L]  /  Alb  3.4  /  TBili  0.3  /  DBili  x   /  AST  36  /  ALT  30  /  AlkPhos  74  12-26      Thyroid Function Tests:      A1C with Estimated Average Glucose Result: 6.1 % (12-16-24 @ 17:36)          Radiology:              HPI:  Patient is a 64y old  Male who presents with a chief complaint of L shoulder mass and pain. Notes that he started to have L shoulder pain 8wks ago, and 6 wks ago he started to notice a lesion on his L shoulder. This has grown over time and has changed color. Pt now has a large mass and erythematous area over his L shoulder. Pt is able to range L shoulder but ROM has become more painful. Pt presented to Dr. Cruz's office today and was sent in for this mass to undergo full onc work up and L shoulder mass biopsy tomorrow 12/17.    Of note, pt has hx DM, chronic back pain, and multiple abdominal surgeries. Pt has not hx of malignancy and has never received chemo or radiation.    Consulted for: Steroid induced hyperglycemia    Endocrine history:  He reports he was diagnosed with preDM 20 years ago, has been on metformin 1000mg BID since 6 years ago. He reports his A1c has always been well controlled below 6.5  A1C with Estimated Average Glucose Result: 6.1 % (12-16-24)  He doesn't often check FS because glycemic control has normally been very good  Managed by PCP Dr. Rico  Family hx: Father had T2DM  Complications: Denies CAD, CVA. No known microvascular complications  Steroids: Prednisone 100mg daily 12/23-, duration 5 days    He reports hx of hypothyroidism, has been on levothyroxine 75mcg daily stable dose for 5 years. He reports TFTs have been normal on this dose.      PAST MEDICAL & SURGICAL HISTORY:  HTN (hypertension)      IBS (irritable bowel syndrome)      SBO (small bowel obstruction)      Gastro-esophageal reflux disease with esophagitis      Obese      Diabetes      H/O neuropathy      Hypothyroidism      History of chronic pain      Unsteady gait      H/O hiatal hernia      H/O small bowel obstruction      History of herniated intervertebral disc      H/O spinal stenosis      Anxiety and depression      NANCY (obstructive sleep apnea)      History of lumbar fusion      H/O exploratory laparotomy      H/O hernia repair  abdominal      History of appendectomy      S/P cholecystectomy      S/P small bowel resection          FAMILY HISTORY:  FH: heart disease (Father)    FH: stomach cancer (Mother)    FH: type 2 diabetes (Mother)    Family history of TIAs (Mother)        Social History:  Denies alcohol, smoking      Outpatient Medications:  Home Medications:  Ambien 5 mg oral tablet: 1 tab(s) orally once a day (at bedtime), As Needed (15 Mar 2023 01:12)  amLODIPine 10 mg oral tablet: 1 tab(s) orally once a day, am (15 Mar 2023 01:12)  ascorbic acid 500 mg oral tablet: 1 tab(s) orally once a day (15 Mar 2023 01:12)  atenolol 100 mg oral tablet: 1 tab(s) orally once a day, am (15 Mar 2023 01:12)  atorvastatin 10 mg oral tablet: 1 tab(s) orally once a day (in the evening) (15 Mar 2023 01:12)  B Complex 50 oral tablet, extended release: 1 tab(s) orally once a day (15 Mar 2023 01:12)  cholecalciferol 25 mcg (1000 intl units) oral tablet: 1 tab(s) orally once a day (15 Mar 2023 01:12)  DULoxetine 60 mg oral delayed release capsule: 1 cap(s) orally 2 times a day (15 Mar 2023 01:12)  gabapentin 300 mg oral tablet: 1 cap(s) orally 4 times a day (15 Mar 2023 01:12)  levothyroxine 75 mcg (0.075 mg) oral tablet: 1 tab(s) orally once a day, am (15 Mar 2023 01:12)  losartan 25 mg oral tablet: 1 tab(s) orally once a day, am (15 Mar 2023 01:12)  metFORMIN 1000 mg oral tablet: 1 tab(s) orally 2 times a day (15 Mar 2023 01:12)  Multiple Vitamins oral tablet: 1 tab(s) orally once a day (15 Mar 2023 01:12)  pantoprazole 40 mg oral delayed release tablet: 1 tab(s) orally 2 times a day (15 Mar 2023 01:12)  tamsulosin 0.4 mg oral capsule: 1 cap(s) orally once a day (in the evening) (15 Mar 2023 01:12)  tiZANidine 4 mg oral capsule: 1 cap(s) orally 2 times a day, As Needed (15 Mar 2023 01:12)    MEDICATIONS  (STANDING):  acetaminophen     Tablet .. 975 milliGRAM(s) Oral every 8 hours  allopurinol 300 milliGRAM(s) Oral daily  amLODIPine   Tablet 10 milliGRAM(s) Oral daily  ascorbic acid 500 milliGRAM(s) Oral daily  atenolol  Tablet 100 milliGRAM(s) Oral daily  atorvastatin 10 milliGRAM(s) Oral at bedtime  cholecalciferol 1000 Unit(s) Oral daily  dextrose 5%. 1000 milliLiter(s) (100 mL/Hr) IV Continuous <Continuous>  dextrose 5%. 1000 milliLiter(s) (50 mL/Hr) IV Continuous <Continuous>  dextrose 5%. 1000 milliLiter(s) (50 mL/Hr) IV Continuous <Continuous>  dextrose 5%. 1000 milliLiter(s) (100 mL/Hr) IV Continuous <Continuous>  dextrose 50% Injectable 25 Gram(s) IV Push once  dextrose 50% Injectable 12.5 Gram(s) IV Push once  dextrose 50% Injectable 25 Gram(s) IV Push once  dextrose 50% Injectable 25 Gram(s) IV Push once  dextrose 50% Injectable 12.5 Gram(s) IV Push once  dextrose 50% Injectable 25 Gram(s) IV Push once  DULoxetine 60 milliGRAM(s) Oral daily  enoxaparin Injectable 40 milliGRAM(s) SubCutaneous every 12 hours  gabapentin 300 milliGRAM(s) Oral four times a day  glucagon  Injectable 1 milliGRAM(s) IntraMuscular once  glucagon  Injectable 1 milliGRAM(s) IntraMuscular once  insulin lispro (ADMELOG) corrective regimen sliding scale   SubCutaneous at bedtime  insulin lispro (ADMELOG) corrective regimen sliding scale   SubCutaneous three times a day before meals  levothyroxine 75 MICROGram(s) Oral daily  losartan 25 milliGRAM(s) Oral daily  multivitamin 1 Tablet(s) Oral daily  OLANZapine 2.5 milliGRAM(s) Oral at bedtime  oxyCODONE  ER Tablet 40 milliGRAM(s) Oral every 12 hours  pantoprazole    Tablet 40 milliGRAM(s) Oral before breakfast  polyethylene glycol 3350 17 Gram(s) Oral two times a day  predniSONE   Tablet 100 milliGRAM(s) Oral daily  senna 2 Tablet(s) Oral two times a day  sodium chloride 0.9%. 1000 milliLiter(s) (75 mL/Hr) IV Continuous <Continuous>  tamsulosin 0.4 milliGRAM(s) Oral at bedtime    MEDICATIONS  (PRN):  dextrose Oral Gel 15 Gram(s) Oral once PRN Blood Glucose LESS THAN 70 milliGRAM(s)/deciliter  dextrose Oral Gel 15 Gram(s) Oral once PRN Blood Glucose LESS THAN 70 milliGRAM(s)/deciliter  diphenhydrAMINE 25 milliGRAM(s) Oral every 6 hours PRN Rash and/or Itching  hydrocortisone sodium succinate Injectable 100 milliGRAM(s) IV Push once PRN allergic reaction  LORazepam     Tablet 0.5 milliGRAM(s) Oral every 4 hours PRN anxiety/agitation  magnesium hydroxide Suspension 30 milliLiter(s) Oral daily PRN Constipation  meperidine     Injectable 25 milliGRAM(s) IV Push once PRN rigors  naloxone Injectable 0.1 milliGRAM(s) IV Push every 3 minutes PRN For ANY of the following changes in patient status:  A. RR LESS THAN 10 breaths per minute, B. Oxygen saturation LESS THAN 90%, C. Sedation score of 6  ondansetron Injectable 4 milliGRAM(s) IV Push every 6 hours PRN Nausea and/or Vomiting  oxyCODONE    IR 15 milliGRAM(s) Oral every 4 hours PRN Moderate Pain (4 - 6)  oxyCODONE    IR 20 milliGRAM(s) Oral every 4 hours PRN Severe Pain (7 - 10)  sucralfate suspension 1 Gram(s) Oral Once PRN for esophageal pain  tiZANidine 4 milliGRAM(s) Oral <User Schedule> PRN Muscle Spasm  zolpidem 5 milliGRAM(s) Oral at bedtime PRN Insomnia      Allergies    No Known Allergies    Intolerances    IV Contrast (Nausea)    Review of Systems:  Constitutional: No fever  Eyes: No blurry vision  Neuro: No tremors  HEENT: No pain  Cardiovascular: No chest pain, palpitations  Respiratory: No SOB, no cough  GI: No nausea, vomiting, abdominal pain  : No dysuria  Skin: no rash  Psych: no depression  Endocrine: no polyuria, polydipsia  Hem/lymph: no swelling  Osteoporosis: no fractures    ALL OTHER SYSTEMS REVIEWED AND NEGATIVE    UNABLE TO OBTAIN    PHYSICAL EXAM:  VITALS: T(C): 36.9 (12-26-24 @ 13:42)  T(F): 98.5 (12-26-24 @ 13:42), Max: 98.5 (12-26-24 @ 13:42)  HR: 85 (12-26-24 @ 13:42) (51 - 90)  BP: 154/78 (12-26-24 @ 13:42) (102/59 - 154/78)  RR:  (16 - 18)  SpO2:  (95% - 98%)  Wt(kg): --  GENERAL: NAD, well-groomed, well-developed  EYES: No proptosis, no lid lag, anicteric  HEENT:  Atraumatic, Normocephalic, moist mucous membranes  THYROID: Normal size, no palpable nodules  RESPIRATORY: Clear to auscultation bilaterally; No rales, rhonchi, wheezing  CARDIOVASCULAR: Regular rate and rhythm; No murmurs; no peripheral edema  GI: Soft, nontender, non distended, normal bowel sounds  SKIN: Dry, intact, No rashes or lesions  MUSCULOSKELETAL: Full range of motion, normal strength  NEURO: sensation intact, extraocular movements intact, no tremor  PSYCH: Alert and oriented x 3, normal affect, normal mood  CUSHING'S SIGNS: no striae      CAPILLARY BLOOD GLUCOSE      POCT Blood Glucose.: 191 mg/dL (26 Dec 2024 11:30)  POCT Blood Glucose.: 126 mg/dL (26 Dec 2024 07:13)  POCT Blood Glucose.: 150 mg/dL (25 Dec 2024 21:10)  POCT Blood Glucose.: 188 mg/dL (25 Dec 2024 16:30)                            12.6   4.69  )-----------( 121      ( 26 Dec 2024 05:50 )             35.8       12-26    134[L]  |  101  |  29[H]  ----------------------------<  131[H]  4.3   |  23  |  0.69    eGFR: 104    Ca    9.0      12-26  Mg     2.00     12-25  Phos  2.8     12-26    TPro  5.9[L]  /  Alb  3.4  /  TBili  0.3  /  DBili  x   /  AST  36  /  ALT  30  /  AlkPhos  74  12-26      Thyroid Function Tests:      A1C with Estimated Average Glucose Result: 6.1 % (12-16-24 @ 17:36)          Radiology:

## 2024-12-26 NOTE — PROGRESS NOTE ADULT - PROBLEM SELECTOR PLAN 5
Pt has capacity for complex medical decision making   Surrogate: Wife Sheri [phone]  Code status: Full   GOC: c/w CDT

## 2024-12-26 NOTE — CONSULT NOTE ADULT - ATTENDING COMMENTS
------------------------------------------------------------------------------------------  Patient seen and examined on rounds on 12/17/24 with heme/onc fellows Dr. Young and Dr. Reardon  Briefly, patient with ECOG performance status of 2 due to poor mobility from failed surgery for spinal stenosis, noted to have rapidly growing left shoulder mass over a period of 6 weeks associated with "Hot flushes" and intermittent chills  - seen as outpatient by Dr. Geremias Cruz and underwent biopsy in the OR with frozen section concerning for lymphomatous process  - awaiting final pathology but given the acuity of mass growth and bulky nature which would put patient at risk for tumor lysis, would opt to keep patient in the hospital for initiation of therapy  - to complete staging, would check CT Abd/Plv  - may also need bone marrow aspirate/biopsy.
64M controlled DM2, (reports prediabetes and well controlled A1c on metformin) now with mild hyperglycemia on prednisone.  In preparation for PET and NPO past midnight please follow above instructions to optimize glucose to follow PET requirements.    Kasey Simon MD  Division of Endocrinology  Pager: [phone]    If after 6PM or before 9AM, or on weekends/holidays, please call endocrine answering service for assistance ([phone]).  For nonurgent matters email [email or web address] for assistance.
64M with PMH hiatal hernia s/p fundoplication, prior SBO s/p multiple abdominal surgeries, T2DM, chronic back pain, and NANCY (not on CPAP) initially admitted to orthopedics for biopsy of enlarging painful L anterior shoulder mass, now s/p biopsy 12/17 with preliminary results suspicious for lymphoma. GaP team consulted for symptom management in setting of new malignancy.     > Pain: Increase oxycodone to 15-20mg q4h prn mod-severe pain   > Tizanidine prn, gabapentin 300mg qid, cymbalta 60mg qd (all started prior to Palliative consult)   > Patient has chronic pain doctor- Dr. 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     > GOC: GOC note was written prior to pathology returning. Patient was undecided on starting treatment but it does appear that plan is for initiation of RCHOP per heme/onc note. Pt's wife, Sheri, is the surrogate decision maker

## 2024-12-27 ENCOUNTER — APPOINTMENT (OUTPATIENT)
Dept: NUCLEAR MEDICINE | Facility: IMAGING CENTER | Age: 64
End: 2024-12-27

## 2024-12-27 ENCOUNTER — OUTPATIENT (OUTPATIENT)
Dept: OUTPATIENT SERVICES | Facility: HOSPITAL | Age: 64
LOS: 1 days | End: 2024-12-27
Payer: COMMERCIAL

## 2024-12-27 DIAGNOSIS — Z98.1 ARTHRODESIS STATUS: Chronic | ICD-10-CM

## 2024-12-27 DIAGNOSIS — Z90.49 ACQUIRED ABSENCE OF OTHER SPECIFIED PARTS OF DIGESTIVE TRACT: Chronic | ICD-10-CM

## 2024-12-27 DIAGNOSIS — Z98.890 OTHER SPECIFIED POSTPROCEDURAL STATES: Chronic | ICD-10-CM

## 2024-12-27 DIAGNOSIS — C83.30 DIFFUSE LARGE B-CELL LYMPHOMA, UNSPECIFIED SITE: ICD-10-CM

## 2024-12-27 DIAGNOSIS — E11.9 TYPE 2 DIABETES MELLITUS WITHOUT COMPLICATIONS: ICD-10-CM

## 2024-12-27 LAB
ALBUMIN SERPL ELPH-MCNC: 4.1 G/DL — SIGNIFICANT CHANGE UP (ref 3.3–5)
ALBUMIN SERPL ELPH-MCNC: 4.1 G/DL — SIGNIFICANT CHANGE UP (ref 3.3–5)
ALBUMIN SERPL ELPH-MCNC: 4.3 G/DL — SIGNIFICANT CHANGE UP (ref 3.3–5)
ALP SERPL-CCNC: 86 U/L — SIGNIFICANT CHANGE UP (ref 40–120)
ALP SERPL-CCNC: 86 U/L — SIGNIFICANT CHANGE UP (ref 40–120)
ALP SERPL-CCNC: 87 U/L — SIGNIFICANT CHANGE UP (ref 40–120)
ALT FLD-CCNC: 42 U/L — HIGH (ref 4–41)
ALT FLD-CCNC: 42 U/L — HIGH (ref 4–41)
ALT FLD-CCNC: 49 U/L — HIGH (ref 4–41)
ANION GAP SERPL CALC-SCNC: 12 MMOL/L — SIGNIFICANT CHANGE UP (ref 7–14)
ANION GAP SERPL CALC-SCNC: 12 MMOL/L — SIGNIFICANT CHANGE UP (ref 7–14)
ANION GAP SERPL CALC-SCNC: 13 MMOL/L — SIGNIFICANT CHANGE UP (ref 7–14)
AST SERPL-CCNC: 50 U/L — HIGH (ref 4–40)
AST SERPL-CCNC: 53 U/L — HIGH (ref 4–40)
AST SERPL-CCNC: 57 U/L — HIGH (ref 4–40)
BASOPHILS # BLD AUTO: 0.01 K/UL — SIGNIFICANT CHANGE UP (ref 0–0.2)
BASOPHILS NFR BLD AUTO: 0.2 % — SIGNIFICANT CHANGE UP (ref 0–2)
BILIRUB SERPL-MCNC: 0.5 MG/DL — SIGNIFICANT CHANGE UP (ref 0.2–1.2)
BILIRUB SERPL-MCNC: 0.5 MG/DL — SIGNIFICANT CHANGE UP (ref 0.2–1.2)
BILIRUB SERPL-MCNC: 0.6 MG/DL — SIGNIFICANT CHANGE UP (ref 0.2–1.2)
BUN SERPL-MCNC: 17 MG/DL — SIGNIFICANT CHANGE UP (ref 7–23)
BUN SERPL-MCNC: 22 MG/DL — SIGNIFICANT CHANGE UP (ref 7–23)
BUN SERPL-MCNC: 22 MG/DL — SIGNIFICANT CHANGE UP (ref 7–23)
CALCIUM SERPL-MCNC: 9.3 MG/DL — SIGNIFICANT CHANGE UP (ref 8.4–10.5)
CALCIUM SERPL-MCNC: 9.3 MG/DL — SIGNIFICANT CHANGE UP (ref 8.4–10.5)
CALCIUM SERPL-MCNC: 9.4 MG/DL — SIGNIFICANT CHANGE UP (ref 8.4–10.5)
CHLORIDE SERPL-SCNC: 98 MMOL/L — SIGNIFICANT CHANGE UP (ref 98–107)
CHLORIDE SERPL-SCNC: 99 MMOL/L — SIGNIFICANT CHANGE UP (ref 98–107)
CHLORIDE SERPL-SCNC: 99 MMOL/L — SIGNIFICANT CHANGE UP (ref 98–107)
CO2 SERPL-SCNC: 23 MMOL/L — SIGNIFICANT CHANGE UP (ref 22–31)
CO2 SERPL-SCNC: 23 MMOL/L — SIGNIFICANT CHANGE UP (ref 22–31)
CO2 SERPL-SCNC: 24 MMOL/L — SIGNIFICANT CHANGE UP (ref 22–31)
CREAT SERPL-MCNC: 0.63 MG/DL — SIGNIFICANT CHANGE UP (ref 0.5–1.3)
CREAT SERPL-MCNC: 0.63 MG/DL — SIGNIFICANT CHANGE UP (ref 0.5–1.3)
CREAT SERPL-MCNC: 0.64 MG/DL — SIGNIFICANT CHANGE UP (ref 0.5–1.3)
EGFR: 106 ML/MIN/1.73M2 — SIGNIFICANT CHANGE UP
EGFR: 107 ML/MIN/1.73M2 — SIGNIFICANT CHANGE UP
EGFR: 107 ML/MIN/1.73M2 — SIGNIFICANT CHANGE UP
EOSINOPHIL # BLD AUTO: 0.02 K/UL — SIGNIFICANT CHANGE UP (ref 0–0.5)
EOSINOPHIL NFR BLD AUTO: 0.4 % — SIGNIFICANT CHANGE UP (ref 0–6)
GLUCOSE BLDC GLUCOMTR-MCNC: 113 MG/DL — HIGH (ref 70–99)
GLUCOSE BLDC GLUCOMTR-MCNC: 145 MG/DL — HIGH (ref 70–99)
GLUCOSE BLDC GLUCOMTR-MCNC: 185 MG/DL — HIGH (ref 70–99)
GLUCOSE BLDC GLUCOMTR-MCNC: 221 MG/DL — HIGH (ref 70–99)
GLUCOSE SERPL-MCNC: 127 MG/DL — HIGH (ref 70–99)
GLUCOSE SERPL-MCNC: 128 MG/DL — HIGH (ref 70–99)
GLUCOSE SERPL-MCNC: 204 MG/DL — HIGH (ref 70–99)
HCT VFR BLD CALC: 42.9 % — SIGNIFICANT CHANGE UP (ref 39–50)
HCT VFR BLD CALC: 45.4 % — SIGNIFICANT CHANGE UP (ref 39–50)
HGB BLD-MCNC: 14.5 G/DL — SIGNIFICANT CHANGE UP (ref 13–17)
HGB BLD-MCNC: 16 G/DL — SIGNIFICANT CHANGE UP (ref 13–17)
IANC: 4.25 K/UL — SIGNIFICANT CHANGE UP (ref 1.8–7.4)
IMM GRANULOCYTES NFR BLD AUTO: 0.4 % — SIGNIFICANT CHANGE UP (ref 0–0.9)
LDH SERPL L TO P-CCNC: 281 U/L — HIGH (ref 135–225)
LDH SERPL L TO P-CCNC: 281 U/L — HIGH (ref 135–225)
LDH SERPL L TO P-CCNC: 286 U/L — HIGH (ref 135–225)
LYMPHOCYTES # BLD AUTO: 0.65 K/UL — LOW (ref 1–3.3)
LYMPHOCYTES # BLD AUTO: 12.5 % — LOW (ref 13–44)
MAGNESIUM SERPL-MCNC: 2.1 MG/DL — SIGNIFICANT CHANGE UP (ref 1.6–2.6)
MAGNESIUM SERPL-MCNC: 2.1 MG/DL — SIGNIFICANT CHANGE UP (ref 1.6–2.6)
MCHC RBC-ENTMCNC: 30.7 PG — SIGNIFICANT CHANGE UP (ref 27–34)
MCHC RBC-ENTMCNC: 31.4 PG — SIGNIFICANT CHANGE UP (ref 27–34)
MCHC RBC-ENTMCNC: 33.8 G/DL — SIGNIFICANT CHANGE UP (ref 32–36)
MCHC RBC-ENTMCNC: 35.2 G/DL — SIGNIFICANT CHANGE UP (ref 32–36)
MCV RBC AUTO: 89.2 FL — SIGNIFICANT CHANGE UP (ref 80–100)
MCV RBC AUTO: 90.9 FL — SIGNIFICANT CHANGE UP (ref 80–100)
MONOCYTES # BLD AUTO: 0.27 K/UL — SIGNIFICANT CHANGE UP (ref 0–0.9)
MONOCYTES NFR BLD AUTO: 5.2 % — SIGNIFICANT CHANGE UP (ref 2–14)
NEUTROPHILS # BLD AUTO: 4.25 K/UL — SIGNIFICANT CHANGE UP (ref 1.8–7.4)
NEUTROPHILS NFR BLD AUTO: 81.3 % — HIGH (ref 43–77)
NRBC # BLD: 0 /100 WBCS — SIGNIFICANT CHANGE UP (ref 0–0)
NRBC # BLD: 0 /100 WBCS — SIGNIFICANT CHANGE UP (ref 0–0)
NRBC # FLD: 0 K/UL — SIGNIFICANT CHANGE UP (ref 0–0)
NRBC # FLD: 0 K/UL — SIGNIFICANT CHANGE UP (ref 0–0)
PHOSPHATE SERPL-MCNC: 2.5 MG/DL — SIGNIFICANT CHANGE UP (ref 2.5–4.5)
PHOSPHATE SERPL-MCNC: 2.5 MG/DL — SIGNIFICANT CHANGE UP (ref 2.5–4.5)
PHOSPHATE SERPL-MCNC: 2.6 MG/DL — SIGNIFICANT CHANGE UP (ref 2.5–4.5)
PLATELET # BLD AUTO: 143 K/UL — LOW (ref 150–400)
PLATELET # BLD AUTO: 152 K/UL — SIGNIFICANT CHANGE UP (ref 150–400)
POTASSIUM SERPL-MCNC: 4.2 MMOL/L — SIGNIFICANT CHANGE UP (ref 3.5–5.3)
POTASSIUM SERPL-MCNC: 4.2 MMOL/L — SIGNIFICANT CHANGE UP (ref 3.5–5.3)
POTASSIUM SERPL-MCNC: 4.3 MMOL/L — SIGNIFICANT CHANGE UP (ref 3.5–5.3)
POTASSIUM SERPL-SCNC: 4.2 MMOL/L — SIGNIFICANT CHANGE UP (ref 3.5–5.3)
POTASSIUM SERPL-SCNC: 4.2 MMOL/L — SIGNIFICANT CHANGE UP (ref 3.5–5.3)
POTASSIUM SERPL-SCNC: 4.3 MMOL/L — SIGNIFICANT CHANGE UP (ref 3.5–5.3)
PROT SERPL-MCNC: 7.2 G/DL — SIGNIFICANT CHANGE UP (ref 6–8.3)
PROT SERPL-MCNC: 7.5 G/DL — SIGNIFICANT CHANGE UP (ref 6–8.3)
PROT SERPL-MCNC: 7.5 G/DL — SIGNIFICANT CHANGE UP (ref 6–8.3)
RBC # BLD: 4.72 M/UL — SIGNIFICANT CHANGE UP (ref 4.2–5.8)
RBC # BLD: 5.09 M/UL — SIGNIFICANT CHANGE UP (ref 4.2–5.8)
RBC # FLD: 12.2 % — SIGNIFICANT CHANGE UP (ref 10.3–14.5)
RBC # FLD: 12.5 % — SIGNIFICANT CHANGE UP (ref 10.3–14.5)
SODIUM SERPL-SCNC: 134 MMOL/L — LOW (ref 135–145)
SODIUM SERPL-SCNC: 134 MMOL/L — LOW (ref 135–145)
SODIUM SERPL-SCNC: 135 MMOL/L — SIGNIFICANT CHANGE UP (ref 135–145)
URATE SERPL-MCNC: 3.2 MG/DL — LOW (ref 3.4–8.8)
URATE SERPL-MCNC: 3.3 MG/DL — LOW (ref 3.4–8.8)
URATE SERPL-MCNC: 3.5 MG/DL — SIGNIFICANT CHANGE UP (ref 3.4–8.8)
WBC # BLD: 5.22 K/UL — SIGNIFICANT CHANGE UP (ref 3.8–10.5)
WBC # BLD: 7.24 K/UL — SIGNIFICANT CHANGE UP (ref 3.8–10.5)
WBC # FLD AUTO: 5.22 K/UL — SIGNIFICANT CHANGE UP (ref 3.8–10.5)
WBC # FLD AUTO: 7.24 K/UL — SIGNIFICANT CHANGE UP (ref 3.8–10.5)

## 2024-12-27 PROCEDURE — 78815 PET IMAGE W/CT SKULL-THIGH: CPT | Mod: 26,PI

## 2024-12-27 PROCEDURE — 99233 SBSQ HOSP IP/OBS HIGH 50: CPT | Mod: GC

## 2024-12-27 PROCEDURE — 78815 PET IMAGE W/CT SKULL-THIGH: CPT

## 2024-12-27 PROCEDURE — A9552: CPT

## 2024-12-27 PROCEDURE — 99232 SBSQ HOSP IP/OBS MODERATE 35: CPT

## 2024-12-27 RX ORDER — INSULIN LISPRO 100/ML
VIAL (ML) SUBCUTANEOUS AT BEDTIME
Refills: 0 | Status: DISCONTINUED | OUTPATIENT
Start: 2024-12-27 | End: 2024-12-28

## 2024-12-27 RX ORDER — DIPHENHYDRAMINE HCL 25 MG
25 TABLET ORAL ONCE
Refills: 0 | Status: COMPLETED | OUTPATIENT
Start: 2024-12-27 | End: 2024-12-27

## 2024-12-27 RX ORDER — DOXORUBICIN HYDROCHLORIDE 2 MG/ML
116 INJECTION, SOLUTION INTRAVENOUS ONCE
Refills: 0 | Status: COMPLETED | OUTPATIENT
Start: 2024-12-27 | End: 2024-12-27

## 2024-12-27 RX ORDER — MEPERIDINE HYDROCHLORIDE 50 MG/ML
12.5 INJECTION, SOLUTION INTRAMUSCULAR; INTRAVENOUS; SUBCUTANEOUS ONCE
Refills: 0 | Status: DISCONTINUED | OUTPATIENT
Start: 2024-12-27 | End: 2024-12-31

## 2024-12-27 RX ORDER — METOCLOPRAMIDE 10 MG/1
10 TABLET ORAL EVERY 4 HOURS
Refills: 0 | Status: DISCONTINUED | OUTPATIENT
Start: 2024-12-27 | End: 2024-12-31

## 2024-12-27 RX ORDER — ONDANSETRON 4 MG/1
16 TABLET ORAL ONCE
Refills: 0 | Status: DISCONTINUED | OUTPATIENT
Start: 2024-12-27 | End: 2024-12-27

## 2024-12-27 RX ORDER — SODIUM CHLORIDE 9 MG/ML
1000 INJECTION, SOLUTION INTRAMUSCULAR; INTRAVENOUS; SUBCUTANEOUS
Refills: 0 | Status: DISCONTINUED | OUTPATIENT
Start: 2024-12-27 | End: 2024-12-28

## 2024-12-27 RX ORDER — FOSAPREPITANT DIMEGLUMINE 150 MG/5ML
150 INJECTION, POWDER, LYOPHILIZED, FOR SOLUTION INTRAVENOUS ONCE
Refills: 0 | Status: COMPLETED | OUTPATIENT
Start: 2024-12-27 | End: 2024-12-27

## 2024-12-27 RX ORDER — SODIUM CHLORIDE 9 MG/ML
500 INJECTION, SOLUTION INTRAMUSCULAR; INTRAVENOUS; SUBCUTANEOUS ONCE
Refills: 0 | Status: COMPLETED | OUTPATIENT
Start: 2024-12-27 | End: 2024-12-27

## 2024-12-27 RX ORDER — CYCLOPHOSPHAMIDE 500 MG/25ML
1730 INJECTION, POWDER, FOR SOLUTION INTRAVENOUS; ORAL ONCE
Refills: 0 | Status: COMPLETED | OUTPATIENT
Start: 2024-12-27 | End: 2024-12-27

## 2024-12-27 RX ORDER — OLANZAPINE 15 MG/1
5 TABLET ORAL AT BEDTIME
Refills: 0 | Status: DISCONTINUED | OUTPATIENT
Start: 2024-12-27 | End: 2024-12-31

## 2024-12-27 RX ORDER — ONDANSETRON 4 MG/1
16 TABLET ORAL ONCE
Refills: 0 | Status: COMPLETED | OUTPATIENT
Start: 2024-12-27 | End: 2024-12-27

## 2024-12-27 RX ORDER — METHYLPREDNISOLONE 4 MG/1
50 TABLET ORAL ONCE
Refills: 0 | Status: DISCONTINUED | OUTPATIENT
Start: 2024-12-27 | End: 2024-12-31

## 2024-12-27 RX ORDER — VINCRISTINE SULFATE 1 MG/ML
2 INJECTION, SOLUTION INTRAVENOUS ONCE
Refills: 0 | Status: COMPLETED | OUTPATIENT
Start: 2024-12-27 | End: 2024-12-27

## 2024-12-27 RX ORDER — INSULIN LISPRO 100/ML
VIAL (ML) SUBCUTANEOUS
Refills: 0 | Status: DISCONTINUED | OUTPATIENT
Start: 2024-12-27 | End: 2024-12-28

## 2024-12-27 RX ORDER — HYDROCORTISONE 100 MG/60ML
100 ENEMA RECTAL ONCE
Refills: 0 | Status: COMPLETED | OUTPATIENT
Start: 2024-12-27 | End: 2024-12-27

## 2024-12-27 RX ORDER — ACETAMINOPHEN 80 MG/.8ML
650 SOLUTION/ DROPS ORAL ONCE
Refills: 0 | Status: COMPLETED | OUTPATIENT
Start: 2024-12-27 | End: 2024-12-27

## 2024-12-27 RX ADMIN — ACETAMINOPHEN 975 MILLIGRAM(S): 80 SOLUTION/ DROPS ORAL at 07:40

## 2024-12-27 RX ADMIN — FOSAPREPITANT DIMEGLUMINE 300 MILLIGRAM(S): 150 INJECTION, POWDER, LYOPHILIZED, FOR SOLUTION INTRAVENOUS at 16:13

## 2024-12-27 RX ADMIN — ACETAMINOPHEN 975 MILLIGRAM(S): 80 SOLUTION/ DROPS ORAL at 06:42

## 2024-12-27 RX ADMIN — Medication 20 MILLIGRAM(S): at 22:34

## 2024-12-27 RX ADMIN — GABAPENTIN 300 MILLIGRAM(S): 300 CAPSULE ORAL at 12:41

## 2024-12-27 RX ADMIN — LOSARTAN POTASSIUM 25 MILLIGRAM(S): 100 TABLET, FILM COATED ORAL at 06:44

## 2024-12-27 RX ADMIN — Medication 1 TABLET(S): at 12:41

## 2024-12-27 RX ADMIN — ATORVASTATIN CALCIUM 10 MILLIGRAM(S): 40 TABLET, FILM COATED ORAL at 22:19

## 2024-12-27 RX ADMIN — ONDANSETRON 116 MILLIGRAM(S): 4 TABLET ORAL at 16:13

## 2024-12-27 RX ADMIN — Medication 20 MILLIGRAM(S): at 23:34

## 2024-12-27 RX ADMIN — ACETAMINOPHEN 975 MILLIGRAM(S): 80 SOLUTION/ DROPS ORAL at 14:39

## 2024-12-27 RX ADMIN — Medication 500 MILLIGRAM(S): at 12:41

## 2024-12-27 RX ADMIN — Medication 20 MILLIGRAM(S): at 13:42

## 2024-12-27 RX ADMIN — Medication 15 MILLIGRAM(S): at 01:48

## 2024-12-27 RX ADMIN — TIZANIDINE 4 MILLIGRAM(S): 4 TABLET ORAL at 12:42

## 2024-12-27 RX ADMIN — ACETAMINOPHEN 975 MILLIGRAM(S): 80 SOLUTION/ DROPS ORAL at 23:34

## 2024-12-27 RX ADMIN — Medication 40 MILLIGRAM(S): at 06:41

## 2024-12-27 RX ADMIN — ACETAMINOPHEN 975 MILLIGRAM(S): 80 SOLUTION/ DROPS ORAL at 22:18

## 2024-12-27 RX ADMIN — ENOXAPARIN SODIUM 40 MILLIGRAM(S): 60 INJECTION INTRAVENOUS; SUBCUTANEOUS at 06:42

## 2024-12-27 RX ADMIN — GABAPENTIN 300 MILLIGRAM(S): 300 CAPSULE ORAL at 17:59

## 2024-12-27 RX ADMIN — SENNOSIDES 2 TABLET(S): 8.6 TABLET, FILM COATED ORAL at 18:00

## 2024-12-27 RX ADMIN — PANTOPRAZOLE 40 MILLIGRAM(S): 40 TABLET, DELAYED RELEASE ORAL at 06:43

## 2024-12-27 RX ADMIN — ALLOPURINOL 300 MILLIGRAM(S): 100 TABLET ORAL at 12:41

## 2024-12-27 RX ADMIN — DOXORUBICIN HYDROCHLORIDE 696 MILLIGRAM(S): 2 INJECTION, SOLUTION INTRAVENOUS at 18:11

## 2024-12-27 RX ADMIN — Medication 40 MILLIGRAM(S): at 07:40

## 2024-12-27 RX ADMIN — CYCLOPHOSPHAMIDE 1730 MILLIGRAM(S): 500 INJECTION, POWDER, FOR SOLUTION INTRAVENOUS; ORAL at 18:28

## 2024-12-27 RX ADMIN — Medication 25 MILLIGRAM(S): at 16:13

## 2024-12-27 RX ADMIN — Medication 15 MILLIGRAM(S): at 00:48

## 2024-12-27 RX ADMIN — SODIUM CHLORIDE 500 MILLILITER(S): 9 INJECTION, SOLUTION INTRAMUSCULAR; INTRAVENOUS; SUBCUTANEOUS at 16:14

## 2024-12-27 RX ADMIN — Medication 100 MILLIGRAM(S): at 06:45

## 2024-12-27 RX ADMIN — VINCRISTINE SULFATE 2 MILLIGRAM(S): 1 INJECTION, SOLUTION INTRAVENOUS at 18:24

## 2024-12-27 RX ADMIN — ACETAMINOPHEN 975 MILLIGRAM(S): 80 SOLUTION/ DROPS ORAL at 13:39

## 2024-12-27 RX ADMIN — OLANZAPINE 5 MILLIGRAM(S): 15 TABLET ORAL at 22:34

## 2024-12-27 RX ADMIN — Medication 17 GRAM(S): at 18:00

## 2024-12-27 RX ADMIN — Medication 40 MILLIGRAM(S): at 17:59

## 2024-12-27 RX ADMIN — ENOXAPARIN SODIUM 40 MILLIGRAM(S): 60 INJECTION INTRAVENOUS; SUBCUTANEOUS at 17:59

## 2024-12-27 RX ADMIN — HYDROCORTISONE 100 MILLIGRAM(S): 100 ENEMA RECTAL at 16:13

## 2024-12-27 RX ADMIN — TAMSULOSIN HYDROCHLORIDE 0.4 MILLIGRAM(S): 0.4 CAPSULE ORAL at 22:18

## 2024-12-27 RX ADMIN — LORAZEPAM 0.5 MILLIGRAM(S): 1 TABLET ORAL at 07:59

## 2024-12-27 RX ADMIN — LEVOTHYROXINE SODIUM 75 MICROGRAM(S): 175 TABLET ORAL at 06:43

## 2024-12-27 RX ADMIN — Medication 2: at 16:38

## 2024-12-27 RX ADMIN — Medication 5 MILLIGRAM(S): at 22:34

## 2024-12-27 RX ADMIN — GABAPENTIN 300 MILLIGRAM(S): 300 CAPSULE ORAL at 23:42

## 2024-12-27 RX ADMIN — Medication 20 MILLIGRAM(S): at 08:33

## 2024-12-27 RX ADMIN — DULOXETINE HYDROCHLORIDE 60 MILLIGRAM(S): 30 CAPSULE, DELAYED RELEASE ORAL at 12:41

## 2024-12-27 RX ADMIN — Medication 20 MILLIGRAM(S): at 12:42

## 2024-12-27 RX ADMIN — Medication 1000 UNIT(S): at 12:41

## 2024-12-27 RX ADMIN — SODIUM CHLORIDE 10 MILLILITER(S): 9 INJECTION, SOLUTION INTRAMUSCULAR; INTRAVENOUS; SUBCUTANEOUS at 18:24

## 2024-12-27 RX ADMIN — Medication 1: at 12:40

## 2024-12-27 RX ADMIN — GABAPENTIN 300 MILLIGRAM(S): 300 CAPSULE ORAL at 06:41

## 2024-12-27 NOTE — PROGRESS NOTE ADULT - ATTENDING COMMENTS
-----------------------------------------------------------------------------------  Patient seen and examined on rounds on 12/27/24 with Heme/Onc [place] Dr. Reardon and Dr. Cotton  Briefly, patient presented for surgical biopsy with Dr. Geremias Cruz (Ortho-Onc) and underwent biopsy of left shoulder mass in the OR.  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; non-double expressor (cMyc +, BCL2 -), Cyclin D1 neg, Ki67 of 80%    - staging CT scans with cervical LAD and indeterminate anterior chest wall lesion; PET/CT requested and scheduled for 12/27/24 (will be after rituxan)  - initiated on R-CHOP; Given bulky nature of his disease started on Steroids on 12/23/24; RItuxan given 12/24/24 and plan for Cytoxan/Adriamycin/Vincristine today 12/27/24 with growth factor support (Fulphila) on 12/28/24  - reviewed labs and tumor lysis values are all normalizing (within range)  - as part of consolidation after completion of chemotherapy, would consider additional radiation to the left shoulder; Rad Onc consult can be done as outpatient as part of consolidation  - appreciate behavioral health following patient as gonzalo/hypomania can certainly become acutely worse with steroid exposure.  - upon completion of therapy, can be discharged home with follow-up already scheduled at [facility] with Dr. Meier on 1/6/25; next cycle of therapy will be due on 1/17/25 and can get R-CHOP as outpatient.

## 2024-12-27 NOTE — PROVIDER CONTACT NOTE (MEDICATION) - SITUATION
Pt to get Cytoxan, Vincristine and Doxorubicin chemo today. 650 mg Tylenol premed order but pt is on standing Tylenol 975mg PO last dose given 13:39.

## 2024-12-27 NOTE — PROGRESS NOTE ADULT - ASSESSMENT
64M with h/o hiatal hernia s/p fundoplication, NANCY, DM2 admitted for biopsy of large and painful L shoulder mass. Hematology consulted for new DLBCL.     #New DLBCL  -expanding mass over past 6 weeks accompanied by "hot flashes"  -, uric acid 5.3  -s/p biopsy with OR 12/17, path and flow consistent  with diffuse large b cell lymphoma: Monotypic B-cells (18% of cells), positive for kappa, bright CD19, CD20, CD10, partial CD38; negative CD5, , CD23. The findings are consistent with CD10 positive B-cell lymphoma. CD10 positivity supports follicular center cell lineage. Correlation with histology is necessary.   - CT chest 12/16 showing the 10.9x6.6x6cm lobulated soft tissue mass on shoulder, sarcomatous/neoplastic etiology, also with 2.9cm lobulated lesion on R anterior chest wall. Multiple enlarged cervical LNs.   - 12/19 CT a/p Cirrhotic liver morphology. Indeterminate subcutaneous lower chest/upper abdominal wall mass, new since 6/16/2023.  - ECOG ~2; limited mobility due to back issues  - TTE 12/23/24: EF > 75%  - hepatitis panel negative  - s/p rituximab on 12/24/24, prednisone 12/23-12/27, CHO to be given on 12/27    Recommend:  - plan for cyclophosphamide, doxorubicin, oncovin (vincristine) today    - please continue prednisone 100mg qd to complete 5 days on 12/27, with PPI  - Fulphila 6mg x1 to be given 12/28  - continue allopurinol (renally dose)  - f/u G6PD (collected 12/23)  - Please trend TLS labs q8hr: LDH, CMP with Phos, uric acid for now - high risk TLS given disease burden  - f/u PET-CT 12/27   - please consult [facility] for radiation to large shoulder mass  - Please contact us if patient is planned for discharge - he is scheduled to f/u with Dr. Meier 1/6/25 at 10AM    Recommendations preliminary until attending attestation   ***************************************************************  Kole Cotton MD  Hematology/Oncology Fellow, PGY5  MS TEAMS  After 5pm or on weekends please contact  to page on-call fellow   ***************************************************************      64M with h/o hiatal hernia s/p fundoplication, NANCY, DM2 admitted for biopsy of large and painful L shoulder mass. Hematology consulted for new DLBCL.     #New DLBCL  -expanding mass over past 6 weeks accompanied by "hot flashes"  -, uric acid 5.3  -s/p biopsy with OR 12/17, path and flow consistent  with diffuse large b cell lymphoma: Monotypic B-cells (18% of cells), positive for kappa, bright CD19, CD20, CD10, partial CD38; negative CD5, , CD23. The findings are consistent with CD10 positive B-cell lymphoma. CD10 positivity supports follicular center cell lineage. Correlation with histology is necessary.   - CT chest 12/16 showing the 10.9x6.6x6cm lobulated soft tissue mass on shoulder, sarcomatous/neoplastic etiology, also with 2.9cm lobulated lesion on R anterior chest wall. Multiple enlarged cervical LNs.   - 12/19 CT a/p Cirrhotic liver morphology. Indeterminate subcutaneous lower chest/upper abdominal wall mass, new since 6/16/2023.  - ECOG ~2; limited mobility due to back issues  - TTE 12/23/24: EF > 75%  - hepatitis panel negative  - s/p rituximab on 12/24/24, prednisone 12/23-12/27, CHO to be given on 12/27    Recommend:  - plan for cyclophosphamide, doxorubicin, oncovin (vincristine) today    - please continue prednisone 100mg qd to complete 5 days on 12/27, with PPI  - Fulphila 6mg x1 to be given 12/28  - continue allopurinol (renally dose)  - f/u G6PD (collected 12/23)  - Please trend TLS labs daily: LDH, CMP with Phos, uric acid  - f/u PET-CT 12/27   - please consult [facility] for radiation to large shoulder mass  - Please contact us if patient is planned for discharge - he is scheduled to f/u with Dr. Meier 1/6/25 at 10AM  - Pt could potentially be discharged for outpatient followup as early as Monday 12/30 if doing well    Recommendations preliminary until attending attestation   ***************************************************************  Kole Cotton MD  Hematology/Oncology Fellow, PGY5  MS TEAMS  After 5pm or on weekends please contact  to page on-call fellow   ***************************************************************      64M with h/o hiatal hernia s/p fundoplication, NANCY, DM2 admitted for biopsy of large and painful L shoulder mass. Hematology consulted for new DLBCL.     #New DLBCL  -expanding mass over past 6 weeks accompanied by "hot flashes"  -, uric acid 5.3  -s/p biopsy with OR 12/17, path and flow consistent  with diffuse large b cell lymphoma: Monotypic B-cells (18% of cells), positive for kappa, bright CD19, CD20, CD10, partial CD38; negative CD5, , CD23. The findings are consistent with CD10 positive B-cell lymphoma. CD10 positivity supports follicular center cell lineage. Correlation with histology is necessary.   - CT chest 12/16 showing the 10.9x6.6x6cm lobulated soft tissue mass on shoulder, sarcomatous/neoplastic etiology, also with 2.9cm lobulated lesion on R anterior chest wall. Multiple enlarged cervical LNs.   - 12/19 CT a/p Cirrhotic liver morphology. Indeterminate subcutaneous lower chest/upper abdominal wall mass, new since 6/16/2023.  - ECOG ~2; limited mobility due to back issues  - TTE 12/23/24: EF > 75%  - hepatitis panel negative  - s/p rituximab on 12/24/24, prednisone 12/23-12/27, CHO to be given on 12/27    Recommend:  - plan for cyclophosphamide, doxorubicin, oncovin (vincristine) today    - please continue prednisone 100mg qd to complete 5 days on 12/27, with PPI  - Fulphila 6mg x1 to be given 12/28  - continue allopurinol (renally dose)  - f/u G6PD (collected 12/23)  - Please trend TLS labs daily: LDH, CMP with Phos, uric acid  - f/u PET-CT 12/27   - please consult [facility] for radiation to large shoulder mass  - Please contact us if patient is planned for discharge - he is scheduled to f/u with Dr. Meier 1/6/25 at 10AM  - Pt could potentially be discharged for outpatient followup as early as Monday 12/30 if doing well    Recommendations preliminary until attending attestation   ***************************************************************  Kole Cotton MD  Hematology/Oncology Fellow, PGY5  MS TEAMS  After 5pm or on weekends please contact  to page on-call fellow   ***************************************************************

## 2024-12-27 NOTE — PROVIDER CONTACT NOTE (MEDICATION) - BACKGROUND
Pt 65 y/o M.  Hx biopsy of enlarging painful L anterior shoulder mass, now s/p biopsy 12/17 with preliminary results suspicious for lymphoma. Getting CHO chemo today

## 2024-12-27 NOTE — CHART NOTE - NSCHARTNOTEFT_GEN_A_CORE
Chart reviewed. Patient unavailable- at pet scan. For full plan, see consult note from previous day.    Assessment and Recommendation:   · Assessment	  64M with hiatal hernia s/p fundoplication, prior SBO s/p multiple abdominal surgeries, T2DM, chronic back pain, hypothyroidism, NANCY (not on CPAP) initially admitted to orthopedics for biopsy of enlarging painful L anterior shoulder mass, now s/p biopsy 12/17. Pathology confirmed DCBL, now undergoing oncology work-up and treatment.     Consulted for: Steroid induced hyperglycemia    #Steroid induced hyperglycemia  #T2DM controlled  He reports he was diagnosed with preDM 20 years ago, has been on metformin 1000mg BID since 6 years ago. He reports his A1c has always been well controlled below 6.5.  He likely has T2DM that is well controlled on metformin.  A1C with Estimated Average Glucose Result: 6.1 % (12-16-24)  He doesn't often check FS because glycemic control has normally been very good  Managed by PCP Dr. Rico  Family hx: Father had T2DM  Complications: Denies CAD, CVA. No known microvascular complications  Steroids: Prednisone 100mg daily 12/23-, duration 5 days    Glucose has been mostly well controlled below 200 on prednisone 100mg daily  Patient has PET scan tomorrow at 9:15AM and must have glucose <200. no insulin 6 hours prior to PET scan.  Patient will be NPO starting at midnight.    Inpatient plan:  - Discontinue correctional scale after PET scan.  - CC diet    Discharge plan:  - Discharge regimen: continue home metformin 1000mg BID  - patient to follow up with PCP Dr. Rico  - Routine ophthalmology and podiatry follow up    #Hypothyroidism  He reports hx of hypothyroidism, has been on levothyroxine 75mcg daily stable dose for 5 years. He reports TFTs have been normal on this dose.  - Continue home levothyroxine 75mcg daily. Please administer in the AM on an empty stomach, 1 hour before food or other medications, and 4 hours before any PPI, calcium, or iron supplements.     #HTN  - goal <130/80  - check urine albumin/cr ratio outpatient  - management by primary team    #HLD  - check lipid panel outpatient    LYNDA Ibrahim-BC  Nurse Practitioner  Division of Endocrinology  Contact on TEAMS    If out of hospital/unavailable when paged, please note: patient will be cared for by another provider on the endocrine service.  For urgent concerns: call the endocrine answering service for assistance to reach covering provider ([phone]). For non-urgent matters: please email [email or web address] for assistance.    CAPILLARY BLOOD GLUCOSE      POCT Blood Glucose.: 185 mg/dL (27 Dec 2024 11:44)  POCT Blood Glucose.: 113 mg/dL (27 Dec 2024 05:58)  POCT Blood Glucose.: 124 mg/dL (26 Dec 2024 23:47)  POCT Blood Glucose.: 127 mg/dL (26 Dec 2024 22:18)  POCT Blood Glucose.: 183 mg/dL (26 Dec 2024 16:34)    MEDICATIONS  (STANDING):  acetaminophen     Tablet .. 975 milliGRAM(s) Oral every 8 hours  allopurinol 300 milliGRAM(s) Oral daily  amLODIPine   Tablet 10 milliGRAM(s) Oral daily  ascorbic acid 500 milliGRAM(s) Oral daily  atenolol  Tablet 100 milliGRAM(s) Oral daily  atorvastatin 10 milliGRAM(s) Oral at bedtime  cholecalciferol 1000 Unit(s) Oral daily  cyclophosphamide IVPB (eMAR) 1730 milliGRAM(s) IV Intermittent once  dextrose 5%. 1000 milliLiter(s) (100 mL/Hr) IV Continuous <Continuous>  dextrose 5%. 1000 milliLiter(s) (50 mL/Hr) IV Continuous <Continuous>  dextrose 5%. 1000 milliLiter(s) (50 mL/Hr) IV Continuous <Continuous>  dextrose 5%. 1000 milliLiter(s) (100 mL/Hr) IV Continuous <Continuous>  dextrose 50% Injectable 25 Gram(s) IV Push once  dextrose 50% Injectable 12.5 Gram(s) IV Push once  dextrose 50% Injectable 25 Gram(s) IV Push once  dextrose 50% Injectable 25 Gram(s) IV Push once  dextrose 50% Injectable 12.5 Gram(s) IV Push once  dextrose 50% Injectable 25 Gram(s) IV Push once  diphenhydrAMINE 25 milliGRAM(s) Oral once  DOXOrubicin Injectable (eMAR) 116 milliGRAM(s) IV Push Chemo once  DULoxetine 60 milliGRAM(s) Oral daily  enoxaparin Injectable 40 milliGRAM(s) SubCutaneous every 12 hours  fosaprepitant IVPB 150 milliGRAM(s) IV Intermittent once  gabapentin 300 milliGRAM(s) Oral four times a day  glucagon  Injectable 1 milliGRAM(s) IntraMuscular once  glucagon  Injectable 1 milliGRAM(s) IntraMuscular once  hydrocortisone sodium succinate Injectable 100 milliGRAM(s) IV Push once  insulin lispro (ADMELOG) corrective regimen sliding scale   SubCutaneous three times a day before meals  insulin lispro (ADMELOG) corrective regimen sliding scale   SubCutaneous at bedtime  levothyroxine 75 MICROGram(s) Oral daily  losartan 25 milliGRAM(s) Oral daily  multivitamin 1 Tablet(s) Oral daily  OLANZapine 2.5 milliGRAM(s) Oral at bedtime  ondansetron  IVPB 16 milliGRAM(s) IV Intermittent once  oxyCODONE  ER Tablet 40 milliGRAM(s) Oral every 12 hours  pantoprazole    Tablet 40 milliGRAM(s) Oral before breakfast  polyethylene glycol 3350 17 Gram(s) Oral two times a day  predniSONE   Tablet 100 milliGRAM(s) Oral daily  senna 2 Tablet(s) Oral two times a day  sodium chloride 0.9% Bolus 500 milliLiter(s) IV Bolus once  sodium chloride 0.9%. 1000 milliLiter(s) (10 mL/Hr) IV Continuous <Continuous>  sodium chloride 0.9%. 1000 milliLiter(s) (75 mL/Hr) IV Continuous <Continuous>  tamsulosin 0.4 milliGRAM(s) Oral at bedtime  vinCRIStine IVPB (eMAR) 2 milliGRAM(s) IV Intermittent once    Diet, Regular:   Consistent Carbohydrate {Evening Snack} (CSTCHOSN)  DASH/TLC {Sodium & Cholesterol Restricted} (DASH)  Low Fat (LOWFAT)  Supplement Feeding Modality:  Oral  Ensure Max Cans or Servings Per Day:  1       Frequency:  Daily (12-27-24 @ 12:00) [Active] Chart reviewed. Patient unavailable- at pet scan. For full plan, see consult note from previous day.    Assessment and Recommendation:   · Assessment	  64M with hiatal hernia s/p fundoplication, prior SBO s/p multiple abdominal surgeries, T2DM, chronic back pain, hypothyroidism, NANCY (not on CPAP) initially admitted to orthopedics for biopsy of enlarging painful L anterior shoulder mass, now s/p biopsy 12/17. Pathology confirmed DCBL, now undergoing oncology work-up and treatment.     Consulted for: Steroid induced hyperglycemia    #Steroid induced hyperglycemia  #T2DM controlled  He reports he was diagnosed with preDM 20 years ago, has been on metformin 1000mg BID since 6 years ago. He reports his A1c has always been well controlled below 6.5.  He likely has T2DM that is well controlled on metformin.  A1C with Estimated Average Glucose Result: 6.1 % (12-16-24)  He doesn't often check FS because glycemic control has normally been very good  Managed by PCP Dr. Rico  Family hx: Father had T2DM  Complications: Denies CAD, CVA. No known microvascular complications  Steroids: Prednisone 100mg daily 12/23-, duration 5 days  Glucose has been mostly well controlled below 200 on prednisone 100mg daily  Patient had PET scan scheduled for today at 9:15AM, glucose <200 required to proceed. no insulin 6 hours prior to PET scan.    Inpatient plan:  - Discontinue correctional scale after PET scan.  - CC diet    Discharge plan:  - Discharge regimen: continue home metformin 1000mg BID  - patient to follow up with PCP Dr. Rico  - Routine ophthalmology and podiatry follow up    #Hypothyroidism  He reports hx of hypothyroidism, has been on levothyroxine 75mcg daily stable dose for 5 years. He reports TFTs have been normal on this dose.  - Continue home levothyroxine 75mcg daily. Please administer in the AM on an empty stomach, 1 hour before food or other medications, and 4 hours before any PPI, calcium, or iron supplements.     #HTN  - goal <130/80  - check urine albumin/cr ratio outpatient  - management by primary team    #HLD  - check lipid panel outpatient    LYNDA Ibrahim-BC  Nurse Practitioner  Division of Endocrinology  Contact on TEAMS    If out of hospital/unavailable when paged, please note: patient will be cared for by another provider on the endocrine service.  For urgent concerns: call the endocrine answering service for assistance to reach covering provider ([phone]). For non-urgent matters: please email [email or web address] for assistance.    CAPILLARY BLOOD GLUCOSE      POCT Blood Glucose.: 185 mg/dL (27 Dec 2024 11:44)  POCT Blood Glucose.: 113 mg/dL (27 Dec 2024 05:58)  POCT Blood Glucose.: 124 mg/dL (26 Dec 2024 23:47)  POCT Blood Glucose.: 127 mg/dL (26 Dec 2024 22:18)  POCT Blood Glucose.: 183 mg/dL (26 Dec 2024 16:34)    MEDICATIONS  (STANDING):  acetaminophen     Tablet .. 975 milliGRAM(s) Oral every 8 hours  allopurinol 300 milliGRAM(s) Oral daily  amLODIPine   Tablet 10 milliGRAM(s) Oral daily  ascorbic acid 500 milliGRAM(s) Oral daily  atenolol  Tablet 100 milliGRAM(s) Oral daily  atorvastatin 10 milliGRAM(s) Oral at bedtime  cholecalciferol 1000 Unit(s) Oral daily  cyclophosphamide IVPB (eMAR) 1730 milliGRAM(s) IV Intermittent once  dextrose 5%. 1000 milliLiter(s) (100 mL/Hr) IV Continuous <Continuous>  dextrose 5%. 1000 milliLiter(s) (50 mL/Hr) IV Continuous <Continuous>  dextrose 5%. 1000 milliLiter(s) (50 mL/Hr) IV Continuous <Continuous>  dextrose 5%. 1000 milliLiter(s) (100 mL/Hr) IV Continuous <Continuous>  dextrose 50% Injectable 25 Gram(s) IV Push once  dextrose 50% Injectable 12.5 Gram(s) IV Push once  dextrose 50% Injectable 25 Gram(s) IV Push once  dextrose 50% Injectable 25 Gram(s) IV Push once  dextrose 50% Injectable 12.5 Gram(s) IV Push once  dextrose 50% Injectable 25 Gram(s) IV Push once  diphenhydrAMINE 25 milliGRAM(s) Oral once  DOXOrubicin Injectable (eMAR) 116 milliGRAM(s) IV Push Chemo once  DULoxetine 60 milliGRAM(s) Oral daily  enoxaparin Injectable 40 milliGRAM(s) SubCutaneous every 12 hours  fosaprepitant IVPB 150 milliGRAM(s) IV Intermittent once  gabapentin 300 milliGRAM(s) Oral four times a day  glucagon  Injectable 1 milliGRAM(s) IntraMuscular once  glucagon  Injectable 1 milliGRAM(s) IntraMuscular once  hydrocortisone sodium succinate Injectable 100 milliGRAM(s) IV Push once  insulin lispro (ADMELOG) corrective regimen sliding scale   SubCutaneous three times a day before meals  insulin lispro (ADMELOG) corrective regimen sliding scale   SubCutaneous at bedtime  levothyroxine 75 MICROGram(s) Oral daily  losartan 25 milliGRAM(s) Oral daily  multivitamin 1 Tablet(s) Oral daily  OLANZapine 2.5 milliGRAM(s) Oral at bedtime  ondansetron  IVPB 16 milliGRAM(s) IV Intermittent once  oxyCODONE  ER Tablet 40 milliGRAM(s) Oral every 12 hours  pantoprazole    Tablet 40 milliGRAM(s) Oral before breakfast  polyethylene glycol 3350 17 Gram(s) Oral two times a day  predniSONE   Tablet 100 milliGRAM(s) Oral daily  senna 2 Tablet(s) Oral two times a day  sodium chloride 0.9% Bolus 500 milliLiter(s) IV Bolus once  sodium chloride 0.9%. 1000 milliLiter(s) (10 mL/Hr) IV Continuous <Continuous>  sodium chloride 0.9%. 1000 milliLiter(s) (75 mL/Hr) IV Continuous <Continuous>  tamsulosin 0.4 milliGRAM(s) Oral at bedtime  vinCRIStine IVPB (eMAR) 2 milliGRAM(s) IV Intermittent once    Diet, Regular:   Consistent Carbohydrate {Evening Snack} (CSTCHOSN)  DASH/TLC {Sodium & Cholesterol Restricted} (DASH)  Low Fat (LOWFAT)  Supplement Feeding Modality:  Oral  Ensure Max Cans or Servings Per Day:  1       Frequency:  Daily (12-27-24 @ 12:00) [Active]

## 2024-12-27 NOTE — PROVIDER CONTACT NOTE (OTHER) - BACKGROUND
Pt 63 y/o M.  Hx biopsy of enlarging painful L anterior shoulder mass, now s/p biopsy 12/17 with preliminary results suspicious for lymphoma. Getting CHO chemo today

## 2024-12-27 NOTE — PROGRESS NOTE ADULT - SUBJECTIVE AND OBJECTIVE BOX
INTERVAL HPI/OVERNIGHT EVENTS:  O/N: no acute events  This morning: Patient was seen and examined at bedside. Tolerated PET/CT well this AM. L shoulder pain continues to improve.    VITAL SIGNS:  T(F): 98 (12-27-24 @ 06:20)  HR: 58 (12-27-24 @ 06:20)  BP: 121/72 (12-27-24 @ 06:20)  RR: 16 (12-27-24 @ 06:20)  SpO2: 95% (12-27-24 @ 06:20)  Wt(kg): --    PHYSICAL EXAM:    Constitutional: NAD, eating lunch comfortably out of bed in chair  HEENT: EOMI, sclera non-icteric  Respiratory: CTAB, with decreased breath sounds in posterior/inferior lung fields  Cardiovascular: blowing murmur   Gastrointestinal: abdomen soft, NTND  Extremities: Warm, well perfused, (+) 1+ edema to ankles  Neurological: alert and oriented, no focal deficits noted  Skin: Normal temperature, warm, dry    MEDICATIONS  (STANDING):  acetaminophen     Tablet .. 975 milliGRAM(s) Oral every 8 hours  allopurinol 300 milliGRAM(s) Oral daily  amLODIPine   Tablet 10 milliGRAM(s) Oral daily  ascorbic acid 500 milliGRAM(s) Oral daily  atenolol  Tablet 100 milliGRAM(s) Oral daily  atorvastatin 10 milliGRAM(s) Oral at bedtime  cholecalciferol 1000 Unit(s) Oral daily  cyclophosphamide IVPB (eMAR) 1730 milliGRAM(s) IV Intermittent once  dextrose 5%. 1000 milliLiter(s) (100 mL/Hr) IV Continuous <Continuous>  dextrose 5%. 1000 milliLiter(s) (50 mL/Hr) IV Continuous <Continuous>  dextrose 5%. 1000 milliLiter(s) (50 mL/Hr) IV Continuous <Continuous>  dextrose 5%. 1000 milliLiter(s) (100 mL/Hr) IV Continuous <Continuous>  dextrose 50% Injectable 25 Gram(s) IV Push once  dextrose 50% Injectable 12.5 Gram(s) IV Push once  dextrose 50% Injectable 25 Gram(s) IV Push once  dextrose 50% Injectable 25 Gram(s) IV Push once  dextrose 50% Injectable 12.5 Gram(s) IV Push once  dextrose 50% Injectable 25 Gram(s) IV Push once  diphenhydrAMINE 25 milliGRAM(s) Oral once  DOXOrubicin Injectable (eMAR) 116 milliGRAM(s) IV Push Chemo once  DULoxetine 60 milliGRAM(s) Oral daily  enoxaparin Injectable 40 milliGRAM(s) SubCutaneous every 12 hours  fosaprepitant IVPB 150 milliGRAM(s) IV Intermittent once  gabapentin 300 milliGRAM(s) Oral four times a day  glucagon  Injectable 1 milliGRAM(s) IntraMuscular once  glucagon  Injectable 1 milliGRAM(s) IntraMuscular once  hydrocortisone sodium succinate Injectable 100 milliGRAM(s) IV Push once  insulin lispro (ADMELOG) corrective regimen sliding scale   SubCutaneous three times a day before meals  insulin lispro (ADMELOG) corrective regimen sliding scale   SubCutaneous at bedtime  levothyroxine 75 MICROGram(s) Oral daily  losartan 25 milliGRAM(s) Oral daily  multivitamin 1 Tablet(s) Oral daily  OLANZapine 2.5 milliGRAM(s) Oral at bedtime  ondansetron  IVPB 16 milliGRAM(s) IV Intermittent once  oxyCODONE  ER Tablet 40 milliGRAM(s) Oral every 12 hours  pantoprazole    Tablet 40 milliGRAM(s) Oral before breakfast  polyethylene glycol 3350 17 Gram(s) Oral two times a day  predniSONE   Tablet 100 milliGRAM(s) Oral daily  senna 2 Tablet(s) Oral two times a day  sodium chloride 0.9% Bolus 500 milliLiter(s) IV Bolus once  sodium chloride 0.9%. 1000 milliLiter(s) (10 mL/Hr) IV Continuous <Continuous>  sodium chloride 0.9%. 1000 milliLiter(s) (75 mL/Hr) IV Continuous <Continuous>  tamsulosin 0.4 milliGRAM(s) Oral at bedtime  vinCRIStine IVPB (eMAR) 2 milliGRAM(s) IV Intermittent once    MEDICATIONS  (PRN):  dextrose Oral Gel 15 Gram(s) Oral once PRN Blood Glucose LESS THAN 70 milliGRAM(s)/deciliter  dextrose Oral Gel 15 Gram(s) Oral once PRN Blood Glucose LESS THAN 70 milliGRAM(s)/deciliter  diphenhydrAMINE 25 milliGRAM(s) Oral every 6 hours PRN Rash and/or Itching  hydrocortisone sodium succinate Injectable 100 milliGRAM(s) IV Push once PRN allergic reaction  LORazepam     Tablet 0.5 milliGRAM(s) Oral every 4 hours PRN anxiety/agitation  magnesium hydroxide Suspension 30 milliLiter(s) Oral daily PRN Constipation  meperidine     Injectable 12.5 milliGRAM(s) IV Push once PRN RIGORS  meperidine     Injectable 25 milliGRAM(s) IV Push once PRN rigors  methylPREDNISolone sodium succinate Injectable 50 milliGRAM(s) IV Push once PRN ANAPHLAXIS  metoclopramide Injectable 10 milliGRAM(s) IV Push every 4 hours PRN NAUSEA  naloxone Injectable 0.1 milliGRAM(s) IV Push every 3 minutes PRN For ANY of the following changes in patient status:  A. RR LESS THAN 10 breaths per minute, B. Oxygen saturation LESS THAN 90%, C. Sedation score of 6  ondansetron Injectable 4 milliGRAM(s) IV Push every 6 hours PRN Nausea and/or Vomiting  oxyCODONE    IR 15 milliGRAM(s) Oral every 4 hours PRN Moderate Pain (4 - 6)  oxyCODONE    IR 20 milliGRAM(s) Oral every 4 hours PRN Severe Pain (7 - 10)  sucralfate suspension 1 Gram(s) Oral Once PRN for esophageal pain  tiZANidine 4 milliGRAM(s) Oral <User Schedule> PRN Muscle Spasm  zolpidem 5 milliGRAM(s) Oral at bedtime PRN Insomnia      Allergies    No Known Allergies    Intolerances    IV Contrast (Nausea)      LABS:                        16.0   5.22  )-----------( 143      ( 27 Dec 2024 12:00 )             45.4     12-27    135  |  98  |  17  ----------------------------<  204[H]  4.3   |  24  |  0.63    Ca    9.3      27 Dec 2024 12:00  Phos  2.5     12-27  Mg     2.10     12-27    TPro  7.5  /  Alb  4.3  /  TBili  0.6  /  DBili  x   /  AST  57[H]  /  ALT  49[H]  /  AlkPhos  87  12-27      Urinalysis Basic - ( 27 Dec 2024 12:00 )    Color: x / Appearance: x / SG: x / pH: x  Gluc: 204 mg/dL / Ketone: x  / Bili: x / Urobili: x   Blood: x / Protein: x / Nitrite: x   Leuk Esterase: x / RBC: x / WBC x   Sq Epi: x / Non Sq Epi: x / Bacteria: x              RADIOLOGY & ADDITIONAL TESTS:  Reviewed INTERVAL HPI/OVERNIGHT EVENTS:  O/N: no acute events  This morning: Patient was seen and examined at bedside. Tolerated PET/CT well this AM. L shoulder pain continues to improve.    VITAL SIGNS:  T(F): 98 (12-27-24 @ 06:20)  HR: 58 (12-27-24 @ 06:20)  BP: 121/72 (12-27-24 @ 06:20)  RR: 16 (12-27-24 @ 06:20)  SpO2: 95% (12-27-24 @ 06:20)  Wt(kg): --    PHYSICAL EXAM:    Constitutional: NAD, eating lunch comfortably out of bed in chair  HEENT: EOMI, sclera non-icteric  Respiratory: CTAB, with decreased breath sounds in posterior/inferior lung fields  Cardiovascular: blowing murmur   Gastrointestinal: abdomen soft, NTND  Extremities: Warm, well perfused, (+) 1+ edema to ankles  Neurological: alert and oriented, no focal deficits noted  Skin: Normal temperature, warm, dry  MSK: left shoulder mass dramatically decreased in size    MEDICATIONS  (STANDING):  acetaminophen     Tablet .. 975 milliGRAM(s) Oral every 8 hours  allopurinol 300 milliGRAM(s) Oral daily  amLODIPine   Tablet 10 milliGRAM(s) Oral daily  ascorbic acid 500 milliGRAM(s) Oral daily  atenolol  Tablet 100 milliGRAM(s) Oral daily  atorvastatin 10 milliGRAM(s) Oral at bedtime  cholecalciferol 1000 Unit(s) Oral daily  cyclophosphamide IVPB (eMAR) 1730 milliGRAM(s) IV Intermittent once  dextrose 5%. 1000 milliLiter(s) (100 mL/Hr) IV Continuous <Continuous>  dextrose 5%. 1000 milliLiter(s) (50 mL/Hr) IV Continuous <Continuous>  dextrose 5%. 1000 milliLiter(s) (50 mL/Hr) IV Continuous <Continuous>  dextrose 5%. 1000 milliLiter(s) (100 mL/Hr) IV Continuous <Continuous>  dextrose 50% Injectable 25 Gram(s) IV Push once  dextrose 50% Injectable 12.5 Gram(s) IV Push once  dextrose 50% Injectable 25 Gram(s) IV Push once  dextrose 50% Injectable 25 Gram(s) IV Push once  dextrose 50% Injectable 12.5 Gram(s) IV Push once  dextrose 50% Injectable 25 Gram(s) IV Push once  diphenhydrAMINE 25 milliGRAM(s) Oral once  DOXOrubicin Injectable (eMAR) 116 milliGRAM(s) IV Push Chemo once  DULoxetine 60 milliGRAM(s) Oral daily  enoxaparin Injectable 40 milliGRAM(s) SubCutaneous every 12 hours  fosaprepitant IVPB 150 milliGRAM(s) IV Intermittent once  gabapentin 300 milliGRAM(s) Oral four times a day  glucagon  Injectable 1 milliGRAM(s) IntraMuscular once  glucagon  Injectable 1 milliGRAM(s) IntraMuscular once  hydrocortisone sodium succinate Injectable 100 milliGRAM(s) IV Push once  insulin lispro (ADMELOG) corrective regimen sliding scale   SubCutaneous three times a day before meals  insulin lispro (ADMELOG) corrective regimen sliding scale   SubCutaneous at bedtime  levothyroxine 75 MICROGram(s) Oral daily  losartan 25 milliGRAM(s) Oral daily  multivitamin 1 Tablet(s) Oral daily  OLANZapine 2.5 milliGRAM(s) Oral at bedtime  ondansetron  IVPB 16 milliGRAM(s) IV Intermittent once  oxyCODONE  ER Tablet 40 milliGRAM(s) Oral every 12 hours  pantoprazole    Tablet 40 milliGRAM(s) Oral before breakfast  polyethylene glycol 3350 17 Gram(s) Oral two times a day  predniSONE   Tablet 100 milliGRAM(s) Oral daily  senna 2 Tablet(s) Oral two times a day  sodium chloride 0.9% Bolus 500 milliLiter(s) IV Bolus once  sodium chloride 0.9%. 1000 milliLiter(s) (10 mL/Hr) IV Continuous <Continuous>  sodium chloride 0.9%. 1000 milliLiter(s) (75 mL/Hr) IV Continuous <Continuous>  tamsulosin 0.4 milliGRAM(s) Oral at bedtime  vinCRIStine IVPB (eMAR) 2 milliGRAM(s) IV Intermittent once    MEDICATIONS  (PRN):  dextrose Oral Gel 15 Gram(s) Oral once PRN Blood Glucose LESS THAN 70 milliGRAM(s)/deciliter  dextrose Oral Gel 15 Gram(s) Oral once PRN Blood Glucose LESS THAN 70 milliGRAM(s)/deciliter  diphenhydrAMINE 25 milliGRAM(s) Oral every 6 hours PRN Rash and/or Itching  hydrocortisone sodium succinate Injectable 100 milliGRAM(s) IV Push once PRN allergic reaction  LORazepam     Tablet 0.5 milliGRAM(s) Oral every 4 hours PRN anxiety/agitation  magnesium hydroxide Suspension 30 milliLiter(s) Oral daily PRN Constipation  meperidine     Injectable 12.5 milliGRAM(s) IV Push once PRN RIGORS  meperidine     Injectable 25 milliGRAM(s) IV Push once PRN rigors  methylPREDNISolone sodium succinate Injectable 50 milliGRAM(s) IV Push once PRN ANAPHLAXIS  metoclopramide Injectable 10 milliGRAM(s) IV Push every 4 hours PRN NAUSEA  naloxone Injectable 0.1 milliGRAM(s) IV Push every 3 minutes PRN For ANY of the following changes in patient status:  A. RR LESS THAN 10 breaths per minute, B. Oxygen saturation LESS THAN 90%, C. Sedation score of 6  ondansetron Injectable 4 milliGRAM(s) IV Push every 6 hours PRN Nausea and/or Vomiting  oxyCODONE    IR 15 milliGRAM(s) Oral every 4 hours PRN Moderate Pain (4 - 6)  oxyCODONE    IR 20 milliGRAM(s) Oral every 4 hours PRN Severe Pain (7 - 10)  sucralfate suspension 1 Gram(s) Oral Once PRN for esophageal pain  tiZANidine 4 milliGRAM(s) Oral <User Schedule> PRN Muscle Spasm  zolpidem 5 milliGRAM(s) Oral at bedtime PRN Insomnia      Allergies    No Known Allergies    Intolerances    IV Contrast (Nausea)      LABS:                        16.0   5.22  )-----------( 143      ( 27 Dec 2024 12:00 )             45.4     12-27    135  |  98  |  17  ----------------------------<  204[H]  4.3   |  24  |  0.63    Ca    9.3      27 Dec 2024 12:00  Phos  2.5     12-27  Mg     2.10     12-27    TPro  7.5  /  Alb  4.3  /  TBili  0.6  /  DBili  x   /  AST  57[H]  /  ALT  49[H]  /  AlkPhos  87  12-27      RADIOLOGY & ADDITIONAL TESTS:  Reviewed

## 2024-12-27 NOTE — PROGRESS NOTE ADULT - SUBJECTIVE AND OBJECTIVE BOX
Medicine Progress Note    Patient is a 64y old  Male who presents with a chief complaint of L shoulder mass (27 Dec 2024 13:40)      SUBJECTIVE / OVERNIGHT EVENTS: had PET this morning     ADDITIONAL REVIEW OF SYSTEMS: negative     MEDICATIONS  (STANDING):  acetaminophen     Tablet .. 975 milliGRAM(s) Oral every 8 hours  allopurinol 300 milliGRAM(s) Oral daily  amLODIPine   Tablet 10 milliGRAM(s) Oral daily  ascorbic acid 500 milliGRAM(s) Oral daily  atenolol  Tablet 100 milliGRAM(s) Oral daily  atorvastatin 10 milliGRAM(s) Oral at bedtime  cholecalciferol 1000 Unit(s) Oral daily  cyclophosphamide IVPB (eMAR) 1730 milliGRAM(s) IV Intermittent once  dextrose 5%. 1000 milliLiter(s) (100 mL/Hr) IV Continuous <Continuous>  dextrose 5%. 1000 milliLiter(s) (50 mL/Hr) IV Continuous <Continuous>  dextrose 5%. 1000 milliLiter(s) (50 mL/Hr) IV Continuous <Continuous>  dextrose 5%. 1000 milliLiter(s) (100 mL/Hr) IV Continuous <Continuous>  dextrose 50% Injectable 25 Gram(s) IV Push once  dextrose 50% Injectable 12.5 Gram(s) IV Push once  dextrose 50% Injectable 25 Gram(s) IV Push once  dextrose 50% Injectable 25 Gram(s) IV Push once  dextrose 50% Injectable 12.5 Gram(s) IV Push once  dextrose 50% Injectable 25 Gram(s) IV Push once  DOXOrubicin Injectable (eMAR) 116 milliGRAM(s) IV Push Chemo once  DULoxetine 60 milliGRAM(s) Oral daily  enoxaparin Injectable 40 milliGRAM(s) SubCutaneous every 12 hours  gabapentin 300 milliGRAM(s) Oral four times a day  glucagon  Injectable 1 milliGRAM(s) IntraMuscular once  glucagon  Injectable 1 milliGRAM(s) IntraMuscular once  insulin lispro (ADMELOG) corrective regimen sliding scale   SubCutaneous three times a day before meals  insulin lispro (ADMELOG) corrective regimen sliding scale   SubCutaneous at bedtime  levothyroxine 75 MICROGram(s) Oral daily  losartan 25 milliGRAM(s) Oral daily  multivitamin 1 Tablet(s) Oral daily  OLANZapine 2.5 milliGRAM(s) Oral at bedtime  oxyCODONE  ER Tablet 40 milliGRAM(s) Oral every 12 hours  pantoprazole    Tablet 40 milliGRAM(s) Oral before breakfast  polyethylene glycol 3350 17 Gram(s) Oral two times a day  predniSONE   Tablet 100 milliGRAM(s) Oral daily  senna 2 Tablet(s) Oral two times a day  sodium chloride 0.9%. 1000 milliLiter(s) (75 mL/Hr) IV Continuous <Continuous>  sodium chloride 0.9%. 1000 milliLiter(s) (10 mL/Hr) IV Continuous <Continuous>  tamsulosin 0.4 milliGRAM(s) Oral at bedtime  vinCRIStine IVPB (eMAR) 2 milliGRAM(s) IV Intermittent once    MEDICATIONS  (PRN):  dextrose Oral Gel 15 Gram(s) Oral once PRN Blood Glucose LESS THAN 70 milliGRAM(s)/deciliter  dextrose Oral Gel 15 Gram(s) Oral once PRN Blood Glucose LESS THAN 70 milliGRAM(s)/deciliter  diphenhydrAMINE 25 milliGRAM(s) Oral every 6 hours PRN Rash and/or Itching  hydrocortisone sodium succinate Injectable 100 milliGRAM(s) IV Push once PRN allergic reaction  LORazepam     Tablet 0.5 milliGRAM(s) Oral every 4 hours PRN anxiety/agitation  magnesium hydroxide Suspension 30 milliLiter(s) Oral daily PRN Constipation  meperidine     Injectable 25 milliGRAM(s) IV Push once PRN rigors  meperidine     Injectable 12.5 milliGRAM(s) IV Push once PRN RIGORS  methylPREDNISolone sodium succinate Injectable 50 milliGRAM(s) IV Push once PRN ANAPHLAXIS  metoclopramide Injectable 10 milliGRAM(s) IV Push every 4 hours PRN NAUSEA  naloxone Injectable 0.1 milliGRAM(s) IV Push every 3 minutes PRN For ANY of the following changes in patient status:  A. RR LESS THAN 10 breaths per minute, B. Oxygen saturation LESS THAN 90%, C. Sedation score of 6  ondansetron Injectable 4 milliGRAM(s) IV Push every 6 hours PRN Nausea and/or Vomiting  oxyCODONE    IR 15 milliGRAM(s) Oral every 4 hours PRN Moderate Pain (4 - 6)  oxyCODONE    IR 20 milliGRAM(s) Oral every 4 hours PRN Severe Pain (7 - 10)  sucralfate suspension 1 Gram(s) Oral Once PRN for esophageal pain  tiZANidine 4 milliGRAM(s) Oral <User Schedule> PRN Muscle Spasm  zolpidem 5 milliGRAM(s) Oral at bedtime PRN Insomnia    CAPILLARY BLOOD GLUCOSE      POCT Blood Glucose.: 221 mg/dL (27 Dec 2024 16:30)  POCT Blood Glucose.: 185 mg/dL (27 Dec 2024 11:44)  POCT Blood Glucose.: 113 mg/dL (27 Dec 2024 05:58)  POCT Blood Glucose.: 124 mg/dL (26 Dec 2024 23:47)  POCT Blood Glucose.: 127 mg/dL (26 Dec 2024 22:18)    I&O's Summary    26 Dec 2024 07:01  -  27 Dec 2024 07:00  --------------------------------------------------------  IN: 0 mL / OUT: 475 mL / NET: -475 mL        PHYSICAL EXAM:  Vital Signs Last 24 Hrs  T(C): 36.6 (27 Dec 2024 14:00), Max: 37.2 (26 Dec 2024 17:07)  T(F): 97.9 (27 Dec 2024 14:00), Max: 99 (26 Dec 2024 17:07)  HR: 89 (27 Dec 2024 14:00) (58 - 89)  BP: 137/85 (27 Dec 2024 14:00) (121/72 - 185/98)  BP(mean): --  RR: 16 (27 Dec 2024 14:00) (16 - 18)  SpO2: 95% (27 Dec 2024 14:00) (95% - 100%)    Parameters below as of 27 Dec 2024 14:00  Patient On (Oxygen Delivery Method): room air      CONSTITUTIONAL: NAD,   ENMT: Moist oral mucosa, no pharyngeal injection or exudates;   RESPIRATORY: Normal respiratory effort; lungs are clear to auscultation bilaterally  CARDIOVASCULAR: Regular rate and rhythm, normal S1 and S2,  No lower extremity edema; Left shoulder wound , ecchymosis decreased in size   ABDOMEN: Nontender to palpation, normoactive bowel sounds, no rebound/guarding;  PSYCH: A+O to person, place, and time; affect appropriate  NEUROLOGY: CN 2-12 are intact and symmetric; no gross sensory deficits   SKIN: No rashes;     LABS:                        16.0   5.22  )-----------( 143      ( 27 Dec 2024 12:00 )             45.4     12-27    135  |  98  |  17  ----------------------------<  204[H]  4.3   |  24  |  0.63    Ca    9.3      27 Dec 2024 12:00  Phos  2.5     12-27  Mg     2.10     12-27    TPro  7.5  /  Alb  4.3  /  TBili  0.6  /  DBili  x   /  AST  57[H]  /  ALT  49[H]  /  AlkPhos  87  12-27          Urinalysis Basic - ( 27 Dec 2024 12:00 )    Color: x / Appearance: x / SG: x / pH: x  Gluc: 204 mg/dL / Ketone: x  / Bili: x / Urobili: x   Blood: x / Protein: x / Nitrite: x   Leuk Esterase: x / RBC: x / WBC x   Sq Epi: x / Non Sq Epi: x / Bacteria: x            RADIOLOGY & ADDITIONAL TESTS:  Imaging from Last 24 Hours:    Electrocardiogram/QTc Interval:    COORDINATION OF CARE:  Care Discussed with Consultants/Other Providers: ACP

## 2024-12-27 NOTE — PROGRESS NOTE ADULT - PROBLEM SELECTOR PLAN 1
Fast growing mass of L shoulder associated with hot flashes.   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

## 2024-12-27 NOTE — PROGRESS NOTE ADULT - PROBLEM SELECTOR PLAN 8
Inpatient plan:  - Discontinue correctional scale after PET scan.  - CC diet    Discharge plan:  - Discharge regimen: continue home metformin 1000mg BID  - patient to follow up with PCP Dr. Rico

## 2024-12-27 NOTE — BH CONSULTATION LIAISON PROGRESS NOTE - NSBHCONSULTFOLLOWAFTERCARE_PSY_A_CORE FT
[facility]  [address] [place] [address], [facility], First Floor, [place] [address]  [phone]  [email or web address] - Central Intake: [phone]  [facility]  [address] [place] [address], [facility], First Floor, [place] [address]  [phone]  [email or web address] - Central Intake: [phone]     Options closer to patient's home:   [facility] [phone]  [facility] [phone]

## 2024-12-28 LAB
ALBUMIN SERPL ELPH-MCNC: 3.6 G/DL — SIGNIFICANT CHANGE UP (ref 3.3–5)
ALP SERPL-CCNC: 70 U/L — SIGNIFICANT CHANGE UP (ref 40–120)
ALT FLD-CCNC: 39 U/L — SIGNIFICANT CHANGE UP (ref 4–41)
ANION GAP SERPL CALC-SCNC: 11 MMOL/L — SIGNIFICANT CHANGE UP (ref 7–14)
AST SERPL-CCNC: 40 U/L — SIGNIFICANT CHANGE UP (ref 4–40)
BASOPHILS # BLD AUTO: 0.01 K/UL — SIGNIFICANT CHANGE UP (ref 0–0.2)
BASOPHILS NFR BLD AUTO: 0.2 % — SIGNIFICANT CHANGE UP (ref 0–2)
BILIRUB SERPL-MCNC: 0.5 MG/DL — SIGNIFICANT CHANGE UP (ref 0.2–1.2)
BUN SERPL-MCNC: 24 MG/DL — HIGH (ref 7–23)
CALCIUM SERPL-MCNC: 8.9 MG/DL — SIGNIFICANT CHANGE UP (ref 8.4–10.5)
CHLORIDE SERPL-SCNC: 100 MMOL/L — SIGNIFICANT CHANGE UP (ref 98–107)
CO2 SERPL-SCNC: 24 MMOL/L — SIGNIFICANT CHANGE UP (ref 22–31)
CREAT SERPL-MCNC: 0.7 MG/DL — SIGNIFICANT CHANGE UP (ref 0.5–1.3)
EGFR: 104 ML/MIN/1.73M2 — SIGNIFICANT CHANGE UP
EOSINOPHIL # BLD AUTO: 0.07 K/UL — SIGNIFICANT CHANGE UP (ref 0–0.5)
EOSINOPHIL NFR BLD AUTO: 1.2 % — SIGNIFICANT CHANGE UP (ref 0–6)
FIBRINOGEN AG PPP IA-MCNC: 443 MG/DL — SIGNIFICANT CHANGE UP (ref 233–496)
GLUCOSE BLDC GLUCOMTR-MCNC: 131 MG/DL — HIGH (ref 70–99)
GLUCOSE BLDC GLUCOMTR-MCNC: 142 MG/DL — HIGH (ref 70–99)
GLUCOSE BLDC GLUCOMTR-MCNC: 167 MG/DL — HIGH (ref 70–99)
GLUCOSE BLDC GLUCOMTR-MCNC: 213 MG/DL — HIGH (ref 70–99)
GLUCOSE SERPL-MCNC: 121 MG/DL — HIGH (ref 70–99)
HCT VFR BLD CALC: 38.5 % — LOW (ref 39–50)
HGB BLD-MCNC: 13.4 G/DL — SIGNIFICANT CHANGE UP (ref 13–17)
IANC: 3.5 K/UL — SIGNIFICANT CHANGE UP (ref 1.8–7.4)
IMM GRANULOCYTES NFR BLD AUTO: 0.4 % — SIGNIFICANT CHANGE UP (ref 0–0.9)
LDH SERPL L TO P-CCNC: 230 U/L — HIGH (ref 135–225)
LYMPHOCYTES # BLD AUTO: 1.22 K/UL — SIGNIFICANT CHANGE UP (ref 1–3.3)
LYMPHOCYTES # BLD AUTO: 21.6 % — SIGNIFICANT CHANGE UP (ref 13–44)
MAGNESIUM SERPL-MCNC: 2.2 MG/DL — SIGNIFICANT CHANGE UP (ref 1.6–2.6)
MCHC RBC-ENTMCNC: 31.9 PG — SIGNIFICANT CHANGE UP (ref 27–34)
MCHC RBC-ENTMCNC: 34.8 G/DL — SIGNIFICANT CHANGE UP (ref 32–36)
MCV RBC AUTO: 91.7 FL — SIGNIFICANT CHANGE UP (ref 80–100)
MONOCYTES # BLD AUTO: 0.82 K/UL — SIGNIFICANT CHANGE UP (ref 0–0.9)
MONOCYTES NFR BLD AUTO: 14.5 % — HIGH (ref 2–14)
NEUTROPHILS # BLD AUTO: 3.5 K/UL — SIGNIFICANT CHANGE UP (ref 1.8–7.4)
NEUTROPHILS NFR BLD AUTO: 62.1 % — SIGNIFICANT CHANGE UP (ref 43–77)
NRBC # BLD: 0 /100 WBCS — SIGNIFICANT CHANGE UP (ref 0–0)
NRBC # FLD: 0 K/UL — SIGNIFICANT CHANGE UP (ref 0–0)
PHOSPHATE SERPL-MCNC: 3.1 MG/DL — SIGNIFICANT CHANGE UP (ref 2.5–4.5)
PLATELET # BLD AUTO: 116 K/UL — LOW (ref 150–400)
POTASSIUM SERPL-MCNC: 3.7 MMOL/L — SIGNIFICANT CHANGE UP (ref 3.5–5.3)
POTASSIUM SERPL-SCNC: 3.7 MMOL/L — SIGNIFICANT CHANGE UP (ref 3.5–5.3)
PROT SERPL-MCNC: 6.6 G/DL — SIGNIFICANT CHANGE UP (ref 6–8.3)
RBC # BLD: 4.2 M/UL — SIGNIFICANT CHANGE UP (ref 4.2–5.8)
RBC # FLD: 12.6 % — SIGNIFICANT CHANGE UP (ref 10.3–14.5)
SODIUM SERPL-SCNC: 135 MMOL/L — SIGNIFICANT CHANGE UP (ref 135–145)
URATE SERPL-MCNC: 3.6 MG/DL — SIGNIFICANT CHANGE UP (ref 3.4–8.8)
WBC # BLD: 5.64 K/UL — SIGNIFICANT CHANGE UP (ref 3.8–10.5)
WBC # FLD AUTO: 5.64 K/UL — SIGNIFICANT CHANGE UP (ref 3.8–10.5)

## 2024-12-28 PROCEDURE — 99232 SBSQ HOSP IP/OBS MODERATE 35: CPT

## 2024-12-28 RX ADMIN — LORAZEPAM 0.5 MILLIGRAM(S): 1 TABLET ORAL at 00:38

## 2024-12-28 RX ADMIN — ACETAMINOPHEN 975 MILLIGRAM(S): 80 SOLUTION/ DROPS ORAL at 13:32

## 2024-12-28 RX ADMIN — Medication 40 MILLIGRAM(S): at 06:15

## 2024-12-28 RX ADMIN — DULOXETINE HYDROCHLORIDE 60 MILLIGRAM(S): 30 CAPSULE, DELAYED RELEASE ORAL at 12:15

## 2024-12-28 RX ADMIN — ACETAMINOPHEN 975 MILLIGRAM(S): 80 SOLUTION/ DROPS ORAL at 22:40

## 2024-12-28 RX ADMIN — ATORVASTATIN CALCIUM 10 MILLIGRAM(S): 40 TABLET, FILM COATED ORAL at 21:41

## 2024-12-28 RX ADMIN — Medication 2: at 12:15

## 2024-12-28 RX ADMIN — ENOXAPARIN SODIUM 40 MILLIGRAM(S): 60 INJECTION INTRAVENOUS; SUBCUTANEOUS at 18:22

## 2024-12-28 RX ADMIN — Medication 100 MILLIGRAM(S): at 05:30

## 2024-12-28 RX ADMIN — OLANZAPINE 5 MILLIGRAM(S): 15 TABLET ORAL at 21:41

## 2024-12-28 RX ADMIN — Medication 1 TABLET(S): at 12:15

## 2024-12-28 RX ADMIN — LOSARTAN POTASSIUM 25 MILLIGRAM(S): 100 TABLET, FILM COATED ORAL at 05:30

## 2024-12-28 RX ADMIN — GABAPENTIN 300 MILLIGRAM(S): 300 CAPSULE ORAL at 05:29

## 2024-12-28 RX ADMIN — Medication 40 MILLIGRAM(S): at 19:19

## 2024-12-28 RX ADMIN — Medication 20 MILLIGRAM(S): at 22:40

## 2024-12-28 RX ADMIN — Medication 20 MILLIGRAM(S): at 21:37

## 2024-12-28 RX ADMIN — LORAZEPAM 0.5 MILLIGRAM(S): 1 TABLET ORAL at 08:47

## 2024-12-28 RX ADMIN — ACETAMINOPHEN 975 MILLIGRAM(S): 80 SOLUTION/ DROPS ORAL at 06:15

## 2024-12-28 RX ADMIN — ACETAMINOPHEN 975 MILLIGRAM(S): 80 SOLUTION/ DROPS ORAL at 13:00

## 2024-12-28 RX ADMIN — Medication 17 GRAM(S): at 05:32

## 2024-12-28 RX ADMIN — ACETAMINOPHEN 975 MILLIGRAM(S): 80 SOLUTION/ DROPS ORAL at 21:41

## 2024-12-28 RX ADMIN — ENOXAPARIN SODIUM 40 MILLIGRAM(S): 60 INJECTION INTRAVENOUS; SUBCUTANEOUS at 05:31

## 2024-12-28 RX ADMIN — TAMSULOSIN HYDROCHLORIDE 0.4 MILLIGRAM(S): 0.4 CAPSULE ORAL at 21:41

## 2024-12-28 RX ADMIN — Medication 40 MILLIGRAM(S): at 20:00

## 2024-12-28 RX ADMIN — ACETAMINOPHEN 975 MILLIGRAM(S): 80 SOLUTION/ DROPS ORAL at 05:31

## 2024-12-28 RX ADMIN — PEGFILGRASTIM-APGF 6 MILLIGRAM(S): 6 INJECTION, SOLUTION SUBCUTANEOUS at 19:23

## 2024-12-28 RX ADMIN — Medication 40 MILLIGRAM(S): at 05:29

## 2024-12-28 RX ADMIN — LEVOTHYROXINE SODIUM 75 MICROGRAM(S): 175 TABLET ORAL at 05:02

## 2024-12-28 RX ADMIN — Medication 10 MILLIGRAM(S): at 05:30

## 2024-12-28 RX ADMIN — GABAPENTIN 300 MILLIGRAM(S): 300 CAPSULE ORAL at 12:13

## 2024-12-28 RX ADMIN — PANTOPRAZOLE 40 MILLIGRAM(S): 40 TABLET, DELAYED RELEASE ORAL at 05:32

## 2024-12-28 RX ADMIN — LORAZEPAM 0.5 MILLIGRAM(S): 1 TABLET ORAL at 21:37

## 2024-12-28 RX ADMIN — ALLOPURINOL 300 MILLIGRAM(S): 100 TABLET ORAL at 12:15

## 2024-12-28 RX ADMIN — Medication 1000 UNIT(S): at 12:15

## 2024-12-28 RX ADMIN — Medication 17 GRAM(S): at 18:22

## 2024-12-28 RX ADMIN — GABAPENTIN 300 MILLIGRAM(S): 300 CAPSULE ORAL at 18:22

## 2024-12-28 RX ADMIN — SENNOSIDES 2 TABLET(S): 8.6 TABLET, FILM COATED ORAL at 05:31

## 2024-12-28 RX ADMIN — Medication 100 MILLIGRAM(S): at 05:29

## 2024-12-28 RX ADMIN — Medication 500 MILLIGRAM(S): at 12:15

## 2024-12-28 NOTE — PROGRESS NOTE ADULT - SUBJECTIVE AND OBJECTIVE BOX
Chief Complaint: T2DM    Interval Events: Pt seen and examined at bedside. Pt tolerating carb consistent diet with no nausea, no vomiting.      MEDICATIONS  (STANDING):  acetaminophen     Tablet .. 975 milliGRAM(s) Oral every 8 hours  allopurinol 300 milliGRAM(s) Oral daily  amLODIPine   Tablet 10 milliGRAM(s) Oral daily  ascorbic acid 500 milliGRAM(s) Oral daily  atenolol  Tablet 100 milliGRAM(s) Oral daily  atorvastatin 10 milliGRAM(s) Oral at bedtime  cholecalciferol 1000 Unit(s) Oral daily  dextrose 5%. 1000 milliLiter(s) (100 mL/Hr) IV Continuous <Continuous>  dextrose 5%. 1000 milliLiter(s) (50 mL/Hr) IV Continuous <Continuous>  dextrose 5%. 1000 milliLiter(s) (50 mL/Hr) IV Continuous <Continuous>  dextrose 5%. 1000 milliLiter(s) (100 mL/Hr) IV Continuous <Continuous>  dextrose 50% Injectable 25 Gram(s) IV Push once  dextrose 50% Injectable 12.5 Gram(s) IV Push once  dextrose 50% Injectable 25 Gram(s) IV Push once  dextrose 50% Injectable 25 Gram(s) IV Push once  dextrose 50% Injectable 12.5 Gram(s) IV Push once  dextrose 50% Injectable 25 Gram(s) IV Push once  DULoxetine 60 milliGRAM(s) Oral daily  enoxaparin Injectable 40 milliGRAM(s) SubCutaneous every 12 hours  gabapentin 300 milliGRAM(s) Oral four times a day  glucagon  Injectable 1 milliGRAM(s) IntraMuscular once  glucagon  Injectable 1 milliGRAM(s) IntraMuscular once  levothyroxine 75 MICROGram(s) Oral daily  losartan 25 milliGRAM(s) Oral daily  multivitamin 1 Tablet(s) Oral daily  OLANZapine 5 milliGRAM(s) Oral at bedtime  oxyCODONE  ER Tablet 40 milliGRAM(s) Oral every 12 hours  pantoprazole    Tablet 40 milliGRAM(s) Oral before breakfast  pegfilgrastim-jmdb (FULPHILA) Injectable 6 milliGRAM(s) SubCutaneous once  polyethylene glycol 3350 17 Gram(s) Oral two times a day  senna 2 Tablet(s) Oral two times a day  tamsulosin 0.4 milliGRAM(s) Oral at bedtime    MEDICATIONS  (PRN):  dextrose Oral Gel 15 Gram(s) Oral once PRN Blood Glucose LESS THAN 70 milliGRAM(s)/deciliter  dextrose Oral Gel 15 Gram(s) Oral once PRN Blood Glucose LESS THAN 70 milliGRAM(s)/deciliter  diphenhydrAMINE 25 milliGRAM(s) Oral every 6 hours PRN Rash and/or Itching  hydrocortisone sodium succinate Injectable 100 milliGRAM(s) IV Push once PRN allergic reaction  LORazepam     Tablet 0.5 milliGRAM(s) Oral every 4 hours PRN anxiety/agitation  magnesium hydroxide Suspension 30 milliLiter(s) Oral daily PRN Constipation  meperidine     Injectable 25 milliGRAM(s) IV Push once PRN rigors  meperidine     Injectable 12.5 milliGRAM(s) IV Push once PRN RIGORS  methylPREDNISolone sodium succinate Injectable 50 milliGRAM(s) IV Push once PRN ANAPHLAXIS  metoclopramide Injectable 10 milliGRAM(s) IV Push every 4 hours PRN NAUSEA  naloxone Injectable 0.1 milliGRAM(s) IV Push every 3 minutes PRN For ANY of the following changes in patient status:  A. RR LESS THAN 10 breaths per minute, B. Oxygen saturation LESS THAN 90%, C. Sedation score of 6  ondansetron Injectable 4 milliGRAM(s) IV Push every 6 hours PRN Nausea and/or Vomiting  oxyCODONE    IR 20 milliGRAM(s) Oral every 4 hours PRN Severe Pain (7 - 10)  oxyCODONE    IR 15 milliGRAM(s) Oral every 4 hours PRN Moderate Pain (4 - 6)  sucralfate suspension 1 Gram(s) Oral Once PRN for esophageal pain  tiZANidine 4 milliGRAM(s) Oral <User Schedule> PRN Muscle Spasm  zolpidem 5 milliGRAM(s) Oral at bedtime PRN Insomnia      Allergies    No Known Allergies    Intolerances    IV Contrast (Nausea)    Review of Systems:  Eyes: No blurry vision  Cardiovascular: No chest pain, palpitations  Respiratory: No SOB, no cough  GI: No nausea, vomiting, abdominal pain  : No dysuria    ALL OTHER SYSTEMS REVIEWED AND NEGATIVE    VITALS: T(C): 36.4 (12-28-24 @ 13:32)  T(F): 97.5 (12-28-24 @ 13:32), Max: 98.3 (12-27-24 @ 22:21)  HR: 71 (12-28-24 @ 13:32) (64 - 95)  BP: 150/69 (12-28-24 @ 13:32) (113/65 - 159/79)  RR:  (16 - 18)  SpO2:  (96% - 98%)  Wt(kg): --      Physical Exam:   GENERAL: NAD, well-developed  EYES: No proptosis  HEENT:  Atraumatic, Normocephalic  RESPIRATORY: non labored breathing   GI: Non distended  PSYCH: Alert, normal affect, normal mood      CAPILLARY BLOOD GLUCOSE    POCT Blood Glucose.: 213 mg/dL (28 Dec 2024 11:21)  POCT Blood Glucose.: 131 mg/dL (28 Dec 2024 07:15)  POCT Blood Glucose.: 145 mg/dL (27 Dec 2024 22:06)  POCT Blood Glucose.: 221 mg/dL (27 Dec 2024 16:30)      12-28    135  |  100  |  24[H]  ----------------------------<  121[H]  3.7   |  24  |  0.70    eGFR: 104    Ca    8.9      12-28  Mg     2.20     12-28  Phos  3.1     12-28    TPro  6.6  /  Alb  3.6  /  TBili  0.5  /  DBili  x   /  AST  40  /  ALT  39  /  AlkPhos  70  12-28      A1C with Estimated Average Glucose Result: 6.1 % (12-16-24 @ 17:36)      Thyroid Function Tests:

## 2024-12-28 NOTE — PROGRESS NOTE ADULT - ASSESSMENT
64M with hiatal hernia s/p fundoplication, prior SBO s/p multiple abdominal surgeries, T2DM, chronic back pain, hypothyroidism, NANCY (not on CPAP) initially admitted to orthopedics for biopsy of enlarging painful L anterior shoulder mass, now s/p biopsy 12/17. Pathology confirmed DCBL, now undergoing oncology work-up and treatment.     Consulted for: Steroid induced hyperglycemia    #Steroid induced hyperglycemia  #T2DM controlled  He reports he was diagnosed with preDM 20 years ago, has been on metformin 1000mg BID since 6 years ago. He reports his A1c has always been well controlled below 6.5.  He likely has T2DM that is well controlled on metformin.  A1C with Estimated Average Glucose Result: 6.1 % (12-16-24)  He doesn't often check FS because glycemic control has normally been very good  Managed by PCP Dr. Rico  Family hx: Father had T2DM  Complications: Denies CAD, CVA. No known microvascular complications  Steroids: Prednisone 100mg daily 12/23-, duration 5 days  Glucose has been mostly well controlled below 200 on prednisone 100mg daily  Patient had PET scan scheduled on Friday 12/27 at 9:15AM, glucose <200 required to proceed. no insulin 6 hours prior to PET scan.    Inpatient plan:  - correctional scale discontinued today.   - CC diet    Discharge plan:  - Discharge regimen: continue home metformin 1000mg BID  - patient to follow up with PCP Dr. Rico  - Routine ophthalmology and podiatry follow up    #Hypothyroidism  He reports hx of hypothyroidism, has been on levothyroxine 75mcg daily stable dose for 5 years. He reports TFTs have been normal on this dose.  - Continue home levothyroxine 75mcg daily. Please administer in the AM on an empty stomach, 1 hour before food or other medications, and 4 hours before any PPI, calcium, or iron supplements.     #HTN  - goal <130/80  - check urine albumin/cr ratio outpatient  - management by primary team    #HLD  - check lipid panel outpatient      Endocrine team signing off at this time. Please contact the Endocrine consult team for any new concerns or questions.     NAIDA IbrahimDARION-BC  Nurse Practitioner  Division of Endocrinology  Contact on TEAMS    If out of hospital/unavailable when paged, please note: patient will be cared for by another provider on the endocrine service.  For urgent concerns: call the endocrine answering service for assistance to reach covering provider ([phone]). For non-urgent matters: please email [email or web address] for assistance.

## 2024-12-28 NOTE — PROGRESS NOTE ADULT - SUBJECTIVE AND OBJECTIVE BOX
Medicine Progress Note    Patient is a 64y old  Male who presents with a chief complaint of L shoulder mass (27 Dec 2024 16:36)      SUBJECTIVE / OVERNIGHT EVENTS: patient is very anxious     ADDITIONAL REVIEW OF SYSTEMS: negative     MEDICATIONS  (STANDING):  acetaminophen     Tablet .. 975 milliGRAM(s) Oral every 8 hours  allopurinol 300 milliGRAM(s) Oral daily  amLODIPine   Tablet 10 milliGRAM(s) Oral daily  ascorbic acid 500 milliGRAM(s) Oral daily  atenolol  Tablet 100 milliGRAM(s) Oral daily  atorvastatin 10 milliGRAM(s) Oral at bedtime  cholecalciferol 1000 Unit(s) Oral daily  dextrose 5%. 1000 milliLiter(s) (50 mL/Hr) IV Continuous <Continuous>  dextrose 5%. 1000 milliLiter(s) (100 mL/Hr) IV Continuous <Continuous>  dextrose 5%. 1000 milliLiter(s) (100 mL/Hr) IV Continuous <Continuous>  dextrose 5%. 1000 milliLiter(s) (50 mL/Hr) IV Continuous <Continuous>  dextrose 50% Injectable 25 Gram(s) IV Push once  dextrose 50% Injectable 12.5 Gram(s) IV Push once  dextrose 50% Injectable 25 Gram(s) IV Push once  dextrose 50% Injectable 25 Gram(s) IV Push once  dextrose 50% Injectable 12.5 Gram(s) IV Push once  dextrose 50% Injectable 25 Gram(s) IV Push once  DULoxetine 60 milliGRAM(s) Oral daily  enoxaparin Injectable 40 milliGRAM(s) SubCutaneous every 12 hours  gabapentin 300 milliGRAM(s) Oral four times a day  glucagon  Injectable 1 milliGRAM(s) IntraMuscular once  glucagon  Injectable 1 milliGRAM(s) IntraMuscular once  insulin lispro (ADMELOG) corrective regimen sliding scale   SubCutaneous three times a day before meals  insulin lispro (ADMELOG) corrective regimen sliding scale   SubCutaneous at bedtime  levothyroxine 75 MICROGram(s) Oral daily  losartan 25 milliGRAM(s) Oral daily  multivitamin 1 Tablet(s) Oral daily  OLANZapine 5 milliGRAM(s) Oral at bedtime  oxyCODONE  ER Tablet 40 milliGRAM(s) Oral every 12 hours  pantoprazole    Tablet 40 milliGRAM(s) Oral before breakfast  pegfilgrastim-jmdb (FULPHILA) Injectable 6 milliGRAM(s) SubCutaneous once  polyethylene glycol 3350 17 Gram(s) Oral two times a day  senna 2 Tablet(s) Oral two times a day  tamsulosin 0.4 milliGRAM(s) Oral at bedtime    MEDICATIONS  (PRN):  dextrose Oral Gel 15 Gram(s) Oral once PRN Blood Glucose LESS THAN 70 milliGRAM(s)/deciliter  dextrose Oral Gel 15 Gram(s) Oral once PRN Blood Glucose LESS THAN 70 milliGRAM(s)/deciliter  diphenhydrAMINE 25 milliGRAM(s) Oral every 6 hours PRN Rash and/or Itching  hydrocortisone sodium succinate Injectable 100 milliGRAM(s) IV Push once PRN allergic reaction  LORazepam     Tablet 0.5 milliGRAM(s) Oral every 4 hours PRN anxiety/agitation  magnesium hydroxide Suspension 30 milliLiter(s) Oral daily PRN Constipation  meperidine     Injectable 12.5 milliGRAM(s) IV Push once PRN RIGORS  meperidine     Injectable 25 milliGRAM(s) IV Push once PRN rigors  methylPREDNISolone sodium succinate Injectable 50 milliGRAM(s) IV Push once PRN ANAPHLAXIS  metoclopramide Injectable 10 milliGRAM(s) IV Push every 4 hours PRN NAUSEA  naloxone Injectable 0.1 milliGRAM(s) IV Push every 3 minutes PRN For ANY of the following changes in patient status:  A. RR LESS THAN 10 breaths per minute, B. Oxygen saturation LESS THAN 90%, C. Sedation score of 6  ondansetron Injectable 4 milliGRAM(s) IV Push every 6 hours PRN Nausea and/or Vomiting  oxyCODONE    IR 20 milliGRAM(s) Oral every 4 hours PRN Severe Pain (7 - 10)  oxyCODONE    IR 15 milliGRAM(s) Oral every 4 hours PRN Moderate Pain (4 - 6)  sucralfate suspension 1 Gram(s) Oral Once PRN for esophageal pain  tiZANidine 4 milliGRAM(s) Oral <User Schedule> PRN Muscle Spasm  zolpidem 5 milliGRAM(s) Oral at bedtime PRN Insomnia    CAPILLARY BLOOD GLUCOSE      POCT Blood Glucose.: 213 mg/dL (28 Dec 2024 11:21)  POCT Blood Glucose.: 131 mg/dL (28 Dec 2024 07:15)  POCT Blood Glucose.: 145 mg/dL (27 Dec 2024 22:06)  POCT Blood Glucose.: 221 mg/dL (27 Dec 2024 16:30)    I&O's Summary      PHYSICAL EXAM:  Vital Signs Last 24 Hrs  T(C): 36.5 (28 Dec 2024 09:41), Max: 36.8 (27 Dec 2024 22:21)  T(F): 97.7 (28 Dec 2024 09:41), Max: 98.3 (27 Dec 2024 22:21)  HR: 64 (28 Dec 2024 09:41) (64 - 95)  BP: 147/70 (28 Dec 2024 09:41) (113/65 - 159/79)  BP(mean): --  RR: 17 (28 Dec 2024 09:41) (16 - 18)  SpO2: 97% (28 Dec 2024 09:41) (95% - 98%)    Parameters below as of 28 Dec 2024 09:41  Patient On (Oxygen Delivery Method): room air      CONSTITUTIONAL: NAD  ENMT: Moist oral mucosa, no pharyngeal injection or exudates;  RESPIRATORY: Normal respiratory effort; lungs are clear to auscultation bilaterally  CARDIOVASCULAR: Regular rate and rhythm, normal S1 and S2, No lower extremity edema;   ABDOMEN: Nontender to palpation, normoactive bowel sounds, no rebound/guarding;  PSYCH: A+O to person, place, and time; affect anxious   NEUROLOGY: CN 2-12 are intact and symmetric; no gross sensory deficits   SKIN: left shoulder rash , wound     LABS:                        13.4   5.64  )-----------( 116      ( 28 Dec 2024 07:10 )             38.5     12-28    135  |  100  |  24[H]  ----------------------------<  121[H]  3.7   |  24  |  0.70    Ca    8.9      28 Dec 2024 07:10  Phos  3.1     12-28  Mg     2.20     12-28    TPro  6.6  /  Alb  3.6  /  TBili  0.5  /  DBili  x   /  AST  40  /  ALT  39  /  AlkPhos  70  12-28          Urinalysis Basic - ( 28 Dec 2024 07:10 )    Color: x / Appearance: x / SG: x / pH: x  Gluc: 121 mg/dL / Ketone: x  / Bili: x / Urobili: x   Blood: x / Protein: x / Nitrite: x   Leuk Esterase: x / RBC: x / WBC x   Sq Epi: x / Non Sq Epi: x / Bacteria: x            RADIOLOGY & ADDITIONAL TESTS:  Imaging from Last 24 Hours:    Electrocardiogram/QTc Interval:    COORDINATION OF CARE:  Care Discussed with Consultants/Other Providers: ACP

## 2024-12-29 LAB
ALBUMIN SERPL ELPH-MCNC: 3.6 G/DL — SIGNIFICANT CHANGE UP (ref 3.3–5)
ALP SERPL-CCNC: 71 U/L — SIGNIFICANT CHANGE UP (ref 40–120)
ALT FLD-CCNC: 38 U/L — SIGNIFICANT CHANGE UP (ref 4–41)
ANION GAP SERPL CALC-SCNC: 12 MMOL/L — SIGNIFICANT CHANGE UP (ref 7–14)
AST SERPL-CCNC: 38 U/L — SIGNIFICANT CHANGE UP (ref 4–40)
BASOPHILS # BLD AUTO: 0.04 K/UL — SIGNIFICANT CHANGE UP (ref 0–0.2)
BASOPHILS # BLD AUTO: 0.05 K/UL — SIGNIFICANT CHANGE UP (ref 0–0.2)
BASOPHILS NFR BLD AUTO: 0.2 % — SIGNIFICANT CHANGE UP (ref 0–2)
BASOPHILS NFR BLD AUTO: 0.3 % — SIGNIFICANT CHANGE UP (ref 0–2)
BILIRUB SERPL-MCNC: 0.9 MG/DL — SIGNIFICANT CHANGE UP (ref 0.2–1.2)
BUN SERPL-MCNC: 21 MG/DL — SIGNIFICANT CHANGE UP (ref 7–23)
CALCIUM SERPL-MCNC: 9.3 MG/DL — SIGNIFICANT CHANGE UP (ref 8.4–10.5)
CHLORIDE SERPL-SCNC: 101 MMOL/L — SIGNIFICANT CHANGE UP (ref 98–107)
CO2 SERPL-SCNC: 24 MMOL/L — SIGNIFICANT CHANGE UP (ref 22–31)
CREAT SERPL-MCNC: 0.76 MG/DL — SIGNIFICANT CHANGE UP (ref 0.5–1.3)
EGFR: 101 ML/MIN/1.73M2 — SIGNIFICANT CHANGE UP
EOSINOPHIL # BLD AUTO: 0.1 K/UL — SIGNIFICANT CHANGE UP (ref 0–0.5)
EOSINOPHIL # BLD AUTO: 0.15 K/UL — SIGNIFICANT CHANGE UP (ref 0–0.5)
EOSINOPHIL NFR BLD AUTO: 0.6 % — SIGNIFICANT CHANGE UP (ref 0–6)
EOSINOPHIL NFR BLD AUTO: 0.7 % — SIGNIFICANT CHANGE UP (ref 0–6)
G6PD RBC-CCNC: 14.2 U/G HGB — SIGNIFICANT CHANGE UP (ref 7–20.5)
GLUCOSE BLDC GLUCOMTR-MCNC: 119 MG/DL — HIGH (ref 70–99)
GLUCOSE BLDC GLUCOMTR-MCNC: 133 MG/DL — HIGH (ref 70–99)
GLUCOSE BLDC GLUCOMTR-MCNC: 152 MG/DL — HIGH (ref 70–99)
GLUCOSE BLDC GLUCOMTR-MCNC: 97 MG/DL — SIGNIFICANT CHANGE UP (ref 70–99)
GLUCOSE SERPL-MCNC: 98 MG/DL — SIGNIFICANT CHANGE UP (ref 70–99)
HCT VFR BLD CALC: 40.3 % — SIGNIFICANT CHANGE UP (ref 39–50)
HCT VFR BLD CALC: 40.7 % — SIGNIFICANT CHANGE UP (ref 39–50)
HGB BLD-MCNC: 14.3 G/DL — SIGNIFICANT CHANGE UP (ref 13–17)
HGB BLD-MCNC: 14.5 G/DL — SIGNIFICANT CHANGE UP (ref 13–17)
IANC: 12.59 K/UL — HIGH (ref 1.8–7.4)
IANC: 18.57 K/UL — HIGH (ref 1.8–7.4)
IMM GRANULOCYTES NFR BLD AUTO: 0.4 % — SIGNIFICANT CHANGE UP (ref 0–0.9)
IMM GRANULOCYTES NFR BLD AUTO: 0.9 % — SIGNIFICANT CHANGE UP (ref 0–0.9)
LDH SERPL L TO P-CCNC: 231 U/L — HIGH (ref 135–225)
LYMPHOCYTES # BLD AUTO: 1.63 K/UL — SIGNIFICANT CHANGE UP (ref 1–3.3)
LYMPHOCYTES # BLD AUTO: 10.5 % — LOW (ref 13–44)
LYMPHOCYTES # BLD AUTO: 2.21 K/UL — SIGNIFICANT CHANGE UP (ref 1–3.3)
LYMPHOCYTES # BLD AUTO: 9.9 % — LOW (ref 13–44)
MCHC RBC-ENTMCNC: 31.8 PG — SIGNIFICANT CHANGE UP (ref 27–34)
MCHC RBC-ENTMCNC: 32.2 PG — SIGNIFICANT CHANGE UP (ref 27–34)
MCHC RBC-ENTMCNC: 35.5 G/DL — SIGNIFICANT CHANGE UP (ref 32–36)
MCHC RBC-ENTMCNC: 35.6 G/DL — SIGNIFICANT CHANGE UP (ref 32–36)
MCV RBC AUTO: 89.3 FL — SIGNIFICANT CHANGE UP (ref 80–100)
MCV RBC AUTO: 90.8 FL — SIGNIFICANT CHANGE UP (ref 80–100)
MONOCYTES # BLD AUTO: 1.13 K/UL — HIGH (ref 0–0.9)
MONOCYTES # BLD AUTO: 1.21 K/UL — HIGH (ref 0–0.9)
MONOCYTES NFR BLD AUTO: 5.4 % — SIGNIFICANT CHANGE UP (ref 2–14)
MONOCYTES NFR BLD AUTO: 7.3 % — SIGNIFICANT CHANGE UP (ref 2–14)
NEUTROPHILS # BLD AUTO: 12.59 K/UL — HIGH (ref 1.8–7.4)
NEUTROPHILS # BLD AUTO: 18.57 K/UL — HIGH (ref 1.8–7.4)
NEUTROPHILS NFR BLD AUTO: 80.9 % — HIGH (ref 43–77)
NEUTROPHILS NFR BLD AUTO: 82.9 % — HIGH (ref 43–77)
NRBC # BLD: 0 /100 WBCS — SIGNIFICANT CHANGE UP (ref 0–0)
NRBC # BLD: 0 /100 WBCS — SIGNIFICANT CHANGE UP (ref 0–0)
NRBC # FLD: 0 K/UL — SIGNIFICANT CHANGE UP (ref 0–0)
NRBC # FLD: 0 K/UL — SIGNIFICANT CHANGE UP (ref 0–0)
PHOSPHATE SERPL-MCNC: 3.2 MG/DL — SIGNIFICANT CHANGE UP (ref 2.5–4.5)
PLATELET # BLD AUTO: 122 K/UL — LOW (ref 150–400)
PLATELET # BLD AUTO: 144 K/UL — LOW (ref 150–400)
POTASSIUM SERPL-MCNC: 3.9 MMOL/L — SIGNIFICANT CHANGE UP (ref 3.5–5.3)
POTASSIUM SERPL-SCNC: 3.9 MMOL/L — SIGNIFICANT CHANGE UP (ref 3.5–5.3)
PROT SERPL-MCNC: 6.6 G/DL — SIGNIFICANT CHANGE UP (ref 6–8.3)
RBC # BLD: 4.44 M/UL — SIGNIFICANT CHANGE UP (ref 4.2–5.8)
RBC # BLD: 4.56 M/UL — SIGNIFICANT CHANGE UP (ref 4.2–5.8)
RBC # FLD: 12 % — SIGNIFICANT CHANGE UP (ref 10.3–14.5)
RBC # FLD: 12.1 % — SIGNIFICANT CHANGE UP (ref 10.3–14.5)
SODIUM SERPL-SCNC: 137 MMOL/L — SIGNIFICANT CHANGE UP (ref 135–145)
URATE SERPL-MCNC: 3.9 MG/DL — SIGNIFICANT CHANGE UP (ref 3.4–8.8)
WBC # BLD: 15.55 K/UL — HIGH (ref 3.8–10.5)
WBC # BLD: 22.39 K/UL — HIGH (ref 3.8–10.5)
WBC # FLD AUTO: 15.55 K/UL — HIGH (ref 3.8–10.5)
WBC # FLD AUTO: 22.39 K/UL — HIGH (ref 3.8–10.5)

## 2024-12-29 PROCEDURE — 99232 SBSQ HOSP IP/OBS MODERATE 35: CPT

## 2024-12-29 RX ADMIN — GABAPENTIN 300 MILLIGRAM(S): 300 CAPSULE ORAL at 12:06

## 2024-12-29 RX ADMIN — Medication 17 GRAM(S): at 19:54

## 2024-12-29 RX ADMIN — LORAZEPAM 0.5 MILLIGRAM(S): 1 TABLET ORAL at 22:59

## 2024-12-29 RX ADMIN — Medication 40 MILLIGRAM(S): at 05:12

## 2024-12-29 RX ADMIN — Medication 20 MILLIGRAM(S): at 22:45

## 2024-12-29 RX ADMIN — Medication 40 MILLIGRAM(S): at 19:54

## 2024-12-29 RX ADMIN — Medication 20 MILLIGRAM(S): at 15:04

## 2024-12-29 RX ADMIN — Medication 1 TABLET(S): at 12:06

## 2024-12-29 RX ADMIN — Medication 20 MILLIGRAM(S): at 09:20

## 2024-12-29 RX ADMIN — GABAPENTIN 300 MILLIGRAM(S): 300 CAPSULE ORAL at 18:35

## 2024-12-29 RX ADMIN — ACETAMINOPHEN 975 MILLIGRAM(S): 80 SOLUTION/ DROPS ORAL at 21:52

## 2024-12-29 RX ADMIN — Medication 20 MILLIGRAM(S): at 21:49

## 2024-12-29 RX ADMIN — ACETAMINOPHEN 975 MILLIGRAM(S): 80 SOLUTION/ DROPS ORAL at 13:32

## 2024-12-29 RX ADMIN — Medication 10 MILLIGRAM(S): at 05:12

## 2024-12-29 RX ADMIN — Medication 5 MILLIGRAM(S): at 01:08

## 2024-12-29 RX ADMIN — DULOXETINE HYDROCHLORIDE 60 MILLIGRAM(S): 30 CAPSULE, DELAYED RELEASE ORAL at 12:06

## 2024-12-29 RX ADMIN — Medication 40 MILLIGRAM(S): at 06:11

## 2024-12-29 RX ADMIN — ACETAMINOPHEN 975 MILLIGRAM(S): 80 SOLUTION/ DROPS ORAL at 06:11

## 2024-12-29 RX ADMIN — LOSARTAN POTASSIUM 25 MILLIGRAM(S): 100 TABLET, FILM COATED ORAL at 05:13

## 2024-12-29 RX ADMIN — Medication 17 GRAM(S): at 05:13

## 2024-12-29 RX ADMIN — LORAZEPAM 0.5 MILLIGRAM(S): 1 TABLET ORAL at 09:20

## 2024-12-29 RX ADMIN — PANTOPRAZOLE 40 MILLIGRAM(S): 40 TABLET, DELAYED RELEASE ORAL at 05:12

## 2024-12-29 RX ADMIN — Medication 20 MILLIGRAM(S): at 10:20

## 2024-12-29 RX ADMIN — SENNOSIDES 2 TABLET(S): 8.6 TABLET, FILM COATED ORAL at 19:55

## 2024-12-29 RX ADMIN — ACETAMINOPHEN 975 MILLIGRAM(S): 80 SOLUTION/ DROPS ORAL at 22:45

## 2024-12-29 RX ADMIN — ATORVASTATIN CALCIUM 10 MILLIGRAM(S): 40 TABLET, FILM COATED ORAL at 21:51

## 2024-12-29 RX ADMIN — ENOXAPARIN SODIUM 40 MILLIGRAM(S): 60 INJECTION INTRAVENOUS; SUBCUTANEOUS at 18:36

## 2024-12-29 RX ADMIN — GABAPENTIN 300 MILLIGRAM(S): 300 CAPSULE ORAL at 05:11

## 2024-12-29 RX ADMIN — ENOXAPARIN SODIUM 40 MILLIGRAM(S): 60 INJECTION INTRAVENOUS; SUBCUTANEOUS at 05:21

## 2024-12-29 RX ADMIN — Medication 500 MILLIGRAM(S): at 12:07

## 2024-12-29 RX ADMIN — OLANZAPINE 5 MILLIGRAM(S): 15 TABLET ORAL at 21:51

## 2024-12-29 RX ADMIN — TAMSULOSIN HYDROCHLORIDE 0.4 MILLIGRAM(S): 0.4 CAPSULE ORAL at 21:52

## 2024-12-29 RX ADMIN — Medication 20 MILLIGRAM(S): at 15:54

## 2024-12-29 RX ADMIN — ALLOPURINOL 300 MILLIGRAM(S): 100 TABLET ORAL at 12:07

## 2024-12-29 RX ADMIN — TIZANIDINE 4 MILLIGRAM(S): 4 TABLET ORAL at 18:38

## 2024-12-29 RX ADMIN — Medication 100 MILLIGRAM(S): at 05:12

## 2024-12-29 RX ADMIN — SENNOSIDES 2 TABLET(S): 8.6 TABLET, FILM COATED ORAL at 05:12

## 2024-12-29 RX ADMIN — ACETAMINOPHEN 975 MILLIGRAM(S): 80 SOLUTION/ DROPS ORAL at 13:34

## 2024-12-29 RX ADMIN — LEVOTHYROXINE SODIUM 75 MICROGRAM(S): 175 TABLET ORAL at 05:12

## 2024-12-29 RX ADMIN — Medication 40 MILLIGRAM(S): at 20:45

## 2024-12-29 RX ADMIN — GABAPENTIN 300 MILLIGRAM(S): 300 CAPSULE ORAL at 01:08

## 2024-12-29 RX ADMIN — ACETAMINOPHEN 975 MILLIGRAM(S): 80 SOLUTION/ DROPS ORAL at 05:13

## 2024-12-29 NOTE — PROGRESS NOTE ADULT - SUBJECTIVE AND OBJECTIVE BOX
Medicine Progress Note    Patient is a 64y old  Male who presents with a chief complaint of L shoulder mass (28 Dec 2024 15:42)      SUBJECTIVE / OVERNIGHT EVENTS:  patient is anxious     ADDITIONAL REVIEW OF SYSTEMS: negative     MEDICATIONS  (STANDING):  acetaminophen     Tablet .. 975 milliGRAM(s) Oral every 8 hours  allopurinol 300 milliGRAM(s) Oral daily  amLODIPine   Tablet 10 milliGRAM(s) Oral daily  ascorbic acid 500 milliGRAM(s) Oral daily  atenolol  Tablet 100 milliGRAM(s) Oral daily  atorvastatin 10 milliGRAM(s) Oral at bedtime  cholecalciferol 1000 Unit(s) Oral daily  dextrose 5%. 1000 milliLiter(s) (100 mL/Hr) IV Continuous <Continuous>  dextrose 5%. 1000 milliLiter(s) (50 mL/Hr) IV Continuous <Continuous>  dextrose 5%. 1000 milliLiter(s) (50 mL/Hr) IV Continuous <Continuous>  dextrose 5%. 1000 milliLiter(s) (100 mL/Hr) IV Continuous <Continuous>  dextrose 50% Injectable 25 Gram(s) IV Push once  dextrose 50% Injectable 12.5 Gram(s) IV Push once  dextrose 50% Injectable 25 Gram(s) IV Push once  dextrose 50% Injectable 25 Gram(s) IV Push once  dextrose 50% Injectable 12.5 Gram(s) IV Push once  dextrose 50% Injectable 25 Gram(s) IV Push once  DULoxetine 60 milliGRAM(s) Oral daily  enoxaparin Injectable 40 milliGRAM(s) SubCutaneous every 12 hours  gabapentin 300 milliGRAM(s) Oral four times a day  glucagon  Injectable 1 milliGRAM(s) IntraMuscular once  glucagon  Injectable 1 milliGRAM(s) IntraMuscular once  levothyroxine 75 MICROGram(s) Oral daily  losartan 25 milliGRAM(s) Oral daily  multivitamin 1 Tablet(s) Oral daily  OLANZapine 5 milliGRAM(s) Oral at bedtime  oxyCODONE  ER Tablet 40 milliGRAM(s) Oral every 12 hours  pantoprazole    Tablet 40 milliGRAM(s) Oral before breakfast  polyethylene glycol 3350 17 Gram(s) Oral two times a day  senna 2 Tablet(s) Oral two times a day  tamsulosin 0.4 milliGRAM(s) Oral at bedtime    MEDICATIONS  (PRN):  dextrose Oral Gel 15 Gram(s) Oral once PRN Blood Glucose LESS THAN 70 milliGRAM(s)/deciliter  dextrose Oral Gel 15 Gram(s) Oral once PRN Blood Glucose LESS THAN 70 milliGRAM(s)/deciliter  diphenhydrAMINE 25 milliGRAM(s) Oral every 6 hours PRN Rash and/or Itching  hydrocortisone sodium succinate Injectable 100 milliGRAM(s) IV Push once PRN allergic reaction  LORazepam     Tablet 0.5 milliGRAM(s) Oral every 4 hours PRN anxiety/agitation  magnesium hydroxide Suspension 30 milliLiter(s) Oral daily PRN Constipation  meperidine     Injectable 25 milliGRAM(s) IV Push once PRN rigors  meperidine     Injectable 12.5 milliGRAM(s) IV Push once PRN RIGORS  methylPREDNISolone sodium succinate Injectable 50 milliGRAM(s) IV Push once PRN ANAPHLAXIS  metoclopramide Injectable 10 milliGRAM(s) IV Push every 4 hours PRN NAUSEA  naloxone Injectable 0.1 milliGRAM(s) IV Push every 3 minutes PRN For ANY of the following changes in patient status:  A. RR LESS THAN 10 breaths per minute, B. Oxygen saturation LESS THAN 90%, C. Sedation score of 6  ondansetron Injectable 4 milliGRAM(s) IV Push every 6 hours PRN Nausea and/or Vomiting  oxyCODONE    IR 20 milliGRAM(s) Oral every 4 hours PRN Severe Pain (7 - 10)  oxyCODONE    IR 15 milliGRAM(s) Oral every 4 hours PRN Moderate Pain (4 - 6)  sucralfate suspension 1 Gram(s) Oral Once PRN for esophageal pain  tiZANidine 4 milliGRAM(s) Oral <User Schedule> PRN Muscle Spasm  zolpidem 5 milliGRAM(s) Oral at bedtime PRN Insomnia    CAPILLARY BLOOD GLUCOSE      POCT Blood Glucose.: 119 mg/dL (29 Dec 2024 11:26)  POCT Blood Glucose.: 97 mg/dL (29 Dec 2024 07:19)  POCT Blood Glucose.: 142 mg/dL (28 Dec 2024 21:35)  POCT Blood Glucose.: 167 mg/dL (28 Dec 2024 16:11)    I&O's Summary      PHYSICAL EXAM:  Vital Signs Last 24 Hrs  T(C): 37.1 (29 Dec 2024 09:27), Max: 37.1 (29 Dec 2024 09:27)  T(F): 98.8 (29 Dec 2024 09:27), Max: 98.8 (29 Dec 2024 09:27)  HR: 71 (29 Dec 2024 09:27) (59 - 71)  BP: 119/74 (29 Dec 2024 09:27) (119/74 - 150/69)  BP(mean): --  RR: 17 (29 Dec 2024 09:27) (16 - 18)  SpO2: 98% (29 Dec 2024 09:27) (96% - 100%)    Parameters below as of 29 Dec 2024 09:27  Patient On (Oxygen Delivery Method): room air      CONSTITUTIONAL: NAD,   ENMT: Moist oral mucosa, no pharyngeal injection or exudates;  RESPIRATORY: Normal respiratory effort; lungs are clear to auscultation bilaterally  CARDIOVASCULAR: Regular rate and rhythm, normal S1 and S2, No lower extremity edema; left shoulder ecchymosis significantly  improved   ABDOMEN: Nontender to palpation, normoactive bowel sounds,   PSYCH: A+O to person, place, and time; affect appropriate  NEUROLOGY: CN 2-12 are intact and symmetric; no gross sensory deficits   SKIN: as above      LABS:                        14.5   22.39 )-----------( 144      ( 29 Dec 2024 09:31 )             40.7     12-29    137  |  101  |  21  ----------------------------<  98  3.9   |  24  |  0.76    Ca    9.3      29 Dec 2024 06:37  Phos  3.2     12-29  Mg     2.20     12-28    TPro  6.6  /  Alb  3.6  /  TBili  0.9  /  DBili  x   /  AST  38  /  ALT  38  /  AlkPhos  71  12-29          Urinalysis Basic - ( 29 Dec 2024 06:37 )    Color: x / Appearance: x / SG: x / pH: x  Gluc: 98 mg/dL / Ketone: x  / Bili: x / Urobili: x   Blood: x / Protein: x / Nitrite: x   Leuk Esterase: x / RBC: x / WBC x   Sq Epi: x / Non Sq Epi: x / Bacteria: x            RADIOLOGY & ADDITIONAL TESTS:  Imaging from Last 24 Hours:    Electrocardiogram/QTc Interval:    COORDINATION OF CARE:  Care Discussed with Consultants/Other Providers: RICHELLE

## 2024-12-30 LAB
ALBUMIN SERPL ELPH-MCNC: 3.4 G/DL — SIGNIFICANT CHANGE UP (ref 3.3–5)
ALP SERPL-CCNC: 107 U/L — SIGNIFICANT CHANGE UP (ref 40–120)
ALT FLD-CCNC: 31 U/L — SIGNIFICANT CHANGE UP (ref 4–41)
ANION GAP SERPL CALC-SCNC: 14 MMOL/L — SIGNIFICANT CHANGE UP (ref 7–14)
AST SERPL-CCNC: 34 U/L — SIGNIFICANT CHANGE UP (ref 4–40)
BASOPHILS # BLD AUTO: 0.09 K/UL — SIGNIFICANT CHANGE UP (ref 0–0.2)
BASOPHILS # BLD AUTO: 0.16 K/UL — SIGNIFICANT CHANGE UP (ref 0–0.2)
BASOPHILS NFR BLD AUTO: 0.4 % — SIGNIFICANT CHANGE UP (ref 0–2)
BASOPHILS NFR BLD AUTO: 0.9 % — SIGNIFICANT CHANGE UP (ref 0–2)
BILIRUB SERPL-MCNC: 0.5 MG/DL — SIGNIFICANT CHANGE UP (ref 0.2–1.2)
BUN SERPL-MCNC: 24 MG/DL — HIGH (ref 7–23)
CALCIUM SERPL-MCNC: 8.9 MG/DL — SIGNIFICANT CHANGE UP (ref 8.4–10.5)
CHLORIDE SERPL-SCNC: 100 MMOL/L — SIGNIFICANT CHANGE UP (ref 98–107)
CO2 SERPL-SCNC: 23 MMOL/L — SIGNIFICANT CHANGE UP (ref 22–31)
CREAT SERPL-MCNC: 0.69 MG/DL — SIGNIFICANT CHANGE UP (ref 0.5–1.3)
EGFR: 104 ML/MIN/1.73M2 — SIGNIFICANT CHANGE UP
EOSINOPHIL # BLD AUTO: 0 K/UL — SIGNIFICANT CHANGE UP (ref 0–0.5)
EOSINOPHIL # BLD AUTO: 0.34 K/UL — SIGNIFICANT CHANGE UP (ref 0–0.5)
EOSINOPHIL NFR BLD AUTO: 0 % — SIGNIFICANT CHANGE UP (ref 0–6)
EOSINOPHIL NFR BLD AUTO: 1.6 % — SIGNIFICANT CHANGE UP (ref 0–6)
GLUCOSE BLDC GLUCOMTR-MCNC: 148 MG/DL — HIGH (ref 70–99)
GLUCOSE BLDC GLUCOMTR-MCNC: 169 MG/DL — HIGH (ref 70–99)
GLUCOSE SERPL-MCNC: 139 MG/DL — HIGH (ref 70–99)
HCT VFR BLD CALC: 39.6 % — SIGNIFICANT CHANGE UP (ref 39–50)
HCT VFR BLD CALC: 39.9 % — SIGNIFICANT CHANGE UP (ref 39–50)
HGB BLD-MCNC: 13.5 G/DL — SIGNIFICANT CHANGE UP (ref 13–17)
HGB BLD-MCNC: 14.1 G/DL — SIGNIFICANT CHANGE UP (ref 13–17)
IANC: 12.67 K/UL — HIGH (ref 1.8–7.4)
IANC: 14.62 K/UL — HIGH (ref 1.8–7.4)
IMM GRANULOCYTES NFR BLD AUTO: 17.6 % — HIGH (ref 0–0.9)
LDH SERPL L TO P-CCNC: 314 U/L — HIGH (ref 135–225)
LYMPHOCYTES # BLD AUTO: 1.42 K/UL — SIGNIFICANT CHANGE UP (ref 1–3.3)
LYMPHOCYTES # BLD AUTO: 1.84 K/UL — SIGNIFICANT CHANGE UP (ref 1–3.3)
LYMPHOCYTES # BLD AUTO: 8.1 % — LOW (ref 13–44)
LYMPHOCYTES # BLD AUTO: 8.6 % — LOW (ref 13–44)
MACROCYTES BLD QL: SIGNIFICANT CHANGE UP
MCHC RBC-ENTMCNC: 31.6 PG — SIGNIFICANT CHANGE UP (ref 27–34)
MCHC RBC-ENTMCNC: 32.1 PG — SIGNIFICANT CHANGE UP (ref 27–34)
MCHC RBC-ENTMCNC: 33.8 G/DL — SIGNIFICANT CHANGE UP (ref 32–36)
MCHC RBC-ENTMCNC: 35.6 G/DL — SIGNIFICANT CHANGE UP (ref 32–36)
MCV RBC AUTO: 90.2 FL — SIGNIFICANT CHANGE UP (ref 80–100)
MCV RBC AUTO: 93.4 FL — SIGNIFICANT CHANGE UP (ref 80–100)
MICROCYTES BLD QL: SLIGHT — SIGNIFICANT CHANGE UP
MONOCYTES # BLD AUTO: 0.63 K/UL — SIGNIFICANT CHANGE UP (ref 0–0.9)
MONOCYTES # BLD AUTO: 0.78 K/UL — SIGNIFICANT CHANGE UP (ref 0–0.9)
MONOCYTES NFR BLD AUTO: 3.6 % — SIGNIFICANT CHANGE UP (ref 2–14)
MONOCYTES NFR BLD AUTO: 3.6 % — SIGNIFICANT CHANGE UP (ref 2–14)
MYELOCYTES NFR BLD: 0.9 % — HIGH (ref 0–0)
NEUTROPHILS # BLD AUTO: 14.62 K/UL — HIGH (ref 1.8–7.4)
NEUTROPHILS # BLD AUTO: 14.95 K/UL — HIGH (ref 1.8–7.4)
NEUTROPHILS NFR BLD AUTO: 68.2 % — SIGNIFICANT CHANGE UP (ref 43–77)
NEUTROPHILS NFR BLD AUTO: 76.6 % — SIGNIFICANT CHANGE UP (ref 43–77)
NEUTS BAND # BLD: 9 % — HIGH (ref 0–6)
NRBC # BLD: 0 /100 WBCS — SIGNIFICANT CHANGE UP (ref 0–0)
NRBC # FLD: 0 K/UL — SIGNIFICANT CHANGE UP (ref 0–0)
PHOSPHATE SERPL-MCNC: 3.8 MG/DL — SIGNIFICANT CHANGE UP (ref 2.5–4.5)
PLAT MORPH BLD: NORMAL — SIGNIFICANT CHANGE UP
PLATELET # BLD AUTO: 102 K/UL — LOW (ref 150–400)
PLATELET # BLD AUTO: 109 K/UL — LOW (ref 150–400)
PLATELET COUNT - ESTIMATE: ABNORMAL
POTASSIUM SERPL-MCNC: 4.1 MMOL/L — SIGNIFICANT CHANGE UP (ref 3.5–5.3)
POTASSIUM SERPL-SCNC: 4.1 MMOL/L — SIGNIFICANT CHANGE UP (ref 3.5–5.3)
PROT SERPL-MCNC: 5.9 G/DL — LOW (ref 6–8.3)
RBC # BLD: 4.27 M/UL — SIGNIFICANT CHANGE UP (ref 4.2–5.8)
RBC # BLD: 4.39 M/UL — SIGNIFICANT CHANGE UP (ref 4.2–5.8)
RBC # FLD: 12 % — SIGNIFICANT CHANGE UP (ref 10.3–14.5)
RBC # FLD: 12.9 % — SIGNIFICANT CHANGE UP (ref 10.3–14.5)
RBC BLD AUTO: NORMAL — SIGNIFICANT CHANGE UP
SODIUM SERPL-SCNC: 137 MMOL/L — SIGNIFICANT CHANGE UP (ref 135–145)
URATE SERPL-MCNC: 3.9 MG/DL — SIGNIFICANT CHANGE UP (ref 3.4–8.8)
VARIANT LYMPHS # BLD: 0.9 % — SIGNIFICANT CHANGE UP (ref 0–6)
WBC # BLD: 17.47 K/UL — HIGH (ref 3.8–10.5)
WBC # BLD: 21.44 K/UL — HIGH (ref 3.8–10.5)
WBC # FLD AUTO: 17.47 K/UL — HIGH (ref 3.8–10.5)
WBC # FLD AUTO: 21.44 K/UL — HIGH (ref 3.8–10.5)

## 2024-12-30 PROCEDURE — 99232 SBSQ HOSP IP/OBS MODERATE 35: CPT

## 2024-12-30 PROCEDURE — 99233 SBSQ HOSP IP/OBS HIGH 50: CPT

## 2024-12-30 RX ORDER — OLANZAPINE 15 MG/1
1 TABLET ORAL
Qty: 30 | Refills: 0
Start: 2024-12-30 | End: 2025-01-28

## 2024-12-30 RX ORDER — ALLOPURINOL 100 MG/1
1 TABLET ORAL
Qty: 30 | Refills: 0
Start: 2024-12-30 | End: 2025-01-28

## 2024-12-30 RX ORDER — OXYCODONE HCL 15 MG
1 TABLET ORAL
Qty: 48 | Refills: 0
Start: 2024-12-30 | End: 2025-01-06

## 2024-12-30 RX ORDER — BENZOCAINE AND MENTHOL 15; 3.6 MG/1; MG/1
1 LOZENGE ORAL ONCE
Refills: 0 | Status: COMPLETED | OUTPATIENT
Start: 2024-12-30 | End: 2024-12-30

## 2024-12-30 RX ORDER — DULOXETINE HYDROCHLORIDE 30 MG/1
1 CAPSULE, DELAYED RELEASE ORAL
Qty: 30 | Refills: 0
Start: 2024-12-30 | End: 2025-01-28

## 2024-12-30 RX ORDER — OXYCODONE HYDROCHLORIDE 30 MG/1
1 TABLET ORAL
Qty: 16 | Refills: 0 | DISCHARGE
Start: 2024-12-30 | End: 2025-01-06

## 2024-12-30 RX ORDER — OXYCODONE HCL 15 MG
1 TABLET ORAL
Qty: 16 | Refills: 0
Start: 2024-12-30 | End: 2025-01-06

## 2024-12-30 RX ORDER — PANTOPRAZOLE 40 MG/1
1 TABLET, DELAYED RELEASE ORAL
Qty: 0 | Refills: 0 | DISCHARGE
Start: 2024-12-30

## 2024-12-30 RX ORDER — POLYETHYLENE GLYCOL 3350 17 G/DOSE
17 POWDER (GRAM) ORAL
Qty: 1020 | Refills: 0
Start: 2024-12-30 | End: 2025-01-28

## 2024-12-30 RX ORDER — NALOXONE HCL 0.4 MG/ML
1 VIAL (ML) INJECTION
Qty: 1 | Refills: 0
Start: 2024-12-30 | End: 2025-01-28

## 2024-12-30 RX ORDER — SENNOSIDES 8.6 MG/1
2 TABLET, FILM COATED ORAL
Qty: 120 | Refills: 0
Start: 2024-12-30 | End: 2025-01-28

## 2024-12-30 RX ADMIN — LORAZEPAM 0.5 MILLIGRAM(S): 1 TABLET ORAL at 12:07

## 2024-12-30 RX ADMIN — ACETAMINOPHEN 975 MILLIGRAM(S): 80 SOLUTION/ DROPS ORAL at 15:47

## 2024-12-30 RX ADMIN — ACETAMINOPHEN 975 MILLIGRAM(S): 80 SOLUTION/ DROPS ORAL at 14:50

## 2024-12-30 RX ADMIN — ATORVASTATIN CALCIUM 10 MILLIGRAM(S): 40 TABLET, FILM COATED ORAL at 21:14

## 2024-12-30 RX ADMIN — ACETAMINOPHEN 975 MILLIGRAM(S): 80 SOLUTION/ DROPS ORAL at 07:45

## 2024-12-30 RX ADMIN — Medication 5 MILLIGRAM(S): at 23:30

## 2024-12-30 RX ADMIN — Medication 1000 UNIT(S): at 12:10

## 2024-12-30 RX ADMIN — Medication 20 MILLIGRAM(S): at 21:13

## 2024-12-30 RX ADMIN — ACETAMINOPHEN 975 MILLIGRAM(S): 80 SOLUTION/ DROPS ORAL at 07:01

## 2024-12-30 RX ADMIN — Medication 40 MILLIGRAM(S): at 07:01

## 2024-12-30 RX ADMIN — Medication 40 MILLIGRAM(S): at 17:28

## 2024-12-30 RX ADMIN — SENNOSIDES 2 TABLET(S): 8.6 TABLET, FILM COATED ORAL at 17:24

## 2024-12-30 RX ADMIN — DULOXETINE HYDROCHLORIDE 60 MILLIGRAM(S): 30 CAPSULE, DELAYED RELEASE ORAL at 12:09

## 2024-12-30 RX ADMIN — Medication 20 MILLIGRAM(S): at 14:49

## 2024-12-30 RX ADMIN — Medication 20 MILLIGRAM(S): at 15:46

## 2024-12-30 RX ADMIN — ACETAMINOPHEN 975 MILLIGRAM(S): 80 SOLUTION/ DROPS ORAL at 22:14

## 2024-12-30 RX ADMIN — GABAPENTIN 300 MILLIGRAM(S): 300 CAPSULE ORAL at 00:07

## 2024-12-30 RX ADMIN — GABAPENTIN 300 MILLIGRAM(S): 300 CAPSULE ORAL at 12:07

## 2024-12-30 RX ADMIN — Medication 20 MILLIGRAM(S): at 10:12

## 2024-12-30 RX ADMIN — LEVOTHYROXINE SODIUM 75 MICROGRAM(S): 175 TABLET ORAL at 06:52

## 2024-12-30 RX ADMIN — ENOXAPARIN SODIUM 40 MILLIGRAM(S): 60 INJECTION INTRAVENOUS; SUBCUTANEOUS at 17:24

## 2024-12-30 RX ADMIN — OLANZAPINE 5 MILLIGRAM(S): 15 TABLET ORAL at 21:14

## 2024-12-30 RX ADMIN — Medication 100 MILLIGRAM(S): at 06:53

## 2024-12-30 RX ADMIN — Medication 40 MILLIGRAM(S): at 07:45

## 2024-12-30 RX ADMIN — BENZOCAINE AND MENTHOL 1 LOZENGE: 15; 3.6 LOZENGE ORAL at 23:29

## 2024-12-30 RX ADMIN — ACETAMINOPHEN 975 MILLIGRAM(S): 80 SOLUTION/ DROPS ORAL at 21:14

## 2024-12-30 RX ADMIN — ALLOPURINOL 300 MILLIGRAM(S): 100 TABLET ORAL at 12:10

## 2024-12-30 RX ADMIN — Medication 20 MILLIGRAM(S): at 22:13

## 2024-12-30 RX ADMIN — LORAZEPAM 0.5 MILLIGRAM(S): 1 TABLET ORAL at 22:23

## 2024-12-30 RX ADMIN — TAMSULOSIN HYDROCHLORIDE 0.4 MILLIGRAM(S): 0.4 CAPSULE ORAL at 21:14

## 2024-12-30 RX ADMIN — LOSARTAN POTASSIUM 25 MILLIGRAM(S): 100 TABLET, FILM COATED ORAL at 06:52

## 2024-12-30 RX ADMIN — GABAPENTIN 300 MILLIGRAM(S): 300 CAPSULE ORAL at 17:28

## 2024-12-30 RX ADMIN — Medication 40 MILLIGRAM(S): at 18:28

## 2024-12-30 RX ADMIN — Medication 500 MILLIGRAM(S): at 12:10

## 2024-12-30 RX ADMIN — PANTOPRAZOLE 40 MILLIGRAM(S): 40 TABLET, DELAYED RELEASE ORAL at 07:02

## 2024-12-30 RX ADMIN — LORAZEPAM 0.5 MILLIGRAM(S): 1 TABLET ORAL at 07:02

## 2024-12-30 RX ADMIN — GABAPENTIN 300 MILLIGRAM(S): 300 CAPSULE ORAL at 23:30

## 2024-12-30 RX ADMIN — Medication 20 MILLIGRAM(S): at 11:12

## 2024-12-30 RX ADMIN — GABAPENTIN 300 MILLIGRAM(S): 300 CAPSULE ORAL at 07:02

## 2024-12-30 RX ADMIN — Medication 17 GRAM(S): at 17:26

## 2024-12-30 RX ADMIN — ENOXAPARIN SODIUM 40 MILLIGRAM(S): 60 INJECTION INTRAVENOUS; SUBCUTANEOUS at 06:52

## 2024-12-30 RX ADMIN — Medication 10 MILLIGRAM(S): at 06:53

## 2024-12-30 RX ADMIN — Medication 1 TABLET(S): at 12:09

## 2024-12-30 RX ADMIN — Medication 5 MILLIGRAM(S): at 00:08

## 2024-12-30 NOTE — BH CONSULTATION LIAISON PROGRESS NOTE - NSBHCHARTREVIEWVS_PSY_A_CORE FT
Vital Signs Last 24 Hrs  T(C): 36.6 (27 Dec 2024 14:00), Max: 37.2 (26 Dec 2024 17:07)  T(F): 97.9 (27 Dec 2024 14:00), Max: 99 (26 Dec 2024 17:07)  HR: 89 (27 Dec 2024 14:00) (58 - 89)  BP: 137/85 (27 Dec 2024 14:00) (121/72 - 185/98)  BP(mean): --  RR: 16 (27 Dec 2024 14:00) (16 - 18)  SpO2: 95% (27 Dec 2024 14:00) (95% - 100%)    Parameters below as of 27 Dec 2024 14:00  Patient On (Oxygen Delivery Method): room air    
Vital Signs Last 24 Hrs  T(C): 36.8 (30 Dec 2024 13:47), Max: 37.2 (30 Dec 2024 10:13)  T(F): 98.2 (30 Dec 2024 13:47), Max: 98.9 (30 Dec 2024 10:13)  HR: 66 (30 Dec 2024 13:47) (61 - 66)  BP: 127/63 (30 Dec 2024 13:47) (110/66 - 127/63)  BP(mean): --  RR: 18 (30 Dec 2024 13:47) (16 - 18)  SpO2: 97% (30 Dec 2024 13:47) (95% - 99%)    Parameters below as of 30 Dec 2024 13:47  Patient On (Oxygen Delivery Method): room air

## 2024-12-30 NOTE — BH CONSULTATION LIAISON PROGRESS NOTE - CURRENT MEDICATION
MEDICATIONS  (STANDING):  acetaminophen     Tablet .. 975 milliGRAM(s) Oral every 8 hours  allopurinol 300 milliGRAM(s) Oral daily  amLODIPine   Tablet 10 milliGRAM(s) Oral daily  ascorbic acid 500 milliGRAM(s) Oral daily  atenolol  Tablet 100 milliGRAM(s) Oral daily  atorvastatin 10 milliGRAM(s) Oral at bedtime  cholecalciferol 1000 Unit(s) Oral daily  dextrose 5%. 1000 milliLiter(s) (100 mL/Hr) IV Continuous <Continuous>  dextrose 5%. 1000 milliLiter(s) (50 mL/Hr) IV Continuous <Continuous>  dextrose 5%. 1000 milliLiter(s) (50 mL/Hr) IV Continuous <Continuous>  dextrose 5%. 1000 milliLiter(s) (100 mL/Hr) IV Continuous <Continuous>  dextrose 50% Injectable 25 Gram(s) IV Push once  dextrose 50% Injectable 12.5 Gram(s) IV Push once  dextrose 50% Injectable 25 Gram(s) IV Push once  dextrose 50% Injectable 25 Gram(s) IV Push once  dextrose 50% Injectable 12.5 Gram(s) IV Push once  dextrose 50% Injectable 25 Gram(s) IV Push once  DULoxetine 60 milliGRAM(s) Oral daily  enoxaparin Injectable 40 milliGRAM(s) SubCutaneous every 12 hours  gabapentin 300 milliGRAM(s) Oral four times a day  glucagon  Injectable 1 milliGRAM(s) IntraMuscular once  glucagon  Injectable 1 milliGRAM(s) IntraMuscular once  levothyroxine 75 MICROGram(s) Oral daily  losartan 25 milliGRAM(s) Oral daily  multivitamin 1 Tablet(s) Oral daily  OLANZapine 5 milliGRAM(s) Oral at bedtime  oxyCODONE  ER Tablet 40 milliGRAM(s) Oral every 12 hours  pantoprazole    Tablet 40 milliGRAM(s) Oral before breakfast  polyethylene glycol 3350 17 Gram(s) Oral two times a day  senna 2 Tablet(s) Oral two times a day  tamsulosin 0.4 milliGRAM(s) Oral at bedtime    MEDICATIONS  (PRN):  dextrose Oral Gel 15 Gram(s) Oral once PRN Blood Glucose LESS THAN 70 milliGRAM(s)/deciliter  dextrose Oral Gel 15 Gram(s) Oral once PRN Blood Glucose LESS THAN 70 milliGRAM(s)/deciliter  diphenhydrAMINE 25 milliGRAM(s) Oral every 6 hours PRN Rash and/or Itching  hydrocortisone sodium succinate Injectable 100 milliGRAM(s) IV Push once PRN allergic reaction  LORazepam     Tablet 0.5 milliGRAM(s) Oral every 4 hours PRN anxiety/agitation  magnesium hydroxide Suspension 30 milliLiter(s) Oral daily PRN Constipation  meperidine     Injectable 25 milliGRAM(s) IV Push once PRN rigors  meperidine     Injectable 12.5 milliGRAM(s) IV Push once PRN RIGORS  methylPREDNISolone sodium succinate Injectable 50 milliGRAM(s) IV Push once PRN ANAPHLAXIS  metoclopramide Injectable 10 milliGRAM(s) IV Push every 4 hours PRN NAUSEA  naloxone Injectable 0.1 milliGRAM(s) IV Push every 3 minutes PRN For ANY of the following changes in patient status:  A. RR LESS THAN 10 breaths per minute, B. Oxygen saturation LESS THAN 90%, C. Sedation score of 6  ondansetron Injectable 4 milliGRAM(s) IV Push every 6 hours PRN Nausea and/or Vomiting  oxyCODONE    IR 15 milliGRAM(s) Oral every 4 hours PRN Moderate Pain (4 - 6)  oxyCODONE    IR 20 milliGRAM(s) Oral every 4 hours PRN Severe Pain (7 - 10)  sucralfate suspension 1 Gram(s) Oral Once PRN for esophageal pain  tiZANidine 4 milliGRAM(s) Oral <User Schedule> PRN Muscle Spasm  zolpidem 5 milliGRAM(s) Oral at bedtime PRN Insomnia  
MEDICATIONS  (STANDING):  acetaminophen     Tablet .. 975 milliGRAM(s) Oral every 8 hours  allopurinol 300 milliGRAM(s) Oral daily  amLODIPine   Tablet 10 milliGRAM(s) Oral daily  ascorbic acid 500 milliGRAM(s) Oral daily  atenolol  Tablet 100 milliGRAM(s) Oral daily  atorvastatin 10 milliGRAM(s) Oral at bedtime  cholecalciferol 1000 Unit(s) Oral daily  cyclophosphamide IVPB (eMAR) 1730 milliGRAM(s) IV Intermittent once  dextrose 5%. 1000 milliLiter(s) (100 mL/Hr) IV Continuous <Continuous>  dextrose 5%. 1000 milliLiter(s) (50 mL/Hr) IV Continuous <Continuous>  dextrose 5%. 1000 milliLiter(s) (50 mL/Hr) IV Continuous <Continuous>  dextrose 5%. 1000 milliLiter(s) (100 mL/Hr) IV Continuous <Continuous>  dextrose 50% Injectable 25 Gram(s) IV Push once  dextrose 50% Injectable 12.5 Gram(s) IV Push once  dextrose 50% Injectable 25 Gram(s) IV Push once  dextrose 50% Injectable 25 Gram(s) IV Push once  dextrose 50% Injectable 12.5 Gram(s) IV Push once  dextrose 50% Injectable 25 Gram(s) IV Push once  diphenhydrAMINE 25 milliGRAM(s) Oral once  DOXOrubicin Injectable (eMAR) 116 milliGRAM(s) IV Push Chemo once  DULoxetine 60 milliGRAM(s) Oral daily  enoxaparin Injectable 40 milliGRAM(s) SubCutaneous every 12 hours  fosaprepitant IVPB 150 milliGRAM(s) IV Intermittent once  gabapentin 300 milliGRAM(s) Oral four times a day  glucagon  Injectable 1 milliGRAM(s) IntraMuscular once  glucagon  Injectable 1 milliGRAM(s) IntraMuscular once  hydrocortisone sodium succinate Injectable 100 milliGRAM(s) IV Push once  insulin lispro (ADMELOG) corrective regimen sliding scale   SubCutaneous three times a day before meals  insulin lispro (ADMELOG) corrective regimen sliding scale   SubCutaneous at bedtime  levothyroxine 75 MICROGram(s) Oral daily  losartan 25 milliGRAM(s) Oral daily  multivitamin 1 Tablet(s) Oral daily  OLANZapine 2.5 milliGRAM(s) Oral at bedtime  ondansetron  IVPB 16 milliGRAM(s) IV Intermittent once  oxyCODONE  ER Tablet 40 milliGRAM(s) Oral every 12 hours  pantoprazole    Tablet 40 milliGRAM(s) Oral before breakfast  polyethylene glycol 3350 17 Gram(s) Oral two times a day  predniSONE   Tablet 100 milliGRAM(s) Oral daily  senna 2 Tablet(s) Oral two times a day  sodium chloride 0.9% Bolus 500 milliLiter(s) IV Bolus once  sodium chloride 0.9%. 1000 milliLiter(s) (10 mL/Hr) IV Continuous <Continuous>  sodium chloride 0.9%. 1000 milliLiter(s) (75 mL/Hr) IV Continuous <Continuous>  tamsulosin 0.4 milliGRAM(s) Oral at bedtime  vinCRIStine IVPB (eMAR) 2 milliGRAM(s) IV Intermittent once    MEDICATIONS  (PRN):  dextrose Oral Gel 15 Gram(s) Oral once PRN Blood Glucose LESS THAN 70 milliGRAM(s)/deciliter  dextrose Oral Gel 15 Gram(s) Oral once PRN Blood Glucose LESS THAN 70 milliGRAM(s)/deciliter  diphenhydrAMINE 25 milliGRAM(s) Oral every 6 hours PRN Rash and/or Itching  hydrocortisone sodium succinate Injectable 100 milliGRAM(s) IV Push once PRN allergic reaction  LORazepam     Tablet 0.5 milliGRAM(s) Oral every 4 hours PRN anxiety/agitation  magnesium hydroxide Suspension 30 milliLiter(s) Oral daily PRN Constipation  meperidine     Injectable 12.5 milliGRAM(s) IV Push once PRN RIGORS  meperidine     Injectable 25 milliGRAM(s) IV Push once PRN rigors  methylPREDNISolone sodium succinate Injectable 50 milliGRAM(s) IV Push once PRN ANAPHLAXIS  metoclopramide Injectable 10 milliGRAM(s) IV Push every 4 hours PRN NAUSEA  naloxone Injectable 0.1 milliGRAM(s) IV Push every 3 minutes PRN For ANY of the following changes in patient status:  A. RR LESS THAN 10 breaths per minute, B. Oxygen saturation LESS THAN 90%, C. Sedation score of 6  ondansetron Injectable 4 milliGRAM(s) IV Push every 6 hours PRN Nausea and/or Vomiting  oxyCODONE    IR 15 milliGRAM(s) Oral every 4 hours PRN Moderate Pain (4 - 6)  oxyCODONE    IR 20 milliGRAM(s) Oral every 4 hours PRN Severe Pain (7 - 10)  sucralfate suspension 1 Gram(s) Oral Once PRN for esophageal pain  tiZANidine 4 milliGRAM(s) Oral <User Schedule> PRN Muscle Spasm  zolpidem 5 milliGRAM(s) Oral at bedtime PRN Insomnia

## 2024-12-30 NOTE — PROGRESS NOTE ADULT - ASSESSMENT
64 YOM w/ pre-DM, chronic back pain, lumbar spinal fusion surgery, multiple abdominal surgeries, bipolar disorder, p/w L shoulder mass and pain. 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 GaP team consulted for sxs.

## 2024-12-30 NOTE — PROGRESS NOTE ADULT - PROBLEM SELECTOR PLAN 5
Pt has capacity for complex medical decision making   Surrogate: Wife Sheri [phone]  Code status: Full   GOC: c/w CDT Pt has capacity for complex medical decision making   Surrogate: Wife Sheri [phone]  Code status: Full   GOC: c/w CDT    Dispo: Home when medically stable

## 2024-12-30 NOTE — PROGRESS NOTE ADULT - PROBLEM SELECTOR PLAN 2
Hx of chronic lower back pain s/p lumbar surgery on chronic opioids   - Cont current regimen: gabapentin, duloxetine 60mg daily, tylenol, tizanidine 4mg BID PRN  - Increased oxycodone IR to 15 and 20 mg PRN for moderate and severe pain as per palliative recs  - continue bowel regimen with senna and miralax

## 2024-12-30 NOTE — PROGRESS NOTE ADULT - PROBLEM SELECTOR PLAN 1
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   - Home regimen: oxycodone 5mg q4hrs prn, I-stop Reviewed #: [id number]  - Hospital regimen: oxycodone 15-20mg for moderate to severe pain q4hrs prn, tizanidine, gabapentin, duloxetine   - Pain currently   [x ] improved   [ ] uncontrolled    - 24hr PRN use reviewed 9AM to 9AM:oxycodone 20mg x3    Recommend:   - continue oxycodone ER 40mg q12hrs (12/26)   - c/w oxycodone 15-20mg for moderate to severe pain q4hrs prn  - narcan prn  - c/w tizanidine 4mg bid prn, gabapentin 300mg qid, duloxetine 60mg qd   - Bowel regimen, goal BM every 2 to 3 days to prevent opioid induced constipation, hold for loose stool  - Follows w/ pain management Dr. Trejo out [facility] but is open to establishing care with [facility] team. Made referral.   Dr. Mcknight, Dr. Dena Campbell, Bella Marley NP, Sylvie Jacobs NP    [facility], [facility]  Phone: [phone]  [address] [place], [place] [id number]

## 2024-12-30 NOTE — BH CONSULTATION LIAISON PROGRESS NOTE - NSBHFUPINTERVALHXFT_PSY_A_CORE
Patient feels PRN meds for anxiety helped overnight and in AM, amenable to med changes as below, no si/hi future oriented
patient feels combination of ativan PO PRN and Zyprexa 5mg qHS is helping with sleep and anxiety, amenable to outpt f/u, no si/hi, future oriented, amenable to support

## 2024-12-30 NOTE — PROGRESS NOTE ADULT - PROBLEM SELECTOR PLAN 1
Fast growing mass of L shoulder associated with hot flashes.   - CT chest: 10.9 x6.6 x6 cm lobulated soft tissue mass involving anterior L shoulder- suspicious for sacromatous/neoplastic etiology with additional 2.9cm lobulated lesion noted in R anterior chest wall, cervical lymphadenopathy  - S/p bx on 12/17 w/ Ortho, prelim results c/f lymphoma, Oncology recs pending final path results to determine if inpatient initiation of treatment is recommended; pt may also need bone marrow biopsy; update Oncology once final path results are reported, as reported b cell lymphoma, will get oncology follow up appreciated, LEYDI, f/w recs  Consult rad oncology, tumor board 12/24  done   - CT A/P w/ IV contrast done for staging - shows indeterminate subcutaneous lower chest/upper abdominal wall mass , started on R CHOP   - Pain control as listed below

## 2024-12-30 NOTE — BH CONSULTATION LIAISON PROGRESS NOTE - NSBHATTESTBILLING_PSY_A_CORE
08066-Mhltfmcawk OBS or IP - moderate complexity OR 35-49 mins
88093-Ifcvduitzk OBS or IP - moderate complexity OR 35-49 mins

## 2024-12-30 NOTE — BH CONSULTATION LIAISON PROGRESS NOTE - NSBHASSESSMENTFT_PSY_ALL_CORE
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 Psych c/s for possible gonzalo in context of unopposed SNRI.    Patient exhibiting symptoms of hypomania though not meeting full criterion, suspect this is multifactorial: stress from new dx + cymbalta usage for pain control. OK to begin zyprexa as this may help with mood stabilization and gonzalo ppx. R/B/I discussed.     12/27 - panic attack prior, more calm today, tolerating ativan, would increase meds as below    Recs:  - C/w Cymbalta  60mg QD  - Continue Gabapentin 400mg QID PO  - Ativan 0.5mg q6h PRN PO/IV for anxiety, OK to use for insomnia PRN as well   - Increase Zyprexa to 5mg qHS standing for mood/insomnia  - No need for CO  - No psych c/i to discharge  
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 Psych c/s for possible gonzalo in context of unopposed SNRI.    Patient exhibiting symptoms of hypomania though not meeting full criterion, suspect this is multifactorial: stress from new dx + cymbalta usage for pain control. OK to begin zyprexa as this may help with mood stabilization and gonzalo ppx. R/B/I discussed.     12/27 - panic attack prior, more calm today, tolerating ativan, would increase meds as below  12/30 - more calm, tolerating meds and PRNs, no psych c/i to discharge    Recs:  - C/w Cymbalta  60mg QD  - Continue Gabapentin 400mg QID PO  - Ativan 0.5mg q6h PRN PO/IV for anxiety, OK to use for insomnia PRN as well   - C/w Zyprexa to 5mg qHS standing for mood/insomnia  - No need for CO  - No psych c/i to discharge

## 2024-12-30 NOTE — PROGRESS NOTE ADULT - PROBLEM SELECTOR PLAN 4
Pt reports being unable to walk since Lumbar fusion, multiple complications from multiple surgeries. He walked w/ walker and cane. Both are present at bedside.  Currently does not work, on disability. Previously worked for [employer].    - PT eval per primary team 12/18 - no skilled PT needs

## 2024-12-30 NOTE — PROGRESS NOTE ADULT - PROBLEM SELECTOR PLAN 6
Thank you for allowing us to participate in your patient's care. We will continue to follow with you. In the event of worsening symptoms, please contact the Palliative Medicine team via pager (if the patient is at [facility] #[phone] or if the patient is at [facility] #[phone]) The Geriatric and Palliative Medicine service has coverage 24 hours a day/ 7 days a week to provide medical recommendations regarding symptom management needs via telephone.

## 2024-12-30 NOTE — CHART NOTE - NSCHARTNOTEFT_GEN_A_CORE
Pt s/p R-CHOP cycle 1: Prednisone 12/23-12/27, rituximab 12/24, CHOP on Fri 12/27, Fulphila  Sat 12/28. Next cycle 3 weeks from first one.  Continue to check TLS labs daily.   Pt scheduled for hematology f/u at [facility] with Dr. Meier on 1/6/25- please remind pt of appointment and include in discharge paperwork.  Hematology to sign off. Please call back with any questions, concerns, or changes in clinical status.    ***************************************************************  Kole Cotton MD  Hematology/Oncology Fellow, PGY5  MS TEAMS  After 5pm or on weekends please contact  to page on-call fellow   ***************************************************************

## 2024-12-30 NOTE — PROGRESS NOTE ADULT - SUBJECTIVE AND OBJECTIVE BOX
[facility] Geriatrics and Palliative Care  Meka Ma, Palliative Care Attending  Contact Info: Page [phone] (including Nights/Weekends), message on [employer] Teams (Meka Ma), or leave  at Palliative Office [phone] (non-urgent)   Date of [id number] @ 13:00    SUBJECTIVE AND OBJECTIVE: Patient seen this AM lying in bed states that he tolerated chemo but it was a little hard on him causing fatigue. He is eager to go home and continue DMT. He's very appreciative of the pain recommendations and would like to establish care with [facility] palliative care team. He states that he feels comfortable with the psychiatry team helping him with his bipolar d/o and feels that the ativan is helping him a lot.     Indication for Geriatrics and Palliative Care Services/INTERVAL HPI: sx management in seeting of new DLBCL     OVERNIGHT EVENTS:   > 12/30: Weekend events reviewed. Over the past 24 hours, patient required PRNs of ativan 0.5mg x3, oxycodone 30mg x3, zanaflex x1, ambien x1.     DNR on chart:  Allergies    No Known Allergies    Intolerances    IV Contrast (Nausea)  MEDICATIONS  (STANDING):  acetaminophen     Tablet .. 975 milliGRAM(s) Oral every 8 hours  allopurinol 300 milliGRAM(s) Oral daily  amLODIPine   Tablet 10 milliGRAM(s) Oral daily  ascorbic acid 500 milliGRAM(s) Oral daily  atenolol  Tablet 100 milliGRAM(s) Oral daily  atorvastatin 10 milliGRAM(s) Oral at bedtime  cholecalciferol 1000 Unit(s) Oral daily  dextrose 5%. 1000 milliLiter(s) (100 mL/Hr) IV Continuous <Continuous>  dextrose 5%. 1000 milliLiter(s) (50 mL/Hr) IV Continuous <Continuous>  dextrose 5%. 1000 milliLiter(s) (50 mL/Hr) IV Continuous <Continuous>  dextrose 5%. 1000 milliLiter(s) (100 mL/Hr) IV Continuous <Continuous>  dextrose 50% Injectable 25 Gram(s) IV Push once  dextrose 50% Injectable 12.5 Gram(s) IV Push once  dextrose 50% Injectable 25 Gram(s) IV Push once  dextrose 50% Injectable 25 Gram(s) IV Push once  dextrose 50% Injectable 12.5 Gram(s) IV Push once  dextrose 50% Injectable 25 Gram(s) IV Push once  DULoxetine 60 milliGRAM(s) Oral daily  enoxaparin Injectable 40 milliGRAM(s) SubCutaneous every 12 hours  gabapentin 300 milliGRAM(s) Oral four times a day  glucagon  Injectable 1 milliGRAM(s) IntraMuscular once  glucagon  Injectable 1 milliGRAM(s) IntraMuscular once  levothyroxine 75 MICROGram(s) Oral daily  losartan 25 milliGRAM(s) Oral daily  multivitamin 1 Tablet(s) Oral daily  OLANZapine 5 milliGRAM(s) Oral at bedtime  oxyCODONE  ER Tablet 40 milliGRAM(s) Oral every 12 hours  pantoprazole    Tablet 40 milliGRAM(s) Oral before breakfast  polyethylene glycol 3350 17 Gram(s) Oral two times a day  senna 2 Tablet(s) Oral two times a day  tamsulosin 0.4 milliGRAM(s) Oral at bedtime    MEDICATIONS  (PRN):  dextrose Oral Gel 15 Gram(s) Oral once PRN Blood Glucose LESS THAN 70 milliGRAM(s)/deciliter  dextrose Oral Gel 15 Gram(s) Oral once PRN Blood Glucose LESS THAN 70 milliGRAM(s)/deciliter  diphenhydrAMINE 25 milliGRAM(s) Oral every 6 hours PRN Rash and/or Itching  hydrocortisone sodium succinate Injectable 100 milliGRAM(s) IV Push once PRN allergic reaction  LORazepam     Tablet 0.5 milliGRAM(s) Oral every 4 hours PRN anxiety/agitation  magnesium hydroxide Suspension 30 milliLiter(s) Oral daily PRN Constipation  meperidine     Injectable 25 milliGRAM(s) IV Push once PRN rigors  meperidine     Injectable 12.5 milliGRAM(s) IV Push once PRN RIGORS  methylPREDNISolone sodium succinate Injectable 50 milliGRAM(s) IV Push once PRN ANAPHLAXIS  metoclopramide Injectable 10 milliGRAM(s) IV Push every 4 hours PRN NAUSEA  naloxone Injectable 0.1 milliGRAM(s) IV Push every 3 minutes PRN For ANY of the following changes in patient status:  A. RR LESS THAN 10 breaths per minute, B. Oxygen saturation LESS THAN 90%, C. Sedation score of 6  ondansetron Injectable 4 milliGRAM(s) IV Push every 6 hours PRN Nausea and/or Vomiting  oxyCODONE    IR 15 milliGRAM(s) Oral every 4 hours PRN Moderate Pain (4 - 6)  oxyCODONE    IR 20 milliGRAM(s) Oral every 4 hours PRN Severe Pain (7 - 10)  sucralfate suspension 1 Gram(s) Oral Once PRN for esophageal pain  tiZANidine 4 milliGRAM(s) Oral <User Schedule> PRN Muscle Spasm  zolpidem 5 milliGRAM(s) Oral at bedtime PRN Insomnia      ITEMS UNCHECKED ARE NOT PRESENT    PRESENT SYMPTOMS: [ ]Unable to self-report - see [ ] CPOT [ ] PAINADS [ ] RDOS  Source if other than patient:  [ ]Family   [ ]Team     Pain: [ x]yes [ ]no  QOL impact - unable to move   Location -      left shoulder   Aggravating factors - movement   Quality - burning and sharp   Radiation - none   Timing- constant but intermittent   Severity (0-10 scale): 9/10   Minimal acceptable level/pain goal (0-10 scale): did not specify       CPOT:    https://www.[facility].org/getattachment/[email or web address]/Critical-Care-Pain-Observation-Tool-(CPOT)    Dyspnea:                           [ ]Mild [ ]Moderate [ ]Severe  Anxiety:                             [x ]Mild [ ]Moderate [ ]Severe Better controlled with ativan   Fatigue:                             [ ]Mild [ ]Moderate [ ]Severe  Nausea:                             [ ]Mild [ ]Moderate [ ]Severe  Loss of appetite:              [ ]Mild [ ]Moderate [ ]Severe  Constipation:                    [ ]Mild [ ]Moderate [ ]Severe  Other Symptoms:  [ x]All other review of systems negative     PCSSQ[Palliative Care Spiritual Screening Question]   Severity (0-10):  Score of 4 or > indicate consideration of Chaplaincy referral.  Chaplaincy Referral: [ ] yes [ ] refused [ ] following [x ] deferred    Caregiver Commodore? : [ ] yes [ ] no [x ] Deferred [ ] Declined             Social work referral [ ] Patient & Family Centered Care Referral [ ]  Anticipatory Grief present?:  [ ] yes [ ] no  [x ] Deferred                  Social work referral [ ] Patient & Family Centered Care Referral [ ]      PHYSICAL EXAM:  Vital Signs Last 24 Hrs  T(C): 37.2 (30 Dec 2024 10:13), Max: 37.2 (29 Dec 2024 13:36)  T(F): 98.9 (30 Dec 2024 10:13), Max: 99 (29 Dec 2024 13:36)  HR: 64 (30 Dec 2024 10:13) (61 - 74)  BP: 110/66 (30 Dec 2024 10:13) (110/66 - 135/65)  BP(mean): --  RR: 18 (30 Dec 2024 10:13) (16 - 18)  SpO2: 99% (30 Dec 2024 10:13) (95% - 99%)    Parameters below as of 30 Dec 2024 10:13  Patient On (Oxygen Delivery Method): room air     I&O's Summary     General: obese alert, NAD  HENT: normocephalic, face relaxed  Eyes: no icterus, conjunctiva clear  Chest: symmetric excursion, no accessory muscle use  Heart: peripheral pulse 2+ and regular  Lungs: no dyspnea with speech  Abdomen: soft, NT, ND  Ext: no cyanosis, clubbing, visible veins, ulcers, wounds  Neuro: alert, grossly non-focal  Psych: hyperverbal anxious fidgety, redirectable w significant effort, very tearful - overall worse than prior exams   Skin: ]Normal  [ ]Rash [x ]Other - large purple bruise like raised lesion on left shoulder s/p Bx   [ ]Pressure ulcer(s) [ ]y [ ]n present on admission    LABS:                        14.1   21.44 )-----------( 109      ( 30 Dec 2024 11:04 )             39.6   12-30    137  |  100  |  24[H]  ----------------------------<  139[H]  4.1   |  23  |  0.69    Ca    8.9      30 Dec 2024 07:30  Phos  3.8     12-30    TPro  5.9[L]  /  Alb  3.4  /  TBili  0.5  /  DBili  x   /  AST  34  /  ALT  31  /  AlkPhos  107  12-30      Urinalysis Basic - ( 30 Dec 2024 07:30 )    Color: x / Appearance: x / SG: x / pH: x  Gluc: 139 mg/dL / Ketone: x  / Bili: x / Urobili: x   Blood: x / Protein: x / Nitrite: x   Leuk Esterase: x / RBC: x / WBC x   Sq Epi: x / Non Sq Epi: x / Bacteria: x      RADIOLOGY & ADDITIONAL STUDIES: < from: NM PET/CT Onc FDG Skull to Thigh, Inital (12.27.24 @ 11:31) >  IMPRESSION: Abnormal skull-to-thigh FDG-PET/CT scan.    1. FDG-avid soft tissue mass involving anterior left shoulder region,   similar in appearance as compared to CT dated 12/16/2024, corresponds to   known diffuse large B-cell lymphoma.    2. Markedly FDG-avid subcutaneous mass in right anterior chest wall and   small FDG-avid soft tissue density abutting left gluteal musculature,   similar in appearance as compared to recent CT scans likely represent   additional sites of lymphoma. Ultrasound-guided biopsy may be obtained   for confirmation, if clinically indicated.    3. Multiple non-FDG-avid small and mildly enlarged bilateral cervical and   supraclavicular lymph nodes, similar in size and appearance on prior CT   scans dating back to 4/26/2023, are nonspecific. Few non-FDG-avid,   nonenlarged retroperitoneal lymph nodes also are nonspecific.   Ultrasound-guided biopsy of cervical lymph nodes may be obtained for   further characterization.    < end of copied text >      Protein Calorie Malnutrition Present: [ ]mild [ ]moderate [ ]severe [ ]underweight [ ]morbid obesity  https://www.andeal.org/vault/2440/web/files/ONC/Table_Clinical%20Characteristics%20to%20Document%20Malnutrition-White%20JV%20et%20al%255590.pdf    Height (cm): 172.7 (12-17-24 @ 10:58)  Weight (kg): 122.8 (12-27-24 @ 15:50), 125.6 (06-16-24 @ 15:14)  BMI (kg/m2): 41.2 (12-27-24 @ 15:50), 42.1 (12-17-24 @ 10:58)    [ ]PPSV2 < or = 30%  [ ]significant weight loss [ ]poor nutritional intake [ ]anasarca[ ]Artificial Nutrition    Other REFERRALS:  [ ]Hospice  [ ]Child Life  [ ]Social Work  [ ]Case management [ ]Holistic Therapy

## 2024-12-30 NOTE — BH CONSULTATION LIAISON PROGRESS NOTE - NSBHTIMEACTIVITIESPERFORMED_PSY_A_CORE
Discussed with patient, team, SW, and chart reviewed. Time exclusive of supervision/education. 
Discussed with patient, team, SW, and chart reviewed. Time exclusive of supervision/education.

## 2024-12-30 NOTE — PROGRESS NOTE ADULT - SUBJECTIVE AND OBJECTIVE BOX
Jean-Paul Lopes MD  [facility] of [facility] Medicine  Pager: [phone]  Available via MS Teams    SUBJECTIVE / OVERNIGHT EVENTS:    complains of acute on chronic back pain this morning     MEDICATIONS  (STANDING):  acetaminophen     Tablet .. 975 milliGRAM(s) Oral every 8 hours  allopurinol 300 milliGRAM(s) Oral daily  amLODIPine   Tablet 10 milliGRAM(s) Oral daily  ascorbic acid 500 milliGRAM(s) Oral daily  atenolol  Tablet 100 milliGRAM(s) Oral daily  atorvastatin 10 milliGRAM(s) Oral at bedtime  cholecalciferol 1000 Unit(s) Oral daily  dextrose 5%. 1000 milliLiter(s) (100 mL/Hr) IV Continuous <Continuous>  dextrose 5%. 1000 milliLiter(s) (50 mL/Hr) IV Continuous <Continuous>  dextrose 5%. 1000 milliLiter(s) (50 mL/Hr) IV Continuous <Continuous>  dextrose 5%. 1000 milliLiter(s) (100 mL/Hr) IV Continuous <Continuous>  dextrose 50% Injectable 25 Gram(s) IV Push once  dextrose 50% Injectable 12.5 Gram(s) IV Push once  dextrose 50% Injectable 25 Gram(s) IV Push once  dextrose 50% Injectable 25 Gram(s) IV Push once  dextrose 50% Injectable 12.5 Gram(s) IV Push once  dextrose 50% Injectable 25 Gram(s) IV Push once  DULoxetine 60 milliGRAM(s) Oral daily  enoxaparin Injectable 40 milliGRAM(s) SubCutaneous every 12 hours  gabapentin 300 milliGRAM(s) Oral four times a day  glucagon  Injectable 1 milliGRAM(s) IntraMuscular once  glucagon  Injectable 1 milliGRAM(s) IntraMuscular once  levothyroxine 75 MICROGram(s) Oral daily  losartan 25 milliGRAM(s) Oral daily  multivitamin 1 Tablet(s) Oral daily  OLANZapine 5 milliGRAM(s) Oral at bedtime  oxyCODONE  ER Tablet 40 milliGRAM(s) Oral every 12 hours  pantoprazole    Tablet 40 milliGRAM(s) Oral before breakfast  polyethylene glycol 3350 17 Gram(s) Oral two times a day  senna 2 Tablet(s) Oral two times a day  tamsulosin 0.4 milliGRAM(s) Oral at bedtime    MEDICATIONS  (PRN):  dextrose Oral Gel 15 Gram(s) Oral once PRN Blood Glucose LESS THAN 70 milliGRAM(s)/deciliter  dextrose Oral Gel 15 Gram(s) Oral once PRN Blood Glucose LESS THAN 70 milliGRAM(s)/deciliter  diphenhydrAMINE 25 milliGRAM(s) Oral every 6 hours PRN Rash and/or Itching  hydrocortisone sodium succinate Injectable 100 milliGRAM(s) IV Push once PRN allergic reaction  LORazepam     Tablet 0.5 milliGRAM(s) Oral every 4 hours PRN anxiety/agitation  magnesium hydroxide Suspension 30 milliLiter(s) Oral daily PRN Constipation  meperidine     Injectable 25 milliGRAM(s) IV Push once PRN rigors  meperidine     Injectable 12.5 milliGRAM(s) IV Push once PRN RIGORS  methylPREDNISolone sodium succinate Injectable 50 milliGRAM(s) IV Push once PRN ANAPHLAXIS  metoclopramide Injectable 10 milliGRAM(s) IV Push every 4 hours PRN NAUSEA  naloxone Injectable 0.1 milliGRAM(s) IV Push every 3 minutes PRN For ANY of the following changes in patient status:  A. RR LESS THAN 10 breaths per minute, B. Oxygen saturation LESS THAN 90%, C. Sedation score of 6  ondansetron Injectable 4 milliGRAM(s) IV Push every 6 hours PRN Nausea and/or Vomiting  oxyCODONE    IR 15 milliGRAM(s) Oral every 4 hours PRN Moderate Pain (4 - 6)  oxyCODONE    IR 20 milliGRAM(s) Oral every 4 hours PRN Severe Pain (7 - 10)  sucralfate suspension 1 Gram(s) Oral Once PRN for esophageal pain  tiZANidine 4 milliGRAM(s) Oral <User Schedule> PRN Muscle Spasm  zolpidem 5 milliGRAM(s) Oral at bedtime PRN Insomnia      I&O's Summary      PHYSICAL EXAM:  Vital Signs Last 24 Hrs  T(C): 37.1 (30 Dec 2024 17:14), Max: 37.2 (30 Dec 2024 10:13)  T(F): 98.8 (30 Dec 2024 17:14), Max: 98.9 (30 Dec 2024 10:13)  HR: 77 (30 Dec 2024 17:14) (61 - 77)  BP: 146/61 (30 Dec 2024 17:14) (110/66 - 146/61)  BP(mean): --  RR: 18 (30 Dec 2024 17:14) (16 - 18)  SpO2: 95% (30 Dec 2024 17:14) (95% - 99%)    Parameters below as of 30 Dec 2024 17:14  Patient On (Oxygen Delivery Method): room air      CONSTITUTIONAL: NAD   EYES: conjunctiva and sclera clear  ENMT: Moist oral mucosa   NECK: Supple   RESPIRATORY: Normal respiratory effort; lungs are clear to auscultation bilaterally  CARDIOVASCULAR: Regular rate and rhythm, normal S1 and S2, no murmur/rub/gallop; Peripheral pulses are 2+ bilaterally  ABDOMEN: Nontender to palpation, normoactive bowel sounds, no rebound/guarding   MUSCULOSKELETAL: No lower extremity edema   PSYCH: A+O to person, place, and time; affect appropriate  NEUROLOGY: moves all extremities   SKIN: No rashes    LABS:                        14.1   21.44 )-----------( 109      ( 30 Dec 2024 11:04 )             39.6     12-30    137  |  100  |  24[H]  ----------------------------<  139[H]  4.1   |  23  |  0.69    Ca    8.9      30 Dec 2024 07:30  Phos  3.8     12-30    TPro  5.9[L]  /  Alb  3.4  /  TBili  0.5  /  DBili  x   /  AST  34  /  ALT  31  /  AlkPhos  107  12-30          Urinalysis Basic - ( 30 Dec 2024 07:30 )    Color: x / Appearance: x / SG: x / pH: x  Gluc: 139 mg/dL / Ketone: x  / Bili: x / Urobili: x   Blood: x / Protein: x / Nitrite: x   Leuk Esterase: x / RBC: x / WBC x   Sq Epi: x / Non Sq Epi: x / Bacteria: x            COORDINATION OF CARE:  Communication: discussed plan of care with ACP

## 2024-12-30 NOTE — PROGRESS NOTE ADULT - PROBLEM SELECTOR PLAN 3
Pt revealed to team during first encounter that he has a ho bipolar TII disorder, not currently under the care of psychiatrist, and on unopposed SNRI.   Mood seems to be exacerbated by triggering event ( fire alarm) possible steroids contributing to dysphoric mood.     - appreciate inpatient psychiatry recommendations   - c/w zyprexa 5mg qHS  - ativan 0.5 mg PO q4hrs prn anxiety/agitation/restlessness  - would recommend continued f/u with Onco-psychiatrist

## 2024-12-30 NOTE — PROGRESS NOTE ADULT - PROBLEM SELECTOR PLAN 2
expanding mass on Lt shoulder, s/p Bx w/ ortho on 12/17  path and flow consistent w/ DLBCL   CT chest 12/16 showing the 10.9x6.6x6cm lobulated soft tissue mass on shoulder, sarcomatous/neoplastic etiology, also with 2.9cm lobulated lesion on R anterior chest wall. Multiple enlarged cervical LNs.  TTE 12/23/24: EF > 75%    - appreciate inpatient oncology recommendations:   - s/p inpatient R-[facility] on 12/27, (steroids on since 12/23, rituxan on 12/24.   - s/p prednisone 100mg qd to complete 5 days, w/ ppx PPI, allopurinol (renally dose)  - high risk for TLS given disease burden   - pt s/p PET-CT 12/27  - [facility] consult for radiation to large shoulder mass? outpatient f/u?   - follow up at [facility]- has appointment with Dr. Meier on 1/6

## 2024-12-30 NOTE — BH CONSULTATION LIAISON PROGRESS NOTE - MSE UNSTRUCTURED FT
Mental Status Exam:  Isa: well groomed, fair hygiene     Behavior: calm, cooperative, no psychomotor retardation/agitation  Motor: no tremors, EPS, or rigidity  Gait: did not assess, pt in bed  Speech: fast rate, rhythm, prosedy and volume   Mood: "okay"  Affect: depressed   Thought process: clear, goal directed   Thought Content: denies paranoia, delusions   Perception: denies AH/VH  SI: denies  HI: denies  Insight: fair  Judgment: fair    Cognitive Exam:  Orientation: AOx3  Recall: intact  Attention: intact  Abstraction: intact 
Mental Status Exam:  Isa: well groomed, fair hygiene     Behavior: calm, cooperative, no psychomotor retardation/agitation  Motor: no tremors, EPS, or rigidity  Gait: did not assess, pt in bed  Speech: fast rate, rhythm, prosedy and volume   Mood: "okay"  Affect: depressed   Thought process: clear, goal directed   Thought Content: denies paranoia, delusions   Perception: denies AH/VH  SI: denies  HI: denies  Insight: fair  Judgment: fair    Cognitive Exam:  Orientation: AOx3  Recall: intact  Attention: intact  Abstraction: intact

## 2024-12-30 NOTE — BH CONSULTATION LIAISON PROGRESS NOTE - NSBHCONSULTFOLLOWAFTERCARE_PSY_A_CORE FT
[facility]  [phone] [place] [address], [facility], First Floor, [place] [address]  [phone]  [email or web address]/visits/[facility]: [phone]     Options closer to patient's home:   [facility] [phone]  [facility] [phone]

## 2024-12-31 ENCOUNTER — OUTPATIENT (OUTPATIENT)
Dept: OUTPATIENT SERVICES | Facility: HOSPITAL | Age: 64
LOS: 1 days | Discharge: HOME | End: 2024-12-31

## 2024-12-31 VITALS
SYSTOLIC BLOOD PRESSURE: 146 MMHG | DIASTOLIC BLOOD PRESSURE: 62 MMHG | RESPIRATION RATE: 18 BRPM | TEMPERATURE: 98 F | HEART RATE: 69 BPM | OXYGEN SATURATION: 98 %

## 2024-12-31 DIAGNOSIS — C83.30 DIFFUSE LARGE B-CELL LYMPHOMA, UNSPECIFIED SITE: ICD-10-CM

## 2024-12-31 DIAGNOSIS — Z98.890 OTHER SPECIFIED POSTPROCEDURAL STATES: Chronic | ICD-10-CM

## 2024-12-31 LAB
ALBUMIN SERPL ELPH-MCNC: 3.7 G/DL — SIGNIFICANT CHANGE UP (ref 3.3–5)
ALP SERPL-CCNC: 130 U/L — HIGH (ref 40–120)
ALT FLD-CCNC: 37 U/L — SIGNIFICANT CHANGE UP (ref 4–41)
ANION GAP SERPL CALC-SCNC: 11 MMOL/L — SIGNIFICANT CHANGE UP (ref 7–14)
AST SERPL-CCNC: 42 U/L — HIGH (ref 4–40)
BASOPHILS # BLD AUTO: 0.02 K/UL — SIGNIFICANT CHANGE UP (ref 0–0.2)
BASOPHILS NFR BLD AUTO: 0.1 % — SIGNIFICANT CHANGE UP (ref 0–2)
BILIRUB SERPL-MCNC: 0.8 MG/DL — SIGNIFICANT CHANGE UP (ref 0.2–1.2)
BUN SERPL-MCNC: 21 MG/DL — SIGNIFICANT CHANGE UP (ref 7–23)
CALCIUM SERPL-MCNC: 9.3 MG/DL — SIGNIFICANT CHANGE UP (ref 8.4–10.5)
CHLORIDE SERPL-SCNC: 98 MMOL/L — SIGNIFICANT CHANGE UP (ref 98–107)
CO2 SERPL-SCNC: 26 MMOL/L — SIGNIFICANT CHANGE UP (ref 22–31)
CREAT SERPL-MCNC: 0.73 MG/DL — SIGNIFICANT CHANGE UP (ref 0.5–1.3)
EGFR: 102 ML/MIN/1.73M2 — SIGNIFICANT CHANGE UP
EOSINOPHIL # BLD AUTO: 0.38 K/UL — SIGNIFICANT CHANGE UP (ref 0–0.5)
EOSINOPHIL NFR BLD AUTO: 2.6 % — SIGNIFICANT CHANGE UP (ref 0–6)
GLUCOSE SERPL-MCNC: 117 MG/DL — HIGH (ref 70–99)
HCT VFR BLD CALC: 37.8 % — LOW (ref 39–50)
HGB BLD-MCNC: 13.7 G/DL — SIGNIFICANT CHANGE UP (ref 13–17)
IANC: 8.33 K/UL — HIGH (ref 1.8–7.4)
IMM GRANULOCYTES NFR BLD AUTO: 27.9 % — HIGH (ref 0–0.9)
LDH SERPL L TO P-CCNC: 329 U/L — HIGH (ref 135–225)
LYMPHOCYTES # BLD AUTO: 1.37 K/UL — SIGNIFICANT CHANGE UP (ref 1–3.3)
LYMPHOCYTES # BLD AUTO: 9.4 % — LOW (ref 13–44)
MAGNESIUM SERPL-MCNC: 2 MG/DL — SIGNIFICANT CHANGE UP (ref 1.6–2.6)
MCHC RBC-ENTMCNC: 32.4 PG — SIGNIFICANT CHANGE UP (ref 27–34)
MCHC RBC-ENTMCNC: 36.2 G/DL — HIGH (ref 32–36)
MCV RBC AUTO: 89.4 FL — SIGNIFICANT CHANGE UP (ref 80–100)
MONOCYTES # BLD AUTO: 0.41 K/UL — SIGNIFICANT CHANGE UP (ref 0–0.9)
MONOCYTES NFR BLD AUTO: 2.8 % — SIGNIFICANT CHANGE UP (ref 2–14)
NEUTROPHILS # BLD AUTO: 8.33 K/UL — HIGH (ref 1.8–7.4)
NEUTROPHILS NFR BLD AUTO: 57.2 % — SIGNIFICANT CHANGE UP (ref 43–77)
NRBC # BLD: 0 /100 WBCS — SIGNIFICANT CHANGE UP (ref 0–0)
NRBC # FLD: 0 K/UL — SIGNIFICANT CHANGE UP (ref 0–0)
PHOSPHATE SERPL-MCNC: 3.8 MG/DL — SIGNIFICANT CHANGE UP (ref 2.5–4.5)
PLATELET # BLD AUTO: 104 K/UL — LOW (ref 150–400)
POTASSIUM SERPL-MCNC: 4.1 MMOL/L — SIGNIFICANT CHANGE UP (ref 3.5–5.3)
POTASSIUM SERPL-SCNC: 4.1 MMOL/L — SIGNIFICANT CHANGE UP (ref 3.5–5.3)
PROT SERPL-MCNC: 6.6 G/DL — SIGNIFICANT CHANGE UP (ref 6–8.3)
RBC # BLD: 4.23 M/UL — SIGNIFICANT CHANGE UP (ref 4.2–5.8)
RBC # FLD: 12.4 % — SIGNIFICANT CHANGE UP (ref 10.3–14.5)
SODIUM SERPL-SCNC: 135 MMOL/L — SIGNIFICANT CHANGE UP (ref 135–145)
URATE SERPL-MCNC: 4.4 MG/DL — SIGNIFICANT CHANGE UP (ref 3.4–8.8)
WBC # BLD: 14.57 K/UL — HIGH (ref 3.8–10.5)
WBC # FLD AUTO: 14.57 K/UL — HIGH (ref 3.8–10.5)

## 2024-12-31 PROCEDURE — 99233 SBSQ HOSP IP/OBS HIGH 50: CPT

## 2024-12-31 PROCEDURE — 99239 HOSP IP/OBS DSCHRG MGMT >30: CPT

## 2024-12-31 RX ORDER — SODIUM CHLORIDE 9 MG/ML
250 INJECTION, SOLUTION INTRAMUSCULAR; INTRAVENOUS; SUBCUTANEOUS ONCE
Refills: 0 | Status: COMPLETED | OUTPATIENT
Start: 2024-12-31 | End: 2024-12-31

## 2024-12-31 RX ADMIN — Medication 17 GRAM(S): at 06:22

## 2024-12-31 RX ADMIN — ALLOPURINOL 300 MILLIGRAM(S): 100 TABLET ORAL at 12:48

## 2024-12-31 RX ADMIN — Medication 40 MILLIGRAM(S): at 07:34

## 2024-12-31 RX ADMIN — DULOXETINE HYDROCHLORIDE 60 MILLIGRAM(S): 30 CAPSULE, DELAYED RELEASE ORAL at 12:48

## 2024-12-31 RX ADMIN — Medication 1 TABLET(S): at 12:47

## 2024-12-31 RX ADMIN — ACETAMINOPHEN 975 MILLIGRAM(S): 80 SOLUTION/ DROPS ORAL at 07:02

## 2024-12-31 RX ADMIN — Medication 20 MILLIGRAM(S): at 06:22

## 2024-12-31 RX ADMIN — Medication 500 MILLIGRAM(S): at 12:48

## 2024-12-31 RX ADMIN — Medication 20 MILLIGRAM(S): at 07:22

## 2024-12-31 RX ADMIN — Medication 20 MILLIGRAM(S): at 01:19

## 2024-12-31 RX ADMIN — GABAPENTIN 300 MILLIGRAM(S): 300 CAPSULE ORAL at 12:46

## 2024-12-31 RX ADMIN — SODIUM CHLORIDE 500 MILLILITER(S): 9 INJECTION, SOLUTION INTRAMUSCULAR; INTRAVENOUS; SUBCUTANEOUS at 10:23

## 2024-12-31 RX ADMIN — TIZANIDINE 4 MILLIGRAM(S): 4 TABLET ORAL at 07:34

## 2024-12-31 RX ADMIN — PANTOPRAZOLE 40 MILLIGRAM(S): 40 TABLET, DELAYED RELEASE ORAL at 06:21

## 2024-12-31 RX ADMIN — Medication 10 MILLIGRAM(S): at 06:20

## 2024-12-31 RX ADMIN — ACETAMINOPHEN 975 MILLIGRAM(S): 80 SOLUTION/ DROPS ORAL at 15:04

## 2024-12-31 RX ADMIN — Medication 1000 UNIT(S): at 12:47

## 2024-12-31 RX ADMIN — LORAZEPAM 0.5 MILLIGRAM(S): 1 TABLET ORAL at 12:47

## 2024-12-31 RX ADMIN — ACETAMINOPHEN 975 MILLIGRAM(S): 80 SOLUTION/ DROPS ORAL at 16:04

## 2024-12-31 RX ADMIN — LEVOTHYROXINE SODIUM 75 MICROGRAM(S): 175 TABLET ORAL at 06:20

## 2024-12-31 RX ADMIN — Medication 40 MILLIGRAM(S): at 08:19

## 2024-12-31 RX ADMIN — ENOXAPARIN SODIUM 40 MILLIGRAM(S): 60 INJECTION INTRAVENOUS; SUBCUTANEOUS at 06:22

## 2024-12-31 RX ADMIN — Medication 100 MILLIGRAM(S): at 06:21

## 2024-12-31 RX ADMIN — Medication 20 MILLIGRAM(S): at 16:03

## 2024-12-31 RX ADMIN — ACETAMINOPHEN 975 MILLIGRAM(S): 80 SOLUTION/ DROPS ORAL at 06:20

## 2024-12-31 RX ADMIN — LOSARTAN POTASSIUM 25 MILLIGRAM(S): 100 TABLET, FILM COATED ORAL at 06:20

## 2024-12-31 RX ADMIN — Medication 20 MILLIGRAM(S): at 02:19

## 2024-12-31 RX ADMIN — Medication 20 MILLIGRAM(S): at 15:07

## 2024-12-31 RX ADMIN — GABAPENTIN 300 MILLIGRAM(S): 300 CAPSULE ORAL at 06:22

## 2024-12-31 NOTE — PROGRESS NOTE ADULT - NUTRITIONAL ASSESSMENT
This patient has been assessed with a concern for Malnutrition and has been determined to have a diagnosis/diagnoses of Morbid obesity (BMI > 40).    This patient is being managed with:   Diet Regular-  Consistent Carbohydrate {No Snacks} (CSTCHO)  Supplement Feeding Modality:  Oral  Ensure Max Cans or Servings Per Day:  1       Frequency:  Daily  Entered: Dec 18 2024 10:20AM  
This patient has been assessed with a concern for Malnutrition and has been determined to have a diagnosis/diagnoses of Morbid obesity (BMI > 40).    This patient is being managed with:   Diet Regular-  Consistent Carbohydrate {Evening Snack} (CSTCHOSN)  DASH/TLC {Sodium & Cholesterol Restricted} (DASH)  Low Fat (LOWFAT)  Supplement Feeding Modality:  Oral  Glucerna Shake Cans or Servings Per Day:  1       Frequency:  Two Times a day  Entered: Dec 28 2024 10:01AM  
This patient has been assessed with a concern for Malnutrition and has been determined to have a diagnosis/diagnoses of Morbid obesity (BMI > 40).    This patient is being managed with:   Diet Regular-  Consistent Carbohydrate {No Snacks} (CSTCHO)  Supplement Feeding Modality:  Oral  Ensure Max Cans or Servings Per Day:  1       Frequency:  Daily  Entered: Dec 18 2024 10:20AM  
This patient has been assessed with a concern for Malnutrition and has been determined to have a diagnosis/diagnoses of Morbid obesity (BMI > 40).    This patient is being managed with:   Diet Regular-  Supplement Feeding Modality:  Oral  Ensure Plus High Protein Cans or Servings Per Day:  1       Frequency:  Daily  Entered: Dec 23 2024  9:36AM  
This patient has been assessed with a concern for Malnutrition and has been determined to have a diagnosis/diagnoses of Morbid obesity (BMI > 40).    This patient is being managed with:   Diet Regular-  Consistent Carbohydrate {No Snacks} (CSTCHO)  Supplement Feeding Modality:  Oral  Ensure Max Cans or Servings Per Day:  1       Frequency:  Daily  Entered: Dec 18 2024 10:20AM  
This patient has been assessed with a concern for Malnutrition and has been determined to have a diagnosis/diagnoses of Morbid obesity (BMI > 40).    This patient is being managed with:   Diet Regular-  Consistent Carbohydrate {No Snacks} (CSTCHO)  Supplement Feeding Modality:  Oral  Ensure Max Cans or Servings Per Day:  1       Frequency:  Daily  Entered: Dec 18 2024 10:20AM  
This patient has been assessed with a concern for Malnutrition and has been determined to have a diagnosis/diagnoses of Morbid obesity (BMI > 40).    This patient is being managed with:   Diet NPO after Midnight-     NPO Start Date: 26-Dec-2024   NPO Start Time: 23:59  Except Medications  Entered: Dec 26 2024  1:35PM    Diet Regular-  Pet Scan (PETSCAN)  Supplement Feeding Modality:  Oral  Ensure Plus High Protein Cans or Servings Per Day:  1       Frequency:  Daily  Entered: Dec 26 2024 10:19AM  
This patient has been assessed with a concern for Malnutrition and has been determined to have a diagnosis/diagnoses of Morbid obesity (BMI > 40).    This patient is being managed with:   Diet Regular-  Consistent Carbohydrate {Evening Snack} (CSTCHOSN)  DASH/TLC {Sodium & Cholesterol Restricted} (DASH)  Low Fat (LOWFAT)  Supplement Feeding Modality:  Oral  Ensure Max Cans or Servings Per Day:  1       Frequency:  Daily  Entered: Dec 27 2024 11:59AM  
This patient has been assessed with a concern for Malnutrition and has been determined to have a diagnosis/diagnoses of Morbid obesity (BMI > 40).    This patient is being managed with:   Diet Regular-  Consistent Carbohydrate {Evening Snack} (CSTCHOSN)  DASH/TLC {Sodium & Cholesterol Restricted} (DASH)  Low Fat (LOWFAT)  Supplement Feeding Modality:  Oral  Glucerna Shake Cans or Servings Per Day:  1       Frequency:  Two Times a day  Entered: Dec 28 2024 10:01AM  
This patient has been assessed with a concern for Malnutrition and has been determined to have a diagnosis/diagnoses of Morbid obesity (BMI > 40).    This patient is being managed with:   Diet Regular-  Consistent Carbohydrate {Evening Snack} (CSTCHOSN)  DASH/TLC {Sodium & Cholesterol Restricted} (DASH)  Low Fat (LOWFAT)  Supplement Feeding Modality:  Oral  Glucerna Shake Cans or Servings Per Day:  1       Frequency:  Two Times a day  Entered: Dec 28 2024 10:01AM  
This patient has been assessed with a concern for Malnutrition and has been determined to have a diagnosis/diagnoses of Morbid obesity (BMI > 40).    This patient is being managed with:   Diet Regular-  Consistent Carbohydrate {No Snacks} (CSTCHO)  Supplement Feeding Modality:  Oral  Ensure Max Cans or Servings Per Day:  1       Frequency:  Daily  Entered: Dec 18 2024 10:20AM  
This patient has been assessed with a concern for Malnutrition and has been determined to have a diagnosis/diagnoses of Morbid obesity (BMI > 40).    This patient is being managed with:   Diet Regular-  Supplement Feeding Modality:  Oral  Ensure Plus High Protein Cans or Servings Per Day:  1       Frequency:  Daily  Entered: Dec 23 2024  9:36AM  
This patient has been assessed with a concern for Malnutrition and has been determined to have a diagnosis/diagnoses of Morbid obesity (BMI > 40).    This patient is being managed with:   Diet Regular-  Consistent Carbohydrate {Evening Snack} (CSTCHOSN)  DASH/TLC {Sodium & Cholesterol Restricted} (DASH)  Low Fat (LOWFAT)  Supplement Feeding Modality:  Oral  Glucerna Shake Cans or Servings Per Day:  1       Frequency:  Two Times a day  Entered: Dec 28 2024 10:01AM  
This patient has been assessed with a concern for Malnutrition and has been determined to have a diagnosis/diagnoses of Morbid obesity (BMI > 40).    This patient is being managed with:   Diet Regular-  Supplement Feeding Modality:  Oral  Ensure Plus High Protein Cans or Servings Per Day:  1       Frequency:  Daily  Entered: Dec 23 2024  9:36AM  
This patient has been assessed with a concern for Malnutrition and has been determined to have a diagnosis/diagnoses of Morbid obesity (BMI > 40).    This patient is being managed with:   Diet Regular-  Supplement Feeding Modality:  Oral  Ensure Max Cans or Servings Per Day:  1       Frequency:  Daily  Entered: Dec 22 2024  7:40AM

## 2024-12-31 NOTE — PROGRESS NOTE ADULT - PROBLEM SELECTOR PLAN 3
- pt on  home amlodipine, atenolol, and losartan  pt noted to be hypotensive this morning   will monitore BP, if still ow , will hold amlodipine for 2-3 days ot until f/u with PMD   - monitor vitals q4h

## 2024-12-31 NOTE — PROGRESS NOTE ADULT - SUBJECTIVE AND OBJECTIVE BOX
[facility] Geriatrics and Palliative Care  Meka Ma Palliative Care Attending  Contact Info: Page [phone] (including Nights/Weekends), message on Microsoft Teams (Meka Ma), or leave  at Palliative Office [phone] (non-urgent)   Date of Zymvedd33-29-11 @ 13:49    SUBJECTIVE AND OBJECTIVE: Patient seen around 1pm with wife at bedside.     Indication for Geriatrics and Palliative Care Services/INTERVAL HPI:     OVERNIGHT EVENTS:    DNR on chart:  Allergies    No Known Allergies    Intolerances    IV Contrast (Nausea)  MEDICATIONS  (STANDING):  acetaminophen     Tablet .. 975 milliGRAM(s) Oral every 8 hours  allopurinol 300 milliGRAM(s) Oral daily  amLODIPine   Tablet 10 milliGRAM(s) Oral daily  ascorbic acid 500 milliGRAM(s) Oral daily  atenolol  Tablet 100 milliGRAM(s) Oral daily  atorvastatin 10 milliGRAM(s) Oral at bedtime  cholecalciferol 1000 Unit(s) Oral daily  dextrose 5%. 1000 milliLiter(s) (100 mL/Hr) IV Continuous <Continuous>  dextrose 5%. 1000 milliLiter(s) (50 mL/Hr) IV Continuous <Continuous>  dextrose 5%. 1000 milliLiter(s) (50 mL/Hr) IV Continuous <Continuous>  dextrose 5%. 1000 milliLiter(s) (100 mL/Hr) IV Continuous <Continuous>  dextrose 50% Injectable 25 Gram(s) IV Push once  dextrose 50% Injectable 25 Gram(s) IV Push once  dextrose 50% Injectable 12.5 Gram(s) IV Push once  dextrose 50% Injectable 25 Gram(s) IV Push once  dextrose 50% Injectable 12.5 Gram(s) IV Push once  dextrose 50% Injectable 25 Gram(s) IV Push once  DULoxetine 60 milliGRAM(s) Oral daily  enoxaparin Injectable 40 milliGRAM(s) SubCutaneous every 12 hours  gabapentin 300 milliGRAM(s) Oral four times a day  glucagon  Injectable 1 milliGRAM(s) IntraMuscular once  glucagon  Injectable 1 milliGRAM(s) IntraMuscular once  levothyroxine 75 MICROGram(s) Oral daily  losartan 25 milliGRAM(s) Oral daily  multivitamin 1 Tablet(s) Oral daily  OLANZapine 5 milliGRAM(s) Oral at bedtime  oxyCODONE  ER Tablet 40 milliGRAM(s) Oral every 12 hours  pantoprazole    Tablet 40 milliGRAM(s) Oral before breakfast  polyethylene glycol 3350 17 Gram(s) Oral two times a day  senna 2 Tablet(s) Oral two times a day  tamsulosin 0.4 milliGRAM(s) Oral at bedtime    MEDICATIONS  (PRN):  dextrose Oral Gel 15 Gram(s) Oral once PRN Blood Glucose LESS THAN 70 milliGRAM(s)/deciliter  dextrose Oral Gel 15 Gram(s) Oral once PRN Blood Glucose LESS THAN 70 milliGRAM(s)/deciliter  diphenhydrAMINE 25 milliGRAM(s) Oral every 6 hours PRN Rash and/or Itching  hydrocortisone sodium succinate Injectable 100 milliGRAM(s) IV Push once PRN allergic reaction  LORazepam     Tablet 0.5 milliGRAM(s) Oral every 4 hours PRN anxiety/agitation  magnesium hydroxide Suspension 30 milliLiter(s) Oral daily PRN Constipation  meperidine     Injectable 12.5 milliGRAM(s) IV Push once PRN RIGORS  meperidine     Injectable 25 milliGRAM(s) IV Push once PRN rigors  methylPREDNISolone sodium succinate Injectable 50 milliGRAM(s) IV Push once PRN ANAPHLAXIS  metoclopramide Injectable 10 milliGRAM(s) IV Push every 4 hours PRN NAUSEA  naloxone Injectable 0.1 milliGRAM(s) IV Push every 3 minutes PRN For ANY of the following changes in patient status:  A. RR LESS THAN 10 breaths per minute, B. Oxygen saturation LESS THAN 90%, C. Sedation score of 6  ondansetron Injectable 4 milliGRAM(s) IV Push every 6 hours PRN Nausea and/or Vomiting  oxyCODONE    IR 15 milliGRAM(s) Oral every 4 hours PRN Moderate Pain (4 - 6)  oxyCODONE    IR 20 milliGRAM(s) Oral every 4 hours PRN Severe Pain (7 - 10)  sucralfate suspension 1 Gram(s) Oral Once PRN for esophageal pain  tiZANidine 4 milliGRAM(s) Oral <User Schedule> PRN Muscle Spasm  zolpidem 5 milliGRAM(s) Oral at bedtime PRN Insomnia      ITEMS UNCHECKED ARE NOT PRESENT    PRESENT SYMPTOMS: [ ]Unable to self-report - see [ ] CPOT [ ] PAINADS [ ] RDOS  Source if other than patient:  [ ]Family   [ ]Team     Pain:  [ ]yes [ ]no  QOL impact -   Location -                    Aggravating factors -  Quality -  Radiation -  Timing-  Severity (0-10 scale):  Minimal acceptable level/ pain goal (0-10 scale):     CPOT:    https://www.[facility].org/getattachment/[email or web address]/Critical-Care-Pain-Observation-Tool-(CPOT)    Dyspnea:                           [ ]Mild [ ]Moderate [ ]Severe  Anxiety:                             [ ]Mild [ ]Moderate [ ]Severe  Fatigue:                             [ ]Mild [ ]Moderate [ ]Severe  Nausea:                             [ ]Mild [ ]Moderate [ ]Severe  Loss of appetite:              [ ]Mild [ ]Moderate [ ]Severe  Constipation:                    [ ]Mild [ ]Moderate [ ]Severe  Other Symptoms:  [ ]All other review of systems negative     PCSSQ[Palliative Care Spiritual Screening Question]   Severity (0-10):  Score of 4 or > indicate consideration of Chaplaincy referral.  Chaplaincy Referral: [ ] yes [ ] refused [ ] following [ ] deferred    Caregiver [place]? : [ ] yes [ ] no [ ] Deferred [ ] Declined             Social work referral [ ] Patient & Family Centered Care Referral [ ]  Anticipatory Grief present?:  [ ] yes [ ] no  [ ] Deferred                  Social work referral [ ] Patient & Family Centered Care Referral [ ]      PHYSICAL EXAM:  Vital Signs Last 24 Hrs  T(C): 36.7 (31 Dec 2024 09:43), Max: 37.3 (31 Dec 2024 06:11)  T(F): 98.1 (31 Dec 2024 09:43), Max: 99.1 (31 Dec 2024 06:11)  HR: 64 (31 Dec 2024 12:53) (56 - 86)  BP: 100/49 (31 Dec 2024 12:53) (89/49 - 163/73)  BP(mean): --  RR: 18 (31 Dec 2024 09:43) (17 - 18)  SpO2: 94% (31 Dec 2024 09:43) (94% - 95%)    Parameters below as of 31 Dec 2024 09:43  Patient On (Oxygen Delivery Method): room air     I&O's Summary     GENERAL: [ ]Cachexia    [ ]Alert  [ ]Oriented x   [ ]Lethargic  [ ]Unarousable  [ ]Verbal  [ ]Non-Verbal  Behavioral:   [ ]Anxiety  [ ]Delirium [ ]Agitation [ ]Other  HEENT:  [ ]Normal   [ ]Dry mouth   [ ]ET Tube/Trach  [ ]Oral lesions  PULMONARY:   [ ]Clear [ ]Tachypnea  [ ]Audible excessive secretions   [ ]Rhonchi        [ ]Right [ ]Left [ ]Bilateral  [ ]Crackles        [ ]Right [ ]Left [ ]Bilateral  [ ]Wheezing     [ ]Right [ ]Left [ ]Bilateral  [ ]Diminished BS [ ] Right [ ]Left [ ]Bilateral  CARDIOVASCULAR:    [ ]Regular [ ]Irregular [ ]Tachy  [ ]Kartik [ ]Murmur [ ]Other  GASTROINTESTINAL:  [ ]Soft  [ ]Distended   [ ]+BS  [ ]Non tender [ ]Tender  [ ]Other [ ]PEG [ ]OGT/ NGT   Last BM:   GENITOURINARY:  [ ]Normal [ ]Incontinent   [ ]Oliguria/Anuria   [ ]Nuñez  MUSCULOSKELETAL:   [ ]Normal   [ ]Weakness  [ ]Bed/Wheelchair bound [ ]Edema  NEUROLOGIC:   [ ]No focal deficits  [ ] Cognitive impairment  [ ] Dysphagia [ ]Dysarthria [ ] Paresis [ ]Other   SKIN: Please see flowsheets   [ ]Normal  [ ]Rash  [ ]Other  [ ]Pressure ulcer(s) [ ]y [ ]n present on admission    CRITICAL CARE:  [ ]Shock Present  [ ]Septic [ ]Cardiogenic [ ]Neurologic [ ]Hypovolemic  [ ]Vasopressors [ ]Inotropes  [ ]Respiratory failure present [ ]Mechanical Ventilation [ ]Non-invasive ventilatory support [ ]High-Flow   [ ]Acute  [ ]Chronic [ ]Hypoxic  [ ]Hypercarbic [ ]Other  [ ]Other organ failure     LABS:                        13.7   14.57 )-----------( 104      ( 31 Dec 2024 06:31 )             37.8   12-31    135  |  98  |  21  ----------------------------<  117[H]  4.1   |  26  |  0.73    Ca    9.3      31 Dec 2024 06:31  Phos  3.8     12-31  Mg     2.00     12-31    TPro  6.6  /  Alb  3.7  /  TBili  0.8  /  DBili  x   /  AST  42[H]  /  ALT  37  /  AlkPhos  130[H]  12-31      Urinalysis Basic - ( 31 Dec 2024 06:31 )    Color: x / Appearance: x / SG: x / pH: x  Gluc: 117 mg/dL / Ketone: x  / Bili: x / Urobili: x   Blood: x / Protein: x / Nitrite: x   Leuk Esterase: x / RBC: x / WBC x   Sq Epi: x / Non Sq Epi: x / Bacteria: x      RADIOLOGY & ADDITIONAL STUDIES:    Protein Calorie Malnutrition Present: [ ]mild [ ]moderate [ ]severe [ ]underweight [ ]morbid obesity  https://www.andeal.org/vault/2130/web/files/ONC/Table_Clinical%20Characteristics%20to%20Document%20Malnutrition-White%20JV%20et%20al%928725.pdf    Height (cm): 172.7 (12-17-24 @ 10:58)  Weight (kg): 122.8 (12-27-24 @ 15:50), 125.6 (06-16-24 @ 15:14)  BMI (kg/m2): 41.2 (12-27-24 @ 15:50), 42.1 (12-17-24 @ 10:58)    [ ]PPSV2 < or = 30%  [ ]significant weight loss [ ]poor nutritional intake [ ]anasarca[ ]Artificial Nutrition    Other REFERRALS:  [ ]Hospice  [ ]Child Life  [ ]Social Work  [ ]Case management [ ]Holistic Therapy     Goals of Care Document: [facility] Geriatrics and Palliative Care  Meka Ma, Palliative Care Attending  Contact Info: Page [phone] (including Nights/Weekends), message on Microsoft Teams (Meka Ma), or leave  at Palliative Office [phone] (non-urgent)   Date of Yvxasou31-42-31 @ 13:49    SUBJECTIVE AND OBJECTIVE: Patient seen around 1pm with wife at bedside. Patient eager to be discharged home. He reports pain is controlled. He states that if oxycontin is not approved for prior authorization, he feels that the oxycodone IR 20mg will manage his pain. He states that he is aware of hypotension this AM but was mentating fine.     Indication for Geriatrics and Palliative Care Services/INTERVAL HPI: sx management in setting of DLBCL     OVERNIGHT EVENTS:  > 12/30: Weekend events reviewed. Over the past 24 hours, patient required PRNs of ativan 0.5mg x3, oxycodone 30mg x3, zanaflex x1, ambien x1.   > 12/31: Over the past 24 hours, patient required PRNs of 0.5mg ativan x2, oxycodone 20mg x2, tizanidine x1, ambien x1.     DNR on chart:  Allergies    No Known Allergies    Intolerances    IV Contrast (Nausea)  MEDICATIONS  (STANDING):  acetaminophen     Tablet .. 975 milliGRAM(s) Oral every 8 hours  allopurinol 300 milliGRAM(s) Oral daily  amLODIPine   Tablet 10 milliGRAM(s) Oral daily  ascorbic acid 500 milliGRAM(s) Oral daily  atenolol  Tablet 100 milliGRAM(s) Oral daily  atorvastatin 10 milliGRAM(s) Oral at bedtime  cholecalciferol 1000 Unit(s) Oral daily  dextrose 5%. 1000 milliLiter(s) (100 mL/Hr) IV Continuous <Continuous>  dextrose 5%. 1000 milliLiter(s) (50 mL/Hr) IV Continuous <Continuous>  dextrose 5%. 1000 milliLiter(s) (50 mL/Hr) IV Continuous <Continuous>  dextrose 5%. 1000 milliLiter(s) (100 mL/Hr) IV Continuous <Continuous>  dextrose 50% Injectable 25 Gram(s) IV Push once  dextrose 50% Injectable 25 Gram(s) IV Push once  dextrose 50% Injectable 12.5 Gram(s) IV Push once  dextrose 50% Injectable 25 Gram(s) IV Push once  dextrose 50% Injectable 12.5 Gram(s) IV Push once  dextrose 50% Injectable 25 Gram(s) IV Push once  DULoxetine 60 milliGRAM(s) Oral daily  enoxaparin Injectable 40 milliGRAM(s) SubCutaneous every 12 hours  gabapentin 300 milliGRAM(s) Oral four times a day  glucagon  Injectable 1 milliGRAM(s) IntraMuscular once  glucagon  Injectable 1 milliGRAM(s) IntraMuscular once  levothyroxine 75 MICROGram(s) Oral daily  losartan 25 milliGRAM(s) Oral daily  multivitamin 1 Tablet(s) Oral daily  OLANZapine 5 milliGRAM(s) Oral at bedtime  oxyCODONE  ER Tablet 40 milliGRAM(s) Oral every 12 hours  pantoprazole    Tablet 40 milliGRAM(s) Oral before breakfast  polyethylene glycol 3350 17 Gram(s) Oral two times a day  senna 2 Tablet(s) Oral two times a day  tamsulosin 0.4 milliGRAM(s) Oral at bedtime    MEDICATIONS  (PRN):  dextrose Oral Gel 15 Gram(s) Oral once PRN Blood Glucose LESS THAN 70 milliGRAM(s)/deciliter  dextrose Oral Gel 15 Gram(s) Oral once PRN Blood Glucose LESS THAN 70 milliGRAM(s)/deciliter  diphenhydrAMINE 25 milliGRAM(s) Oral every 6 hours PRN Rash and/or Itching  hydrocortisone sodium succinate Injectable 100 milliGRAM(s) IV Push once PRN allergic reaction  LORazepam     Tablet 0.5 milliGRAM(s) Oral every 4 hours PRN anxiety/agitation  magnesium hydroxide Suspension 30 milliLiter(s) Oral daily PRN Constipation  meperidine     Injectable 12.5 milliGRAM(s) IV Push once PRN RIGORS  meperidine     Injectable 25 milliGRAM(s) IV Push once PRN rigors  methylPREDNISolone sodium succinate Injectable 50 milliGRAM(s) IV Push once PRN ANAPHLAXIS  metoclopramide Injectable 10 milliGRAM(s) IV Push every 4 hours PRN NAUSEA  naloxone Injectable 0.1 milliGRAM(s) IV Push every 3 minutes PRN For ANY of the following changes in patient status:  A. RR LESS THAN 10 breaths per minute, B. Oxygen saturation LESS THAN 90%, C. Sedation score of 6  ondansetron Injectable 4 milliGRAM(s) IV Push every 6 hours PRN Nausea and/or Vomiting  oxyCODONE    IR 15 milliGRAM(s) Oral every 4 hours PRN Moderate Pain (4 - 6)  oxyCODONE    IR 20 milliGRAM(s) Oral every 4 hours PRN Severe Pain (7 - 10)  sucralfate suspension 1 Gram(s) Oral Once PRN for esophageal pain  tiZANidine 4 milliGRAM(s) Oral <User Schedule> PRN Muscle Spasm  zolpidem 5 milliGRAM(s) Oral at bedtime PRN Insomnia      ITEMS UNCHECKED ARE NOT PRESENT    PRESENT SYMPTOMS: [ ]Unable to self-report - see [ ] CPOT [ ] PAINADS [ ] RDOS  Source if other than patient:  [ ]Family   [ ]Team       Pain: [ x]yes- controlled [ ]no  QOL impact - unable to move   Location -      left shoulder   Aggravating factors - movement   Quality - burning and sharp   Radiation - none   Timing- constant but intermittent   Severity (0-10 scale): 9/10   Minimal acceptable level/pain goal (0-10 scale): did not specify     CPOT:    https://www.[facility].org/getattachment/[email or web address]/Critical-Care-Pain-Observation-Tool-(CPOT)    Dyspnea:                           [ ]Mild [ ]Moderate [ ]Severe  Anxiety:                             [ ]Mild [ ]Moderate [ ]Severe  Fatigue:                             [ ]Mild [ ]Moderate [ ]Severe  Nausea:                             [ ]Mild [ ]Moderate [ ]Severe  Loss of appetite:              [ ]Mild [ ]Moderate [ ]Severe  Constipation:                    [ ]Mild [ ]Moderate [ ]Severe  Other Symptoms:  [x ]All other review of systems negative     PCSSQ[Palliative Care Spiritual Screening Question]   Severity (0-10):  Score of 4 or > indicate consideration of Chaplaincy referral.  Chaplaincy Referral: [ ] yes [ ] refused [ ] following [x ] deferred    Caregiver [place]? : [ ] yes [ ] no [x ] Deferred [ ] Declined             Social work referral [ ] Patient & Family Centered Care Referral [ ]  Anticipatory Grief present?:  [ ] yes [ ] no  [x ] Deferred                  Social work referral [ ] Patient & Family Centered Care Referral [ ]    PHYSICAL EXAM:  Vital Signs Last 24 Hrs  T(C): 36.7 (31 Dec 2024 09:43), Max: 37.3 (31 Dec 2024 06:11)  T(F): 98.1 (31 Dec 2024 09:43), Max: 99.1 (31 Dec 2024 06:11)  HR: 64 (31 Dec 2024 12:53) (56 - 86)  BP: 100/49 (31 Dec 2024 12:53) (89/49 - 163/73)  BP(mean): --  RR: 18 (31 Dec 2024 09:43) (17 - 18)  SpO2: 94% (31 Dec 2024 09:43) (94% - 95%)    Parameters below as of 31 Dec 2024 09:43  Patient On (Oxygen Delivery Method): room air     I&O's Summary     General: obese alert, NAD  HENT: normocephalic, face relaxed  Eyes: no icterus, conjunctiva clear  Chest: symmetric excursion, no accessory muscle use  Heart: peripheral pulse 2+ and regular  Lungs: no dyspnea with speech  Abdomen: soft, NT, ND  Ext: no cyanosis, clubbing, visible veins, ulcers, wounds  Neuro: alert, grossly non-focal  Psych: hyperverbal anxious fidgety, redirectable w significant effort, very tearful - overall worse than prior exams   Skin: ]Normal  [ ]Rash [x ]Other - large purple bruise like raised lesion on left shoulder s/p Bx   [ ]Pressure ulcer(s) [ ]y [ ]n present on admission    CRITICAL CARE:  [ ]Shock Present  [ ]Septic [ ]Cardiogenic [ ]Neurologic [ ]Hypovolemic  [ ]Vasopressors [ ]Inotropes  [ ]Respiratory failure present [ ]Mechanical Ventilation [ ]Non-invasive ventilatory support [ ]High-Flow   [ ]Acute  [ ]Chronic [ ]Hypoxic  [ ]Hypercarbic [ ]Other  [ ]Other organ failure     LABS:                        13.7   14.57 )-----------( 104      ( 31 Dec 2024 06:31 )             37.8   12-31    135  |  98  |  21  ----------------------------<  117[H]  4.1   |  26  |  0.73    Ca    9.3      31 Dec 2024 06:31  Phos  3.8     12-31  Mg     2.00     12-31    TPro  6.6  /  Alb  3.7  /  TBili  0.8  /  DBili  x   /  AST  42[H]  /  ALT  37  /  AlkPhos  130[H]  12-31      Urinalysis Basic - ( 31 Dec 2024 06:31 )    Color: x / Appearance: x / SG: x / pH: x  Gluc: 117 mg/dL / Ketone: x  / Bili: x / Urobili: x   Blood: x / Protein: x / Nitrite: x   Leuk Esterase: x / RBC: x / WBC x   Sq Epi: x / Non Sq Epi: x / Bacteria: x      RADIOLOGY & ADDITIONAL STUDIES: no new     Protein Calorie Malnutrition Present: [ ]mild [ ]moderate [ ]severe [ ]underweight [ ]morbid obesity  https://www.andeal.org/vault/2440/web/files/ONC/Table_Clinical%20Characteristics%20to%20Document%20Malnutrition-White%20JV%20et%20al%202012.pdf    Height (cm): 172.7 (12-17-24 @ 10:58)  Weight (kg): 122.8 (12-27-24 @ 15:50), 125.6 (06-16-24 @ 15:14)  BMI (kg/m2): 41.2 (12-27-24 @ 15:50), 42.1 (12-17-24 @ 10:58)    [ ]PPSV2 < or = 30%  [ ]significant weight loss [ ]poor nutritional intake [ ]anasarca[ ]Artificial Nutrition    Other REFERRALS:  [ ]Hospice  [ ]Child Life  [ ]Social Work  [ ]Case management [ ]Holistic Therapy

## 2024-12-31 NOTE — PROGRESS NOTE ADULT - PROBLEM SELECTOR PLAN 1
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   - Home regimen: oxycodone 5mg q4hrs prn, I-stop Reviewed #: [id number]  - Hospital regimen: oxycodone 15-20mg for moderate to severe pain q4hrs prn, tizanidine, gabapentin, duloxetine   - Pain currently   [x ] improved   [ ] uncontrolled    - 24hr PRN use reviewed 9AM to 9AM:oxycodone 20mg x3    Recommend:   - continue oxycodone ER 40mg q12hrs (12/26)   - c/w oxycodone 15-20mg for moderate to severe pain q4hrs prn  - narcan prn  - c/w tizanidine 4mg bid prn, gabapentin 300mg qid, duloxetine 60mg qd   - Bowel regimen, goal BM every 2 to 3 days to prevent opioid induced constipation, hold for loose stool  - Follows w/ pain management Dr. Trejo out [facility] but is open to establishing care with [facility] team. Made referral.   Dr. Mcknight, Dr. 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   - c/w oxycodone 15-20mg for moderate to severe pain q4hrs prn  - narcan prn  - c/w tizanidine 4mg bid prn, gabapentin 300mg qid, duloxetine 60mg qd   - Bowel regimen, goal BM every 2 to 3 days to prevent opioid induced constipation, hold for loose stool  - Follows w/ pain management Dr. Trejo out [facility] but is open to establishing care with [facility] team. Made referral.   Dr. Mcknight, Dr. Dena Campbell, Bella Marley NP, Sylvie Jacobs NP    [facility], [facility]  Phone: [phone]  [address] [place], [place] [address]

## 2024-12-31 NOTE — PROGRESS NOTE ADULT - PROBLEM SELECTOR PLAN 7
DVT ppx: Lovenox s/c 40 mg q12h as BMI > 40  PT/OT: No skilled needs  Dispo: Home pending PA for oxy
DVT ppx: Lovenox s/c 40 mg q12h as BMI > 40  PT/OT: No skilled needs  Dispo: Home pending Oncology recommendations
DVT ppx: Lovenox s/c 40 mg q12h as BMI > 40  PT/OT: No skilled needs  Dispo: Home pending PA for oxy  Patient hemodynamically stable for discharge home  Time spent in discharge process is 36 min  Plan of care d/w pt and family  case d/w ACP
DVT ppx: Lovenox s/c 40 mg q12h as BMI > 40  PT/OT: No skilled needs  Dispo: Home pending Oncology recommendations
Thank you for allowing us to participate in your patient's care. We will continue to follow with you. In the event of worsening symptoms, please contact the Palliative Medicine team via pager (if the patient is at [facility] #[phone] or if the patient is at [facility] #[phone]) The Geriatric and Palliative Medicine service has coverage 24 hours a day/ 7 days a week to provide medical recommendations regarding symptom management needs via telephone.     Kalyani Sanchez M.D.  Geriatrics and Palliative Care Fellow  Available via TEAMS.
DVT ppx: Lovenox s/c 40 mg q12h as BMI > 40  PT/OT: No skilled needs  Dispo: Home pending Oncology recommendations  Rad onc pending consult
DVT ppx: Lovenox s/c 40 mg q12h as BMI > 40  PT/OT: No skilled needs  Dispo: Home pending Oncology recommendations

## 2024-12-31 NOTE — PROGRESS NOTE ADULT - PROBLEM SELECTOR PROBLEM 7
Prophylactic measure
Encounter for palliative care
Prophylactic measure

## 2024-12-31 NOTE — PROGRESS NOTE ADULT - PROBLEM SELECTOR PLAN 6
Thank you for allowing us to participate in your patient's care. We will continue to follow with you. In the event of worsening symptoms, please contact the Palliative Medicine team via pager (if the patient is at [facility] #[phone] or if the patient is at [facility] #[phone]) The Geriatric and Palliative Medicine service has coverage 24 hours a day/ 7 days a week to provide medical recommendations regarding symptom management needs via telephone.

## 2024-12-31 NOTE — PROGRESS NOTE ADULT - PROBLEM SELECTOR PLAN 4
Pt reports being unable to walk since Lumbar fusion, multiple complications from multiple surgeries. He walked w/ walker and cane. Both are present at bedside.  Currently does not work, on disability. Previously worked for [employer].    - PT eval per primary team 12/18 - no skilled PT needs

## 2024-12-31 NOTE — PROGRESS NOTE ADULT - PROBLEM SELECTOR PLAN 5
Pt has capacity for complex medical decision making   Surrogate: Wife Sheri [phone]  Code status: Full   GOC: c/w CDT    Dispo: Home when medically stable

## 2024-12-31 NOTE — PROGRESS NOTE ADULT - PROBLEM SELECTOR PLAN 2
Hx of chronic lower back pain s/p lumbar surgery on chronic opioids   - Cont current regimen: gabapentin, duloxetine 60mg daily, tylenol, tizanidine 4mg BID PRN  -  continue OXy ER 40 mg q 12 hrs and oxycodone IR  15 and 20 mg PRN for moderate and severe pain as per palliative recs  - continue bowel regimen with senna and miralax

## 2024-12-31 NOTE — PROGRESS NOTE ADULT - TIME BILLING
Time spent for extensive review of the physical chart, electronic medical record, and documentation to obtain collateral information including but not limited to:  [x ] Inpatient records (ED, H&P, primary team, and consultants if applicable, care coordination)  [x ] Inpatient values/results (biomarkers, immunoassays, imaging, and microbiology results)  [x ] Current or proposed treatment plans  [x  ] Discussion with the primary team  [x ] Discussion with the patient, surrogate decision maker, or family    Time spent: >50 min
- Ordering, reviewing, and interpreting labs, testing, and imaging.  - Independently obtaining a review of systems and performing a physical exam  - Reviewing consultant documentation/recommendations in addition to discussing plan of care with consultants.  - Counselling and educating patient and family regarding interpretation of aforementioned items and plan of care.
Time-based billing (NON-critical care).     37 minutes spent on total encounter; more than 50% of the visit was spent counseling and / or coordinating care by the attending physician.  The necessity of the time spent during the encounter on this date of service was due to:     review of laboratory data, radiology results, consultants' recommendations, documentation in [place], discussion with patient/ACP and interdisciplinary staff (such as , social workers, etc). Interventions were performed as documented above.
Time spent for extensive review of the physical chart, electronic medical record, and documentation to obtain collateral information including but not limited to:  [x ] Inpatient records (ED, H&P, primary team, and consultants if applicable, care coordination)  [x ] Inpatient values/results (biomarkers, immunoassays, imaging, and microbiology results)  [x ] Current or proposed treatment plans  [x  ] Discussion with the primary team  [x ] Discussion with the patient, surrogate decision maker, or family    Time spent: >50 min
Time spent for extensive review of the physical chart, electronic medical record, and documentation to obtain collateral information including but not limited to:  [x ] Inpatient records (ED, H&P, primary team, and consultants if applicable, care coordination)  [x ] Inpatient values/results (biomarkers, immunoassays, imaging, and microbiology results)  [x ] Current or proposed treatment plans  [x  ] Discussion with the primary team  [x ] Discussion with the patient, surrogate decision maker, or family    Time spent: >52 min
Time spent for extensive review of the physical chart, electronic medical record, and documentation to obtain collateral information including but not limited to:  [x ] Inpatient records (ED, H&P, primary team, and consultants if applicable, care coordination)  [x ] Inpatient values/results (biomarkers, immunoassays, imaging, and microbiology results)  [x ] Current or proposed treatment plans  [x  ] Discussion with the primary team  [x ] Discussion with the patient, surrogate decision maker, or family    Time spent: >50 min

## 2024-12-31 NOTE — PROGRESS NOTE ADULT - PROBLEM SELECTOR PLAN 2
expanding mass on Lt shoulder, s/p Bx w/ ortho on 12/17  path and flow consistent w/ DLBCL   CT chest 12/16 showing the 10.9x6.6x6cm lobulated soft tissue mass on shoulder, sarcomatous/neoplastic etiology, also with 2.9cm lobulated lesion on R anterior chest wall. Multiple enlarged cervical LNs.  TTE 12/23/24: EF > 75%    - appreciate inpatient oncology recommendations:   - s/p inpatient R-CHOP on 12/27, (steroids on since 12/23, rituxan on 12/24.   - s/p prednisone 100mg qd to complete 5 days, w/ ppx PPI, allopurinol (renally dose)  - high risk for TLS given disease burden   - pt s/p PET-CT 12/27  - [facility] consult for radiation to large shoulder mass? outpatient f/u?   - follow up at [facility]- has appointment with Dr. Meier on 1/6

## 2024-12-31 NOTE — PROGRESS NOTE ADULT - SUBJECTIVE AND OBJECTIVE BOX
Patient is a 64y old  Male who presents with a chief complaint of L shoulder mass (31 Dec 2024 13:49)      SUBJECTIVE / OVERNIGHT EVENTS: patient seen and examined by bedside, pt says he had  headache in the morning but now resolved, denies , dizziness, SOB, CP, Palpitations , N/V/D, abdominal pain, Pt noted to be hypotensive this morning , pt denies dizziness ot light-headedness. pt says he is not able eat and drink as well here , also sleeping poorly in the hospital , patient wants to go home today, does not want to stay for further monitoring       MEDICATIONS  (STANDING):  acetaminophen     Tablet .. 975 milliGRAM(s) Oral every 8 hours  allopurinol 300 milliGRAM(s) Oral daily  amLODIPine   Tablet 10 milliGRAM(s) Oral daily  ascorbic acid 500 milliGRAM(s) Oral daily  atenolol  Tablet 100 milliGRAM(s) Oral daily  atorvastatin 10 milliGRAM(s) Oral at bedtime  cholecalciferol 1000 Unit(s) Oral daily  dextrose 5%. 1000 milliLiter(s) (100 mL/Hr) IV Continuous <Continuous>  dextrose 5%. 1000 milliLiter(s) (50 mL/Hr) IV Continuous <Continuous>  dextrose 5%. 1000 milliLiter(s) (50 mL/Hr) IV Continuous <Continuous>  dextrose 5%. 1000 milliLiter(s) (100 mL/Hr) IV Continuous <Continuous>  dextrose 50% Injectable 25 Gram(s) IV Push once  dextrose 50% Injectable 25 Gram(s) IV Push once  dextrose 50% Injectable 12.5 Gram(s) IV Push once  dextrose 50% Injectable 25 Gram(s) IV Push once  dextrose 50% Injectable 12.5 Gram(s) IV Push once  dextrose 50% Injectable 25 Gram(s) IV Push once  DULoxetine 60 milliGRAM(s) Oral daily  enoxaparin Injectable 40 milliGRAM(s) SubCutaneous every 12 hours  gabapentin 300 milliGRAM(s) Oral four times a day  glucagon  Injectable 1 milliGRAM(s) IntraMuscular once  glucagon  Injectable 1 milliGRAM(s) IntraMuscular once  levothyroxine 75 MICROGram(s) Oral daily  losartan 25 milliGRAM(s) Oral daily  multivitamin 1 Tablet(s) Oral daily  OLANZapine 5 milliGRAM(s) Oral at bedtime  oxyCODONE  ER Tablet 40 milliGRAM(s) Oral every 12 hours  pantoprazole    Tablet 40 milliGRAM(s) Oral before breakfast  polyethylene glycol 3350 17 Gram(s) Oral two times a day  senna 2 Tablet(s) Oral two times a day  tamsulosin 0.4 milliGRAM(s) Oral at bedtime    MEDICATIONS  (PRN):  dextrose Oral Gel 15 Gram(s) Oral once PRN Blood Glucose LESS THAN 70 milliGRAM(s)/deciliter  dextrose Oral Gel 15 Gram(s) Oral once PRN Blood Glucose LESS THAN 70 milliGRAM(s)/deciliter  diphenhydrAMINE 25 milliGRAM(s) Oral every 6 hours PRN Rash and/or Itching  hydrocortisone sodium succinate Injectable 100 milliGRAM(s) IV Push once PRN allergic reaction  LORazepam     Tablet 0.5 milliGRAM(s) Oral every 4 hours PRN anxiety/agitation  magnesium hydroxide Suspension 30 milliLiter(s) Oral daily PRN Constipation  meperidine     Injectable 12.5 milliGRAM(s) IV Push once PRN RIGORS  meperidine     Injectable 25 milliGRAM(s) IV Push once PRN rigors  methylPREDNISolone sodium succinate Injectable 50 milliGRAM(s) IV Push once PRN ANAPHLAXIS  metoclopramide Injectable 10 milliGRAM(s) IV Push every 4 hours PRN NAUSEA  naloxone Injectable 0.1 milliGRAM(s) IV Push every 3 minutes PRN For ANY of the following changes in patient status:  A. RR LESS THAN 10 breaths per minute, B. Oxygen saturation LESS THAN 90%, C. Sedation score of 6  ondansetron Injectable 4 milliGRAM(s) IV Push every 6 hours PRN Nausea and/or Vomiting  oxyCODONE    IR 15 milliGRAM(s) Oral every 4 hours PRN Moderate Pain (4 - 6)  oxyCODONE    IR 20 milliGRAM(s) Oral every 4 hours PRN Severe Pain (7 - 10)  sucralfate suspension 1 Gram(s) Oral Once PRN for esophageal pain  tiZANidine 4 milliGRAM(s) Oral <User Schedule> PRN Muscle Spasm  zolpidem 5 milliGRAM(s) Oral at bedtime PRN Insomnia      Vital Signs Last 24 Hrs  T(C): 36.7 (31 Dec 2024 09:43), Max: 37.3 (31 Dec 2024 06:11)  T(F): 98.1 (31 Dec 2024 09:43), Max: 99.1 (31 Dec 2024 06:11)  HR: 64 (31 Dec 2024 12:53) (56 - 86)  BP: 100/49 (31 Dec 2024 12:53) (89/49 - 163/73)  BP(mean): --  RR: 18 (31 Dec 2024 09:43) (17 - 18)  SpO2: 94% (31 Dec 2024 09:43) (94% - 95%)    Parameters below as of 31 Dec 2024 09:43  Patient On (Oxygen Delivery Method): room air      CAPILLARY BLOOD GLUCOSE      POCT Blood Glucose.: 169 mg/dL (30 Dec 2024 16:37)    I&O's Summary  PHYSICAL EXAM :   CONSTITUTIONAL: NAD   EYES: conjunctiva and sclera clear  ENMT: Moist oral mucosa   NECK: Supple   RESPIRATORY: Normal respiratory effort; lungs are clear to auscultation bilaterally  CARDIOVASCULAR: Regular rate and rhythm, normal S1 and S2, no murmur/rub/gallop; Peripheral pulses are 2+ bilaterally  ABDOMEN: Nontender to palpation, normoactive bowel sounds, no rebound/guarding   MUSCULOSKELETAL: No lower extremity edema   PSYCH: A+O to person, place, and time; affect appropriate  NEUROLOGY: moves all extremities   SKIN: No rashes      LABS:                        13.7   14.57 )-----------( 104      ( 31 Dec 2024 06:31 )             37.8     12-31    135  |  98  |  21  ----------------------------<  117[H]  4.1   |  26  |  0.73    Ca    9.3      31 Dec 2024 06:31  Phos  3.8     12-31  Mg     2.00     12-31    TPro  6.6  /  Alb  3.7  /  TBili  0.8  /  DBili  x   /  AST  42[H]  /  ALT  37  /  AlkPhos  130[H]  12-31          Urinalysis Basic - ( 31 Dec 2024 06:31 )    Color: x / Appearance: x / SG: x / pH: x  Gluc: 117 mg/dL / Ketone: x  / Bili: x / Urobili: x   Blood: x / Protein: x / Nitrite: x   Leuk Esterase: x / RBC: x / WBC x   Sq Epi: x / Non Sq Epi: x / Bacteria: x        RADIOLOGY & ADDITIONAL TESTS:    Imaging Personally Reviewed:    Consultant(s) Notes Reviewed:  Palliative care     Care Discussed with Consultants/Other Providers: Hematology

## 2024-12-31 NOTE — PROGRESS NOTE ADULT - REASON FOR ADMISSION
L shoulder mass

## 2024-12-31 NOTE — PROGRESS NOTE ADULT - PROVIDER SPECIALTY LIST ADULT
Heme/Onc
Orthopedics
Heme/Onc
Orthopedics
Heme/Onc
Orthopedics
Orthopedics
Hospitalist
Endocrinology
Hospitalist
Palliative Care
Hospitalist
Hospitalist
Palliative Care
Hospitalist
Palliative Care
Hospitalist
Palliative Care
Hospitalist
Hospitalist

## 2024-12-31 NOTE — PROGRESS NOTE ADULT - NSPROGADDITIONALINFOA_GEN_ALL_CORE
Primary team to prescribe discharge medications to cover patient’s needs between the date of discharge and the date of appointment with either the patient's PMD, the primary oncologist, or a palliative care provider.  Ideally medications are to be prescribed to [facility] pharmacy, so the patient can leave the hospital with those medications.  For pain being treated as a part of cancer care, hospice or other end-of-life care, or pain being treated as part of palliative care prescribing opioids beyond the 7-day and less than 28 days post-discharge window IS ACCEPTABLE as per [place] Department of Health (https://www.health.[place].gov/professionals/narcotic/laws_and_regulations/).  Primary team to  make sure that any pre-approvals are done before discharge.    Pls prescribe Naloxone for here and upon discharge. Patients with 50mg OME have 4x risk of OD and those with 100mg OME have 9x risk (prescribetoprevent.org)    Inpatient: Naloxone 0.4 to 2mg IV q2-3mins PRN (max 10mg)  Upon discharge: Naloxone 4mg/0.1 ml nasal spray, 1 spray q2-3mins alternating between nostrils     Pls provide patient education prior to discharge. Resource: https://www.health.[place].gov/diseases/aids/general/opioid_overdose_prevention/overdose_facts.htm
Primary team to prescribe discharge medications to cover patient’s needs between the date of discharge and the date of appointment with either the patient's PMD, the primary oncologist, or a palliative care provider.  Ideally medications are to be prescribed to [facility] pharmacy, so the patient can leave the hospital with those medications.  For pain being treated as a part of cancer care, hospice or other end-of-life care, or pain being treated as part of palliative care prescribing opioids beyond the 7-day and less than 28 days post-discharge window IS ACCEPTABLE as per [place] Department of Health (https://www.health.[place].gov/professionals/narcotic/laws_and_regulations/).  Primary team to  make sure that any pre-approvals are done before discharge.    Pls prescribe Naloxone for here and upon discharge. Patients with 50mg OME have 4x risk of OD and those with 100mg OME have 9x risk (prescribetoprevent.org)    Inpatient: Naloxone 0.4 to 2mg IV q2-3mins PRN (max 10mg)  Upon discharge: Naloxone 4mg/0.1 ml nasal spray, 1 spray q2-3mins alternating between nostrils     Pls provide patient education prior to discharge. Resource: https://www.health.[place].gov/diseases/aids/general/opioid_overdose_prevention/overdose_facts.htm
Primary team to prescribe discharge medications to cover patient’s needs between the date of discharge and the date of appointment with either the patient's PMD, the primary oncologist, or a palliative care provider.  Ideally medications are to be prescribed to [facility] pharmacy, so the patient can leave the hospital with those medications.  For pain being treated as a part of cancer care, hospice or other end-of-life care, or pain being treated as part of palliative care prescribing opioids beyond the 7-day and less than 28 days post-discharge window IS ACCEPTABLE as per [place] Department of Health (https://www.health.[place].gov/professionals/narcotic/laws_and_regulations/).  Primary team to  make sure that any pre-approvals are done before discharge.
Primary team to prescribe discharge medications to cover patient’s needs between the date of discharge and the date of appointment with either the patient's PMD, the primary oncologist, or a palliative care provider.  Ideally medications are to be prescribed to [facility] pharmacy, so the patient can leave the hospital with those medications.  For pain being treated as a part of cancer care, hospice or other end-of-life care, or pain being treated as part of palliative care prescribing opioids beyond the 7-day and less than 28 days post-discharge window IS ACCEPTABLE as per [place] Department of Health (https://www.health.[place].gov/professionals/narcotic/laws_and_regulations/).  Primary team to  make sure that any pre-approvals are done before discharge.

## 2024-12-31 NOTE — PROGRESS NOTE ADULT - PROBLEM SELECTOR PLAN 1
Fast growing mass of L shoulder associated with hot flashes.   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

## 2025-01-02 NOTE — CHART NOTE - NSCHARTNOTESELECT_GEN_ALL_CORE
Endocrine/Event Note
Event Note
I-STOP/Event Note
Palliative care - Pain recommendation/Event Note
follow up/Nutrition Services
Heme/Onc/Event Note
Post-Discharge Note

## 2025-01-02 NOTE — CHART NOTE - NSCHARTNOTEFT_GEN_A_CORE
Post-Discharge Medication Review: Completed	  	  Patient's preferred pharmacy was updated in OMR: [facility]	  	  Patient and Caregiver (Sheri, wife) contacted to offer medication counseling post-discharge. Medication reconciliation completed. Per Patient and Caregiver, medications include:	  	  1.	allopurinol 300 mg oral tablet 1 tab(s) orally once a day  2.	Ambien 5 mg oral tablet 1 tab(s) orally once a day (at bedtime), As Needed  3.	amLODIPine 10 mg oral tablet 1 tab(s) orally once a day, am  4.	ascorbic acid 500 mg oral tablet 1 tab(s) orally once a day  5.	atenolol 100 mg oral tablet 1 tab(s) orally once a day, am  6.	atorvastatin 10 mg oral tablet 1 tab(s) orally once a day (in the evening)  7.	B Complex 50 oral tablet, extended release 1 tab(s) orally once a day  8.	cholecalciferol 25 mcg (1000 intl units) oral tablet 1 tab(s) orally once a day  9.	DULoxetine 60 mg oral delayed release capsule 1 cap(s) orally once a day  10.	gabapentin 300 mg oral tablet 1 cap(s) orally 4 times a day  11.	levothyroxine 75 mcg (0.075 mg) oral tablet 1 tab(s) orally once a day, am  12.	losartan 25 mg oral tablet 1 tab(s) orally once a day, am  13.	metFORMIN 1000 mg oral tablet 1 tab(s) orally 2 times a day  14.	Multiple Vitamins oral tablet 1 tab(s) orally once a day  15.	Narcan 4 mg/0.1 mL nasal spray 1 spray(s) intranasally every 2 minutes Give every 2 minutes in event of Overdose or until EMS arrives.  16.	OLANZapine 5 mg oral tablet 1 tab(s) orally once a day (at bedtime)  17.	oxyCODONE 20 mg oral tablet 1 tab(s) orally every 4 hours as needed for Severe Pain (7 - 10) MDD: 6  18.	oxyCODONE 40 mg oral tablet, extended release 1 tab(s) orally every 12 hours MDD: 2  19.	pantoprazole 40 mg oral delayed release tablet 1 tab(s) orally once a day (before a meal)  20.	polyethylene glycol 3350 oral powder for reconstitution 17 gram(s) orally 2 times a day  21.	senna leaf extract oral tablet 2 tab(s) orally 2 times a day  22.	tamsulosin 0.4 mg oral capsule 1 cap(s) orally once a day (in the evening)  23.	tiZANidine 4 mg oral capsule 1 cap(s) orally 2 times a day, As Needed  	  	  Medications removed from OMR (updated per discussion with Patient and Caregiver):	  1.	Carafate 1 g/10 mL oral suspension 10 milliliter(s) orally 4 times a day as needed for esophageal pain    	  Medication name, indication, administration, side effects, and monitoring reviewed for new medications during post discharge counseling visit with Patient and Caregiver. Patient and Caregiver demonstrated understanding. Counseling offered for all medications.	  	  During post discharge counseling, the patient reported experiencing some visual disturbances, possible hallucinations (both visual and auditory). While the patient is uncertain about the cause, they suspect it may be related to the newly prescribed olanzapine, which was initiated during the inpatient stay alongside their existing medications. The patient also mentioned that they currently do not have a psychiatrist. Per inpatient team,  The olanzapine is PRN so if he doesn't need it or is having adverse effects he can hold it and not take it.  Caregiver raised concerns regarding duloxetine. Prior to admission, the patient was on duloxetine 60 mg, two tablets daily (120 mg total). During admission, it was initially continued at 60 mg BID, but on 12/17, it was reordered as 60 mg daily and subsequently discharged with the same daily dosing. Since discharge, the patient has resumed their previous regimen of 60 mg BID (120 mg total). The caregiver wanted clarification on whether it is safe to continue this home regimen or if adjustments are recommended. Per inpatient team, The patient should take Cymbalta 60mg QD as discharged.   Patient's caregiver was thoroughly counseled on medications and aware to avoid taking Ambien at the same time as oxycodone. Caregiver also counsleed that oxycodone ER 40 mg is to be use BID scheduled and oxycodone IR 20 mg is to be used as needed for breakthrough pain. Caregiver informed to avoid using any other medications not listed on discharge paperwork. Patient and caregiver were encouraged to follow up with outpatient provider such as psychiatry and pain management.   	  	  Dipesh Wing, [facility]	  Clinical Pharmacy Specialist, Pharmacy Telehealth Team	  Can be reached via [place] Teams or [phone]

## 2025-01-03 LAB
G6PD RBC-CCNC: 15.8 U/G HGB — SIGNIFICANT CHANGE UP (ref 7–20.5)
G6PD RBC-CCNC: 18.6 U/G HGB — SIGNIFICANT CHANGE UP (ref 7–20.5)

## 2025-01-04 ENCOUNTER — EMERGENCY (EMERGENCY)
Facility: HOSPITAL | Age: 65
LOS: 0 days | Discharge: HOME | End: 2025-01-04
Attending: STUDENT IN AN ORGANIZED HEALTH CARE EDUCATION/TRAINING PROGRAM
Payer: MEDICARE

## 2025-01-04 VITALS
WEIGHT: 179.9 LBS | HEART RATE: 85 BPM | TEMPERATURE: 98 F | OXYGEN SATURATION: 100 % | DIASTOLIC BLOOD PRESSURE: 84 MMHG | SYSTOLIC BLOOD PRESSURE: 136 MMHG | RESPIRATION RATE: 18 BRPM | HEIGHT: 68 IN

## 2025-01-04 VITALS
HEART RATE: 83 BPM | TEMPERATURE: 99 F | RESPIRATION RATE: 18 BRPM | SYSTOLIC BLOOD PRESSURE: 100 MMHG | DIASTOLIC BLOOD PRESSURE: 77 MMHG | OXYGEN SATURATION: 96 %

## 2025-01-04 DIAGNOSIS — Z98.890 OTHER SPECIFIED POSTPROCEDURAL STATES: Chronic | ICD-10-CM

## 2025-01-04 DIAGNOSIS — R06.02 SHORTNESS OF BREATH: ICD-10-CM

## 2025-01-04 DIAGNOSIS — K58.9 IRRITABLE BOWEL SYNDROME, UNSPECIFIED: ICD-10-CM

## 2025-01-04 DIAGNOSIS — Z90.49 ACQUIRED ABSENCE OF OTHER SPECIFIED PARTS OF DIGESTIVE TRACT: Chronic | ICD-10-CM

## 2025-01-04 DIAGNOSIS — E11.40 TYPE 2 DIABETES MELLITUS WITH DIABETIC NEUROPATHY, UNSPECIFIED: ICD-10-CM

## 2025-01-04 DIAGNOSIS — T40.2X1A POISONING BY OTHER OPIOIDS, ACCIDENTAL (UNINTENTIONAL), INITIAL ENCOUNTER: ICD-10-CM

## 2025-01-04 DIAGNOSIS — G89.29 OTHER CHRONIC PAIN: ICD-10-CM

## 2025-01-04 DIAGNOSIS — K21.9 GASTRO-ESOPHAGEAL REFLUX DISEASE WITHOUT ESOPHAGITIS: ICD-10-CM

## 2025-01-04 DIAGNOSIS — I10 ESSENTIAL (PRIMARY) HYPERTENSION: ICD-10-CM

## 2025-01-04 DIAGNOSIS — C85.10 UNSPECIFIED B-CELL LYMPHOMA, UNSPECIFIED SITE: ICD-10-CM

## 2025-01-04 DIAGNOSIS — Z98.1 ARTHRODESIS STATUS: Chronic | ICD-10-CM

## 2025-01-04 DIAGNOSIS — G47.33 OBSTRUCTIVE SLEEP APNEA (ADULT) (PEDIATRIC): ICD-10-CM

## 2025-01-04 DIAGNOSIS — I44.0 ATRIOVENTRICULAR BLOCK, FIRST DEGREE: ICD-10-CM

## 2025-01-04 PROBLEM — H93.8X2 SENSATION OF PLUGGED EAR ON LEFT SIDE: Status: RESOLVED | Noted: 2023-05-22 | Resolved: 2025-01-04

## 2025-01-04 PROBLEM — Z12.11 ENCOUNTER FOR SCREENING COLONOSCOPY: Status: RESOLVED | Noted: 2022-01-19 | Resolved: 2025-01-04

## 2025-01-04 PROBLEM — K30 FUNCTIONAL DYSPEPSIA: Status: RESOLVED | Noted: 2023-07-31 | Resolved: 2025-01-04

## 2025-01-04 PROBLEM — K58.2 IRRITABLE BOWEL SYNDROME WITH BOTH CONSTIPATION AND DIARRHEA: Status: RESOLVED | Noted: 2022-01-19 | Resolved: 2025-01-04

## 2025-01-04 PROBLEM — Z87.2 HISTORY OF SKIN PRURITUS: Status: RESOLVED | Noted: 2022-08-19 | Resolved: 2025-01-04

## 2025-01-04 PROBLEM — R07.0 BURNING SENSATION OF THROAT: Status: RESOLVED | Noted: 2023-06-22 | Resolved: 2025-01-04

## 2025-01-04 PROBLEM — M25.812 MASS OF JOINT OF LEFT SHOULDER: Status: RESOLVED | Noted: 2024-12-15 | Resolved: 2025-01-04

## 2025-01-04 PROBLEM — R07.0 THROAT DISCOMFORT: Status: RESOLVED | Noted: 2023-05-22 | Resolved: 2025-01-04

## 2025-01-04 PROBLEM — Z87.898 HISTORY OF NAUSEA AND VOMITING: Status: RESOLVED | Noted: 2022-10-13 | Resolved: 2025-01-04

## 2025-01-04 PROBLEM — H61.22 EXCESSIVE CERUMEN IN LEFT EAR CANAL: Status: RESOLVED | Noted: 2023-05-22 | Resolved: 2025-01-04

## 2025-01-04 PROBLEM — R13.19 ESOPHAGEAL DYSPHAGIA: Status: RESOLVED | Noted: 2022-01-19 | Resolved: 2025-01-04

## 2025-01-04 LAB
ALBUMIN SERPL ELPH-MCNC: 3.6 G/DL — SIGNIFICANT CHANGE UP (ref 3.3–5)
ALP SERPL-CCNC: 128 U/L — HIGH (ref 40–120)
ALT FLD-CCNC: 40 U/L — SIGNIFICANT CHANGE UP (ref 12–78)
ANION GAP SERPL CALC-SCNC: 6 MMOL/L — SIGNIFICANT CHANGE UP (ref 5–17)
APAP SERPL-MCNC: < 2 UG/ML (ref 10–30)
AST SERPL-CCNC: 49 U/L — HIGH (ref 15–37)
BASOPHILS # BLD AUTO: 0.04 K/UL — SIGNIFICANT CHANGE UP (ref 0–0.2)
BASOPHILS NFR BLD AUTO: 2 % — SIGNIFICANT CHANGE UP (ref 0–2)
BILIRUB SERPL-MCNC: 0.7 MG/DL — SIGNIFICANT CHANGE UP (ref 0.2–1.2)
BUN SERPL-MCNC: 39 MG/DL — HIGH (ref 7–23)
CALCIUM SERPL-MCNC: 9.9 MG/DL — SIGNIFICANT CHANGE UP (ref 8.5–10.1)
CHLORIDE SERPL-SCNC: 97 MMOL/L — SIGNIFICANT CHANGE UP (ref 96–108)
CO2 SERPL-SCNC: 25 MMOL/L — SIGNIFICANT CHANGE UP (ref 22–31)
CREAT SERPL-MCNC: 1.37 MG/DL — HIGH (ref 0.5–1.3)
EGFR: 58 ML/MIN/1.73M2 — LOW
EGFR: 58 ML/MIN/1.73M2 — LOW
EOSINOPHIL # BLD AUTO: 0.21 K/UL — SIGNIFICANT CHANGE UP (ref 0–0.5)
EOSINOPHIL NFR BLD AUTO: 12 % — HIGH (ref 0–6)
ETHANOL SERPL-MCNC: <10 MG/DL — SIGNIFICANT CHANGE UP (ref 0–10)
FLUAV AG NPH QL: SIGNIFICANT CHANGE UP
FLUBV AG NPH QL: SIGNIFICANT CHANGE UP
GLUCOSE SERPL-MCNC: 170 MG/DL — HIGH (ref 70–99)
HCT VFR BLD CALC: 36.7 % — LOW (ref 39–50)
HGB BLD-MCNC: 12.7 G/DL — LOW (ref 13–17)
LYMPHOCYTES # BLD AUTO: 0.8 K/UL — LOW (ref 1–3.3)
LYMPHOCYTES # BLD AUTO: 45 % — HIGH (ref 13–44)
MANUAL SMEAR VERIFICATION: SIGNIFICANT CHANGE UP
MCHC RBC-ENTMCNC: 32.1 PG — SIGNIFICANT CHANGE UP (ref 27–34)
MCHC RBC-ENTMCNC: 34.6 G/DL — SIGNIFICANT CHANGE UP (ref 32–36)
MCV RBC AUTO: 92.7 FL — SIGNIFICANT CHANGE UP (ref 80–100)
MONOCYTES # BLD AUTO: 0.2 K/UL — SIGNIFICANT CHANGE UP (ref 0–0.9)
MONOCYTES NFR BLD AUTO: 11 % — SIGNIFICANT CHANGE UP (ref 2–14)
NEUTROPHILS # BLD AUTO: 0.52 K/UL — LOW (ref 1.8–7.4)
NEUTROPHILS NFR BLD AUTO: 27 % — LOW (ref 43–77)
NEUTS BAND # BLD: 2 % — SIGNIFICANT CHANGE UP (ref 0–8)
NEUTS BAND NFR BLD: 2 % — SIGNIFICANT CHANGE UP (ref 0–8)
NRBC # BLD: 0 /100 WBCS — SIGNIFICANT CHANGE UP (ref 0–0)
NRBC # BLD: SIGNIFICANT CHANGE UP /100 WBCS (ref 0–0)
NRBC BLD-RTO: 0 /100 WBCS — SIGNIFICANT CHANGE UP (ref 0–0)
NRBC BLD-RTO: SIGNIFICANT CHANGE UP /100 WBCS (ref 0–0)
PLAT MORPH BLD: NORMAL — SIGNIFICANT CHANGE UP
PLATELET # BLD AUTO: 57 K/UL — LOW (ref 150–400)
PLATELET COUNT - ESTIMATE: ABNORMAL
POTASSIUM SERPL-MCNC: 4.3 MMOL/L — SIGNIFICANT CHANGE UP (ref 3.5–5.3)
POTASSIUM SERPL-SCNC: 4.3 MMOL/L — SIGNIFICANT CHANGE UP (ref 3.5–5.3)
PROT SERPL-MCNC: 7.3 GM/DL — SIGNIFICANT CHANGE UP (ref 6–8.3)
RBC # BLD: 3.96 M/UL — LOW (ref 4.2–5.8)
RBC # FLD: 12 % — SIGNIFICANT CHANGE UP (ref 10.3–14.5)
RBC BLD AUTO: SIGNIFICANT CHANGE UP
RSV RNA NPH QL NAA+NON-PROBE: SIGNIFICANT CHANGE UP
SALICYLATES SERPL-MCNC: <1.7 MG/DL — LOW (ref 2.8–20)
SARS-COV-2 RNA SPEC QL NAA+PROBE: SIGNIFICANT CHANGE UP
SODIUM SERPL-SCNC: 128 MMOL/L — LOW (ref 135–145)
VARIANT LYMPHS # BLD: 1 % — SIGNIFICANT CHANGE UP (ref 0–6)
VARIANT LYMPHS NFR BLD MANUAL: 1 % — SIGNIFICANT CHANGE UP (ref 0–6)
WBC # BLD: 1.78 K/UL — LOW (ref 3.8–10.5)
WBC # FLD AUTO: 1.78 K/UL — LOW (ref 3.8–10.5)

## 2025-01-06 ENCOUNTER — APPOINTMENT (OUTPATIENT)
Dept: HEMATOLOGY ONCOLOGY | Facility: CLINIC | Age: 65
End: 2025-01-06
Payer: MEDICARE

## 2025-01-06 ENCOUNTER — NON-APPOINTMENT (OUTPATIENT)
Age: 65
End: 2025-01-06

## 2025-01-06 VITALS
HEIGHT: 67.32 IN | BODY MASS INDEX: 43.74 KG/M2 | WEIGHT: 281.97 LBS | SYSTOLIC BLOOD PRESSURE: 146 MMHG | TEMPERATURE: 98 F | RESPIRATION RATE: 18 BRPM | OXYGEN SATURATION: 98 % | DIASTOLIC BLOOD PRESSURE: 82 MMHG | HEART RATE: 71 BPM

## 2025-01-06 DIAGNOSIS — R07.0 PAIN IN THROAT: ICD-10-CM

## 2025-01-06 DIAGNOSIS — K30 FUNCTIONAL DYSPEPSIA: ICD-10-CM

## 2025-01-06 DIAGNOSIS — K58.2 MIXED IRRITABLE BOWEL SYNDROME: ICD-10-CM

## 2025-01-06 DIAGNOSIS — Z87.2 PERSONAL HISTORY OF DISEASES OF THE SKIN AND SUBCUTANEOUS TISSUE: ICD-10-CM

## 2025-01-06 DIAGNOSIS — K21.9 GASTRO-ESOPHAGEAL REFLUX DISEASE W/OUT ESOPHAGITIS: ICD-10-CM

## 2025-01-06 DIAGNOSIS — H93.8X2 OTHER SPECIFIED DISORDERS OF LEFT EAR: ICD-10-CM

## 2025-01-06 DIAGNOSIS — H61.22 IMPACTED CERUMEN, LEFT EAR: ICD-10-CM

## 2025-01-06 DIAGNOSIS — Z87.898 PERSONAL HISTORY OF OTHER SPECIFIED CONDITIONS: ICD-10-CM

## 2025-01-06 DIAGNOSIS — R13.19 OTHER DYSPHAGIA: ICD-10-CM

## 2025-01-06 DIAGNOSIS — M25.812 OTHER SPECIFIED JOINT DISORDERS, LEFT SHOULDER: ICD-10-CM

## 2025-01-06 DIAGNOSIS — Z12.11 ENCOUNTER FOR SCREENING FOR MALIGNANT NEOPLASM OF COLON: ICD-10-CM

## 2025-01-06 PROCEDURE — 99205 OFFICE O/P NEW HI 60 MIN: CPT

## 2025-01-06 RX ORDER — OXYCODONE HYDROCHLORIDE 40 MG/1
40 TABLET, FILM COATED, EXTENDED RELEASE ORAL
Refills: 0 | Status: ACTIVE | COMMUNITY
Start: 2025-01-06

## 2025-01-06 RX ORDER — OXYCODONE 20 MG/1
20 TABLET ORAL EVERY 4 HOURS
Refills: 0 | Status: ACTIVE | COMMUNITY
Start: 2025-01-06

## 2025-01-06 RX ORDER — TAMSULOSIN HYDROCHLORIDE 0.4 MG/1
0.4 CAPSULE ORAL DAILY
Refills: 0 | Status: ACTIVE | COMMUNITY
Start: 2025-01-06

## 2025-01-06 RX ORDER — ACETAMINOPHEN 500 MG/1
500 TABLET ORAL TWICE DAILY
Refills: 0 | Status: ACTIVE | COMMUNITY
Start: 2025-01-06

## 2025-01-08 ENCOUNTER — NON-APPOINTMENT (OUTPATIENT)
Age: 65
End: 2025-01-08

## 2025-01-13 ENCOUNTER — APPOINTMENT (OUTPATIENT)
Dept: ORTHOPEDIC SURGERY | Facility: CLINIC | Age: 65
End: 2025-01-13
Payer: MEDICARE

## 2025-01-13 DIAGNOSIS — M17.12 UNILATERAL PRIMARY OSTEOARTHRITIS, LEFT KNEE: ICD-10-CM

## 2025-01-13 PROCEDURE — 20610 DRAIN/INJ JOINT/BURSA W/O US: CPT | Mod: LT

## 2025-01-13 PROCEDURE — 99214 OFFICE O/P EST MOD 30 MIN: CPT | Mod: 25

## 2025-01-14 ENCOUNTER — APPOINTMENT (OUTPATIENT)
Dept: RADIATION ONCOLOGY | Facility: CLINIC | Age: 65
End: 2025-01-14
Payer: MEDICARE

## 2025-01-14 VITALS
DIASTOLIC BLOOD PRESSURE: 72 MMHG | BODY MASS INDEX: 44.25 KG/M2 | RESPIRATION RATE: 18 BRPM | TEMPERATURE: 97.3 F | HEART RATE: 69 BPM | HEIGHT: 67.32 IN | WEIGHT: 285.25 LBS | SYSTOLIC BLOOD PRESSURE: 160 MMHG | OXYGEN SATURATION: 96 %

## 2025-01-14 PROCEDURE — 99205 OFFICE O/P NEW HI 60 MIN: CPT

## 2025-01-16 RX ORDER — PREDNISONE 10 MG/1
10 TABLET ORAL DAILY
Qty: 18 | Refills: 0 | Status: ACTIVE | COMMUNITY
Start: 2025-01-16 | End: 1900-01-01

## 2025-01-17 ENCOUNTER — RESULT REVIEW (OUTPATIENT)
Age: 65
End: 2025-01-17

## 2025-01-17 ENCOUNTER — APPOINTMENT (OUTPATIENT)
Dept: GERIATRICS | Facility: CLINIC | Age: 65
End: 2025-01-17
Payer: MEDICARE

## 2025-01-17 ENCOUNTER — APPOINTMENT (OUTPATIENT)
Dept: HEMATOLOGY ONCOLOGY | Facility: CLINIC | Age: 65
End: 2025-01-17

## 2025-01-17 ENCOUNTER — NON-APPOINTMENT (OUTPATIENT)
Age: 65
End: 2025-01-17

## 2025-01-17 ENCOUNTER — APPOINTMENT (OUTPATIENT)
Dept: INFUSION THERAPY | Facility: HOSPITAL | Age: 65
End: 2025-01-17

## 2025-01-17 VITALS
SYSTOLIC BLOOD PRESSURE: 122 MMHG | TEMPERATURE: 97.2 F | BODY MASS INDEX: 42.66 KG/M2 | HEART RATE: 61 BPM | WEIGHT: 275 LBS | OXYGEN SATURATION: 95 % | DIASTOLIC BLOOD PRESSURE: 84 MMHG | RESPIRATION RATE: 18 BRPM

## 2025-01-17 DIAGNOSIS — Z51.5 ENCOUNTER FOR PALLIATIVE CARE: ICD-10-CM

## 2025-01-17 DIAGNOSIS — G89.29 OTHER CHRONIC PAIN: ICD-10-CM

## 2025-01-17 DIAGNOSIS — F31.60 BIPOLAR DISORDER, CURRENT EPISODE MIXED, UNSPECIFIED: ICD-10-CM

## 2025-01-17 DIAGNOSIS — K59.00 CONSTIPATION, UNSPECIFIED: ICD-10-CM

## 2025-01-17 LAB
ALBUMIN SERPL ELPH-MCNC: 4.1 G/DL — SIGNIFICANT CHANGE UP (ref 3.3–5)
ALP SERPL-CCNC: 109 U/L — SIGNIFICANT CHANGE UP (ref 40–120)
ALT FLD-CCNC: 30 U/L — SIGNIFICANT CHANGE UP (ref 10–45)
ANION GAP SERPL CALC-SCNC: 8 MMOL/L — SIGNIFICANT CHANGE UP (ref 5–17)
AST SERPL-CCNC: 50 U/L — HIGH (ref 10–40)
BASOPHILS # BLD AUTO: 0.24 K/UL — HIGH (ref 0–0.2)
BASOPHILS # BLD AUTO: 0.24 K/UL — HIGH (ref 0–0.2)
BASOPHILS NFR BLD AUTO: 2.9 % — HIGH (ref 0–2)
BASOPHILS NFR BLD AUTO: 3 % — HIGH (ref 0–2)
BILIRUB SERPL-MCNC: 0.4 MG/DL — SIGNIFICANT CHANGE UP (ref 0.2–1.2)
BUN SERPL-MCNC: 19 MG/DL — SIGNIFICANT CHANGE UP (ref 7–23)
CALCIUM SERPL-MCNC: 9.3 MG/DL — SIGNIFICANT CHANGE UP (ref 8.4–10.5)
CHLORIDE SERPL-SCNC: 99 MMOL/L — SIGNIFICANT CHANGE UP (ref 96–108)
CO2 SERPL-SCNC: 24 MMOL/L — SIGNIFICANT CHANGE UP (ref 22–31)
CREAT SERPL-MCNC: 0.62 MG/DL — SIGNIFICANT CHANGE UP (ref 0.5–1.3)
EGFR: 107 ML/MIN/1.73M2 — SIGNIFICANT CHANGE UP
EOSINOPHIL # BLD AUTO: 0.06 K/UL — SIGNIFICANT CHANGE UP (ref 0–0.5)
EOSINOPHIL # BLD AUTO: 0.08 K/UL — SIGNIFICANT CHANGE UP (ref 0–0.5)
EOSINOPHIL NFR BLD AUTO: 0.7 % — SIGNIFICANT CHANGE UP (ref 0–6)
EOSINOPHIL NFR BLD AUTO: 1 % — SIGNIFICANT CHANGE UP (ref 0–6)
GLUCOSE SERPL-MCNC: 114 MG/DL — HIGH (ref 70–99)
HCT VFR BLD CALC: 38.6 % — LOW (ref 39–50)
HCT VFR BLD CALC: 39.1 % — SIGNIFICANT CHANGE UP (ref 39–50)
HGB BLD-MCNC: 13.6 G/DL — SIGNIFICANT CHANGE UP (ref 13–17)
HGB BLD-MCNC: 13.7 G/DL — SIGNIFICANT CHANGE UP (ref 13–17)
IMM GRANULOCYTES NFR BLD AUTO: 0.4 % — SIGNIFICANT CHANGE UP (ref 0–0.9)
IMM GRANULOCYTES NFR BLD AUTO: 0.6 % — SIGNIFICANT CHANGE UP (ref 0–0.9)
LYMPHOCYTES # BLD AUTO: 0.84 K/UL — LOW (ref 1–3.3)
LYMPHOCYTES # BLD AUTO: 0.92 K/UL — LOW (ref 1–3.3)
LYMPHOCYTES # BLD AUTO: 10.4 % — LOW (ref 13–44)
LYMPHOCYTES # BLD AUTO: 11.2 % — LOW (ref 13–44)
MCHC RBC-ENTMCNC: 32.1 PG — SIGNIFICANT CHANGE UP (ref 27–34)
MCHC RBC-ENTMCNC: 32.3 PG — SIGNIFICANT CHANGE UP (ref 27–34)
MCHC RBC-ENTMCNC: 35 G/DL — SIGNIFICANT CHANGE UP (ref 32–36)
MCHC RBC-ENTMCNC: 35.2 G/DL — SIGNIFICANT CHANGE UP (ref 32–36)
MCV RBC AUTO: 91.6 FL — SIGNIFICANT CHANGE UP (ref 80–100)
MCV RBC AUTO: 91.7 FL — SIGNIFICANT CHANGE UP (ref 80–100)
MONOCYTES # BLD AUTO: 1.06 K/UL — HIGH (ref 0–0.9)
MONOCYTES # BLD AUTO: 1.22 K/UL — HIGH (ref 0–0.9)
MONOCYTES NFR BLD AUTO: 13.2 % — SIGNIFICANT CHANGE UP (ref 2–14)
MONOCYTES NFR BLD AUTO: 14.8 % — HIGH (ref 2–14)
NEUTROPHILS # BLD AUTO: 5.75 K/UL — SIGNIFICANT CHANGE UP (ref 1.8–7.4)
NEUTROPHILS # BLD AUTO: 5.78 K/UL — SIGNIFICANT CHANGE UP (ref 1.8–7.4)
NEUTROPHILS NFR BLD AUTO: 70 % — SIGNIFICANT CHANGE UP (ref 43–77)
NEUTROPHILS NFR BLD AUTO: 71.8 % — SIGNIFICANT CHANGE UP (ref 43–77)
NRBC # BLD: 0 /100 WBCS — SIGNIFICANT CHANGE UP (ref 0–0)
NRBC # BLD: 0 /100 WBCS — SIGNIFICANT CHANGE UP (ref 0–0)
NRBC BLD-RTO: 0 /100 WBCS — SIGNIFICANT CHANGE UP (ref 0–0)
NRBC BLD-RTO: 0 /100 WBCS — SIGNIFICANT CHANGE UP (ref 0–0)
PLATELET # BLD AUTO: 262 K/UL — SIGNIFICANT CHANGE UP (ref 150–400)
PLATELET # BLD AUTO: 277 K/UL — SIGNIFICANT CHANGE UP (ref 150–400)
POTASSIUM SERPL-MCNC: 4.8 MMOL/L — SIGNIFICANT CHANGE UP (ref 3.5–5.3)
POTASSIUM SERPL-SCNC: 4.8 MMOL/L — SIGNIFICANT CHANGE UP (ref 3.5–5.3)
PROT SERPL-MCNC: 7.5 G/DL — SIGNIFICANT CHANGE UP (ref 6–8.3)
RBC # BLD: 4.21 M/UL — SIGNIFICANT CHANGE UP (ref 4.2–5.8)
RBC # BLD: 4.27 M/UL — SIGNIFICANT CHANGE UP (ref 4.2–5.8)
RBC # FLD: 14 % — SIGNIFICANT CHANGE UP (ref 10.3–14.5)
RBC # FLD: 14.1 % — SIGNIFICANT CHANGE UP (ref 10.3–14.5)
SODIUM SERPL-SCNC: 132 MMOL/L — LOW (ref 135–145)
URATE SERPL-MCNC: 3 MG/DL — LOW (ref 3.4–8.8)
WBC # BLD: 8.05 K/UL — SIGNIFICANT CHANGE UP (ref 3.8–10.5)
WBC # BLD: 8.22 K/UL — SIGNIFICANT CHANGE UP (ref 3.8–10.5)
WBC # FLD AUTO: 8.05 K/UL — SIGNIFICANT CHANGE UP (ref 3.8–10.5)
WBC # FLD AUTO: 8.22 K/UL — SIGNIFICANT CHANGE UP (ref 3.8–10.5)

## 2025-01-17 PROCEDURE — 99205 OFFICE O/P NEW HI 60 MIN: CPT

## 2025-01-17 RX ORDER — OXYCODONE HYDROCHLORIDE 40 MG/1
40 TABLET, FILM COATED, EXTENDED RELEASE ORAL
Qty: 60 | Refills: 0 | Status: ACTIVE | COMMUNITY
Start: 2025-01-17 | End: 1900-01-01

## 2025-01-17 RX ORDER — GABAPENTIN 400 MG/1
2 CAPSULE ORAL
Refills: 0 | DISCHARGE

## 2025-01-17 RX ORDER — ALLOPURINOL 300 MG/1
300 TABLET ORAL DAILY
Qty: 30 | Refills: 2 | Status: ACTIVE | COMMUNITY
Start: 2025-01-17 | End: 1900-01-01

## 2025-01-17 RX ORDER — OLANZAPINE 5 MG/1
5 TABLET, FILM COATED ORAL
Qty: 30 | Refills: 0 | Status: ACTIVE | COMMUNITY
Start: 2025-01-17 | End: 1900-01-01

## 2025-01-17 RX ORDER — TIZANIDINE 4 MG/1
1 TABLET ORAL
Refills: 0 | DISCHARGE

## 2025-01-17 RX ORDER — DULOXETINE 20 MG/1
1 CAPSULE, DELAYED RELEASE ORAL
Qty: 30 | Refills: 0 | DISCHARGE

## 2025-01-20 PROBLEM — Z51.5 ENCOUNTER FOR PALLIATIVE CARE: Status: ACTIVE | Noted: 2025-01-20

## 2025-01-20 PROBLEM — G89.29 CHRONIC PAIN: Status: ACTIVE | Noted: 2025-01-20

## 2025-01-20 PROBLEM — K59.00 CONSTIPATION: Status: ACTIVE | Noted: 2025-01-20

## 2025-01-21 DIAGNOSIS — C83.398 DIFFUSE LARGE B-CELL LYMPHOMA OF OTHER EXTRANODAL AND SOLID ORGAN SITES: ICD-10-CM

## 2025-01-21 DIAGNOSIS — E86.0 DEHYDRATION: ICD-10-CM

## 2025-01-21 DIAGNOSIS — Z51.89 ENCOUNTER FOR OTHER SPECIFIED AFTERCARE: ICD-10-CM

## 2025-01-21 DIAGNOSIS — Z51.11 ENCOUNTER FOR ANTINEOPLASTIC CHEMOTHERAPY: ICD-10-CM

## 2025-01-21 DIAGNOSIS — R11.2 NAUSEA WITH VOMITING, UNSPECIFIED: ICD-10-CM

## 2025-01-21 NOTE — ED ADULT NURSE NOTE - NS ED NOTE  TALK SOMEONE YN
Patient: calling for medication refill.  Medication(s) set up as pending orders from medication list.  Preferred pharmacy set up and verified.    Patient told to check with pharmacy after 48 hours.  Patient was advised they would be notified only if there is a problem concerning the refill.        Patient has 2 pills left.     No

## 2025-01-27 ENCOUNTER — APPOINTMENT (OUTPATIENT)
Dept: GASTROENTEROLOGY | Facility: CLINIC | Age: 65
End: 2025-01-27
Payer: MEDICARE

## 2025-01-27 VITALS
OXYGEN SATURATION: 97 % | DIASTOLIC BLOOD PRESSURE: 80 MMHG | BODY MASS INDEX: 40.92 KG/M2 | WEIGHT: 270 LBS | SYSTOLIC BLOOD PRESSURE: 130 MMHG | RESPIRATION RATE: 20 BRPM | HEART RATE: 87 BPM | HEIGHT: 68 IN

## 2025-01-27 DIAGNOSIS — K21.9 DIAPHRAGMATIC HERNIA W/OUT OBSTRUCTION OR GANGRENE: ICD-10-CM

## 2025-01-27 DIAGNOSIS — G89.29 OTHER CHRONIC PAIN: ICD-10-CM

## 2025-01-27 DIAGNOSIS — K44.9 DIAPHRAGMATIC HERNIA W/OUT OBSTRUCTION OR GANGRENE: ICD-10-CM

## 2025-01-27 PROCEDURE — 99215 OFFICE O/P EST HI 40 MIN: CPT

## 2025-01-27 PROCEDURE — G2211 COMPLEX E/M VISIT ADD ON: CPT

## 2025-01-27 RX ORDER — ALUMINUM HYDROXIDE AND MAGNESIUM CARBONATE 254; 237.5 MG/5ML; MG/5ML
508-475 LIQUID ORAL 4 TIMES DAILY
Qty: 4 | Refills: 0 | Status: ACTIVE | COMMUNITY
Start: 2025-01-27 | End: 1900-01-01

## 2025-01-27 RX ORDER — OMEPRAZOLE 40 MG/1
40 CAPSULE, DELAYED RELEASE ORAL
Qty: 90 | Refills: 2 | Status: ACTIVE | COMMUNITY
Start: 2025-01-27 | End: 1900-01-01

## 2025-01-28 RX ORDER — OXYCODONE 20 MG/1
20 TABLET ORAL
Qty: 90 | Refills: 0 | Status: ACTIVE | COMMUNITY
Start: 2025-01-28 | End: 1900-01-01

## 2025-01-30 ENCOUNTER — RESULT REVIEW (OUTPATIENT)
Age: 65
End: 2025-01-30

## 2025-01-30 ENCOUNTER — APPOINTMENT (OUTPATIENT)
Dept: HEMATOLOGY ONCOLOGY | Facility: CLINIC | Age: 65
End: 2025-01-30
Payer: MEDICARE

## 2025-01-30 ENCOUNTER — APPOINTMENT (OUTPATIENT)
Dept: HEMATOLOGY ONCOLOGY | Facility: CLINIC | Age: 65
End: 2025-01-30

## 2025-01-30 VITALS
BODY MASS INDEX: 42.74 KG/M2 | WEIGHT: 281.07 LBS | RESPIRATION RATE: 18 BRPM | OXYGEN SATURATION: 96 % | TEMPERATURE: 98.4 F | DIASTOLIC BLOOD PRESSURE: 77 MMHG | SYSTOLIC BLOOD PRESSURE: 119 MMHG | HEART RATE: 63 BPM

## 2025-01-30 LAB
ALBUMIN SERPL ELPH-MCNC: 3.9 G/DL
ALP BLD-CCNC: 178 U/L
ALT SERPL-CCNC: 41 U/L
ANION GAP SERPL CALC-SCNC: 12 MMOL/L
AST SERPL-CCNC: 47 U/L
BASOPHILS # BLD AUTO: 0.21 K/UL — HIGH (ref 0–0.2)
BASOPHILS NFR BLD AUTO: 3 % — HIGH (ref 0–2)
BILIRUB SERPL-MCNC: 0.3 MG/DL
BUN SERPL-MCNC: 21 MG/DL
CALCIUM SERPL-MCNC: 9.7 MG/DL
CHLORIDE SERPL-SCNC: 100 MMOL/L
CO2 SERPL-SCNC: 22 MMOL/L
CREAT SERPL-MCNC: 0.66 MG/DL
EGFR: 105 ML/MIN/1.73M2
EOSINOPHIL # BLD AUTO: 0.6 K/UL — HIGH (ref 0–0.5)
EOSINOPHIL NFR BLD AUTO: 8.5 % — HIGH (ref 0–6)
GLUCOSE SERPL-MCNC: 161 MG/DL
HCT VFR BLD CALC: 34.3 % — LOW (ref 39–50)
HGB BLD-MCNC: 11.9 G/DL — LOW (ref 13–17)
IMM GRANULOCYTES NFR BLD AUTO: 2.7 % — HIGH (ref 0–0.9)
LYMPHOCYTES # BLD AUTO: 1.03 K/UL — SIGNIFICANT CHANGE UP (ref 1–3.3)
LYMPHOCYTES # BLD AUTO: 14.5 % — SIGNIFICANT CHANGE UP (ref 13–44)
MCHC RBC-ENTMCNC: 33 PG — SIGNIFICANT CHANGE UP (ref 27–34)
MCHC RBC-ENTMCNC: 34.7 G/DL — SIGNIFICANT CHANGE UP (ref 32–36)
MCV RBC AUTO: 95 FL — SIGNIFICANT CHANGE UP (ref 80–100)
MONOCYTES # BLD AUTO: 0.99 K/UL — HIGH (ref 0–0.9)
MONOCYTES NFR BLD AUTO: 14 % — SIGNIFICANT CHANGE UP (ref 2–14)
NEUTROPHILS # BLD AUTO: 4.07 K/UL — SIGNIFICANT CHANGE UP (ref 1.8–7.4)
NEUTROPHILS NFR BLD AUTO: 57.3 % — SIGNIFICANT CHANGE UP (ref 43–77)
NRBC # BLD: 0 /100 WBCS — SIGNIFICANT CHANGE UP (ref 0–0)
NRBC BLD-RTO: 0 /100 WBCS — SIGNIFICANT CHANGE UP (ref 0–0)
PLATELET # BLD AUTO: 77 K/UL — LOW (ref 150–400)
POTASSIUM SERPL-SCNC: 4.6 MMOL/L
PROT SERPL-MCNC: 6.9 G/DL
RBC # BLD: 3.61 M/UL — LOW (ref 4.2–5.8)
RBC # FLD: 15.1 % — HIGH (ref 10.3–14.5)
SODIUM SERPL-SCNC: 134 MMOL/L
WBC # BLD: 7.09 K/UL — SIGNIFICANT CHANGE UP (ref 3.8–10.5)
WBC # FLD AUTO: 7.09 K/UL — SIGNIFICANT CHANGE UP (ref 3.8–10.5)

## 2025-01-30 PROCEDURE — G2211 COMPLEX E/M VISIT ADD ON: CPT

## 2025-01-30 PROCEDURE — 99215 OFFICE O/P EST HI 40 MIN: CPT

## 2025-02-04 RX ORDER — PREDNISONE 20 MG/1
20 TABLET ORAL
Qty: 9 | Refills: 0 | Status: ACTIVE | COMMUNITY
Start: 2025-02-04 | End: 1900-01-01

## 2025-02-06 DIAGNOSIS — C83.30 DIFFUSE LARGE B-CELL LYMPHOMA, UNSPECIFIED SITE: ICD-10-CM

## 2025-02-07 ENCOUNTER — RESULT REVIEW (OUTPATIENT)
Age: 65
End: 2025-02-07

## 2025-02-07 ENCOUNTER — APPOINTMENT (OUTPATIENT)
Dept: INFUSION THERAPY | Facility: HOSPITAL | Age: 65
End: 2025-02-07

## 2025-02-07 ENCOUNTER — APPOINTMENT (OUTPATIENT)
Dept: HEMATOLOGY ONCOLOGY | Facility: CLINIC | Age: 65
End: 2025-02-07

## 2025-02-07 LAB
ALBUMIN SERPL ELPH-MCNC: 3.9 G/DL — SIGNIFICANT CHANGE UP (ref 3.3–5)
ALP SERPL-CCNC: 129 U/L — HIGH (ref 40–120)
ALT FLD-CCNC: 42 U/L — SIGNIFICANT CHANGE UP (ref 10–45)
ANION GAP SERPL CALC-SCNC: 9 MMOL/L — SIGNIFICANT CHANGE UP (ref 5–17)
AST SERPL-CCNC: 72 U/L — HIGH (ref 10–40)
BASOPHILS # BLD AUTO: 0.12 K/UL — SIGNIFICANT CHANGE UP (ref 0–0.2)
BASOPHILS NFR BLD AUTO: 1.5 % — SIGNIFICANT CHANGE UP (ref 0–2)
BILIRUB SERPL-MCNC: 0.4 MG/DL — SIGNIFICANT CHANGE UP (ref 0.2–1.2)
BUN SERPL-MCNC: 17 MG/DL — SIGNIFICANT CHANGE UP (ref 7–23)
CALCIUM SERPL-MCNC: 9.5 MG/DL — SIGNIFICANT CHANGE UP (ref 8.4–10.5)
CHLORIDE SERPL-SCNC: 102 MMOL/L — SIGNIFICANT CHANGE UP (ref 96–108)
CO2 SERPL-SCNC: 25 MMOL/L — SIGNIFICANT CHANGE UP (ref 22–31)
CREAT SERPL-MCNC: 0.59 MG/DL — SIGNIFICANT CHANGE UP (ref 0.5–1.3)
EGFR: 108 ML/MIN/1.73M2 — SIGNIFICANT CHANGE UP
EOSINOPHIL # BLD AUTO: 0.14 K/UL — SIGNIFICANT CHANGE UP (ref 0–0.5)
EOSINOPHIL NFR BLD AUTO: 1.7 % — SIGNIFICANT CHANGE UP (ref 0–6)
GLUCOSE SERPL-MCNC: 134 MG/DL — HIGH (ref 70–99)
HCT VFR BLD CALC: 36.1 % — LOW (ref 39–50)
HGB BLD-MCNC: 12.6 G/DL — LOW (ref 13–17)
IMM GRANULOCYTES NFR BLD AUTO: 0.9 % — SIGNIFICANT CHANGE UP (ref 0–0.9)
LYMPHOCYTES # BLD AUTO: 0.82 K/UL — LOW (ref 1–3.3)
LYMPHOCYTES # BLD AUTO: 10.1 % — LOW (ref 13–44)
MCHC RBC-ENTMCNC: 33.2 PG — SIGNIFICANT CHANGE UP (ref 27–34)
MCHC RBC-ENTMCNC: 34.9 G/DL — SIGNIFICANT CHANGE UP (ref 32–36)
MCV RBC AUTO: 95.3 FL — SIGNIFICANT CHANGE UP (ref 80–100)
MONOCYTES # BLD AUTO: 1.58 K/UL — HIGH (ref 0–0.9)
MONOCYTES NFR BLD AUTO: 19.4 % — HIGH (ref 2–14)
NEUTROPHILS # BLD AUTO: 5.41 K/UL — SIGNIFICANT CHANGE UP (ref 1.8–7.4)
NEUTROPHILS NFR BLD AUTO: 66.4 % — SIGNIFICANT CHANGE UP (ref 43–77)
NRBC # BLD: 0 /100 WBCS — SIGNIFICANT CHANGE UP (ref 0–0)
NRBC BLD-RTO: 0 /100 WBCS — SIGNIFICANT CHANGE UP (ref 0–0)
PLATELET # BLD AUTO: 179 K/UL — SIGNIFICANT CHANGE UP (ref 150–400)
POTASSIUM SERPL-MCNC: 5.4 MMOL/L — HIGH (ref 3.5–5.3)
POTASSIUM SERPL-SCNC: 5.4 MMOL/L — HIGH (ref 3.5–5.3)
PROT SERPL-MCNC: 7.2 G/DL — SIGNIFICANT CHANGE UP (ref 6–8.3)
RBC # BLD: 3.79 M/UL — LOW (ref 4.2–5.8)
RBC # FLD: 16.2 % — HIGH (ref 10.3–14.5)
SODIUM SERPL-SCNC: 136 MMOL/L — SIGNIFICANT CHANGE UP (ref 135–145)
URATE SERPL-MCNC: 2.3 MG/DL — LOW (ref 3.4–8.8)
WBC # BLD: 8.14 K/UL — SIGNIFICANT CHANGE UP (ref 3.8–10.5)
WBC # FLD AUTO: 8.14 K/UL — SIGNIFICANT CHANGE UP (ref 3.8–10.5)

## 2025-02-20 ENCOUNTER — RESULT REVIEW (OUTPATIENT)
Age: 65
End: 2025-02-20

## 2025-02-20 ENCOUNTER — APPOINTMENT (OUTPATIENT)
Dept: HEMATOLOGY ONCOLOGY | Facility: CLINIC | Age: 65
End: 2025-02-20
Payer: MEDICARE

## 2025-02-20 VITALS
WEIGHT: 285.48 LBS | OXYGEN SATURATION: 95 % | SYSTOLIC BLOOD PRESSURE: 122 MMHG | RESPIRATION RATE: 18 BRPM | HEART RATE: 67 BPM | DIASTOLIC BLOOD PRESSURE: 75 MMHG | TEMPERATURE: 98.6 F | BODY MASS INDEX: 43.41 KG/M2

## 2025-02-20 LAB
BASOPHILS # BLD AUTO: 0.13 K/UL — SIGNIFICANT CHANGE UP (ref 0–0.2)
BASOPHILS NFR BLD AUTO: 2.6 % — HIGH (ref 0–2)
EOSINOPHIL # BLD AUTO: 0.27 K/UL — SIGNIFICANT CHANGE UP (ref 0–0.5)
EOSINOPHIL NFR BLD AUTO: 5.4 % — SIGNIFICANT CHANGE UP (ref 0–6)
HCT VFR BLD CALC: 33.5 % — LOW (ref 39–50)
HGB BLD-MCNC: 11.3 G/DL — LOW (ref 13–17)
IMM GRANULOCYTES NFR BLD AUTO: 2 % — HIGH (ref 0–0.9)
LYMPHOCYTES # BLD AUTO: 0.65 K/UL — LOW (ref 1–3.3)
LYMPHOCYTES # BLD AUTO: 12.9 % — LOW (ref 13–44)
MCHC RBC-ENTMCNC: 32.9 PG — SIGNIFICANT CHANGE UP (ref 27–34)
MCHC RBC-ENTMCNC: 33.7 G/DL — SIGNIFICANT CHANGE UP (ref 32–36)
MCV RBC AUTO: 97.7 FL — SIGNIFICANT CHANGE UP (ref 80–100)
MONOCYTES # BLD AUTO: 0.9 K/UL — SIGNIFICANT CHANGE UP (ref 0–0.9)
MONOCYTES NFR BLD AUTO: 17.9 % — HIGH (ref 2–14)
NEUTROPHILS # BLD AUTO: 2.98 K/UL — SIGNIFICANT CHANGE UP (ref 1.8–7.4)
NEUTROPHILS NFR BLD AUTO: 59.2 % — SIGNIFICANT CHANGE UP (ref 43–77)
NRBC BLD AUTO-RTO: 0 /100 WBCS — SIGNIFICANT CHANGE UP (ref 0–0)
PLATELET # BLD AUTO: 99 K/UL — LOW (ref 150–400)
RBC # BLD: 3.43 M/UL — LOW (ref 4.2–5.8)
RBC # FLD: 16.1 % — HIGH (ref 10.3–14.5)
WBC # BLD: 5.03 K/UL — SIGNIFICANT CHANGE UP (ref 3.8–10.5)
WBC # FLD AUTO: 5.03 K/UL — SIGNIFICANT CHANGE UP (ref 3.8–10.5)

## 2025-02-20 PROCEDURE — G2211 COMPLEX E/M VISIT ADD ON: CPT

## 2025-02-20 PROCEDURE — 99215 OFFICE O/P EST HI 40 MIN: CPT

## 2025-02-25 ENCOUNTER — OUTPATIENT (OUTPATIENT)
Dept: OUTPATIENT SERVICES | Facility: HOSPITAL | Age: 65
LOS: 1 days | End: 2025-02-25
Payer: MEDICARE

## 2025-02-25 ENCOUNTER — APPOINTMENT (OUTPATIENT)
Dept: NUCLEAR MEDICINE | Facility: IMAGING CENTER | Age: 65
End: 2025-02-25
Payer: MEDICARE

## 2025-02-25 DIAGNOSIS — Z98.890 OTHER SPECIFIED POSTPROCEDURAL STATES: Chronic | ICD-10-CM

## 2025-02-25 DIAGNOSIS — Z90.49 ACQUIRED ABSENCE OF OTHER SPECIFIED PARTS OF DIGESTIVE TRACT: Chronic | ICD-10-CM

## 2025-02-25 DIAGNOSIS — Z98.1 ARTHRODESIS STATUS: Chronic | ICD-10-CM

## 2025-02-25 DIAGNOSIS — Z00.8 ENCOUNTER FOR OTHER GENERAL EXAMINATION: ICD-10-CM

## 2025-02-25 DIAGNOSIS — C83.30 DIFFUSE LARGE B-CELL LYMPHOMA, UNSPECIFIED SITE: ICD-10-CM

## 2025-02-25 PROCEDURE — A9552: CPT

## 2025-02-25 PROCEDURE — 78815 PET IMAGE W/CT SKULL-THIGH: CPT | Mod: 26,PS

## 2025-02-25 PROCEDURE — 78815 PET IMAGE W/CT SKULL-THIGH: CPT

## 2025-02-28 ENCOUNTER — APPOINTMENT (OUTPATIENT)
Dept: HEMATOLOGY ONCOLOGY | Facility: CLINIC | Age: 65
End: 2025-02-28

## 2025-02-28 ENCOUNTER — RESULT REVIEW (OUTPATIENT)
Age: 65
End: 2025-02-28

## 2025-02-28 ENCOUNTER — APPOINTMENT (OUTPATIENT)
Dept: INFUSION THERAPY | Facility: HOSPITAL | Age: 65
End: 2025-02-28

## 2025-02-28 ENCOUNTER — NON-APPOINTMENT (OUTPATIENT)
Age: 65
End: 2025-02-28

## 2025-02-28 LAB
ALBUMIN SERPL ELPH-MCNC: 3.8 G/DL — SIGNIFICANT CHANGE UP (ref 3.3–5)
ALP SERPL-CCNC: 129 U/L — HIGH (ref 40–120)
ALT FLD-CCNC: 27 U/L — SIGNIFICANT CHANGE UP (ref 10–45)
ANION GAP SERPL CALC-SCNC: 10 MMOL/L — SIGNIFICANT CHANGE UP (ref 5–17)
AST SERPL-CCNC: 40 U/L — SIGNIFICANT CHANGE UP (ref 10–40)
BASOPHILS # BLD AUTO: 0.11 K/UL — SIGNIFICANT CHANGE UP (ref 0–0.2)
BASOPHILS NFR BLD AUTO: 2.3 % — HIGH (ref 0–2)
BILIRUB SERPL-MCNC: 0.4 MG/DL — SIGNIFICANT CHANGE UP (ref 0.2–1.2)
BUN SERPL-MCNC: 19 MG/DL — SIGNIFICANT CHANGE UP (ref 7–23)
CALCIUM SERPL-MCNC: 9.6 MG/DL — SIGNIFICANT CHANGE UP (ref 8.4–10.5)
CHLORIDE SERPL-SCNC: 102 MMOL/L — SIGNIFICANT CHANGE UP (ref 96–108)
CO2 SERPL-SCNC: 24 MMOL/L — SIGNIFICANT CHANGE UP (ref 22–31)
CREAT SERPL-MCNC: 0.57 MG/DL — SIGNIFICANT CHANGE UP (ref 0.5–1.3)
EGFR: 109 ML/MIN/1.73M2 — SIGNIFICANT CHANGE UP
EOSINOPHIL # BLD AUTO: 0.11 K/UL — SIGNIFICANT CHANGE UP (ref 0–0.5)
EOSINOPHIL NFR BLD AUTO: 2.3 % — SIGNIFICANT CHANGE UP (ref 0–6)
GLUCOSE SERPL-MCNC: 139 MG/DL — HIGH (ref 70–99)
HCT VFR BLD CALC: 34 % — LOW (ref 39–50)
HGB BLD-MCNC: 11.8 G/DL — LOW (ref 13–17)
IMM GRANULOCYTES NFR BLD AUTO: 0.4 % — SIGNIFICANT CHANGE UP (ref 0–0.9)
LYMPHOCYTES # BLD AUTO: 0.57 K/UL — LOW (ref 1–3.3)
LYMPHOCYTES # BLD AUTO: 12 % — LOW (ref 13–44)
MCHC RBC-ENTMCNC: 33.6 PG — SIGNIFICANT CHANGE UP (ref 27–34)
MCHC RBC-ENTMCNC: 34.7 G/DL — SIGNIFICANT CHANGE UP (ref 32–36)
MCV RBC AUTO: 96.9 FL — SIGNIFICANT CHANGE UP (ref 80–100)
MONOCYTES # BLD AUTO: 1.05 K/UL — HIGH (ref 0–0.9)
MONOCYTES NFR BLD AUTO: 22.2 % — HIGH (ref 2–14)
NEUTROPHILS # BLD AUTO: 2.88 K/UL — SIGNIFICANT CHANGE UP (ref 1.8–7.4)
NEUTROPHILS NFR BLD AUTO: 60.8 % — SIGNIFICANT CHANGE UP (ref 43–77)
NRBC BLD AUTO-RTO: 0 /100 WBCS — SIGNIFICANT CHANGE UP (ref 0–0)
PLATELET # BLD AUTO: 146 K/UL — LOW (ref 150–400)
POTASSIUM SERPL-MCNC: 4.3 MMOL/L — SIGNIFICANT CHANGE UP (ref 3.5–5.3)
POTASSIUM SERPL-SCNC: 4.3 MMOL/L — SIGNIFICANT CHANGE UP (ref 3.5–5.3)
PROT SERPL-MCNC: 7 G/DL — SIGNIFICANT CHANGE UP (ref 6–8.3)
RBC # BLD: 3.51 M/UL — LOW (ref 4.2–5.8)
RBC # FLD: 16.2 % — HIGH (ref 10.3–14.5)
SODIUM SERPL-SCNC: 137 MMOL/L — SIGNIFICANT CHANGE UP (ref 135–145)
URATE SERPL-MCNC: 3.7 MG/DL — SIGNIFICANT CHANGE UP (ref 3.4–8.8)
WBC # BLD: 4.74 K/UL — SIGNIFICANT CHANGE UP (ref 3.8–10.5)
WBC # FLD AUTO: 4.74 K/UL — SIGNIFICANT CHANGE UP (ref 3.8–10.5)

## 2025-02-28 RX ORDER — PROCHLORPERAZINE MALEATE 10 MG/1
10 TABLET ORAL EVERY 6 HOURS
Qty: 60 | Refills: 0 | Status: ACTIVE | COMMUNITY
Start: 2025-02-28 | End: 1900-01-01

## 2025-03-13 ENCOUNTER — OUTPATIENT (OUTPATIENT)
Dept: OUTPATIENT SERVICES | Facility: HOSPITAL | Age: 65
LOS: 1 days | Discharge: HOME | End: 2025-03-13

## 2025-03-13 DIAGNOSIS — Z98.890 OTHER SPECIFIED POSTPROCEDURAL STATES: Chronic | ICD-10-CM

## 2025-03-13 DIAGNOSIS — Z98.1 ARTHRODESIS STATUS: Chronic | ICD-10-CM

## 2025-03-13 DIAGNOSIS — Z90.49 ACQUIRED ABSENCE OF OTHER SPECIFIED PARTS OF DIGESTIVE TRACT: Chronic | ICD-10-CM

## 2025-03-13 DIAGNOSIS — C83.30 DIFFUSE LARGE B-CELL LYMPHOMA, UNSPECIFIED SITE: ICD-10-CM

## 2025-03-14 ENCOUNTER — APPOINTMENT (OUTPATIENT)
Dept: HEMATOLOGY ONCOLOGY | Facility: CLINIC | Age: 65
End: 2025-03-14
Payer: MEDICARE

## 2025-03-14 ENCOUNTER — RESULT REVIEW (OUTPATIENT)
Age: 65
End: 2025-03-14

## 2025-03-14 VITALS
BODY MASS INDEX: 42.4 KG/M2 | TEMPERATURE: 97.2 F | SYSTOLIC BLOOD PRESSURE: 143 MMHG | OXYGEN SATURATION: 97 % | HEART RATE: 60 BPM | RESPIRATION RATE: 20 BRPM | DIASTOLIC BLOOD PRESSURE: 80 MMHG | WEIGHT: 278.88 LBS

## 2025-03-14 LAB
BASOPHILS # BLD AUTO: 0.09 K/UL — SIGNIFICANT CHANGE UP (ref 0–0.2)
BASOPHILS NFR BLD AUTO: 2.1 % — HIGH (ref 0–2)
EOSINOPHIL # BLD AUTO: 0.25 K/UL — SIGNIFICANT CHANGE UP (ref 0–0.5)
EOSINOPHIL NFR BLD AUTO: 5.7 % — SIGNIFICANT CHANGE UP (ref 0–6)
HCT VFR BLD CALC: 31 % — LOW (ref 39–50)
HGB BLD-MCNC: 10.7 G/DL — LOW (ref 13–17)
IMM GRANULOCYTES NFR BLD AUTO: 1.4 % — HIGH (ref 0–0.9)
LYMPHOCYTES # BLD AUTO: 0.65 K/UL — LOW (ref 1–3.3)
LYMPHOCYTES # BLD AUTO: 14.9 % — SIGNIFICANT CHANGE UP (ref 13–44)
MCHC RBC-ENTMCNC: 33.9 PG — SIGNIFICANT CHANGE UP (ref 27–34)
MCHC RBC-ENTMCNC: 34.5 G/DL — SIGNIFICANT CHANGE UP (ref 32–36)
MCV RBC AUTO: 98.1 FL — SIGNIFICANT CHANGE UP (ref 80–100)
MONOCYTES # BLD AUTO: 0.94 K/UL — HIGH (ref 0–0.9)
MONOCYTES NFR BLD AUTO: 21.6 % — HIGH (ref 2–14)
NEUTROPHILS # BLD AUTO: 2.36 K/UL — SIGNIFICANT CHANGE UP (ref 1.8–7.4)
NEUTROPHILS NFR BLD AUTO: 54.3 % — SIGNIFICANT CHANGE UP (ref 43–77)
NRBC BLD AUTO-RTO: 0 /100 WBCS — SIGNIFICANT CHANGE UP (ref 0–0)
PLATELET # BLD AUTO: 99 K/UL — LOW (ref 150–400)
RBC # BLD: 3.16 M/UL — LOW (ref 4.2–5.8)
RBC # FLD: 15.5 % — HIGH (ref 10.3–14.5)
WBC # BLD: 4.35 K/UL — SIGNIFICANT CHANGE UP (ref 3.8–10.5)
WBC # FLD AUTO: 4.35 K/UL — SIGNIFICANT CHANGE UP (ref 3.8–10.5)

## 2025-03-14 PROCEDURE — 99213 OFFICE O/P EST LOW 20 MIN: CPT

## 2025-03-17 ENCOUNTER — APPOINTMENT (OUTPATIENT)
Dept: GERIATRICS | Facility: CLINIC | Age: 65
End: 2025-03-17
Payer: MEDICARE

## 2025-03-17 VITALS
WEIGHT: 276 LBS | SYSTOLIC BLOOD PRESSURE: 136 MMHG | OXYGEN SATURATION: 97 % | DIASTOLIC BLOOD PRESSURE: 82 MMHG | HEART RATE: 74 BPM | BODY MASS INDEX: 41.97 KG/M2 | TEMPERATURE: 98.3 F | RESPIRATION RATE: 18 BRPM

## 2025-03-17 DIAGNOSIS — C83.30 DIFFUSE LARGE B-CELL LYMPHOMA, UNSPECIFIED SITE: ICD-10-CM

## 2025-03-17 DIAGNOSIS — Z51.5 ENCOUNTER FOR PALLIATIVE CARE: ICD-10-CM

## 2025-03-17 DIAGNOSIS — K59.00 CONSTIPATION, UNSPECIFIED: ICD-10-CM

## 2025-03-17 DIAGNOSIS — G89.29 OTHER CHRONIC PAIN: ICD-10-CM

## 2025-03-17 DIAGNOSIS — F31.60 BIPOLAR DISORDER, CURRENT EPISODE MIXED, UNSPECIFIED: ICD-10-CM

## 2025-03-17 PROCEDURE — 99214 OFFICE O/P EST MOD 30 MIN: CPT

## 2025-03-17 RX ORDER — TIZANIDINE 4 MG/1
4 TABLET ORAL 3 TIMES DAILY
Qty: 90 | Refills: 2 | Status: ACTIVE | COMMUNITY
Start: 2025-03-17 | End: 1900-01-01

## 2025-03-17 RX ORDER — SENNOSIDES 8.6 MG TABLETS 8.6 MG/1
8.6 TABLET ORAL
Qty: 60 | Refills: 3 | Status: ACTIVE | COMMUNITY
Start: 2025-03-17 | End: 1900-01-01

## 2025-03-21 ENCOUNTER — APPOINTMENT (OUTPATIENT)
Dept: INFUSION THERAPY | Facility: HOSPITAL | Age: 65
End: 2025-03-21

## 2025-03-21 ENCOUNTER — NON-APPOINTMENT (OUTPATIENT)
Age: 65
End: 2025-03-21

## 2025-03-21 ENCOUNTER — RESULT REVIEW (OUTPATIENT)
Age: 65
End: 2025-03-21

## 2025-03-21 ENCOUNTER — APPOINTMENT (OUTPATIENT)
Dept: HEMATOLOGY ONCOLOGY | Facility: CLINIC | Age: 65
End: 2025-03-21

## 2025-03-21 LAB
BASOPHILS # BLD AUTO: 0.09 K/UL — SIGNIFICANT CHANGE UP (ref 0–0.2)
BASOPHILS NFR BLD AUTO: 2.3 % — HIGH (ref 0–2)
EOSINOPHIL # BLD AUTO: 0.07 K/UL — SIGNIFICANT CHANGE UP (ref 0–0.5)
EOSINOPHIL NFR BLD AUTO: 1.8 % — SIGNIFICANT CHANGE UP (ref 0–6)
HCT VFR BLD CALC: 30.3 % — LOW (ref 39–50)
HGB BLD-MCNC: 10.6 G/DL — LOW (ref 13–17)
IMM GRANULOCYTES NFR BLD AUTO: 0.3 % — SIGNIFICANT CHANGE UP (ref 0–0.9)
LYMPHOCYTES # BLD AUTO: 0.63 K/UL — LOW (ref 1–3.3)
LYMPHOCYTES # BLD AUTO: 16.2 % — SIGNIFICANT CHANGE UP (ref 13–44)
MCHC RBC-ENTMCNC: 34.4 PG — HIGH (ref 27–34)
MCHC RBC-ENTMCNC: 35 G/DL — SIGNIFICANT CHANGE UP (ref 32–36)
MCV RBC AUTO: 98.4 FL — SIGNIFICANT CHANGE UP (ref 80–100)
MONOCYTES # BLD AUTO: 1.12 K/UL — HIGH (ref 0–0.9)
MONOCYTES NFR BLD AUTO: 28.8 % — HIGH (ref 2–14)
NEUTROPHILS # BLD AUTO: 1.97 K/UL — SIGNIFICANT CHANGE UP (ref 1.8–7.4)
NEUTROPHILS NFR BLD AUTO: 50.6 % — SIGNIFICANT CHANGE UP (ref 43–77)
NRBC BLD AUTO-RTO: 0 /100 WBCS — SIGNIFICANT CHANGE UP (ref 0–0)
PLATELET # BLD AUTO: 116 K/UL — LOW (ref 150–400)
RBC # BLD: 3.08 M/UL — LOW (ref 4.2–5.8)
RBC # FLD: 15.4 % — HIGH (ref 10.3–14.5)
WBC # BLD: 3.89 K/UL — SIGNIFICANT CHANGE UP (ref 3.8–10.5)
WBC # FLD AUTO: 3.89 K/UL — SIGNIFICANT CHANGE UP (ref 3.8–10.5)

## 2025-03-22 ENCOUNTER — NON-APPOINTMENT (OUTPATIENT)
Age: 65
End: 2025-03-22

## 2025-03-24 DIAGNOSIS — Z51.89 ENCOUNTER FOR OTHER SPECIFIED AFTERCARE: ICD-10-CM

## 2025-03-24 DIAGNOSIS — Z51.11 ENCOUNTER FOR ANTINEOPLASTIC CHEMOTHERAPY: ICD-10-CM

## 2025-03-24 DIAGNOSIS — C83.398 DIFFUSE LARGE B-CELL LYMPHOMA OF OTHER EXTRANODAL AND SOLID ORGAN SITES: ICD-10-CM

## 2025-03-24 DIAGNOSIS — R11.2 NAUSEA WITH VOMITING, UNSPECIFIED: ICD-10-CM

## 2025-03-24 RX ORDER — METHADONE HYDROCHLORIDE 5 MG/1
5 TABLET ORAL TWICE DAILY
Qty: 30 | Refills: 0 | Status: ACTIVE | COMMUNITY
Start: 2025-03-24 | End: 1900-01-01

## 2025-04-04 ENCOUNTER — APPOINTMENT (OUTPATIENT)
Dept: HEMATOLOGY ONCOLOGY | Facility: CLINIC | Age: 65
End: 2025-04-04

## 2025-04-04 ENCOUNTER — RESULT CHARGE (OUTPATIENT)
Age: 65
End: 2025-04-04

## 2025-04-04 ENCOUNTER — APPOINTMENT (OUTPATIENT)
Dept: HEMATOLOGY ONCOLOGY | Facility: CLINIC | Age: 65
End: 2025-04-04
Payer: MEDICARE

## 2025-04-04 ENCOUNTER — NON-APPOINTMENT (OUTPATIENT)
Age: 65
End: 2025-04-04

## 2025-04-04 ENCOUNTER — RESULT REVIEW (OUTPATIENT)
Age: 65
End: 2025-04-04

## 2025-04-04 VITALS
TEMPERATURE: 97.6 F | BODY MASS INDEX: 42.64 KG/M2 | DIASTOLIC BLOOD PRESSURE: 60 MMHG | SYSTOLIC BLOOD PRESSURE: 91 MMHG | RESPIRATION RATE: 18 BRPM | WEIGHT: 280.41 LBS | HEART RATE: 62 BPM | OXYGEN SATURATION: 95 %

## 2025-04-04 DIAGNOSIS — C83.30 DIFFUSE LARGE B-CELL LYMPHOMA, UNSPECIFIED SITE: ICD-10-CM

## 2025-04-04 LAB
BASOPHILS # BLD AUTO: 0.09 K/UL — SIGNIFICANT CHANGE UP (ref 0–0.2)
BASOPHILS NFR BLD AUTO: 1.6 % — SIGNIFICANT CHANGE UP (ref 0–2)
EOSINOPHIL # BLD AUTO: 0.18 K/UL — SIGNIFICANT CHANGE UP (ref 0–0.5)
EOSINOPHIL NFR BLD AUTO: 3.2 % — SIGNIFICANT CHANGE UP (ref 0–6)
HCT VFR BLD CALC: 30.8 % — LOW (ref 39–50)
HGB BLD-MCNC: 10.5 G/DL — LOW (ref 13–17)
IMM GRANULOCYTES NFR BLD AUTO: 1.1 % — HIGH (ref 0–0.9)
LYMPHOCYTES # BLD AUTO: 0.69 K/UL — LOW (ref 1–3.3)
LYMPHOCYTES # BLD AUTO: 12.2 % — LOW (ref 13–44)
MCHC RBC-ENTMCNC: 34.1 G/DL — SIGNIFICANT CHANGE UP (ref 32–36)
MCHC RBC-ENTMCNC: 34.4 PG — HIGH (ref 27–34)
MCV RBC AUTO: 101 FL — HIGH (ref 80–100)
MONOCYTES # BLD AUTO: 0.99 K/UL — HIGH (ref 0–0.9)
MONOCYTES NFR BLD AUTO: 17.5 % — HIGH (ref 2–14)
NEUTROPHILS # BLD AUTO: 3.64 K/UL — SIGNIFICANT CHANGE UP (ref 1.8–7.4)
NEUTROPHILS NFR BLD AUTO: 64.4 % — SIGNIFICANT CHANGE UP (ref 43–77)
NRBC BLD AUTO-RTO: 0 /100 WBCS — SIGNIFICANT CHANGE UP (ref 0–0)
PLATELET # BLD AUTO: 77 K/UL — LOW (ref 150–400)
RBC # BLD: 3.05 M/UL — LOW (ref 4.2–5.8)
RBC # FLD: 14.5 % — SIGNIFICANT CHANGE UP (ref 10.3–14.5)
WBC # BLD: 5.65 K/UL — SIGNIFICANT CHANGE UP (ref 3.8–10.5)
WBC # FLD AUTO: 5.65 K/UL — SIGNIFICANT CHANGE UP (ref 3.8–10.5)

## 2025-04-04 PROCEDURE — 99212 OFFICE O/P EST SF 10 MIN: CPT

## 2025-04-04 PROCEDURE — 93010 ELECTROCARDIOGRAM REPORT: CPT

## 2025-04-11 ENCOUNTER — RESULT REVIEW (OUTPATIENT)
Age: 65
End: 2025-04-11

## 2025-04-11 ENCOUNTER — APPOINTMENT (OUTPATIENT)
Dept: HEMATOLOGY ONCOLOGY | Facility: CLINIC | Age: 65
End: 2025-04-11

## 2025-04-11 ENCOUNTER — APPOINTMENT (OUTPATIENT)
Dept: INFUSION THERAPY | Facility: HOSPITAL | Age: 65
End: 2025-04-11

## 2025-04-11 LAB
BASOPHILS # BLD AUTO: 0.14 K/UL — SIGNIFICANT CHANGE UP (ref 0–0.2)
BASOPHILS NFR BLD AUTO: 3 % — HIGH (ref 0–2)
EOSINOPHIL # BLD AUTO: 0.08 K/UL — SIGNIFICANT CHANGE UP (ref 0–0.5)
EOSINOPHIL NFR BLD AUTO: 1.7 % — SIGNIFICANT CHANGE UP (ref 0–6)
HCT VFR BLD CALC: 35 % — LOW (ref 39–50)
HGB BLD-MCNC: 12.4 G/DL — LOW (ref 13–17)
IMM GRANULOCYTES NFR BLD AUTO: 3.8 % — HIGH (ref 0–0.9)
LYMPHOCYTES # BLD AUTO: 0.92 K/UL — LOW (ref 1–3.3)
LYMPHOCYTES # BLD AUTO: 19.5 % — SIGNIFICANT CHANGE UP (ref 13–44)
MCHC RBC-ENTMCNC: 34.7 PG — HIGH (ref 27–34)
MCHC RBC-ENTMCNC: 35.4 G/DL — SIGNIFICANT CHANGE UP (ref 32–36)
MCV RBC AUTO: 98 FL — SIGNIFICANT CHANGE UP (ref 80–100)
MONOCYTES # BLD AUTO: 1.06 K/UL — HIGH (ref 0–0.9)
MONOCYTES NFR BLD AUTO: 22.5 % — HIGH (ref 2–14)
NEUTROPHILS # BLD AUTO: 2.34 K/UL — SIGNIFICANT CHANGE UP (ref 1.8–7.4)
NEUTROPHILS NFR BLD AUTO: 49.5 % — SIGNIFICANT CHANGE UP (ref 43–77)
NRBC BLD AUTO-RTO: 0 /100 WBCS — SIGNIFICANT CHANGE UP (ref 0–0)
PLATELET # BLD AUTO: 146 K/UL — LOW (ref 150–400)
RBC # BLD: 3.57 M/UL — LOW (ref 4.2–5.8)
RBC # FLD: 14.3 % — SIGNIFICANT CHANGE UP (ref 10.3–14.5)
WBC # BLD: 4.72 K/UL — SIGNIFICANT CHANGE UP (ref 3.8–10.5)
WBC # FLD AUTO: 4.72 K/UL — SIGNIFICANT CHANGE UP (ref 3.8–10.5)

## 2025-05-11 NOTE — PRE-OP CHECKLIST - WARM FLUIDS/WARM BLANKETS
Dr Cueva at bedside with pt- talking with pt and son via phone call.  
Spoke to patients son Loyd, he made this RN aware that meeting with hospice [facility] is arranged for tomorrow morning at 9:30 am.   
no

## 2025-05-15 ENCOUNTER — NON-APPOINTMENT (OUTPATIENT)
Age: 65
End: 2025-05-15

## 2025-05-16 ENCOUNTER — EMERGENCY (EMERGENCY)
Facility: HOSPITAL | Age: 65
LOS: 0 days | Discharge: HOME | End: 2025-05-16
Attending: EMERGENCY MEDICINE
Payer: MEDICARE

## 2025-05-16 VITALS
HEIGHT: 66.93 IN | SYSTOLIC BLOOD PRESSURE: 106 MMHG | HEART RATE: 95 BPM | DIASTOLIC BLOOD PRESSURE: 80 MMHG | TEMPERATURE: 98 F | OXYGEN SATURATION: 98 % | RESPIRATION RATE: 18 BRPM | WEIGHT: 173.06 LBS

## 2025-05-16 VITALS
OXYGEN SATURATION: 96 % | RESPIRATION RATE: 19 BRPM | TEMPERATURE: 98 F | SYSTOLIC BLOOD PRESSURE: 141 MMHG | HEART RATE: 88 BPM | DIASTOLIC BLOOD PRESSURE: 80 MMHG

## 2025-05-16 DIAGNOSIS — Z90.49 ACQUIRED ABSENCE OF OTHER SPECIFIED PARTS OF DIGESTIVE TRACT: Chronic | ICD-10-CM

## 2025-05-16 DIAGNOSIS — Z98.890 OTHER SPECIFIED POSTPROCEDURAL STATES: Chronic | ICD-10-CM

## 2025-05-16 DIAGNOSIS — Z98.1 ARTHRODESIS STATUS: Chronic | ICD-10-CM

## 2025-05-16 LAB
ADD ON TEST-SPECIMEN IN LAB: SIGNIFICANT CHANGE UP
ALBUMIN SERPL ELPH-MCNC: 3.6 G/DL — SIGNIFICANT CHANGE UP (ref 3.3–5)
ALP SERPL-CCNC: 111 U/L — SIGNIFICANT CHANGE UP (ref 40–120)
ALT FLD-CCNC: 40 U/L — SIGNIFICANT CHANGE UP (ref 12–78)
ANION GAP SERPL CALC-SCNC: 6 MMOL/L — SIGNIFICANT CHANGE UP (ref 5–17)
AST SERPL-CCNC: 52 U/L — HIGH (ref 15–37)
BASOPHILS # BLD AUTO: 0.07 K/UL — SIGNIFICANT CHANGE UP (ref 0–0.2)
BASOPHILS NFR BLD AUTO: 1 % — SIGNIFICANT CHANGE UP (ref 0–2)
BILIRUB SERPL-MCNC: 0.8 MG/DL — SIGNIFICANT CHANGE UP (ref 0.2–1.2)
BUN SERPL-MCNC: 22 MG/DL — SIGNIFICANT CHANGE UP (ref 7–23)
CALCIUM SERPL-MCNC: 9.5 MG/DL — SIGNIFICANT CHANGE UP (ref 8.5–10.1)
CHLORIDE SERPL-SCNC: 101 MMOL/L — SIGNIFICANT CHANGE UP (ref 96–108)
CO2 SERPL-SCNC: 28 MMOL/L — SIGNIFICANT CHANGE UP (ref 22–31)
CREAT SERPL-MCNC: 0.79 MG/DL — SIGNIFICANT CHANGE UP (ref 0.5–1.3)
EGFR: 99 ML/MIN/1.73M2 — SIGNIFICANT CHANGE UP
EGFR: 99 ML/MIN/1.73M2 — SIGNIFICANT CHANGE UP
EOSINOPHIL # BLD AUTO: 0.47 K/UL — SIGNIFICANT CHANGE UP (ref 0–0.5)
EOSINOPHIL NFR BLD AUTO: 6.7 % — HIGH (ref 0–6)
GLUCOSE SERPL-MCNC: 141 MG/DL — HIGH (ref 70–99)
HCT VFR BLD CALC: 35.1 % — LOW (ref 39–50)
HGB BLD-MCNC: 11.8 G/DL — LOW (ref 13–17)
IMM GRANULOCYTES # BLD AUTO: 0.02 K/UL — SIGNIFICANT CHANGE UP (ref 0–0.07)
IMM GRANULOCYTES NFR BLD AUTO: 0.3 % — SIGNIFICANT CHANGE UP (ref 0–0.9)
LACTATE SERPL-SCNC: 1.4 MMOL/L — SIGNIFICANT CHANGE UP (ref 0.7–2)
LYMPHOCYTES # BLD AUTO: 0.7 K/UL — LOW (ref 1–3.3)
LYMPHOCYTES NFR BLD AUTO: 10 % — LOW (ref 13–44)
MACROCYTES BLD QL: SLIGHT — SIGNIFICANT CHANGE UP
MANUAL SMEAR VERIFICATION: SIGNIFICANT CHANGE UP
MCHC RBC-ENTMCNC: 33.6 G/DL — SIGNIFICANT CHANGE UP (ref 32–36)
MCHC RBC-ENTMCNC: 33.7 PG — SIGNIFICANT CHANGE UP (ref 27–34)
MCV RBC AUTO: 100.3 FL — HIGH (ref 80–100)
MONOCYTES # BLD AUTO: 1.39 K/UL — HIGH (ref 0–0.9)
MONOCYTES NFR BLD AUTO: 19.9 % — HIGH (ref 2–14)
NEUTROPHILS # BLD AUTO: 4.32 K/UL — SIGNIFICANT CHANGE UP (ref 1.8–7.4)
NEUTROPHILS NFR BLD AUTO: 62.1 % — SIGNIFICANT CHANGE UP (ref 43–77)
NRBC # BLD AUTO: 0 K/UL — SIGNIFICANT CHANGE UP (ref 0–0)
NRBC # FLD: 0 K/UL — SIGNIFICANT CHANGE UP (ref 0–0)
NRBC BLD AUTO-RTO: 0 /100 WBCS — SIGNIFICANT CHANGE UP (ref 0–0)
PLAT MORPH BLD: NORMAL — SIGNIFICANT CHANGE UP
PLATELET # BLD AUTO: 126 K/UL — LOW (ref 150–400)
PMV BLD: 10 FL — SIGNIFICANT CHANGE UP (ref 7–13)
POIKILOCYTOSIS BLD QL AUTO: SLIGHT — SIGNIFICANT CHANGE UP
POTASSIUM SERPL-MCNC: 4.8 MMOL/L — SIGNIFICANT CHANGE UP (ref 3.5–5.3)
POTASSIUM SERPL-SCNC: 4.8 MMOL/L — SIGNIFICANT CHANGE UP (ref 3.5–5.3)
PROT SERPL-MCNC: 6.9 GM/DL — SIGNIFICANT CHANGE UP (ref 6–8.3)
RBC # BLD: 3.5 M/UL — LOW (ref 4.2–5.8)
RBC # FLD: 12.8 % — SIGNIFICANT CHANGE UP (ref 10.3–14.5)
RBC BLD AUTO: ABNORMAL
SODIUM SERPL-SCNC: 135 MMOL/L — SIGNIFICANT CHANGE UP (ref 135–145)
STOMATOCYTES BLD QL SMEAR: SLIGHT — SIGNIFICANT CHANGE UP
TROPONIN I, HIGH SENSITIVITY RESULT: 44.92 NG/L — SIGNIFICANT CHANGE UP
WBC # BLD: 6.97 K/UL — SIGNIFICANT CHANGE UP (ref 3.8–10.5)
WBC # FLD AUTO: 6.97 K/UL — SIGNIFICANT CHANGE UP (ref 3.8–10.5)
WBC MORPHOLOGY: NORMAL — SIGNIFICANT CHANGE UP

## 2025-05-16 PROCEDURE — 71250 CT THORAX DX C-: CPT

## 2025-05-16 PROCEDURE — 99285 EMERGENCY DEPT VISIT HI MDM: CPT | Mod: 25

## 2025-05-16 PROCEDURE — 36415 COLL VENOUS BLD VENIPUNCTURE: CPT

## 2025-05-16 PROCEDURE — 71250 CT THORAX DX C-: CPT | Mod: 26

## 2025-05-16 PROCEDURE — 36000 PLACE NEEDLE IN VEIN: CPT

## 2025-05-16 PROCEDURE — 93010 ELECTROCARDIOGRAM REPORT: CPT

## 2025-05-16 PROCEDURE — 84484 ASSAY OF TROPONIN QUANT: CPT

## 2025-05-16 PROCEDURE — 70490 CT SOFT TISSUE NECK W/O DYE: CPT

## 2025-05-16 PROCEDURE — 80053 COMPREHEN METABOLIC PANEL: CPT

## 2025-05-16 PROCEDURE — 93005 ELECTROCARDIOGRAM TRACING: CPT

## 2025-05-16 PROCEDURE — 83605 ASSAY OF LACTIC ACID: CPT

## 2025-05-16 PROCEDURE — 85025 COMPLETE CBC W/AUTO DIFF WBC: CPT

## 2025-05-16 PROCEDURE — 99285 EMERGENCY DEPT VISIT HI MDM: CPT

## 2025-05-16 PROCEDURE — 70490 CT SOFT TISSUE NECK W/O DYE: CPT | Mod: 26

## 2025-05-16 NOTE — ED PROVIDER NOTE - PATIENT PORTAL LINK FT
You can access the FollowMyHealth Patient Portal offered by [facility] by registering at the following website: [email or web address]. By joining [employer]’s FollowMyHealth portal, you will also be able to view your health information using other applications (apps) compatible with our system.

## 2025-05-16 NOTE — ED PROVIDER NOTE - NSFOLLOWUPINSTRUCTIONS_ED_ALL_ED_FT
Rhinosinusitis    [facility]    AMBULATORY CARE:    Rhinosinusitis (RS) is inflammation or infection of your nasal passages and sinuses. RS is most often caused by a virus but may be caused by bacteria. Acute RS lasts up to 12 weeks. Chronic RS lasts more than 12 weeks. Recurrent RS means you have 4 or more infections in 1 year.  Sinuses    Common signs and symptoms:    Fever    Pain, pressure, redness, or swelling around the forehead, cheeks, or eyes    Thick yellow or green discharge from your nose    Loss of smell or taste    Dry cough that happens mostly at night or when you lie down    Headache and face pain that is worse when you lean forward    Tooth pain, or pain when you chew  Seek care immediately if:    You have trouble breathing, or wheezing that is getting worse.    You have a stiff neck, a fever, or a bad headache.    You cannot open your eye.    Your eyeball bulges out, or you cannot move your eye.    You are more sleepy than usual, or you notice changes in your ability to think, move, or talk.    You have swelling of your forehead or scalp.  Call your doctor if:    Your symptoms are worse or do not improve after 3 to 5 days of treatment.    You have questions or concerns about your condition or care.  Treatment may not be needed. Your symptoms may go away on their own. Your healthcare provider may recommend watchful waiting for up to 10 days before starting antibiotics. Antibiotics will not help if the infection is caused by a virus. Check with your provider before you take any over-the-counter medicines. You may need any of the following:    Acetaminophen decreases pain and fever. It is available without a doctor's order. Ask how much to take and how often to take it. Follow directions. Read the labels of all other medicines you are using to see if they also contain acetaminophen, or ask your doctor or pharmacist. Acetaminophen can cause liver damage if not taken correctly.    NSAIDs, such as ibuprofen, help decrease swelling, pain, and fever. This medicine is available with or without a doctor's order. NSAIDs can cause stomach bleeding or kidney problems in certain people. If you take blood thinner medicine, always ask your healthcare provider if NSAIDs are safe for you. Always read the medicine label and follow directions.    Nasal steroid sprays help decrease inflammation in your nose and sinuses.    Decongestants help reduce swelling and drain mucus in the nose and sinuses. They may help you breathe easier. Do not use decongestants for more than 3 days.    Antihistamines help dry mucus in the nose and relieve sneezing.    Antibiotics help treat or prevent a bacterial infection.  Self-care:    Rinse your sinuses as directed. Use a sinus rinse device to rinse your nasal passages with a saline (salt water) solution or distilled water. Do not use tap water. A sinus rinse will help thin the mucus in your nose and rinse away pollen and dirt. It will also help lower swelling so you can breathe normally.    Use a humidifier to increase air moisture in your home. Moist air may make it easier for you to breathe and help decrease your cough.    Sleep with your head raised. Place an extra pillow under your head before you go to sleep to help your sinuses drain.    Drink liquids as directed. Ask your healthcare provider how much liquid to drink each day and which liquids are best for you. Liquids will thin the mucus in your nose and help it drain. Do not have drinks that contain alcohol or caffeine.    Do not smoke, and avoid secondhand smoke. Nicotine and other chemicals in cigarettes and cigars can make your symptoms worse. Ask your healthcare provider for information if you currently smoke and need help to quit. E-cigarettes or smokeless tobacco still contain nicotine. Talk to your healthcare provider before you use these products.  Prevent the spread of germs:    Wash your hands often with soap and water. Wash your hands after you use the bathroom, change a child's diaper, or sneeze. Wash your hands before you prepare or eat food.  Handwashing      Stay away from people who are sick. Some germs spread easily and quickly through contact.  Follow up with your doctor as directed: You may be referred to an ear, nose, and throat specialist. Write down your questions so you remember to ask them during your visits.    © [employer] 1973, 2025    	  back to top            © [employer] 1973, 2025

## 2025-05-16 NOTE — ED ADULT NURSE NOTE - CCCP TRG CHIEF CMPLNT
Will send another script that can be filled on or after May 29 at [facility] pharmacy.   sinus congestion/pain

## 2025-05-16 NOTE — ED ADULT TRIAGE NOTE - CHIEF COMPLAINT QUOTE
patient c/o sore throat and sinus infection.  patient states he finished ABX 3  days ago and is not feeling any better.

## 2025-05-16 NOTE — ED ADULT NURSE REASSESSMENT NOTE - NS ED NURSE REASSESS COMMENT FT1
Resumed care from JIMBO Trevino. Patient ambulatory with assistance to the bathroom, in view of nursing station at this time. Safety and comfort measures maintained.

## 2025-05-16 NOTE — ED PROVIDER NOTE - PROGRESS NOTE DETAILS
so by Dr Romeo pending ct 's B Christie TIWARI awaiting ct chest now back, will recommend clar[facility], ct head and chest negative will agustin Soriano DO

## 2025-05-16 NOTE — ED PROVIDER NOTE - HOW PATIENT ADDRESSED, PROFILE
Health Maintenance Due   Topic Date Due    Hepatitis B Vaccine (1 of 3 - 3-dose series) Never done    Influenza Vaccine (1) 09/01/2023    COVID-19 Vaccine (3 - 2023-24 season) 09/01/2023       Patient is due for topics as listed above but is not proceeding with Immunization(s) COVID-19 at this time.     Lacholy, Julio Cesar Plummer

## 2025-05-16 NOTE — ED ADULT NURSE NOTE - CHIEF COMPLAINT
Individual Follow-Up Form    6/22/2017        Clinical Status of Patient: Outpatient    Length of Service: 30 minutes    Continuing Medication: yes  Patches    Other Medications: Lozenge     Target Symptoms: Withdrawal and medication side effects. The following were  rated moderate (3) to severe (4) on TCRS:  · Moderate (3): none  · Severe (4): desire/crave    Comments: Pt seen in office today. He remains tobacco free at this time. He does state that cravings for tobacco remain great, but he manages by sucking on toothpicks. Pt remains on tobacco cessation medication of 21 mg nicotine patch QD and 2 mg nicotine lozenge PRN (1-2 per hour in place of cigarettes). No adverse effects/mental changes noted at this time. Congratulated him on his quit and to continued success and encouraged him to keep up the good work. Reviewed coping strategies/habitual behavior/relapse prevention with patient.  Will see pt back in office in 1 wk.     Diagnosis: F17.210    Next Visit: 1 week  
The patient is a 65y Male complaining of sinus congestion/pain.

## 2025-05-16 NOTE — ED ADULT NURSE NOTE - OBJECTIVE STATEMENT
patient complains of sore throat worsening x days , recent diagnosed with strep throat and completed course of po antibiotics

## 2025-05-20 ENCOUNTER — APPOINTMENT (OUTPATIENT)
Dept: CT IMAGING | Facility: CLINIC | Age: 65
End: 2025-05-20
Payer: MEDICARE

## 2025-05-20 ENCOUNTER — OUTPATIENT (OUTPATIENT)
Dept: OUTPATIENT SERVICES | Facility: HOSPITAL | Age: 65
LOS: 1 days | End: 2025-05-20
Payer: MEDICARE

## 2025-05-20 DIAGNOSIS — Z98.1 ARTHRODESIS STATUS: Chronic | ICD-10-CM

## 2025-05-20 DIAGNOSIS — Z98.890 OTHER SPECIFIED POSTPROCEDURAL STATES: Chronic | ICD-10-CM

## 2025-05-20 DIAGNOSIS — C83.30 DIFFUSE LARGE B-CELL LYMPHOMA, UNSPECIFIED SITE: ICD-10-CM

## 2025-05-20 DIAGNOSIS — Z90.49 ACQUIRED ABSENCE OF OTHER SPECIFIED PARTS OF DIGESTIVE TRACT: Chronic | ICD-10-CM

## 2025-05-20 PROCEDURE — 74176 CT ABD & PELVIS W/O CONTRAST: CPT | Mod: 26

## 2025-05-20 PROCEDURE — 74176 CT ABD & PELVIS W/O CONTRAST: CPT

## 2025-05-22 ENCOUNTER — APPOINTMENT (OUTPATIENT)
Dept: GERIATRICS | Facility: CLINIC | Age: 65
End: 2025-05-22
Payer: MEDICARE

## 2025-05-22 VITALS
OXYGEN SATURATION: 98 % | RESPIRATION RATE: 19 BRPM | TEMPERATURE: 97 F | DIASTOLIC BLOOD PRESSURE: 94 MMHG | SYSTOLIC BLOOD PRESSURE: 141 MMHG | HEART RATE: 80 BPM

## 2025-05-22 DIAGNOSIS — M25.562 PAIN IN LEFT KNEE: ICD-10-CM

## 2025-05-22 DIAGNOSIS — K59.00 CONSTIPATION, UNSPECIFIED: ICD-10-CM

## 2025-05-22 DIAGNOSIS — Z51.5 ENCOUNTER FOR PALLIATIVE CARE: ICD-10-CM

## 2025-05-22 DIAGNOSIS — C83.30 DIFFUSE LARGE B-CELL LYMPHOMA, UNSPECIFIED SITE: ICD-10-CM

## 2025-05-22 PROCEDURE — 99214 OFFICE O/P EST MOD 30 MIN: CPT

## 2025-06-09 ENCOUNTER — RX RENEWAL (OUTPATIENT)
Age: 65
End: 2025-06-09

## 2025-06-27 ENCOUNTER — RX RENEWAL (OUTPATIENT)
Age: 65
End: 2025-06-27

## 2025-06-30 ENCOUNTER — NON-APPOINTMENT (OUTPATIENT)
Age: 65
End: 2025-06-30

## 2025-07-09 NOTE — ED STATDOCS - NSICDXPASTSURGICALHX_GEN_ALL_CORE_FT
Call hub created for triage   PAST SURGICAL HISTORY:  H/O exploratory laparotomy     H/O hernia repair abdominal    History of appendectomy     History of lumbar fusion     S/P cholecystectomy     S/P small bowel resection

## 2025-07-18 ENCOUNTER — OUTPATIENT (OUTPATIENT)
Dept: OUTPATIENT SERVICES | Facility: HOSPITAL | Age: 65
LOS: 1 days | Discharge: HOME | End: 2025-07-18

## 2025-07-18 DIAGNOSIS — Z90.49 ACQUIRED ABSENCE OF OTHER SPECIFIED PARTS OF DIGESTIVE TRACT: Chronic | ICD-10-CM

## 2025-07-18 DIAGNOSIS — C83.30 DIFFUSE LARGE B-CELL LYMPHOMA, UNSPECIFIED SITE: ICD-10-CM

## 2025-07-18 DIAGNOSIS — Z98.890 OTHER SPECIFIED POSTPROCEDURAL STATES: Chronic | ICD-10-CM

## 2025-07-18 DIAGNOSIS — Z98.1 ARTHRODESIS STATUS: Chronic | ICD-10-CM

## 2025-07-21 ENCOUNTER — APPOINTMENT (OUTPATIENT)
Dept: HEMATOLOGY ONCOLOGY | Facility: CLINIC | Age: 65
End: 2025-07-21
Payer: MEDICARE

## 2025-07-21 VITALS
SYSTOLIC BLOOD PRESSURE: 113 MMHG | HEART RATE: 63 BPM | TEMPERATURE: 97.2 F | OXYGEN SATURATION: 96 % | DIASTOLIC BLOOD PRESSURE: 70 MMHG | BODY MASS INDEX: 42.57 KG/M2 | RESPIRATION RATE: 18 BRPM | WEIGHT: 279.97 LBS

## 2025-07-21 DIAGNOSIS — C83.30 DIFFUSE LARGE B-CELL LYMPHOMA, UNSPECIFIED SITE: ICD-10-CM

## 2025-07-21 PROCEDURE — 99215 OFFICE O/P EST HI 40 MIN: CPT

## 2025-07-21 PROCEDURE — G2211 COMPLEX E/M VISIT ADD ON: CPT

## 2025-07-23 ENCOUNTER — APPOINTMENT (OUTPATIENT)
Dept: OTOLARYNGOLOGY | Facility: CLINIC | Age: 65
End: 2025-07-23
Payer: MEDICARE

## 2025-07-23 VITALS — HEIGHT: 68 IN | BODY MASS INDEX: 42.28 KG/M2 | WEIGHT: 279 LBS

## 2025-07-23 DIAGNOSIS — J32.0 CHRONIC MAXILLARY SINUSITIS: ICD-10-CM

## 2025-07-23 DIAGNOSIS — R09.81 NASAL CONGESTION: ICD-10-CM

## 2025-07-23 DIAGNOSIS — J34.3 HYPERTROPHY OF NASAL TURBINATES: ICD-10-CM

## 2025-07-23 DIAGNOSIS — J34.2 DEVIATED NASAL SEPTUM: ICD-10-CM

## 2025-07-23 PROCEDURE — 99214 OFFICE O/P EST MOD 30 MIN: CPT | Mod: 25

## 2025-07-23 PROCEDURE — 31231 NASAL ENDOSCOPY DX: CPT

## 2025-07-23 RX ORDER — MELOXICAM 15 MG/1
15 TABLET ORAL
Qty: 30 | Refills: 0 | Status: ACTIVE | COMMUNITY
Start: 2025-06-13

## 2025-07-23 RX ORDER — AZELASTINE HYDROCHLORIDE 137 UG/1
0.1 SPRAY, METERED NASAL TWICE DAILY
Qty: 1 | Refills: 4 | Status: ACTIVE | COMMUNITY
Start: 2025-07-23 | End: 1900-01-01

## 2025-07-23 RX ORDER — FLUTICASONE PROPIONATE 50 UG/1
50 SPRAY NASAL DAILY
Qty: 1 | Refills: 0 | Status: ACTIVE | COMMUNITY
Start: 2025-07-23 | End: 1900-01-01

## 2025-08-06 ENCOUNTER — APPOINTMENT (OUTPATIENT)
Dept: PAIN MANAGEMENT | Facility: CLINIC | Age: 65
End: 2025-08-06
Payer: MEDICARE

## 2025-08-06 VITALS — HEIGHT: 68 IN | WEIGHT: 278 LBS | BODY MASS INDEX: 42.13 KG/M2

## 2025-08-06 DIAGNOSIS — G89.29 OTHER CHRONIC PAIN: ICD-10-CM

## 2025-08-06 PROCEDURE — 99204 OFFICE O/P NEW MOD 45 MIN: CPT

## 2025-08-07 ENCOUNTER — APPOINTMENT (OUTPATIENT)
Dept: CT IMAGING | Facility: CLINIC | Age: 65
End: 2025-08-07

## 2025-08-07 ENCOUNTER — OUTPATIENT (OUTPATIENT)
Dept: OUTPATIENT SERVICES | Facility: HOSPITAL | Age: 65
LOS: 1 days | End: 2025-08-07
Payer: MEDICARE

## 2025-08-07 DIAGNOSIS — Z98.1 ARTHRODESIS STATUS: Chronic | ICD-10-CM

## 2025-08-07 DIAGNOSIS — Z00.8 ENCOUNTER FOR OTHER GENERAL EXAMINATION: ICD-10-CM

## 2025-08-07 DIAGNOSIS — Z98.890 OTHER SPECIFIED POSTPROCEDURAL STATES: Chronic | ICD-10-CM

## 2025-08-07 DIAGNOSIS — J32.0 CHRONIC MAXILLARY SINUSITIS: ICD-10-CM

## 2025-08-07 DIAGNOSIS — Z90.49 ACQUIRED ABSENCE OF OTHER SPECIFIED PARTS OF DIGESTIVE TRACT: Chronic | ICD-10-CM

## 2025-08-07 PROCEDURE — 70486 CT MAXILLOFACIAL W/O DYE: CPT

## 2025-08-07 PROCEDURE — 70486 CT MAXILLOFACIAL W/O DYE: CPT | Mod: 26

## 2025-08-20 ENCOUNTER — NON-APPOINTMENT (OUTPATIENT)
Age: 65
End: 2025-08-20

## 2025-09-08 ENCOUNTER — APPOINTMENT (OUTPATIENT)
Dept: OTOLARYNGOLOGY | Facility: CLINIC | Age: 65
End: 2025-09-08
Payer: MEDICARE

## 2025-09-08 VITALS
HEIGHT: 68 IN | BODY MASS INDEX: 42.13 KG/M2 | DIASTOLIC BLOOD PRESSURE: 70 MMHG | WEIGHT: 278 LBS | SYSTOLIC BLOOD PRESSURE: 113 MMHG

## 2025-09-08 DIAGNOSIS — J34.3 HYPERTROPHY OF NASAL TURBINATES: ICD-10-CM

## 2025-09-08 DIAGNOSIS — J34.2 DEVIATED NASAL SEPTUM: ICD-10-CM

## 2025-09-08 DIAGNOSIS — J32.0 CHRONIC MAXILLARY SINUSITIS: ICD-10-CM

## 2025-09-08 PROCEDURE — 99214 OFFICE O/P EST MOD 30 MIN: CPT | Mod: 25

## 2025-09-08 PROCEDURE — 31231 NASAL ENDOSCOPY DX: CPT

## 2025-09-16 DIAGNOSIS — C83.30 DIFFUSE LARGE B-CELL LYMPHOMA, UNSPECIFIED SITE: ICD-10-CM

## 2025-09-17 ENCOUNTER — APPOINTMENT (OUTPATIENT)
Dept: PAIN MANAGEMENT | Facility: CLINIC | Age: 65
End: 2025-09-17

## 2025-09-24 PROBLEM — M54.16 CHRONIC LUMBAR RADICULOPATHY: Status: ACTIVE | Noted: 2025-09-24

## (undated) DEVICE — DRAIN PENROSE .25" X 18" LATEX

## (undated) DEVICE — PORT LP ACCESS AND OBTURATOR W BLADELESS OPT TIP 12X120MM

## (undated) DEVICE — ELCTR BOVIE TIP BLADE INSULATED 2.75" EDGE

## (undated) DEVICE — SUT VICRYL 0 27" UR-6

## (undated) DEVICE — SYR LUER LOK 50CC

## (undated) DEVICE — NDL SPINAL 18G X 3.5" (PINK)

## (undated) DEVICE — GOWN LG

## (undated) DEVICE — XI ARM FORCEP CADIERE 8MM

## (undated) DEVICE — PACK IV START WITH CHG

## (undated) DEVICE — TUBING IV SET GRAVITY 3Y 100" MACRO

## (undated) DEVICE — TUBING MEDI-VAC W MAXIGRIP CONNECTORS 1/4"X6'

## (undated) DEVICE — PACK LIJ BASIC ORTHO

## (undated) DEVICE — XI NEEDLE DRIVER LARGE

## (undated) DEVICE — DRSG BANDAID 0.75X3"

## (undated) DEVICE — DRSG GAUZE PACKTNER ROLL

## (undated) DEVICE — XI ARM NEEDLE DRIVER SUTURECUT MEGA 8MM

## (undated) DEVICE — ELCTR BOVIE PENCIL SMOKE EVACUATION

## (undated) DEVICE — UNDERPAD LINEN SAVER 17 X 24"

## (undated) DEVICE — TROCAR COVIDIEN VERSAPORT BLADELESS OPTICAL 5MM STANDARD

## (undated) DEVICE — SUT MONOCRYL 4-0 27" PS-2 UNDYED

## (undated) DEVICE — XI ARM GRASPER TIP UP FENESTRATED

## (undated) DEVICE — BLADE SURGICAL #15 CARBON

## (undated) DEVICE — XI ARM CLIP APPLIER MEDIUM-LARGE

## (undated) DEVICE — DRSG TELFA 3 X 8

## (undated) DEVICE — DRAPE COVER SNAP 36X30"

## (undated) DEVICE — GRASPER LAPA 5MMX35CM

## (undated) DEVICE — CONTAINER FORMALIN 10% 20ML

## (undated) DEVICE — XI ARM PERMANENT CAUTERY HOOK

## (undated) DEVICE — DRAPE IOBAN 23" X 23"

## (undated) DEVICE — SUT VICRYL PLUS 2-0 27" FS-1 UNDYED

## (undated) DEVICE — TROCAR SURGIQUEST AIRSEAL 8MMX120MM

## (undated) DEVICE — TRAP QUICK CATCH  SINGL CHAMBER

## (undated) DEVICE — DRSG 2X2

## (undated) DEVICE — PREP CHLORAPREP HI-LITE ORANGE 26ML

## (undated) DEVICE — TUBING STRYKEFLOW II SUCTION / IRRIGATOR

## (undated) DEVICE — DRSG STERISTRIPS 0.25 X 3"

## (undated) DEVICE — XI ARM FORCEP MARYLAND BIPOLAR

## (undated) DEVICE — POSITIONER STRAP ARMBOARD VELCRO TS-30

## (undated) DEVICE — SUT VICRYL PLUS 3-0 27" RB-1 UNDYED

## (undated) DEVICE — DRSG STOCKINETTE IMPERVIOUS XL 12 X 48"

## (undated) DEVICE — ELCTR BOVIE TIP NEEDLE INSULATED 2.8" EDGE

## (undated) DEVICE — FOLEY HOLDER STATLOCK 2 WAY ADULT

## (undated) DEVICE — XI VESSEL SEALER

## (undated) DEVICE — BASIN EMESIS 10IN GRADUATED MAUVE

## (undated) DEVICE — TAPE SILK 3"

## (undated) DEVICE — XI ARM PERMANENT CAUTERY SPATULA

## (undated) DEVICE — BIOPSY FORCEP COLD DISP

## (undated) DEVICE — SALIVA EJECTOR (BLUE)

## (undated) DEVICE — BIOPSY FORCEP RADIAL JAW 4 STANDARD WITH NEEDLE

## (undated) DEVICE — BITE BLOCK ADULT 20 X 27MM (GREEN)

## (undated) DEVICE — XI ARM FORCEP PROGRASP 8MM

## (undated) DEVICE — LINE BREATHE SAMPLNG

## (undated) DEVICE — LUBRICATING JELLY HR ONE SHOT 3G

## (undated) DEVICE — XI ARM GRASPER BIPOLAR LONG 8MM

## (undated) DEVICE — DRSG COBAN 4"

## (undated) DEVICE — SYR LUER LOK 5CC

## (undated) DEVICE — D HELP - CLEARVIEW CLEARIFY SYSTEM

## (undated) DEVICE — XI 12MM AND STAPLER CANNULA SEAL

## (undated) DEVICE — CLAMP BX HOT RAD JAW 3

## (undated) DEVICE — PACK ROBOTIC LIJ

## (undated) DEVICE — XI ARM FORCEP TENACULUM

## (undated) DEVICE — ELCTR GROUNDING PAD INFANT COVIDIEN

## (undated) DEVICE — XI ARM SCISSOR MONO CURVED

## (undated) DEVICE — TUBING AIRSEAL TRI-LUMEN FILTERED

## (undated) DEVICE — FACESHIELD FULL VISOR

## (undated) DEVICE — LUBRICATING JELLY ONESHOT 1.25OZ

## (undated) DEVICE — TUBING TRUWAVE PRESSURE MALE/FEMALE 72"

## (undated) DEVICE — XI OBTURATOR OPTICAL BLADELESS 8MM

## (undated) DEVICE — ELCTR GROUNDING PAD ADULT COVIDIEN

## (undated) DEVICE — DRSG CURITY GAUZE SPONGE 4 X 4" 12-PLY NON-STERILE

## (undated) DEVICE — TRAP SPECIMEN SPUTUM 40CC

## (undated) DEVICE — MARKING PEN W RULER

## (undated) DEVICE — BLADE SCALPEL SAFETYLOCK #10

## (undated) DEVICE — VENODYNE/SCD SLEEVE CALF MEDIUM

## (undated) DEVICE — SUT ETHILON 2-0 18" FS

## (undated) DEVICE — CATH IV SAFE BC 22G X 1" (BLUE)

## (undated) DEVICE — XI DRAPE COLUMN

## (undated) DEVICE — SYR LUER LOK 20CC

## (undated) DEVICE — CHEST DRAIN OASIS DRY SUCTION WATER SEAL

## (undated) DEVICE — SUT SILK 2-0 30" SH

## (undated) DEVICE — WARMING BLANKET LOWER ADULT

## (undated) DEVICE — DENTURE CUP PINK

## (undated) DEVICE — SOL IRR POUR H2O 500ML

## (undated) DEVICE — WARMING BLANKET FULL ADULT

## (undated) DEVICE — SUT VLOC 180 0 12" GS-21 GREEN

## (undated) DEVICE — XI ARM CLIP APPLIER LARGE

## (undated) DEVICE — DRAPE 3/4 SHEET 52X76"

## (undated) DEVICE — DRAPE SPLIT SHEET 77" X 120"

## (undated) DEVICE — DRAPE U (BLUE) 60 X 60"

## (undated) DEVICE — PROTECTOR HEEL / ELBOW FLUFFY

## (undated) DEVICE — SUT SILK 0 24" SH DA

## (undated) DEVICE — XI DRAPE ARM

## (undated) DEVICE — TUBING SUCTION 20FT

## (undated) DEVICE — SNARE EXACTO COLD 9MMX230CM

## (undated) DEVICE — XI ENDOWRIST 12 - 8 MM CANNULA REDUCER

## (undated) DEVICE — NDL HYPO REGULAR BEVEL 22G X 1.5" (TURQUOISE)

## (undated) DEVICE — SUCTION YANKAUER NO CONTROL VENT

## (undated) DEVICE — XI ARM DISSECTOR CURVED BIPOLAR 8MM

## (undated) DEVICE — CONTAINER FORMALIN 80ML YELLOW

## (undated) DEVICE — TUBING SUCTION CONN 6FT STERILE

## (undated) DEVICE — ELCTR ECG CONDUCTIVE ADHESIVE

## (undated) DEVICE — XI SEAL UNIV 5- 8 MM

## (undated) DEVICE — TUBING SUCTION NONCONDUCTIVE 6MM X 12FT

## (undated) DEVICE — CONNECTOR 5 IN1 SUCTION TUBING

## (undated) DEVICE — DRSG XEROFORM 5 X 9"

## (undated) DEVICE — SLING SHOULDER IMMOBILIZER CLINIC LARGE

## (undated) DEVICE — XI ARM FORCEP FENESTRATED BIPOLAR 8MM